# Patient Record
Sex: FEMALE | Race: WHITE | NOT HISPANIC OR LATINO | Employment: UNEMPLOYED | URBAN - METROPOLITAN AREA
[De-identification: names, ages, dates, MRNs, and addresses within clinical notes are randomized per-mention and may not be internally consistent; named-entity substitution may affect disease eponyms.]

---

## 2017-01-16 ENCOUNTER — OFFICE VISIT (OUTPATIENT)
Dept: INTERNAL MEDICINE | Facility: CLINIC | Age: 52
End: 2017-01-16
Payer: MEDICARE

## 2017-01-16 VITALS
HEART RATE: 87 BPM | TEMPERATURE: 99 F | SYSTOLIC BLOOD PRESSURE: 117 MMHG | WEIGHT: 169.31 LBS | HEIGHT: 64 IN | OXYGEN SATURATION: 96 % | DIASTOLIC BLOOD PRESSURE: 70 MMHG | RESPIRATION RATE: 17 BRPM | BODY MASS INDEX: 28.91 KG/M2

## 2017-01-16 DIAGNOSIS — Z01.419 WELL WOMAN EXAM WITH ROUTINE GYNECOLOGICAL EXAM: ICD-10-CM

## 2017-01-16 DIAGNOSIS — F98.8 ADD (ATTENTION DEFICIT DISORDER): ICD-10-CM

## 2017-01-16 DIAGNOSIS — J01.90 ACUTE SINUSITIS, UNSPECIFIED: ICD-10-CM

## 2017-01-16 DIAGNOSIS — Z12.39 BREAST CANCER SCREENING: ICD-10-CM

## 2017-01-16 DIAGNOSIS — F43.0 ACUTE STRESS REACTION: Primary | ICD-10-CM

## 2017-01-16 PROCEDURE — 99999 PR PBB SHADOW E&M-EST. PATIENT-LVL III: CPT | Mod: PBBFAC,,, | Performed by: FAMILY MEDICINE

## 2017-01-16 PROCEDURE — 1159F MED LIST DOCD IN RCRD: CPT | Mod: S$GLB,,, | Performed by: FAMILY MEDICINE

## 2017-01-16 PROCEDURE — 99499 UNLISTED E&M SERVICE: CPT | Mod: S$GLB,,, | Performed by: FAMILY MEDICINE

## 2017-01-16 PROCEDURE — 99214 OFFICE O/P EST MOD 30 MIN: CPT | Mod: S$GLB,,, | Performed by: FAMILY MEDICINE

## 2017-01-16 RX ORDER — LISDEXAMFETAMINE DIMESYLATE 40 MG/1
40 CAPSULE ORAL EVERY MORNING
Qty: 30 CAPSULE | Refills: 0 | Status: SHIPPED | OUTPATIENT
Start: 2017-02-16 | End: 2017-04-21 | Stop reason: SDUPTHER

## 2017-01-16 RX ORDER — LISDEXAMFETAMINE DIMESYLATE 40 MG/1
40 CAPSULE ORAL EVERY MORNING
Qty: 30 CAPSULE | Refills: 0 | Status: SHIPPED | OUTPATIENT
Start: 2017-01-16 | End: 2017-04-21 | Stop reason: SDUPTHER

## 2017-01-16 RX ORDER — AMOXICILLIN 875 MG/1
875 TABLET, FILM COATED ORAL EVERY 12 HOURS
Qty: 14 TABLET | Refills: 0 | Status: SHIPPED | OUTPATIENT
Start: 2017-01-16 | End: 2017-01-23

## 2017-01-16 RX ORDER — ONDANSETRON 4 MG/1
4 TABLET, ORALLY DISINTEGRATING ORAL EVERY 8 HOURS PRN
Qty: 14 TABLET | Refills: 1 | Status: SHIPPED | OUTPATIENT
Start: 2017-01-16 | End: 2017-12-20 | Stop reason: SDUPTHER

## 2017-01-16 RX ORDER — BENZONATATE 100 MG/1
100 CAPSULE ORAL 3 TIMES DAILY PRN
Qty: 30 CAPSULE | Refills: 1 | Status: SHIPPED | OUTPATIENT
Start: 2017-01-16 | End: 2017-08-01 | Stop reason: ALTCHOICE

## 2017-01-16 RX ORDER — LISDEXAMFETAMINE DIMESYLATE 40 MG/1
40 CAPSULE ORAL EVERY MORNING
Qty: 30 CAPSULE | Refills: 0 | Status: SHIPPED | OUTPATIENT
Start: 2017-03-16 | End: 2017-04-21 | Stop reason: SDUPTHER

## 2017-01-16 RX ORDER — CITALOPRAM 20 MG/1
20 TABLET, FILM COATED ORAL DAILY
Qty: 30 TABLET | Refills: 11 | Status: SHIPPED | OUTPATIENT
Start: 2017-01-16 | End: 2018-03-08 | Stop reason: SDUPTHER

## 2017-01-16 RX ORDER — ALBUTEROL SULFATE 1.25 MG/3ML
SOLUTION RESPIRATORY (INHALATION)
Qty: 75 ML | Refills: 6 | Status: SHIPPED | OUTPATIENT
Start: 2017-01-16 | End: 2018-07-16 | Stop reason: SDUPTHER

## 2017-01-16 RX ORDER — BUTALBITAL, ACETAMINOPHEN AND CAFFEINE 50; 325; 40 MG/1; MG/1; MG/1
1 TABLET ORAL EVERY 6 HOURS PRN
Qty: 30 TABLET | Refills: 2 | Status: SHIPPED | OUTPATIENT
Start: 2017-01-16 | End: 2017-02-15

## 2017-01-16 NOTE — PROGRESS NOTES
Chief Complaint:    Chief Complaint   Patient presents with    Medication Refill       History of Present Illness:    She presents today for ADD refill.  The medicines are working well.  She does a positive stress she has several people living in her house which is stressing out.    Her son was in drug rehabilitation.  She gets stressed out cries she is not suicidal or homicidal.  She does want something to help relieve the stress.    She's also had some sinus congestion cough postnasal drip for the past few days.            ROS:  Review of Systems   Constitutional: Negative for activity change, chills, fatigue, fever and unexpected weight change.   HENT: Positive for congestion, postnasal drip, rhinorrhea and sinus pressure. Negative for ear discharge, ear pain and hearing loss.    Eyes: Negative for pain and visual disturbance.   Respiratory: Positive for cough. Negative for chest tightness and shortness of breath.    Cardiovascular: Negative for chest pain and palpitations.   Gastrointestinal: Negative for abdominal pain, diarrhea and vomiting.   Endocrine: Negative for heat intolerance.   Genitourinary: Negative for dysuria, flank pain, frequency and hematuria.   Musculoskeletal: Negative for back pain, gait problem and neck pain.   Skin: Negative for color change and rash.   Neurological: Negative for dizziness, tremors, seizures, numbness and headaches.   Psychiatric/Behavioral: Negative for agitation, hallucinations, self-injury, sleep disturbance and suicidal ideas. The patient is not nervous/anxious.        Past Medical History   Diagnosis Date    ADD (attention deficit disorder with hyperactivity)     Anxiety     Asthma      mild intermittent    Depression        Social History:  Social History     Social History    Marital status:      Spouse name: N/A    Number of children: N/A    Years of education: N/A     Social History Main Topics    Smoking status: Current Every Day Smoker      "Packs/day: 0.50     Years: 10.00     Types: Cigarettes    Smokeless tobacco: Never Used    Alcohol use No    Drug use: No    Sexual activity: Yes     Partners: Male     Birth control/ protection: None     Other Topics Concern    None     Social History Narrative       Family History:   family history includes Coronary artery disease (age of onset: 38) in her father; Coronary artery disease (age of onset: 50) in her mother; Lung cancer in her mother. There is no history of Breast cancer, Colon cancer, or Ovarian cancer.    Health Maintenance   Topic Date Due    Lipid Panel  1965    TETANUS VACCINE  11/01/1982    Colonoscopy  11/01/2014    Mammogram  03/24/2016    Influenza Vaccine  08/01/2016    Pap Smear  03/11/2018    Pneumococcal PPSV23 (Medium Risk) (2) 11/01/2029    Hepatitis C Screening  Completed       Physical Exam:    Vital Signs  Temp: 98.5 °F (36.9 °C)  Temp src: Tympanic  Pulse: 87  Resp: 17  SpO2: 96 %  BP: 117/70  BP Location: Left arm  BP Method: Manual  Patient Position: Sitting  Pain Score: 0-No pain  Height and Weight  Height: 5' 4" (162.6 cm)  Weight: 76.8 kg (169 lb 5 oz)  BSA (Calculated - sq m): 1.86 sq meters  BMI (Calculated): 29.1  Weight in (lb) to have BMI = 25: 145.3]    Body mass index is 29.06 kg/(m^2).    Physical Exam   Constitutional: She is oriented to person, place, and time. She appears well-developed.   HENT:   Right Ear: External ear normal.   Left Ear: External ear normal.   Mouth/Throat: Oropharynx is clear and moist.   Eyes: Conjunctivae are normal. Pupils are equal, round, and reactive to light.   Neck: Normal range of motion. Neck supple.   Cardiovascular: Normal rate, regular rhythm and normal heart sounds.    No murmur heard.  Pulmonary/Chest: Effort normal and breath sounds normal. No respiratory distress. She has no wheezes. She has no rales. She exhibits no tenderness.   Abdominal: Soft. She exhibits no distension and no mass. There is no tenderness. " There is no guarding.   Musculoskeletal: She exhibits no edema or tenderness.   Lymphadenopathy:     She has no cervical adenopathy.   Neurological: She is alert and oriented to person, place, and time. She has normal reflexes.   Skin: Skin is warm and dry.   Psychiatric: She has a normal mood and affect. Her behavior is normal. Judgment and thought content normal.       No results found for: CHOL, TRIG, HDL, TOTALCHOLEST, NONHDLCHOL    No results found for: HGBA1C    Assessment:      ICD-10-CM ICD-9-CM   1. Acute stress reaction F43.0 308.9   2. ADD (attention deficit disorder) F98.8 314.00   3. Acute sinusitis, unspecified J01.90 461.9   4. Well woman exam with routine gynecological exam Z01.419 V72.31   5. Breast cancer screening Z12.39 V76.10         Plan:  1.  Acute stress reaction start Celexa  2.  ADD  looked up 3 prescriptions filled for Vyvanse.  2.  Sinusitis acute, treat with amoxicillin.  4.  Schedule a screening mammogram  She will be referred to GYN for Pap smear.  Orders Placed This Encounter   Procedures    Mammo Digital Screening Bilat with CAD    Ambulatory Referral to Obstetrics / Gynecology       Current Outpatient Prescriptions   Medication Sig Dispense Refill    albuterol (ACCUNEB) 1.25 mg/3 mL Nebu USE ONE VIAL IN NEBULIZER EVERY 4 TO 6 HOURS AS NEEDED FOR WHEEZING & SHORTNESS OF BREATH **THANK YOU** 75 mL 6    albuterol (PROAIR HFA) 90 mcg/actuation inhaler Inhale 2 puffs into the lungs every 6 (six) hours as needed for Wheezing. 25.5 g 3    fluticasone (FLONASE) 50 mcg/actuation nasal spray 2 sprays by Each Nare route once daily. 16 g 11    levocetirizine (XYZAL) 5 MG tablet TAKE ONE TABLET BY MOUTH EVERY EVENING **THANK YOU** 30 tablet 6    lisdexamfetamine (VYVANSE) 40 MG Cap Take 1 capsule (40 mg total) by mouth every morning. 30 capsule 0    [START ON 2/16/2017] lisdexamfetamine (VYVANSE) 40 MG Cap Take 1 capsule (40 mg total) by mouth every morning. 30 capsule 0    [START ON  3/16/2017] lisdexamfetamine (VYVANSE) 40 MG Cap Take 1 capsule (40 mg total) by mouth every morning. 30 capsule 0    montelukast (SINGULAIR) 10 mg tablet 1 Tablet Oral Every day 90 tablet 3    omeprazole (PRILOSEC) 40 MG capsule Take 1 capsule (40 mg total) by mouth once daily. 90 capsule 3    ondansetron (ZOFRAN-ODT) 4 MG TbDL Take 1 tablet (4 mg total) by mouth every 8 (eight) hours as needed. 14 tablet 1    tizanidine (ZANAFLEX) 4 mg Cap Take 1 capsule by mouth 2 (two) times daily as needed. 60 capsule 0    amoxicillin (AMOXIL) 875 MG tablet Take 1 tablet (875 mg total) by mouth every 12 (twelve) hours. 14 tablet 0    benzonatate (TESSALON) 100 MG capsule Take 1 capsule (100 mg total) by mouth 3 (three) times daily as needed for Cough. 30 capsule 1    butalbital-acetaminophen-caffeine -40 mg (FIORICET, ESGIC) -40 mg per tablet Take 1 tablet by mouth every 6 (six) hours as needed for Pain. 30 tablet 2    citalopram (CELEXA) 20 MG tablet Take 1 tablet (20 mg total) by mouth once daily. 30 tablet 11    epinephrine (EPIPEN 2-JHONY) 0.3 mg/0.3 mL AtIn Inject 0.3 mLs (0.3 mg total) into the muscle once. 0.3 mL 12     No current facility-administered medications for this visit.        Medications Discontinued During This Encounter   Medication Reason    lisdexamfetamine (VYVANSE) 40 MG Cap Reorder    lisdexamfetamine (VYVANSE) 40 MG Cap Reorder    lisdexamfetamine (VYVANSE) 40 MG Cap Reorder    albuterol (ACCUNEB) 1.25 mg/3 mL Nebu Reorder    ondansetron (ZOFRAN-ODT) 4 MG TbDL Reorder       No Follow-up on file.      Dr Dima Her MD    Disclaimer: This note is prepared using voice recognition system and as such is likely to have errors and is not proof read.

## 2017-01-16 NOTE — MR AVS SNAPSHOT
[facility]  [address] [place] [id number]  Phone: [phone]                  Chasity Blake   2017 10:40 AM   Office Visit    Description:  Female : 1965   Provider:  Dima Her MD   Department:  [facility]           Reason for Visit     Medication Refill           Diagnoses this Visit        Comments    Acute stress reaction    -  Primary     ADD (attention deficit disorder)         Acute sinusitis, unspecified         Well woman exam with routine gynecological exam         Breast cancer screening                To Do List           Future Appointments        Provider Department Dept Phone    2017 10:00 AM ONLH MAMMO1 [facility] [phone]    2017 11:00 AM MD EDIL[facility] - OB/ [phone]    2017 10:40 AM Dima Her MD [facility] [phone]      Goals (5 Years of Data)     None       These Medications        Disp Refills Start End    lisdexamfetamine (VYVANSE) 40 MG Cap 30 capsule 0 2017     Take 1 capsule (40 mg total) by mouth every morning. - Oral    Pharmacy: [facility] [place] [address] Ph #: [phone]       lisdexamfetamine (VYVANSE) 40 MG Cap 30 capsule 0 2017     Take 1 capsule (40 mg total) by mouth every morning. - Oral    Pharmacy: [facility] - [place] [address] Ph #: [phone]       lisdexamfetamine (VYVANSE) 40 MG Cap 30 capsule 0 3/16/2017     Take 1 capsule (40 mg total) by mouth every morning. - Oral    Pharmacy: [facility] [place] [address] Ph #: [phone]       albuterol (ACCUNEB) 1.25 mg/3 mL Nebu 75 mL 6 2017     USE ONE VIAL IN NEBULIZER EVERY 4 TO 6 HOURS AS NEEDED FOR WHEEZING & SHORTNESS OF BREATH **THANK YOU**    Pharmacy: [facility] [address]  #: [phone]       butalbital-acetaminophen-caffeine -40 mg (FIORICET, ESGIC) -40 mg per tablet 30 tablet 2 1/16/2017 2/15/2017    Take 1 tablet by mouth every 6 (six) hours as needed for Pain. - Oral    Pharmacy: [address] #: [phone]       ondansetron (ZOFRAN-ODT) 4 MG TbDL 14 tablet 1 1/16/2017     Take 1 tablet (4 mg total) by mouth every 8 (eight) hours as needed. - Oral    Pharmacy: [address] #: [phone]       citalopram (CELEXA) 20 MG tablet 30 tablet 11 1/16/2017 1/16/2018    Take 1 tablet (20 mg total) by mouth once daily. - Oral    Pharmacy: [address] #: [phone]       amoxicillin (AMOXIL) 875 MG tablet 14 tablet 0 1/16/2017 1/23/2017    Take 1 tablet (875 mg total) by mouth every 12 (twelve) hours. - Oral    Pharmacy: [address] #: [phone]       Notes to Pharmacy: Pharmacist to go over the side effects.    benzonatate (TESSALON) 100 MG capsule 30 capsule 1 1/16/2017     Take 1 capsule (100 mg total) by mouth 3 (three) times daily as needed for Cough. - Oral    Pharmacy: [address] #: [phone]         [facility] - 24/7 Assistance  Registered nurses in the [facility] provide clinical advisement, health education, appointment booking, and other advisory services.  Call for this free service at [phone].             Medications           Message regarding Medications     Verify the changes and/or additions to your medication regime listed below are the same as discussed with your clinician today.  If any of these changes or additions are incorrect, please notify your healthcare provider.        START  taking these NEW medications        Refills    butalbital-acetaminophen-caffeine -40 mg (FIORICET, ESGIC) -40 mg per tablet 2    Sig: Take 1 tablet by mouth every 6 (six) hours as needed for Pain.    Class: Normal    Route: Oral    citalopram (CELEXA) 20 MG tablet 11    Sig: Take 1 tablet (20 mg total) by mouth once daily.    Class: Normal    Route: Oral    amoxicillin (AMOXIL) 875 MG tablet 0    Sig: Take 1 tablet (875 mg total) by mouth every 12 (twelve) hours.    Class: Normal    Route: Oral    benzonatate (TESSALON) 100 MG capsule 1    Sig: Take 1 capsule (100 mg total) by mouth 3 (three) times daily as needed for Cough.    Class: Normal    Route: Oral           Verify that the below list of medications is an accurate representation of the medications you are currently taking.  If none reported, the list may be blank. If incorrect, please contact your healthcare provider. Carry this list with you in case of emergency.           Current Medications     albuterol (ACCUNEB) 1.25 mg/3 mL Nebu USE ONE VIAL IN NEBULIZER EVERY 4 TO 6 HOURS AS NEEDED FOR WHEEZING & SHORTNESS OF BREATH **THANK YOU**    albuterol (PROAIR HFA) 90 mcg/actuation inhaler Inhale 2 puffs into the lungs every 6 (six) hours as needed for Wheezing.    fluticasone (FLONASE) 50 mcg/actuation nasal spray 2 sprays by Each Nare route once daily.    levocetirizine (XYZAL) 5 MG tablet TAKE ONE TABLET BY MOUTH EVERY EVENING **THANK YOU**    lisdexamfetamine (VYVANSE) 40 MG Cap Take 1 capsule (40 mg total) by mouth every morning.    lisdexamfetamine (VYVANSE) 40 MG Cap Starting on Feb 16, 2017. Take 1 capsule (40 mg total) by mouth every morning.    lisdexamfetamine (VYVANSE) 40 MG Cap Starting on Mar 16, 2017. Take 1 capsule (40 mg total) by mouth every morning.    montelukast (SINGULAIR) 10 mg tablet 1 Tablet Oral Every day    omeprazole (PRILOSEC) 40 MG capsule Take 1 capsule (40 mg total) by mouth once daily.    ondansetron (ZOFRAN-ODT) 4  "MG TbDL Take 1 tablet (4 mg total) by mouth every 8 (eight) hours as needed.    tizanidine (ZANAFLEX) 4 mg Cap Take 1 capsule by mouth 2 (two) times daily as needed.    amoxicillin (AMOXIL) 875 MG tablet Take 1 tablet (875 mg total) by mouth every 12 (twelve) hours.    benzonatate (TESSALON) 100 MG capsule Take 1 capsule (100 mg total) by mouth 3 (three) times daily as needed for Cough.    butalbital-acetaminophen-caffeine -40 mg (FIORICET, ESGIC) -40 mg per tablet Take 1 tablet by mouth every 6 (six) hours as needed for Pain.    citalopram (CELEXA) 20 MG tablet Take 1 tablet (20 mg total) by mouth once daily.    epinephrine (EPIPEN 2-JHONY) 0.3 mg/0.3 mL AtIn Inject 0.3 mLs (0.3 mg total) into the muscle once.           Clinical Reference Information           Vital Signs - Last Recorded  Most recent update: 1/16/2017 11:10 AM by Jeanne Desai MA    BP Pulse Temp Resp Ht Wt    117/70 (BP Location: Left arm, Patient Position: Sitting, BP Method: Manual) 87 98.5 °F (36.9 °C) (Tympanic) 17 5' 4" (1.626 m) 76.8 kg (169 lb 5 oz)    SpO2 BMI             96% 29.06 kg/m2         Blood Pressure          Most Recent Value    BP  117/70      Allergies as of 1/16/2017     Percocet [Oxycodone-acetaminophen]      Immunizations Administered on Date of Encounter - 1/16/2017     None      Orders Placed During Today's Visit      Normal Orders This Visit    Ambulatory Referral to Obstetrics / Gynecology     Future Labs/Procedures Expected by Expires    Mammo Digital Screening Bilat with CAD  1/16/2017 (Approximate) 3/18/2018      Smoking Cessation     If you would like to quit smoking:   You may be eligible for free services if you are a [place] resident and started smoking cigarettes before September 1, 1988.  Call the [employer]oking [employer]ess[employer] ([employer]) toll free at (655) 701-7217 or (676) 379-5926.   Call 1-800-QUIT-NOW if you do not meet the above criteria.            "

## 2017-04-21 ENCOUNTER — TELEPHONE (OUTPATIENT)
Dept: INTERNAL MEDICINE | Facility: CLINIC | Age: 52
End: 2017-04-21

## 2017-04-21 ENCOUNTER — OFFICE VISIT (OUTPATIENT)
Dept: INTERNAL MEDICINE | Facility: CLINIC | Age: 52
End: 2017-04-21
Payer: MEDICARE

## 2017-04-21 VITALS
TEMPERATURE: 99 F | WEIGHT: 174.81 LBS | HEART RATE: 83 BPM | BODY MASS INDEX: 29.84 KG/M2 | OXYGEN SATURATION: 98 % | SYSTOLIC BLOOD PRESSURE: 130 MMHG | DIASTOLIC BLOOD PRESSURE: 80 MMHG | HEIGHT: 64 IN

## 2017-04-21 DIAGNOSIS — M71.30 SYNOVIAL CYST: ICD-10-CM

## 2017-04-21 DIAGNOSIS — Z12.39 BREAST CANCER SCREENING: ICD-10-CM

## 2017-04-21 DIAGNOSIS — Z12.11 COLON CANCER SCREENING: ICD-10-CM

## 2017-04-21 DIAGNOSIS — F43.0 ACUTE STRESS REACTION: ICD-10-CM

## 2017-04-21 DIAGNOSIS — F98.8 ADD (ATTENTION DEFICIT DISORDER): Primary | ICD-10-CM

## 2017-04-21 PROCEDURE — 99499 UNLISTED E&M SERVICE: CPT | Mod: S$GLB,,, | Performed by: FAMILY MEDICINE

## 2017-04-21 PROCEDURE — 99999 PR PBB SHADOW E&M-EST. PATIENT-LVL III: CPT | Mod: PBBFAC,,, | Performed by: FAMILY MEDICINE

## 2017-04-21 PROCEDURE — 1160F RVW MEDS BY RX/DR IN RCRD: CPT | Mod: S$GLB,,, | Performed by: FAMILY MEDICINE

## 2017-04-21 PROCEDURE — 99214 OFFICE O/P EST MOD 30 MIN: CPT | Mod: S$GLB,,, | Performed by: FAMILY MEDICINE

## 2017-04-21 RX ORDER — LISDEXAMFETAMINE DIMESYLATE 40 MG/1
40 CAPSULE ORAL EVERY MORNING
Qty: 30 CAPSULE | Refills: 0 | Status: SHIPPED | OUTPATIENT
Start: 2017-04-25 | End: 2017-08-01 | Stop reason: SDUPTHER

## 2017-04-21 RX ORDER — LISDEXAMFETAMINE DIMESYLATE 40 MG/1
40 CAPSULE ORAL EVERY MORNING
Qty: 30 CAPSULE | Refills: 0 | Status: SHIPPED | OUTPATIENT
Start: 2017-05-25 | End: 2017-08-01 | Stop reason: SDUPTHER

## 2017-04-21 RX ORDER — LISDEXAMFETAMINE DIMESYLATE 40 MG/1
40 CAPSULE ORAL EVERY MORNING
Qty: 30 CAPSULE | Refills: 0 | Status: SHIPPED | OUTPATIENT
Start: 2017-06-25 | End: 2017-08-01 | Stop reason: CLARIF

## 2017-04-21 NOTE — TELEPHONE ENCOUNTER
----- Message from Lizeth Chaidez sent at 4/21/2017 11:42 AM CDT -----  Contact: [facility]  States need to change fill date on Rx for Vyvanse. Please adv/call     Pt use..    [facility] [address] [place] [id number] [address] [address]  Phone: [phone] Fax: [phone]

## 2017-04-21 NOTE — PROGRESS NOTES
Chief Complaint:    Chief Complaint   Patient presents with    Follow-up     3 month       History of Present Illness:    Patient is here for follow-up of chronic liver problems,  She is in a lot of stress she has ADD medicine is working good.  She has a synovial cyst on the wrist that is getting bigger.  She's taking Celexa.  Asthma stable.      ROS:  Review of Systems   Constitutional: Negative for activity change, chills, fatigue, fever and unexpected weight change.   HENT: Negative for congestion, ear discharge, ear pain, hearing loss, postnasal drip and rhinorrhea.    Eyes: Negative for pain and visual disturbance.   Respiratory: Negative for cough, chest tightness and shortness of breath.    Cardiovascular: Negative for chest pain and palpitations.   Gastrointestinal: Negative for abdominal pain, diarrhea and vomiting.   Endocrine: Negative for heat intolerance.   Genitourinary: Negative for dysuria, flank pain, frequency and hematuria.   Musculoskeletal: Negative for back pain, gait problem and neck pain.   Skin: Negative for color change and rash.   Neurological: Negative for dizziness, tremors, seizures, numbness and headaches.   Psychiatric/Behavioral: Negative for agitation, hallucinations, self-injury, sleep disturbance and suicidal ideas. The patient is not nervous/anxious.        Past Medical History:   Diagnosis Date    ADD (attention deficit disorder with hyperactivity)     Anxiety     Asthma     mild intermittent    Depression        Social History:  Social History     Social History    Marital status:      Spouse name: N/A    Number of children: N/A    Years of education: N/A     Social History Main Topics    Smoking status: Current Every Day Smoker     Packs/day: 0.50     Years: 10.00     Types: Cigarettes    Smokeless tobacco: Never Used    Alcohol use No    Drug use: No    Sexual activity: Yes     Partners: Male     Birth control/ protection: None     Other Topics Concern  "   None     Social History Narrative       Family History:   family history includes Coronary artery disease (age of onset: 38) in her father; Coronary artery disease (age of onset: 50) in her mother; Lung cancer in her mother. There is no history of Breast cancer, Colon cancer, or Ovarian cancer.    Health Maintenance   Topic Date Due    Lipid Panel  1965    TETANUS VACCINE  11/01/1982    Colonoscopy  11/01/2014    Mammogram  03/24/2016    Influenza Vaccine  08/01/2016    Pap Smear  03/11/2018    Pneumococcal PPSV23 (Medium Risk) (2) 11/01/2029    Hepatitis C Screening  Completed       Physical Exam:    Vital Signs  Temp: 99 °F (37.2 °C)  Temp src: Tympanic  Pulse: 83  SpO2: 98 %  BP: 130/80  Pain Score: 0-No pain  Height and Weight  Height: 5' 4" (162.6 cm)  Weight: 79.3 kg (174 lb 13.2 oz)  BSA (Calculated - sq m): 1.89 sq meters  BMI (Calculated): 30.1  Weight in (lb) to have BMI = 25: 145.3]    Body mass index is 30.01 kg/(m^2).    Physical Exam   Constitutional: She is oriented to person, place, and time. She appears well-developed.   HENT:   Mouth/Throat: Oropharynx is clear and moist.   Eyes: Conjunctivae are normal. Pupils are equal, round, and reactive to light.   Neck: Normal range of motion. Neck supple.   Cardiovascular: Normal rate, regular rhythm and normal heart sounds.    No murmur heard.  Pulmonary/Chest: Effort normal and breath sounds normal. No respiratory distress. She has no wheezes. She has no rales. She exhibits no tenderness.   Abdominal: Soft. She exhibits no distension and no mass. There is no tenderness. There is no guarding.   Musculoskeletal: She exhibits no edema or tenderness.   Lymphadenopathy:     She has no cervical adenopathy.   Neurological: She is alert and oriented to person, place, and time. She has normal reflexes.   Skin: Skin is warm and dry.   Psychiatric: She has a normal mood and affect. Her behavior is normal. Judgment and thought content normal. "       No results found for: CHOL, TRIG, HDL, TOTALCHOLEST, NONHDLCHOL    No results found for: HGBA1C    Assessment:      ICD-10-CM ICD-9-CM   1. ADD (attention deficit disorder) F98.8 314.00   2. Synovial cyst M71.30 727.40   3. Acute stress reaction F43.0 308.9   4. Colon cancer screening Z12.11 V76.51   5. Breast cancer screening Z12.39 V76.10         Plan:  Patient's Vyvanse is been filled.  She'll be referred to orthopedic for synovial cyst.  Stress reaction is controlled.  Schedule a screening mammogram and colonoscopy.  We'll do a Pap smear next visit.        Orders Placed This Encounter   Procedures    Mammo Digital Screening Bilat with CAD    Ambulatory referral to Orthopedics       Current Outpatient Prescriptions   Medication Sig Dispense Refill    albuterol (ACCUNEB) 1.25 mg/3 mL Nebu USE ONE VIAL IN NEBULIZER EVERY 4 TO 6 HOURS AS NEEDED FOR WHEEZING & SHORTNESS OF BREATH **THANK YOU** 75 mL 6    albuterol (PROAIR HFA) 90 mcg/actuation inhaler Inhale 2 puffs into the lungs every 6 (six) hours as needed for Wheezing. 25.5 g 3    benzonatate (TESSALON) 100 MG capsule Take 1 capsule (100 mg total) by mouth 3 (three) times daily as needed for Cough. 30 capsule 1    citalopram (CELEXA) 20 MG tablet Take 1 tablet (20 mg total) by mouth once daily. 30 tablet 11    fluticasone (FLONASE) 50 mcg/actuation nasal spray 2 sprays by Each Nare route once daily. 16 g 11    levocetirizine (XYZAL) 5 MG tablet TAKE ONE TABLET BY MOUTH EVERY EVENING **THANK YOU** 30 tablet 6    [START ON 4/25/2017] lisdexamfetamine (VYVANSE) 40 MG Cap Take 1 capsule (40 mg total) by mouth every morning. 30 capsule 0    montelukast (SINGULAIR) 10 mg tablet 1 Tablet Oral Every day 90 tablet 3    omeprazole (PRILOSEC) 40 MG capsule Take 1 capsule (40 mg total) by mouth once daily. 90 capsule 3    ondansetron (ZOFRAN-ODT) 4 MG TbDL Take 1 tablet (4 mg total) by mouth every 8 (eight) hours as needed. 14 tablet 1    tizanidine  (ZANAFLEX) 4 mg Cap Take 1 capsule by mouth 2 (two) times daily as needed. 60 capsule 0    epinephrine (EPIPEN 2-JHONY) 0.3 mg/0.3 mL AtIn Inject 0.3 mLs (0.3 mg total) into the muscle once. 0.3 mL 12    [START ON 6/25/2017] lisdexamfetamine (VYVANSE) 40 MG Cap Take 1 capsule (40 mg total) by mouth every morning. 30 capsule 0    [START ON 5/25/2017] lisdexamfetamine (VYVANSE) 40 MG Cap Take 1 capsule (40 mg total) by mouth every morning. 30 capsule 0     No current facility-administered medications for this visit.        Medications Discontinued During This Encounter   Medication Reason    lisdexamfetamine (VYVANSE) 40 MG Cap Reorder    lisdexamfetamine (VYVANSE) 40 MG Cap Reorder    lisdexamfetamine (VYVANSE) 40 MG Cap Reorder       No Follow-up on file.      Dr Dima Her MD    Disclaimer: This note is prepared using voice recognition system and as such is likely to have errors and is not proof read.

## 2017-04-21 NOTE — MR AVS SNAPSHOT
[facility]  [address] [place] [address]  Phone: [phone]                  Chasity Blake   2017 10:40 AM   Office Visit    Description:  Female : 1965   Provider:  Dima Her MD   Department:  [facility]           Reason for Visit     Follow-up           Diagnoses this Visit        Comments    ADD (attention deficit disorder)    -  Primary     Synovial cyst         Acute stress reaction         Colon cancer screening         Breast cancer screening                To Do List           Future Appointments        Provider Department Dept Phone    5/3/2017 11:00 AM Agustin Garcia MD [facility] [phone]    5/10/2017 12:30 PM [place] [id number] [facility] [phone]      Goals (5 Years of Data)     None       These Medications        Disp Refills Start End    lisdexamfetamine (VYVANSE) 40 MG Cap 30 capsule 0 2017     Take 1 capsule (40 mg total) by mouth every morning. - Oral    Pharmacy: [place] [id number] Ph #: [phone]       lisdexamfetamine (VYVANSE) 40 MG Cap 30 capsule 0 2017     Take 1 capsule (40 mg total) by mouth every morning. - Oral    Pharmacy: [place] [id number] Ph #: [phone]       lisdexamfetamine (VYVANSE) 40 MG Cap 30 capsule 0 2017     Take 1 capsule (40 mg total) by mouth every morning. - Oral    Pharmacy: [place] [id number] Ph #: [phone]         [facility] On Call     [facility] -  Assistance  Unless otherwise directed by your provider, please contact [facility], our nurse care line that is available for  assistance.     Registered nurses in the [facility] provide: appointment scheduling, clinical advisement, health education, and other advisory services.  Call:  [phone] (toll free)               Medications           Message regarding Medications     Verify the changes and/or additions to your medication regime listed below are the same as discussed with your clinician today.  If any of these changes or additions are incorrect, please notify your healthcare provider.             Verify that the below list of medications is an accurate representation of the medications you are currently taking.  If none reported, the list may be blank. If incorrect, please contact your healthcare provider. Carry this list with you in case of emergency.           Current Medications     albuterol (ACCUNEB) 1.25 mg/3 mL Nebu USE ONE VIAL IN NEBULIZER EVERY 4 TO 6 HOURS AS NEEDED FOR WHEEZING & SHORTNESS OF BREATH **THANK YOU**    albuterol (PROAIR HFA) 90 mcg/actuation inhaler Inhale 2 puffs into the lungs every 6 (six) hours as needed for Wheezing.    benzonatate (TESSALON) 100 MG capsule Take 1 capsule (100 mg total) by mouth 3 (three) times daily as needed for Cough.    citalopram (CELEXA) 20 MG tablet Take 1 tablet (20 mg total) by mouth once daily.    epinephrine (EPIPEN 2-JHONY) 0.3 mg/0.3 mL AtIn Inject 0.3 mLs (0.3 mg total) into the muscle once.    fluticasone (FLONASE) 50 mcg/actuation nasal spray 2 sprays by Each Nare route once daily.    levocetirizine (XYZAL) 5 MG tablet TAKE ONE TABLET BY MOUTH EVERY EVENING **THANK YOU**    lisdexamfetamine (VYVANSE) 40 MG Cap Starting on Apr 25, 2017. Take 1 capsule (40 mg total) by mouth every morning.    lisdexamfetamine (VYVANSE) 40 MG Cap Starting on Jun 25, 2017. Take 1 capsule (40 mg total) by mouth every morning.    lisdexamfetamine (VYVANSE) 40 MG Cap Starting on May 25, 2017. Take 1 capsule (40 mg total) by mouth every morning.    montelukast (SINGULAIR) 10 mg tablet 1 Tablet Oral Every day    omeprazole (PRILOSEC) 40 MG capsule Take 1 capsule (40 mg total) by mouth once daily.    ondansetron (ZOFRAN-ODT) 4 MG TbDL Take 1 tablet  "(4 mg total) by mouth every 8 (eight) hours as needed.    tizanidine (ZANAFLEX) 4 mg Cap Take 1 capsule by mouth 2 (two) times daily as needed.           Clinical Reference Information           Your Vitals Were     BP Pulse Temp Height Weight SpO2    130/80 83 99 °F (37.2 °C) (Tympanic) 5' 4" (1.626 m) 79.3 kg (174 lb 13.2 oz) 98%    BMI                30.01 kg/m2          Blood Pressure          Most Recent Value    BP  130/80      Allergies as of 4/21/2017     Percocet [Oxycodone-acetaminophen]      Immunizations Administered on Date of Encounter - 4/21/2017     None      Orders Placed During Today's Visit      Normal Orders This Visit    Ambulatory referral to Orthopedics     Case request GI: COLONOSCOPY     Future Labs/Procedures Expected by Expires    Mammo Digital Screening Bilat with CAD  4/21/2017 (Approximate) 6/21/2018      Smoking Cessation     If you would like to quit smoking:   You may be eligible for free services if you are a [place] resident and started smoking cigarettes before September 1, 1988.  Call the Smoking Cessation Trust ([facility]) toll free at [phone] or [phone].   Call [phone]-NOW if you do not meet the above criteria.   Contact us via email: [email or web address]   View our website for more information: [email or web address]        Language Assistance Services     ATTENTION: Language assistance services are available, free of charge. Please call [phone].      ATENCIÓN: Si habla español, tiene a gusman disposición servicios gratuitos de asistencia lingüística. Llame al [phone].     [facility] Ý: N?u b?n nói Ti?ng Vi?t, có các d?ch v? h? tr? ngôn ng? mi?n phí dành cho b?n. G?i s? [phone].         [facility] complies with applicable Federal civil rights laws and does not discriminate on the basis of race, color, national origin, age, disability, or sex.        "

## 2017-05-02 DIAGNOSIS — M25.531 BILATERAL WRIST PAIN: Primary | ICD-10-CM

## 2017-05-02 DIAGNOSIS — M25.532 BILATERAL WRIST PAIN: Primary | ICD-10-CM

## 2017-05-09 RX ORDER — OMEPRAZOLE 40 MG/1
CAPSULE, DELAYED RELEASE ORAL
Qty: 90 CAPSULE | Refills: 3 | Status: SHIPPED | OUTPATIENT
Start: 2017-05-09 | End: 2018-08-06 | Stop reason: SDUPTHER

## 2017-05-10 ENCOUNTER — PATIENT MESSAGE (OUTPATIENT)
Dept: INTERNAL MEDICINE | Facility: CLINIC | Age: 52
End: 2017-05-10

## 2017-07-20 ENCOUNTER — PATIENT OUTREACH (OUTPATIENT)
Dept: ADMINISTRATIVE | Facility: HOSPITAL | Age: 52
End: 2017-07-20

## 2017-08-01 ENCOUNTER — LAB VISIT (OUTPATIENT)
Dept: LAB | Facility: HOSPITAL | Age: 52
End: 2017-08-01
Attending: FAMILY MEDICINE
Payer: MEDICARE

## 2017-08-01 ENCOUNTER — OFFICE VISIT (OUTPATIENT)
Dept: FAMILY MEDICINE | Facility: CLINIC | Age: 52
End: 2017-08-01
Payer: MEDICARE

## 2017-08-01 VITALS
SYSTOLIC BLOOD PRESSURE: 113 MMHG | BODY MASS INDEX: 29.24 KG/M2 | TEMPERATURE: 99 F | OXYGEN SATURATION: 97 % | HEART RATE: 83 BPM | DIASTOLIC BLOOD PRESSURE: 62 MMHG | WEIGHT: 171.31 LBS | HEIGHT: 64 IN

## 2017-08-01 DIAGNOSIS — R23.2 HOT FLASHES: ICD-10-CM

## 2017-08-01 DIAGNOSIS — F98.8 ATTENTION DEFICIT DISORDER, UNSPECIFIED HYPERACTIVITY PRESENCE: ICD-10-CM

## 2017-08-01 DIAGNOSIS — K21.9 GASTROESOPHAGEAL REFLUX DISEASE, ESOPHAGITIS PRESENCE NOT SPECIFIED: ICD-10-CM

## 2017-08-01 DIAGNOSIS — M79.672 FOOT PAIN, LEFT: ICD-10-CM

## 2017-08-01 DIAGNOSIS — F98.8 ATTENTION DEFICIT DISORDER, UNSPECIFIED HYPERACTIVITY PRESENCE: Primary | ICD-10-CM

## 2017-08-01 LAB
CHOLEST/HDLC SERPL: 3.7 {RATIO}
FSH SERPL-ACNC: 73.1 MIU/ML
HDL/CHOLESTEROL RATIO: 27.3 %
HDLC SERPL-MCNC: 176 MG/DL
HDLC SERPL-MCNC: 48 MG/DL
LDLC SERPL CALC-MCNC: 96.2 MG/DL
LH SERPL-ACNC: 27 MIU/ML
NONHDLC SERPL-MCNC: 128 MG/DL
TRIGL SERPL-MCNC: 159 MG/DL

## 2017-08-01 PROCEDURE — 83002 ASSAY OF GONADOTROPIN (LH): CPT

## 2017-08-01 PROCEDURE — 99499 UNLISTED E&M SERVICE: CPT | Mod: S$GLB,,, | Performed by: FAMILY MEDICINE

## 2017-08-01 PROCEDURE — 99999 PR PBB SHADOW E&M-EST. PATIENT-LVL III: CPT | Mod: PBBFAC,,, | Performed by: FAMILY MEDICINE

## 2017-08-01 PROCEDURE — 83001 ASSAY OF GONADOTROPIN (FSH): CPT

## 2017-08-01 PROCEDURE — 36415 COLL VENOUS BLD VENIPUNCTURE: CPT | Mod: PO

## 2017-08-01 PROCEDURE — 80061 LIPID PANEL: CPT

## 2017-08-01 PROCEDURE — 99214 OFFICE O/P EST MOD 30 MIN: CPT | Mod: S$GLB,,, | Performed by: FAMILY MEDICINE

## 2017-08-01 RX ORDER — VARENICLINE TARTRATE 0.5 (11)-1
KIT ORAL
Qty: 1 PACKAGE | Status: CANCELLED | OUTPATIENT
Start: 2017-08-01

## 2017-08-01 RX ORDER — VARENICLINE TARTRATE 0.5 (11)-1
1 KIT ORAL
Qty: 1 PACKAGE | Refills: 0 | Status: SHIPPED | OUTPATIENT
Start: 2017-08-01 | End: 2017-11-09

## 2017-08-01 RX ORDER — LISDEXAMFETAMINE DIMESYLATE 40 MG/1
40 CAPSULE ORAL EVERY MORNING
Qty: 30 CAPSULE | Refills: 0 | Status: SHIPPED | OUTPATIENT
Start: 2017-10-01 | End: 2017-12-20 | Stop reason: SDUPTHER

## 2017-08-01 RX ORDER — TIZANIDINE HYDROCHLORIDE 4 MG/1
1 CAPSULE, GELATIN COATED ORAL 2 TIMES DAILY PRN
Qty: 60 CAPSULE | Refills: 0 | Status: SHIPPED | OUTPATIENT
Start: 2017-08-01 | End: 2018-05-24 | Stop reason: SDUPTHER

## 2017-08-01 RX ORDER — LISDEXAMFETAMINE DIMESYLATE 40 MG/1
40 CAPSULE ORAL EVERY MORNING
Qty: 30 CAPSULE | Refills: 0 | Status: SHIPPED | OUTPATIENT
Start: 2017-08-01 | End: 2017-10-12 | Stop reason: SDUPTHER

## 2017-08-01 RX ORDER — VARENICLINE TARTRATE 0.5 (11)-1
KIT ORAL
COMMUNITY
End: 2017-08-01 | Stop reason: SDUPTHER

## 2017-08-01 RX ORDER — PANTOPRAZOLE SODIUM 40 MG/1
40 TABLET, DELAYED RELEASE ORAL DAILY
Qty: 30 TABLET | Refills: 1 | Status: SHIPPED | OUTPATIENT
Start: 2017-08-01 | End: 2017-11-09

## 2017-08-01 RX ORDER — LISDEXAMFETAMINE DIMESYLATE 40 MG/1
40 CAPSULE ORAL EVERY MORNING
Qty: 30 CAPSULE | Refills: 0 | Status: SHIPPED | OUTPATIENT
Start: 2017-09-01 | End: 2017-12-20 | Stop reason: SDUPTHER

## 2017-08-01 NOTE — PROGRESS NOTES
Chief Complaint:    Chief Complaint   Patient presents with    Arthritis    Medication Refill       History of Present Illness:    She revealed the following commands,  She has ADHD wants refill on vitamins,  She is also having hot flashes going on for the past few weeks  She has some chronic foot pain mostly when she tries to walk.  She takes Prevacid for GERD also try different medicine because insurance not paying  She is has started smoking again once her prescription for Chantix.  Also due for mammogram and colonoscopies which has been scheduled numerous times and she has not kept that appointment.    ROS:  Review of Systems   Constitutional: Negative for activity change, chills, fatigue, fever and unexpected weight change.   HENT: Negative for congestion, ear discharge, ear pain, hearing loss, postnasal drip and rhinorrhea.    Eyes: Negative for pain and visual disturbance.   Respiratory: Negative for cough, chest tightness and shortness of breath.    Cardiovascular: Negative for chest pain and palpitations.   Gastrointestinal: Negative for abdominal pain, diarrhea and vomiting.   Endocrine: Negative for heat intolerance.   Genitourinary: Negative for dysuria, flank pain, frequency and hematuria.   Musculoskeletal: Negative for back pain, gait problem and neck pain.   Skin: Negative for color change and rash.   Neurological: Negative for dizziness, tremors, seizures, numbness and headaches.   Psychiatric/Behavioral: Negative for agitation, hallucinations, self-injury, sleep disturbance and suicidal ideas. The patient is not nervous/anxious.        Past Medical History:   Diagnosis Date    ADD (attention deficit disorder with hyperactivity)     Anxiety     Asthma     mild intermittent    Depression        Social History:  Social History     Social History    Marital status:      Spouse name: N/A    Number of children: N/A    Years of education: N/A     Social History Main Topics    Smoking  "status: Current Every Day Smoker     Packs/day: 0.50     Years: 10.00     Types: Cigarettes    Smokeless tobacco: Never Used    Alcohol use No    Drug use: No    Sexual activity: Yes     Partners: Male     Birth control/ protection: None     Other Topics Concern    None     Social History Narrative    None       Family History:   family history includes Coronary artery disease (age of onset: 38) in her father; Coronary artery disease (age of onset: 50) in her mother; Lung cancer in her mother.    Health Maintenance   Topic Date Due    Lipid Panel  1965    TETANUS VACCINE  11/01/1982    Colonoscopy  11/01/2014    Mammogram  03/24/2016    Influenza Vaccine  08/01/2017    Pap Smear  03/11/2018    Pneumococcal PPSV23 (Medium Risk) (2) 11/01/2029    Hepatitis C Screening  Completed       Physical Exam:    Vital Signs  Temp: 99.1 °F (37.3 °C)  Temp src: Oral  Pulse: 83  SpO2: 97 %  BP: 113/62  BP Location: Left arm  BP Method: Manual  Patient Position: Sitting  Pain Score: 0-No pain  Height and Weight  Height: 5' 4" (162.6 cm)  Weight: 77.7 kg (171 lb 4.8 oz)  BSA (Calculated - sq m): 1.87 sq meters  BMI (Calculated): 29.5  Weight in (lb) to have BMI = 25: 145.3]    Body mass index is 29.4 kg/m².    Physical Exam   Constitutional: She is oriented to person, place, and time. She appears well-developed.   HENT:   Mouth/Throat: Oropharynx is clear and moist.   Eyes: Conjunctivae are normal. Pupils are equal, round, and reactive to light.   Neck: Normal range of motion. Neck supple.   Cardiovascular: Normal rate, regular rhythm and normal heart sounds.    No murmur heard.  Pulmonary/Chest: Effort normal and breath sounds normal. No respiratory distress. She has no wheezes. She has no rales. She exhibits no tenderness.   Abdominal: Soft. She exhibits no distension and no mass. There is no tenderness. There is no guarding.   Musculoskeletal: She exhibits no edema or tenderness.        " Feet:    Lymphadenopathy:     She has no cervical adenopathy.   Neurological: She is alert and oriented to person, place, and time. She has normal reflexes.   Skin: Skin is warm and dry.   Psychiatric: She has a normal mood and affect. Her behavior is normal. Judgment and thought content normal.       No results found for: CHOL, TRIG, HDL, TOTALCHOLEST, NONHDLCHOL    No results found for: HGBA1C    Assessment:      ICD-10-CM ICD-9-CM   1. Attention deficit disorder, unspecified hyperactivity presence F98.8 314.00   2. Foot pain, left M79.672 729.5   3. Gastroesophageal reflux disease, esophagitis presence not specified K21.9 530.81   4. Hot flashes R23.2 782.62         Plan:  We'll check lipids and labs as below  She'll be referred to podiatry for foot pain   looked up today, 3 prescriptions for Vyvanse sent to the pharmacy.  Prescription was Chantix to be sent to the pharmacy.  Please let us know when you want to do mammogram and colonoscopy and we'll be happy to schedule.  Orders Placed This Encounter   Procedures    Lipid panel    Follicle stimulating hormone    Luteinizing hormone    Ambulatory referral to Podiatry       Current Outpatient Prescriptions   Medication Sig Dispense Refill    albuterol (ACCUNEB) 1.25 mg/3 mL Nebu USE ONE VIAL IN NEBULIZER EVERY 4 TO 6 HOURS AS NEEDED FOR WHEEZING & SHORTNESS OF BREATH **THANK YOU** 75 mL 6    albuterol (PROAIR HFA) 90 mcg/actuation inhaler Inhale 2 puffs into the lungs every 6 (six) hours as needed for Wheezing. 25.5 g 3    citalopram (CELEXA) 20 MG tablet Take 1 tablet (20 mg total) by mouth once daily. 30 tablet 11    fluticasone (FLONASE) 50 mcg/actuation nasal spray 2 sprays by Each Nare route once daily. 16 g 11    levocetirizine (XYZAL) 5 MG tablet TAKE ONE TABLET BY MOUTH EVERY EVENING **THANK YOU** 30 tablet 6    lisdexamfetamine (VYVANSE) 40 MG Cap Take 1 capsule (40 mg total) by mouth every morning. 30 capsule 0    [START ON 9/1/2017]  lisdexamfetamine (VYVANSE) 40 MG Cap Take 1 capsule (40 mg total) by mouth every morning. 30 capsule 0    [START ON 10/1/2017] lisdexamfetamine (VYVANSE) 40 MG Cap Take 1 capsule (40 mg total) by mouth every morning. 30 capsule 0    montelukast (SINGULAIR) 10 mg tablet 1 Tablet Oral Every day 90 tablet 3    omeprazole (PRILOSEC) 40 MG capsule TAKE ONE CAPSULE BY MOUTH ONCE DAILY **THANK YOU** 90 capsule 3    ondansetron (ZOFRAN-ODT) 4 MG TbDL Take 1 tablet (4 mg total) by mouth every 8 (eight) hours as needed. 14 tablet 1    tizanidine (ZANAFLEX) 4 mg Cap Take 1 capsule by mouth 2 (two) times daily as needed. 60 capsule 0    varenicline (CHANTIX JHONY) 0.5 mg (11)- 1 mg (42) tablet Take by mouth. Take one 0.5mg tab by mouth once daily X3 days,then increase to one 0.5mg tab twice daily X4 days,then increase to one 1mg tab twice daily      epinephrine (EPIPEN 2-JHONY) 0.3 mg/0.3 mL AtIn Inject 0.3 mLs (0.3 mg total) into the muscle once. 0.3 mL 12    pantoprazole (PROTONIX) 40 MG tablet Take 1 tablet (40 mg total) by mouth once daily. 30 tablet 1     No current facility-administered medications for this visit.        Medications Discontinued During This Encounter   Medication Reason    benzonatate (TESSALON) 100 MG capsule Therapy completed    lisdexamfetamine (VYVANSE) 40 MG Cap Error    lisdexamfetamine (VYVANSE) 40 MG Cap Duplicate Order    lisdexamfetamine (VYVANSE) 40 MG Cap Reorder    tizanidine (ZANAFLEX) 4 mg Cap Reorder       No Follow-up on file.      Dr Dima Her MD    Disclaimer: This note is prepared using voice recognition system and as such is likely to have errors and is not proof read.

## 2017-08-07 RX ORDER — ALBUTEROL SULFATE 90 UG/1
AEROSOL, METERED RESPIRATORY (INHALATION)
Qty: 25.5 G | Refills: 5 | Status: SHIPPED | OUTPATIENT
Start: 2017-08-07 | End: 2017-12-20 | Stop reason: SDUPTHER

## 2017-08-08 RX ORDER — MONTELUKAST SODIUM 10 MG/1
TABLET ORAL
Qty: 90 TABLET | Refills: 4 | Status: SHIPPED | OUTPATIENT
Start: 2017-08-08 | End: 2018-04-09 | Stop reason: SDUPTHER

## 2017-08-22 ENCOUNTER — OFFICE VISIT (OUTPATIENT)
Dept: PODIATRY | Facility: CLINIC | Age: 52
End: 2017-08-22
Payer: COMMERCIAL

## 2017-08-22 ENCOUNTER — HOSPITAL ENCOUNTER (OUTPATIENT)
Dept: RADIOLOGY | Facility: HOSPITAL | Age: 52
Discharge: HOME | End: 2017-08-22
Attending: PODIATRIST
Payer: COMMERCIAL

## 2017-08-22 ENCOUNTER — HOSPITAL ENCOUNTER (OUTPATIENT)
Dept: RADIOLOGY | Facility: HOSPITAL | Age: 52
Discharge: HOME | End: 2017-08-22
Attending: FAMILY MEDICINE
Payer: COMMERCIAL

## 2017-08-22 VITALS
SYSTOLIC BLOOD PRESSURE: 112 MMHG | BODY MASS INDEX: 28 KG/M2 | HEIGHT: 64 IN | HEART RATE: 68 BPM | WEIGHT: 164 LBS | DIASTOLIC BLOOD PRESSURE: 69 MMHG

## 2017-08-22 DIAGNOSIS — M79.671 RIGHT FOOT PAIN: Primary | ICD-10-CM

## 2017-08-22 DIAGNOSIS — D36.10 NEUROMA: Primary | ICD-10-CM

## 2017-08-22 DIAGNOSIS — Z12.39 BREAST CANCER SCREENING: ICD-10-CM

## 2017-08-22 DIAGNOSIS — M79.671 RIGHT FOOT PAIN: ICD-10-CM

## 2017-08-22 PROCEDURE — 73630 X-RAY EXAM OF FOOT: CPT | Mod: TC,RT

## 2017-08-22 PROCEDURE — 77063 BREAST TOMOSYNTHESIS BI: CPT | Mod: 26,,, | Performed by: RADIOLOGY

## 2017-08-22 PROCEDURE — 77067 SCR MAMMO BI INCL CAD: CPT | Mod: TC

## 2017-08-22 PROCEDURE — 77067 SCR MAMMO BI INCL CAD: CPT | Mod: 26,,, | Performed by: RADIOLOGY

## 2017-08-22 PROCEDURE — 99204 OFFICE O/P NEW MOD 45 MIN: CPT | Mod: S$GLB,,, | Performed by: PODIATRIST

## 2017-08-22 PROCEDURE — 3008F BODY MASS INDEX DOCD: CPT | Mod: S$GLB,,, | Performed by: PODIATRIST

## 2017-08-22 PROCEDURE — 99999 PR PBB SHADOW E&M-EST. PATIENT-LVL III: CPT | Mod: PBBFAC,,, | Performed by: PODIATRIST

## 2017-08-22 PROCEDURE — 73630 X-RAY EXAM OF FOOT: CPT | Mod: 26,RT,, | Performed by: RADIOLOGY

## 2017-08-22 RX ORDER — MELOXICAM 15 MG/1
15 TABLET ORAL DAILY
Qty: 30 TABLET | Refills: 0 | Status: SHIPPED | OUTPATIENT
Start: 2017-08-22 | End: 2018-02-07

## 2017-08-22 NOTE — LETTER
August 28, 2017      Dima Her MD  [id number] [address] [address]           [facility]  [id number] [place] [address]  Phone: [phone]  Fax: [phone]          Patient: Chasity Blake   MR Number: [id number]   YOB: 1965   Date of Visit: 8/22/2017       Dear Dr. Dima Her:    Thank you for referring Chasity Blake to me for evaluation. Attached you will find relevant portions of my assessment and plan of care.    If you have questions, please do not hesitate to call me. I look forward to following Chasity Blake along with you.    Sincerely,    Jose Nina  CC:  No Recipients    If you would like to receive this communication electronically, please contact [email or web address] or [phone] to request more information on [facility] [employer] access.    For providers and/or their staff who would like to refer a patient to [facility], please contact us through our one-stop-shop provider referral line, Yary Larson, at [phone].    If you feel you have received this communication in error or would no longer like to receive these types of communications, please e-mail [email or web address]

## 2017-08-22 NOTE — PROGRESS NOTES
Subjective:     Patient ID: Chasity Blake is a 51 y.o. female.    Chief Complaint: Foot Pain (Right foot pain(ball of foot starting from 2nd toe). Patient states no injury and rates the current pain 2/10.)    Chasity is a 51 y.o. female who presents to the podiatry clinic with complaint of  right foot pain. Onset of the symptoms was several weeks ago. Precipitating event: none known. Current symptoms include: ability to bear weight, but with some pain. Aggravating factors: any weight bearing and standing. Symptoms have waxed and waned. Patient has had no prior foot problems. Evaluation to date: none. Treatment to date: OTC analgesics which are somewhat effective. Patients rates pain 2/10 on pain scale.        Patient Active Problem List   Diagnosis    Insomnia, psychophysiological    ADD (attention deficit disorder)    Backache    Asthma, mild intermittent    Abnormal uterine bleeding    BECK (stress urinary incontinence, female)    Urticaria, idiopathic       Medication List with Changes/Refills   New Medications    MELOXICAM (MOBIC) 15 MG TABLET    Take 1 tablet (15 mg total) by mouth once daily.   Current Medications    ALBUTEROL (ACCUNEB) 1.25 MG/3 ML NEBU    USE ONE VIAL IN NEBULIZER EVERY 4 TO 6 HOURS AS NEEDED FOR WHEEZING & SHORTNESS OF BREATH **THANK YOU**    CITALOPRAM (CELEXA) 20 MG TABLET    Take 1 tablet (20 mg total) by mouth once daily.    EPINEPHRINE (EPIPEN 2-JHONY) 0.3 MG/0.3 ML ATIN    Inject 0.3 mLs (0.3 mg total) into the muscle once.    FLUTICASONE (FLONASE) 50 MCG/ACTUATION NASAL SPRAY    2 sprays by Each Nare route once daily.    LEVOCETIRIZINE (XYZAL) 5 MG TABLET    TAKE ONE TABLET BY MOUTH EVERY EVENING **THANK YOU**    LISDEXAMFETAMINE (VYVANSE) 40 MG CAP    Take 1 capsule (40 mg total) by mouth every morning.    LISDEXAMFETAMINE (VYVANSE) 40 MG CAP    Take 1 capsule (40 mg total) by mouth every morning.    LISDEXAMFETAMINE (VYVANSE) 40 MG CAP    Take 1 capsule (40 mg total) by mouth  every morning.    MONTELUKAST (SINGULAIR) 10 MG TABLET    TAKE ONE TABLET BY MOUTH EVERY DAY **THANK YOU**    OMEPRAZOLE (PRILOSEC) 40 MG CAPSULE    TAKE ONE CAPSULE BY MOUTH ONCE DAILY **THANK YOU**    ONDANSETRON (ZOFRAN-ODT) 4 MG TBDL    Take 1 tablet (4 mg total) by mouth every 8 (eight) hours as needed.    PANTOPRAZOLE (PROTONIX) 40 MG TABLET    Take 1 tablet (40 mg total) by mouth once daily.    PROAIR HFA 90 MCG/ACTUATION INHALER    INHALE TWO PUFFS INTO THE LUNGS EVERY 6 HOURS AS NEEDED FOR WHEEZING **THANK YOU**    TIZANIDINE (ZANAFLEX) 4 MG CAP    Take 1 capsule by mouth 2 (two) times daily as needed.    VARENICLINE (CHANTIX JHONY) 0.5 MG (11)- 1 MG (42) TABLET    Take 1 tablet by mouth 2 times daily 2 hours after meal. Take one 0.5mg tab by mouth once daily X3 days,then increase to one 0.5mg tab twice daily X4 days,then increase to one 1mg tab twice daily       Review of patient's allergies indicates:   Allergen Reactions    Percocet [oxycodone-acetaminophen] Itching       Past Surgical History:   Procedure Laterality Date    BELT ABDOMINOPLASTY      Breast Augmentation      BREAST BIOPSY Left     2015. benign    BREAST SURGERY      COSMETIC SURGERY      TONSILLECTOMY      TUBAL LIGATION         Family History   Problem Relation Age of Onset    Lung cancer Mother     Coronary artery disease Mother 50    Coronary artery disease Father 38    Breast cancer Neg Hx     Colon cancer Neg Hx     Ovarian cancer Neg Hx        Social History     Social History    Marital status:      Spouse name: N/A    Number of children: N/A    Years of education: N/A     Occupational History    Not on file.     Social History Main Topics    Smoking status: Current Every Day Smoker     Packs/day: 0.50     Years: 10.00     Types: Cigarettes    Smokeless tobacco: Never Used    Alcohol use No    Drug use: No    Sexual activity: Yes     Partners: Male     Birth control/ protection: None     Other Topics  "Concern    Not on file     Social History Narrative    No narrative on file       Vitals:    08/22/17 1320   BP: 112/69   Pulse: 68   Weight: 74.4 kg (164 lb)   Height: 5' 4" (1.626 m)   PainSc:   2   PainLoc: Foot     Review of Systems   Constitutional: Negative for chills and fever.   Respiratory: Negative for shortness of breath.    Cardiovascular: Negative for chest pain, palpitations, orthopnea, claudication and leg swelling.   Gastrointestinal: Negative for diarrhea, nausea and vomiting.   Musculoskeletal: Negative for joint pain.   Skin: Negative for rash.   Neurological: Negative for dizziness, tingling, sensory change, focal weakness and weakness.   Psychiatric/Behavioral: Negative.            Objective:       PHYSICAL EXAM: Apperance: Alert and orient in no distress,well developed, and with good attention to grooming and body habits  Patient presents ambulating in tennis shoes.   Lower Extremity Physical Exam:  VASCULAR: Dorsalis pedis pulses 2/4 bilateral and Posterior Tibial pulses 2/4 bilateral. Capillary fill time <4 seconds bilateral. No edema observed bilateral. Varicosities absent bilateral. Skin temperature of the lower extremities is warm to warm, proximal to distal. Hair growth WNL bilateral.  DERMATOLOGICAL: No skin rashes, subcutaneous nodules, lesions, or ulcers observed bilateral.  NEUROLOGICAL: Light touch, sharp-dull, proprioception all present and equal bilaterally.    MUSCULOSKELETAL: Muscle strength is 5/5 for foot inverters, everters, plantarflexors, and dorsiflexors. Muscle tone is normal. There is pain on palpation of the intermetatarsal space with a (+) Presley's click. Minimal tenderness to palpation of the adjacent metatarsal heads..        Assessment:       Encounter Diagnosis   Name Primary?    Neuroma Yes         Plan:   Neuroma  -     meloxicam (MOBIC) 15 MG tablet; Take 1 tablet (15 mg total) by mouth once daily.  Dispense: 30 tablet; Refill: 0      I counseled the patient " on her conditions, regarding findings of my examination, my impressions, and usual treatment plan.   Ordered right foot x-ray.  I explained to the patient that etiology and treatment options for neuroma pain including rest, ice messages,  new shoegear and inserts, NSAID's,  Injections, physical therapy, and/or surgical intervention  Patient agreed to oral and  Offloading padding dispensed/applied to shoe inserts.   Prescribed Meloxicam 15mg to be taken once daily with food for the next 7 days and then as needed for inflammation. Patient advised on the possible elevation of blood pressure or heart effects and caution to take pills as needed and to discontinue use if symptoms arise, patient agreed.  The patient and I reviewed the types of shoes she should be wearing, my recommendation includes generally the best time of the day for a shoe fitting is the afternoon, shoes with a wide toe box, very good cushion, and tennis shoes with removable inner soles.The patient and I reviewed my recommendations for over-the-counter orthotic inserts.   Patient to return in 6 weeks.                 Ap Robertson DPM  [facility]

## 2017-10-12 ENCOUNTER — OFFICE VISIT (OUTPATIENT)
Dept: FAMILY MEDICINE | Facility: CLINIC | Age: 52
End: 2017-10-12
Payer: COMMERCIAL

## 2017-10-12 VITALS
DIASTOLIC BLOOD PRESSURE: 59 MMHG | SYSTOLIC BLOOD PRESSURE: 109 MMHG | HEART RATE: 83 BPM | HEIGHT: 64 IN | WEIGHT: 174.06 LBS | OXYGEN SATURATION: 95 % | BODY MASS INDEX: 29.72 KG/M2 | TEMPERATURE: 98 F

## 2017-10-12 DIAGNOSIS — B96.89 ACUTE BACTERIAL SINUSITIS: ICD-10-CM

## 2017-10-12 DIAGNOSIS — J45.20 MILD INTERMITTENT ASTHMA WITHOUT COMPLICATION: Primary | ICD-10-CM

## 2017-10-12 DIAGNOSIS — J01.90 ACUTE BACTERIAL SINUSITIS: ICD-10-CM

## 2017-10-12 PROCEDURE — 99213 OFFICE O/P EST LOW 20 MIN: CPT | Mod: S$GLB,,, | Performed by: FAMILY MEDICINE

## 2017-10-12 PROCEDURE — 99499 UNLISTED E&M SERVICE: CPT | Mod: S$GLB,,, | Performed by: FAMILY MEDICINE

## 2017-10-12 PROCEDURE — 99999 PR PBB SHADOW E&M-EST. PATIENT-LVL III: CPT | Mod: PBBFAC,,, | Performed by: FAMILY MEDICINE

## 2017-10-12 RX ORDER — IPRATROPIUM BROMIDE AND ALBUTEROL SULFATE 2.5; .5 MG/3ML; MG/3ML
3 SOLUTION RESPIRATORY (INHALATION) EVERY 6 HOURS PRN
Qty: 1 BOX | Refills: 0 | Status: SHIPPED | OUTPATIENT
Start: 2017-10-12 | End: 2018-07-16 | Stop reason: SDUPTHER

## 2017-10-12 RX ORDER — PREDNISONE 50 MG/1
50 TABLET ORAL DAILY
Qty: 7 TABLET | Refills: 0 | Status: SHIPPED | OUTPATIENT
Start: 2017-10-12 | End: 2017-10-19

## 2017-10-12 RX ORDER — AMOXICILLIN AND CLAVULANATE POTASSIUM 875; 125 MG/1; MG/1
1 TABLET, FILM COATED ORAL EVERY 12 HOURS
Qty: 14 TABLET | Refills: 0 | Status: SHIPPED | OUTPATIENT
Start: 2017-10-12 | End: 2017-10-19

## 2017-10-12 RX ORDER — LISDEXAMFETAMINE DIMESYLATE 40 MG/1
40 CAPSULE ORAL EVERY MORNING
Qty: 30 CAPSULE | Refills: 0 | Status: SHIPPED | OUTPATIENT
Start: 2017-11-05 | End: 2017-12-20 | Stop reason: SDUPTHER

## 2017-10-12 NOTE — PROGRESS NOTES
Chief Complaint:    Chief Complaint   Patient presents with    Nasal Congestion    Cough       History of Present Illness:  Presents today with several day history of cough congestion sinus pressure wheezing.  Does suffer with asthma.      ROS:  Review of Systems   Constitutional: Negative for appetite change, chills and fever.   HENT: Positive for congestion. Negative for ear discharge, ear pain, facial swelling, mouth sores, postnasal drip, rhinorrhea, sinus pressure, sneezing, sore throat, trouble swallowing and voice change.    Eyes: Negative for discharge, redness and itching.   Respiratory: Positive for cough and wheezing. Negative for chest tightness and shortness of breath.    Cardiovascular: Negative for chest pain.       Past Medical History:   Diagnosis Date    ADD (attention deficit disorder with hyperactivity)     Anxiety     Asthma     mild intermittent    Depression        Social History:  Social History     Social History    Marital status:      Spouse name: N/A    Number of children: N/A    Years of education: N/A     Social History Main Topics    Smoking status: Current Every Day Smoker     Packs/day: 0.50     Years: 10.00     Types: Cigarettes    Smokeless tobacco: Never Used    Alcohol use No    Drug use: No    Sexual activity: Yes     Partners: Male     Birth control/ protection: None     Other Topics Concern    None     Social History Narrative    None       Family History:   family history includes Coronary artery disease (age of onset: 38) in her father; Coronary artery disease (age of onset: 50) in her mother; Lung cancer in her mother.    Health Maintenance   Topic Date Due    TETANUS VACCINE  11/01/1982    Colonoscopy  11/01/2014    Influenza Vaccine  08/01/2017    Pap Smear  03/11/2018    Mammogram  08/22/2018    Lipid Panel  08/01/2022    Pneumococcal PPSV23 (Medium Risk) (2) 11/01/2029    Hepatitis C Screening  Completed       Physical Exam:    Vital  "Signs  Temp: 98.1 °F (36.7 °C)  Temp src: Tympanic  Pulse: 83  SpO2: 95 %  BP: (!) 109/59  BP Location: Left arm  Patient Position: Sitting  Pain Score: 0-No pain  Height and Weight  Height: 5' 4" (162.6 cm)  Weight: 78.9 kg (174 lb 0.9 oz)  BSA (Calculated - sq m): 1.89 sq meters  BMI (Calculated): 29.9  Weight in (lb) to have BMI = 25: 145.3]    Body mass index is 29.88 kg/m².    Physical Exam   Constitutional: She appears well-developed and well-nourished.   HENT:   Head: Normocephalic and atraumatic.   Right Ear: Tympanic membrane is not bulging.   Left Ear: Tympanic membrane is not bulging.   Nose: No rhinorrhea. Right sinus exhibits no maxillary sinus tenderness and no frontal sinus tenderness. Left sinus exhibits no maxillary sinus tenderness and no frontal sinus tenderness.   Mouth/Throat: Mucous membranes are normal. No posterior oropharyngeal erythema or tonsillar abscesses.   Positive for postnasal drip with some oropharyngeal erythema   Eyes: Conjunctivae are normal. Pupils are equal, round, and reactive to light.   Neck: Normal range of motion.   Cardiovascular: Normal rate and normal heart sounds.    Pulmonary/Chest: Effort normal. No stridor. No respiratory distress. She has wheezes. She has no rales. She exhibits no tenderness.   Abdominal: Soft. There is no tenderness.   Lymphadenopathy:     She has no cervical adenopathy.       Lab Results   Component Value Date    CHOL 176 08/01/2017    TRIG 159 (H) 08/01/2017    HDL 48 08/01/2017    TOTALCHOLEST 3.7 08/01/2017    NONHDLCHOL 128 08/01/2017       No results found for: HGBA1C    Assessment:      ICD-10-CM ICD-9-CM   1. Mild intermittent asthma without complication J45.20 493.90   2. Acute bacterial sinusitis J01.90 461.9    B96.89          Plan:  DuoNeb prescribed to use with the nebulizer  *And prednisone 50 mg 7 days  Add Augmentin  Orders Placed This Encounter   Procedures    POCT Influenza A/B       Current Outpatient Prescriptions   Medication " Sig Dispense Refill    albuterol (ACCUNEB) 1.25 mg/3 mL Nebu USE ONE VIAL IN NEBULIZER EVERY 4 TO 6 HOURS AS NEEDED FOR WHEEZING & SHORTNESS OF BREATH **THANK YOU** 75 mL 6    citalopram (CELEXA) 20 MG tablet Take 1 tablet (20 mg total) by mouth once daily. 30 tablet 11    epinephrine (EPIPEN 2-JHONY) 0.3 mg/0.3 mL AtIn Inject 0.3 mLs (0.3 mg total) into the muscle once. 0.3 mL 12    fluticasone (FLONASE) 50 mcg/actuation nasal spray 2 sprays by Each Nare route once daily. 16 g 11    levocetirizine (XYZAL) 5 MG tablet TAKE ONE TABLET BY MOUTH EVERY EVENING **THANK YOU** 30 tablet 6    lisdexamfetamine (VYVANSE) 40 MG Cap Take 1 capsule (40 mg total) by mouth every morning. 30 capsule 0    lisdexamfetamine (VYVANSE) 40 MG Cap Take 1 capsule (40 mg total) by mouth every morning. 30 capsule 0    [START ON 11/5/2017] lisdexamfetamine (VYVANSE) 40 MG Cap Take 1 capsule (40 mg total) by mouth every morning. 30 capsule 0    meloxicam (MOBIC) 15 MG tablet Take 1 tablet (15 mg total) by mouth once daily. 30 tablet 0    montelukast (SINGULAIR) 10 mg tablet TAKE ONE TABLET BY MOUTH EVERY DAY **THANK YOU** 90 tablet 4    omeprazole (PRILOSEC) 40 MG capsule TAKE ONE CAPSULE BY MOUTH ONCE DAILY **THANK YOU** 90 capsule 3    ondansetron (ZOFRAN-ODT) 4 MG TbDL Take 1 tablet (4 mg total) by mouth every 8 (eight) hours as needed. 14 tablet 1    pantoprazole (PROTONIX) 40 MG tablet Take 1 tablet (40 mg total) by mouth once daily. 30 tablet 1    PROAIR HFA 90 mcg/actuation inhaler INHALE TWO PUFFS INTO THE LUNGS EVERY 6 HOURS AS NEEDED FOR WHEEZING **THANK YOU** 25.5 g 5    tizanidine (ZANAFLEX) 4 mg Cap Take 1 capsule by mouth 2 (two) times daily as needed. 60 capsule 0    varenicline (CHANTIX JHONY) 0.5 mg (11)- 1 mg (42) tablet Take 1 tablet by mouth 2 times daily 2 hours after meal. Take one 0.5mg tab by mouth once daily X3 days,then increase to one 0.5mg tab twice daily X4 days,then increase to one 1mg tab twice daily  1 Package 0    albuterol-ipratropium 2.5mg-0.5mg/3mL (DUO-NEB) 0.5 mg-3 mg(2.5 mg base)/3 mL nebulizer solution Take 3 mLs by nebulization every 6 (six) hours as needed for Wheezing. Rescue 1 Box 0    amoxicillin-clavulanate 875-125mg (AUGMENTIN) 875-125 mg per tablet Take 1 tablet by mouth every 12 (twelve) hours. 14 tablet 0    predniSONE (DELTASONE) 50 MG Tab Take 1 tablet (50 mg total) by mouth once daily. 7 tablet 0     No current facility-administered medications for this visit.        Medications Discontinued During This Encounter   Medication Reason    lisdexamfetamine (VYVANSE) 40 MG Cap Reorder       No Follow-up on file.      Dima Her MD          Disclaimer: This note is prepared using voice recognition system and as such is likely to have errors and is not proof read.

## 2017-10-18 RX ORDER — LEVOCETIRIZINE DIHYDROCHLORIDE 5 MG/1
TABLET, FILM COATED ORAL
Qty: 30 TABLET | Refills: 5 | Status: SHIPPED | OUTPATIENT
Start: 2017-10-18

## 2017-11-01 ENCOUNTER — OFFICE VISIT (OUTPATIENT)
Dept: FAMILY MEDICINE | Facility: CLINIC | Age: 52
End: 2017-11-01
Payer: COMMERCIAL

## 2017-11-01 ENCOUNTER — TELEPHONE (OUTPATIENT)
Dept: FAMILY MEDICINE | Facility: CLINIC | Age: 52
End: 2017-11-01

## 2017-11-01 VITALS
DIASTOLIC BLOOD PRESSURE: 77 MMHG | BODY MASS INDEX: 30.19 KG/M2 | HEIGHT: 64 IN | OXYGEN SATURATION: 99 % | HEART RATE: 78 BPM | WEIGHT: 176.81 LBS | TEMPERATURE: 98 F | SYSTOLIC BLOOD PRESSURE: 130 MMHG
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
## 2017-11-01 PROCEDURE — 99999 PR PBB SHADOW E&M-EST. PATIENT-LVL III: CPT | Mod: PBBFAC,,, | Performed by: FAMILY MEDICINE

## 2017-11-01 PROCEDURE — 99213 OFFICE O/P EST LOW 20 MIN: CPT | Mod: S$GLB,,, | Performed by: FAMILY MEDICINE

## 2017-11-01 RX ORDER — FLUCONAZOLE 150 MG/1
150 TABLET ORAL ONCE
Qty: 1 TABLET | Refills: 2 | Status: SHIPPED | OUTPATIENT
Start: 2017-11-01 | End: 2017-11-01

## 2017-11-01 RX ORDER — SULFAMETHOXAZOLE AND TRIMETHOPRIM 800; 160 MG/1; MG/1
1 TABLET ORAL 2 TIMES DAILY
Qty: 10 TABLET | Refills: 0 | Status: SHIPPED | OUTPATIENT
Start: 2017-11-01 | End: 2017-11-09

## 2017-11-01 NOTE — TELEPHONE ENCOUNTER
----- Message from Melvina Luis sent at 11/1/2017  8:46 AM CDT -----  Contact: Pt   Pt request same day access appointment due a spider bite on right upper leg. Hard, red, and  painful. .[phone] (home)

## 2017-11-01 NOTE — PROGRESS NOTES
"  Chief Complaint:    Chief Complaint   Patient presents with    Insect Bite     right upper thigh       History of Present Illness:    Says she was bit by an insect 2 days back did not see the insect but seems to think it was a spider.  She went to the ED she was started on clindamycin the area was marked on the leg and it looks like the redness is receding and got smaller but it's a lot more later.    ROS:  Review of Systems    Past Medical History:   Diagnosis Date    ADD (attention deficit disorder with hyperactivity)     Anxiety     Asthma     mild intermittent    Depression        Social History:  Social History     Social History    Marital status:      Spouse name: N/A    Number of children: N/A    Years of education: N/A     Social History Main Topics    Smoking status: Current Every Day Smoker     Packs/day: 0.50     Years: 10.00     Types: Cigarettes    Smokeless tobacco: Never Used    Alcohol use No    Drug use: No    Sexual activity: Yes     Partners: Male     Birth control/ protection: None     Other Topics Concern    None     Social History Narrative    None       Family History:   family history includes Coronary artery disease (age of onset: 38) in her father; Coronary artery disease (age of onset: 50) in her mother; Lung cancer in her mother.    Health Maintenance   Topic Date Due    TETANUS VACCINE  11/01/1982    Colonoscopy  11/01/2014    Influenza Vaccine  08/01/2017    Pap Smear  03/11/2018    Mammogram  08/22/2018    Lipid Panel  08/01/2022    Pneumococcal PPSV23 (Medium Risk) (2) 11/01/2029    Hepatitis C Screening  Completed       Physical Exam:    Vital Signs  Temp: 98.4 °F (36.9 °C)  Temp src: Tympanic  Pulse: 78  SpO2: 99 %  BP: 130/77  BP Location: Left arm  Patient Position: Sitting  Pain Score:   7 (right upper thigh)  Pain Loc: Leg  Height and Weight  Height: 5' 4" (162.6 cm)  Weight: 80.2 kg (176 lb 12.9 oz)  BSA (Calculated - sq m): 1.9 sq meters  BMI " (Calculated): 30.4  Weight in (lb) to have BMI = 25: 145.3]    Body mass index is 30.35 kg/m².    Physical Exam    Lab Results   Component Value Date    CHOL 176 08/01/2017    TRIG 159 (H) 08/01/2017    HDL 48 08/01/2017    TOTALCHOLEST 3.7 08/01/2017    NONHDLCHOL 128 08/01/2017       No results found for: HGBA1C        There is no fluctuation, has not ready for an I&D.  Assessment:      ICD-10-CM ICD-9-CM   1. Insect bite, initial encounter W57.XXXA 919.4     E906.4         Plan:  The infection C seems to be responding well without Bactrim twice a day.  Please use warm rags but comes to a head come back for an I&D.  It is not sure what type of insect this was  No orders of the defined types were placed in this encounter.      Current Outpatient Prescriptions   Medication Sig Dispense Refill    albuterol (ACCUNEB) 1.25 mg/3 mL Nebu USE ONE VIAL IN NEBULIZER EVERY 4 TO 6 HOURS AS NEEDED FOR WHEEZING & SHORTNESS OF BREATH **THANK YOU** 75 mL 6    albuterol-ipratropium 2.5mg-0.5mg/3mL (DUO-NEB) 0.5 mg-3 mg(2.5 mg base)/3 mL nebulizer solution Take 3 mLs by nebulization every 6 (six) hours as needed for Wheezing. Rescue 1 Box 0    citalopram (CELEXA) 20 MG tablet Take 1 tablet (20 mg total) by mouth once daily. 30 tablet 11    epinephrine (EPIPEN 2-JHONY) 0.3 mg/0.3 mL AtIn Inject 0.3 mLs (0.3 mg total) into the muscle once. 0.3 mL 12    fluticasone (FLONASE) 50 mcg/actuation nasal spray 2 sprays by Each Nare route once daily. 16 g 11    levocetirizine (XYZAL) 5 MG tablet TAKE ONE TABLET BY MOUTH EVERY EVENING **THANK YOU** 30 tablet 5    lisdexamfetamine (VYVANSE) 40 MG Cap Take 1 capsule (40 mg total) by mouth every morning. 30 capsule 0    lisdexamfetamine (VYVANSE) 40 MG Cap Take 1 capsule (40 mg total) by mouth every morning. 30 capsule 0    [START ON 11/5/2017] lisdexamfetamine (VYVANSE) 40 MG Cap Take 1 capsule (40 mg total) by mouth every morning. 30 capsule 0    meloxicam (MOBIC) 15 MG tablet Take 1  tablet (15 mg total) by mouth once daily. 30 tablet 0    montelukast (SINGULAIR) 10 mg tablet TAKE ONE TABLET BY MOUTH EVERY DAY **THANK YOU** 90 tablet 4    omeprazole (PRILOSEC) 40 MG capsule TAKE ONE CAPSULE BY MOUTH ONCE DAILY **THANK YOU** 90 capsule 3    ondansetron (ZOFRAN-ODT) 4 MG TbDL Take 1 tablet (4 mg total) by mouth every 8 (eight) hours as needed. 14 tablet 1    pantoprazole (PROTONIX) 40 MG tablet Take 1 tablet (40 mg total) by mouth once daily. 30 tablet 1    PROAIR HFA 90 mcg/actuation inhaler INHALE TWO PUFFS INTO THE LUNGS EVERY 6 HOURS AS NEEDED FOR WHEEZING **THANK YOU** 25.5 g 5    tizanidine (ZANAFLEX) 4 mg Cap Take 1 capsule by mouth 2 (two) times daily as needed. 60 capsule 0    varenicline (CHANTIX JHONY) 0.5 mg (11)- 1 mg (42) tablet Take 1 tablet by mouth 2 times daily 2 hours after meal. Take one 0.5mg tab by mouth once daily X3 days,then increase to one 0.5mg tab twice daily X4 days,then increase to one 1mg tab twice daily 1 Package 0    fluconazole (DIFLUCAN) 150 MG Tab Take 1 tablet (150 mg total) by mouth once. 1 tablet 2    sulfamethoxazole-trimethoprim 800-160mg (BACTRIM DS) 800-160 mg Tab Take 1 tablet by mouth 2 (two) times daily. 10 tablet 0     No current facility-administered medications for this visit.        There are no discontinued medications.    No Follow-up on file.      Dima Her MD

## 2017-11-04 ENCOUNTER — HOSPITAL ENCOUNTER (EMERGENCY)
Facility: HOSPITAL | Age: 52
Discharge: HOME | End: 2017-11-04
Payer: COMMERCIAL

## 2017-11-04 VITALS
HEIGHT: 64 IN | BODY MASS INDEX: 30.05 KG/M2 | TEMPERATURE: 99 F | RESPIRATION RATE: 16 BRPM | HEART RATE: 92 BPM | WEIGHT: 176 LBS | DIASTOLIC BLOOD PRESSURE: 72 MMHG | OXYGEN SATURATION: 98 % | SYSTOLIC BLOOD PRESSURE: 128 MMHG

## 2017-11-04 DIAGNOSIS — M79.604 PAIN OF RIGHT LOWER EXTREMITY: ICD-10-CM

## 2017-11-04 DIAGNOSIS — L02.415 ABSCESS OF RIGHT THIGH: Primary | ICD-10-CM

## 2017-11-04 PROCEDURE — 10060 I&D ABSCESS SIMPLE/SINGLE: CPT

## 2017-11-04 PROCEDURE — 99283 EMERGENCY DEPT VISIT LOW MDM: CPT | Mod: 25

## 2017-11-04 PROCEDURE — 25000003 PHARM REV CODE 250: Performed by: REGISTERED NURSE

## 2017-11-04 RX ORDER — HYDROCODONE BITARTRATE AND ACETAMINOPHEN 5; 325 MG/1; MG/1
1 TABLET ORAL EVERY 6 HOURS PRN
Qty: 10 TABLET | Refills: 0 | Status: SHIPPED | OUTPATIENT
Start: 2017-11-04 | End: 2017-11-09

## 2017-11-04 RX ORDER — LIDOCAINE HYDROCHLORIDE 10 MG/ML
10 INJECTION INFILTRATION; PERINEURAL
Status: COMPLETED | OUTPATIENT
Start: 2017-11-04 | End: 2017-11-04

## 2017-11-04 RX ORDER — CLINDAMYCIN HYDROCHLORIDE 150 MG/1
450 CAPSULE ORAL 4 TIMES DAILY
COMMUNITY
End: 2018-11-07 | Stop reason: ALTCHOICE

## 2017-11-04 RX ORDER — HYDROCODONE BITARTRATE AND ACETAMINOPHEN 10; 325 MG/1; MG/1
1 TABLET ORAL
Status: COMPLETED | OUTPATIENT
Start: 2017-11-04 | End: 2017-11-04

## 2017-11-04 RX ADMIN — LIDOCAINE HYDROCHLORIDE 10 ML: 10 INJECTION, SOLUTION INFILTRATION; PERINEURAL at 01:11

## 2017-11-04 RX ADMIN — HYDROCODONE BITARTRATE AND ACETAMINOPHEN 1 TABLET: 10; 325 TABLET ORAL at 02:11

## 2017-11-04 NOTE — ED PROVIDER NOTES
Encounter Date: 11/4/2017       History     Chief Complaint   Patient presents with    Abscess     pt states she has a spider bite, seen at [facility] Sunday     Pt presents to ED complaining of abscess to R thigh which onset gradually 6 days ago. Symptoms are constant and moderate in severity. No mitigating or exacerbating factors reported. Associated sxs include pain to area and drainage which began this morning. Patient denies any fever, and all other sxs at this time. Prior Tx includes clindamycin and bactrim prescribed by her PCP. She states that she was seen at [facility] last Sunday and was prescribed antibiotics but it was never drained. No further complaints or concerns at this time.             Review of patient's allergies indicates:   Allergen Reactions    Percocet [oxycodone-acetaminophen] Itching     Past Medical History:   Diagnosis Date    ADD (attention deficit disorder with hyperactivity)     Anxiety     Asthma     mild intermittent    Depression      Past Surgical History:   Procedure Laterality Date    BELT ABDOMINOPLASTY      Breast Augmentation      BREAST BIOPSY Left     2015. benign    BREAST SURGERY      COSMETIC SURGERY      TONSILLECTOMY      TUBAL LIGATION       Family History   Problem Relation Age of Onset    Lung cancer Mother     Coronary artery disease Mother 50    Coronary artery disease Father 38    Breast cancer Neg Hx     Colon cancer Neg Hx     Ovarian cancer Neg Hx      Social History   Substance Use Topics    Smoking status: Current Every Day Smoker     Packs/day: 0.50     Years: 10.00     Types: Cigarettes    Smokeless tobacco: Never Used    Alcohol use No     Review of Systems   Skin: Positive for wound.        Positive for abscess R thigh       Physical Exam     Initial Vitals [11/04/17 1316]   BP Pulse Resp Temp SpO2   131/71 101 16 98.5 °F (36.9 °C) 100 %      MAP       91         Physical Exam  Vital signs and nursing notes reviewed.  Constitutional: Patient is  in no acute distress. Awake and alert. Well-developed and well-nourished.  Head: Atraumatic. Normocephalic.  Eyes: PERRL. EOM intact. Conjunctivae nl. No scleral icterus.  ENT: Mucous membranes are moist. Oropharynx is clear.  Neck: Supple. Full ROM. No lymphadenopathy.  Cardiovascular: Regular rate and rhythm. No murmurs, rubs, or gallops. Distal pulses are 2+ and symmetric .  Pulmonary/Chest: No respiratory distress. Clear to auscultation bilaterally. No wheezing, rales, or rhonchi.  Abdominal: Soft. Non-distended. No TTP. No rebound, guarding, or rigidity. Good bowel sounds.  Genitourinary: No CVA tenderness  Musculoskeletal: Moves all extremities. No edema. No calf tenderness.  Skin: Warm and dry. 6 cm area of erythema and induration with fluctuance to R thigh  Neurological: Awake and alert. No acute focal neurological deficits are appreciated.  Psychiatric: Normal affect. Good eye contact. Appropriate in content.    ED Course   I & D - Incision and Drainage  Date/Time: 11/4/2017 2:10 PM  Performed by: REI WONG JR  Authorized by: REI WONG JR   Type: abscess  Body area: lower extremity  Location details: right leg  Anesthesia: local infiltration    Anesthesia:  Local Anesthetic: lidocaine 1% without epinephrine  Anesthetic total: 10 mL  Description of findings: Abscess R thigh   Scalpel size: 11  Incision type: single straight  Complexity: simple  Drainage: pus and  bloody  Drainage amount: moderate  Wound treatment: incision  Packing material: 1/4 in gauze  Complications: No  Specimens: No  Patient tolerance: Patient tolerated the procedure well with no immediate complications        Medications   hydrocodone-acetaminophen 10-325mg per tablet 1 tablet (not administered)   lidocaine HCL 10 mg/ml (1%) injection 10 mL (10 mLs Infiltration Given 11/4/17 1349)                                 ED Course    2:24 PM: Reassessed pt at this time.  Pt states her condition has improved at this time.  Discussed with pt all pertinent ED information and results. Discussed pt dx of abscess and plan of tx. Pt will follow up with PCP or return to ED in 24-48 hours for packing removal. Gave pt all f/u and return to the ED instructions. All questions and concerns were addressed at this time. Pt expresses understanding of information and instructions, and is comfortable with plan to discharge. Pt is stable for discharge.    New Prescriptions    HYDROCODONE-ACETAMINOPHEN 5-325MG (NORCO) 5-325 MG PER TABLET    Take 1 tablet by mouth every 6 (six) hours as needed for Pain.       Clinical Impression:   The primary encounter diagnosis was Abscess of right thigh. A diagnosis of Pain of right lower extremity was also pertinent to this visit.                           Prince Darby Jr., FNP  11/04/17 0306

## 2017-11-06 ENCOUNTER — HOSPITAL ENCOUNTER (EMERGENCY)
Facility: HOSPITAL | Age: 52
Discharge: HOME | End: 2017-11-06
Payer: COMMERCIAL

## 2017-11-06 VITALS
SYSTOLIC BLOOD PRESSURE: 121 MMHG | WEIGHT: 155 LBS | DIASTOLIC BLOOD PRESSURE: 72 MMHG | HEIGHT: 64 IN | HEART RATE: 80 BPM | TEMPERATURE: 98 F | OXYGEN SATURATION: 100 % | BODY MASS INDEX: 26.46 KG/M2 | RESPIRATION RATE: 20 BRPM

## 2017-11-06 DIAGNOSIS — Z51.89 WOUND CHECK, ABSCESS: Primary | ICD-10-CM

## 2017-11-06 DIAGNOSIS — Z48.00 ENCOUNTER FOR ABSCESS PACKING REMOVAL: ICD-10-CM

## 2017-11-06 PROCEDURE — 99283 EMERGENCY DEPT VISIT LOW MDM: CPT

## 2017-11-06 NOTE — DISCHARGE INSTRUCTIONS
Keep wound clean and dry.  Apply warm compresses four times daily to the wound to help draw out infection.  Take antibiotic as prescribed for full course of treatment.  Bactroban ointment three times day.  Keep wound covered when going outside or working.  Remove packing in 2 days.  Recommend pulling while in shower or follow-up with PCP for removal.     If symptoms worsen or fail to improve with treatment, fever occurs, or if wound increases in size or fails to respond to antibiotic, see your Primary Care Provider or go to the nearest Emergency Room.

## 2017-11-07 NOTE — ED PROVIDER NOTES
"  History      Chief Complaint   Patient presents with    Wound Check     Pt states, "I am here to get my wound checked on my leg."       Review of patient's allergies indicates:   Allergen Reactions    Percocet [oxycodone-acetaminophen] Itching        HPI   HPI    11/6/2017, 9:40 PM   History obtained from the patient      History of Present Illness: Chasity Blake is a 52 y.o. female patient who presents to the Emergency Department for wound check of abscess.   Patient had abscess I&D two days ago.  She denies fever.  Patient continues to take antibiotic.       Arrival mode: Personal vehicle    PCP: Dima Her MD       Past Medical History:  Past Medical History:   Diagnosis Date    ADD (attention deficit disorder with hyperactivity)     Anxiety     Asthma     mild intermittent    Depression        Past Surgical History:  Past Surgical History:   Procedure Laterality Date    BELT ABDOMINOPLASTY      Breast Augmentation      BREAST BIOPSY Left     2015. benign    BREAST SURGERY      COSMETIC SURGERY      TONSILLECTOMY      TUBAL LIGATION           Family History:  Family History   Problem Relation Age of Onset    Lung cancer Mother     Coronary artery disease Mother 50    Coronary artery disease Father 38    Breast cancer Neg Hx     Colon cancer Neg Hx     Ovarian cancer Neg Hx        Social History:  Social History     Social History Main Topics    Smoking status: Current Every Day Smoker     Packs/day: 0.50     Years: 10.00     Types: Cigarettes    Smokeless tobacco: Never Used    Alcohol use No    Drug use: No    Sexual activity: Yes     Partners: Male     Birth control/ protection: None       ROS   Review of Systems   Constitutional: Negative for chills and fever.   HENT: Negative for congestion and sore throat.    Respiratory: Negative for cough and wheezing.    Gastrointestinal: Negative for diarrhea and vomiting.   Skin: Positive for wound.       Physical Exam      Initial Vitals " "[11/06/17 1124]   BP Pulse Resp Temp SpO2   121/72 80 20 98 °F (36.7 °C) 100 %      MAP       88.33          Physical Exam  Nursing Notes and Vital Signs Reviewed.  Constitutional: Patient is in no apparent distress. Awake and alert. Well-developed and well-nourished.  Head: Atraumatic. Normocephalic.  Eyes: PERRL. EOM intact. Conjunctivae are not pale. No scleral icterus.  ENT: Mucous membranes are moist. Oropharynx is clear and symmetric.    Neck: Supple. Full ROM. No lymphadenopathy.  Cardiovascular: Regular rate. Regular rhythm. No murmurs, rubs, or gallops. Distal pulses are 2+ and symmetric.  Pulmonary/Chest: No respiratory distress. Clear to auscultation bilaterally. No wheezing, rales, or rhonchi.  Abdominal: Soft and non-distended.  There is no tenderness.  No rebound, guarding, or rigidity. Good bowel sounds.  Genitourinary: No CVA tenderness  Musculoskeletal: Moves all extremities. No obvious deformities. No edema. No calf tenderness.  Skin: Warm and dry.  Packing in place with drainage on bandage, minimal pus on sides of wound.    Neurological:  Alert, awake, and appropriate.  Normal speech.  No acute focal neurological deficits are appreciated.  Psychiatric: Normal affect. Good eye contact. Appropriate in content.    ED Course    Procedures  ED Vital Signs:  Vitals:    11/06/17 1124   BP: 121/72   Pulse: 80   Resp: 20   Temp: 98 °F (36.7 °C)   TempSrc: Oral   SpO2: 100%   Weight: 70.3 kg (155 lb)   Height: 5' 4" (1.626 m)       Abnormal Lab Results:  Labs Reviewed - No data to display       Imaging Results:  Imaging Results    None                 The Emergency Provider reviewed the vital signs and test results, which are outlined above.    ED Discussion     12:50:  Packing removed.  Wound then packed with 1/4 inch gauze and dressed with bandage.  Recommend patient remove packing in two days.        12:58 PM:  Discussed with pt all pertinent ED information and results. Discussed pt dx and plan of tx. " Gave pt all f/u and return to the ED instructions. All questions and concerns were addressed at this time. Pt expresses understanding of information and instructions, and is comfortable with plan to discharge. Pt is stable for discharge.        ED Medication(s):  Medications - No data to display    Discharge Medication List as of 11/6/2017 12:58 PM          Follow-up Information     Dima Her MD In 2 days.    Specialty:  Family Medicine  Contact information:  [address] [address] [address] [phone]                     Medical Decision Making                  Clinical Impression       ICD-10-CM ICD-9-CM   1. Wound check, abscess Z51.89 V58.89   2. Encounter for abscess packing removal Z48.00 V58.30       Disposition:   Disposition: Discharged  Condition: Stable         Carola Hassan PA-C  11/06/17 2144

## 2017-11-09 ENCOUNTER — OFFICE VISIT (OUTPATIENT)
Dept: FAMILY MEDICINE | Facility: CLINIC | Age: 52
End: 2017-11-09
Payer: COMMERCIAL

## 2017-11-09 VITALS
BODY MASS INDEX: 26.78 KG/M2 | OXYGEN SATURATION: 99 % | TEMPERATURE: 99 F | SYSTOLIC BLOOD PRESSURE: 120 MMHG | HEART RATE: 80 BPM | DIASTOLIC BLOOD PRESSURE: 82 MMHG | HEIGHT: 64 IN | WEIGHT: 156.88 LBS

## 2017-11-09 DIAGNOSIS — L02.91 ABSCESS: Primary | ICD-10-CM

## 2017-11-09 PROCEDURE — 99999 PR PBB SHADOW E&M-EST. PATIENT-LVL III: CPT | Mod: PBBFAC,,, | Performed by: FAMILY MEDICINE

## 2017-11-09 PROCEDURE — 99213 OFFICE O/P EST LOW 20 MIN: CPT | Mod: S$GLB,,, | Performed by: FAMILY MEDICINE

## 2017-11-09 RX ORDER — MUPIROCIN CALCIUM 20 MG/G
CREAM TOPICAL 3 TIMES DAILY
Qty: 1 TUBE | Refills: 0 | Status: SHIPPED | OUTPATIENT
Start: 2017-11-09 | End: 2018-11-07 | Stop reason: SDUPTHER

## 2017-11-09 NOTE — PROGRESS NOTES
She is here to have the abscess looked at.  She had it I&D in the ED and repacked once.  Examination of the abscesses that is healing well there is still about a centimeter deep hole but there is no surrounding cellulitis or any purulent discharge.      A new packing was placed in today, patient will replace the packing in a couple days.  Use antibiotic cream and a Band-Aid.

## 2017-12-20 ENCOUNTER — OFFICE VISIT (OUTPATIENT)
Dept: FAMILY MEDICINE | Facility: CLINIC | Age: 52
End: 2017-12-20
Payer: COMMERCIAL

## 2017-12-20 VITALS
BODY MASS INDEX: 29.75 KG/M2 | HEART RATE: 81 BPM | OXYGEN SATURATION: 99 % | SYSTOLIC BLOOD PRESSURE: 138 MMHG | DIASTOLIC BLOOD PRESSURE: 72 MMHG | HEIGHT: 64 IN | TEMPERATURE: 98 F | WEIGHT: 174.25 LBS

## 2017-12-20 DIAGNOSIS — F98.8 ATTENTION DEFICIT DISORDER, UNSPECIFIED HYPERACTIVITY PRESENCE: Primary | ICD-10-CM

## 2017-12-20 PROCEDURE — 99213 OFFICE O/P EST LOW 20 MIN: CPT | Mod: S$GLB,,, | Performed by: FAMILY MEDICINE

## 2017-12-20 PROCEDURE — 99999 PR PBB SHADOW E&M-EST. PATIENT-LVL III: CPT | Mod: PBBFAC,,, | Performed by: FAMILY MEDICINE

## 2017-12-20 PROCEDURE — 99499 UNLISTED E&M SERVICE: CPT | Mod: S$GLB,,, | Performed by: FAMILY MEDICINE

## 2017-12-20 RX ORDER — LISDEXAMFETAMINE DIMESYLATE 40 MG/1
40 CAPSULE ORAL EVERY MORNING
Qty: 30 CAPSULE | Refills: 0 | Status: SHIPPED | OUTPATIENT
Start: 2018-02-24 | End: 2018-02-07 | Stop reason: SDUPTHER

## 2017-12-20 RX ORDER — LISDEXAMFETAMINE DIMESYLATE 40 MG/1
40 CAPSULE ORAL EVERY MORNING
Qty: 30 CAPSULE | Refills: 0 | Status: SHIPPED | OUTPATIENT
Start: 2017-12-24 | End: 2018-04-09 | Stop reason: SDUPTHER

## 2017-12-20 RX ORDER — LISDEXAMFETAMINE DIMESYLATE 40 MG/1
40 CAPSULE ORAL EVERY MORNING
Qty: 30 CAPSULE | Refills: 0 | Status: SHIPPED | OUTPATIENT
Start: 2018-01-24 | End: 2018-02-07 | Stop reason: SDUPTHER

## 2017-12-20 RX ORDER — ALBUTEROL SULFATE 90 UG/1
AEROSOL, METERED RESPIRATORY (INHALATION)
Qty: 25.5 G | Refills: 5 | Status: SHIPPED | OUTPATIENT
Start: 2017-12-20 | End: 2018-07-16 | Stop reason: SDUPTHER

## 2017-12-20 RX ORDER — ONDANSETRON 4 MG/1
4 TABLET, ORALLY DISINTEGRATING ORAL EVERY 8 HOURS PRN
Qty: 14 TABLET | Refills: 1 | Status: SHIPPED | OUTPATIENT
Start: 2017-12-20 | End: 2018-11-07 | Stop reason: SDUPTHER

## 2017-12-20 NOTE — PROGRESS NOTES
Chief Complaint:    Chief Complaint   Patient presents with    Medication Refill       History of Present Illness:    Patient is here for follow-up of ADD medication medicine working good no side effects.    ROS:  Review of Systems   Constitutional: Negative for activity change, appetite change and unexpected weight change.   Cardiovascular: Negative for palpitations.   Gastrointestinal: Negative for abdominal pain.   Musculoskeletal: Negative for myalgias.   Neurological: Negative for dizziness, tremors, light-headedness and headaches.   Psychiatric/Behavioral: Negative for agitation, behavioral problems, confusion, decreased concentration, dysphoric mood, hallucinations, sleep disturbance and suicidal ideas. The patient is not nervous/anxious and is not hyperactive.        Past Medical History:   Diagnosis Date    ADD (attention deficit disorder with hyperactivity)     Anxiety     Asthma     mild intermittent    Depression        Social History:  Social History     Social History    Marital status:      Spouse name: N/A    Number of children: N/A    Years of education: N/A     Social History Main Topics    Smoking status: Current Every Day Smoker     Packs/day: 0.50     Years: 10.00     Types: Cigarettes    Smokeless tobacco: Never Used    Alcohol use No    Drug use: No    Sexual activity: Yes     Partners: Male     Birth control/ protection: None     Other Topics Concern    None     Social History Narrative    None       Family History:   family history includes Coronary artery disease (age of onset: 38) in her father; Coronary artery disease (age of onset: 50) in her mother; Lung cancer in her mother.    Health Maintenance   Topic Date Due    TETANUS VACCINE  11/01/1982    Colonoscopy  11/01/2014    Influenza Vaccine  08/01/2017    Pap Smear  03/11/2018    Mammogram  08/22/2018    Lipid Panel  08/01/2022    Pneumococcal PPSV23 (Medium Risk) (2) 11/01/2029    Hepatitis C Screening   "Completed       Physical Exam:    Vital Signs  Temp: 98.1 °F (36.7 °C)  Temp src: Tympanic  Pulse: 81  SpO2: 99 %  BP: 138/72  BP Location: Left arm  Patient Position: Sitting  Pain Score: 0-No pain  Height and Weight  Height: 5' 4" (162.6 cm)  Weight: 79.1 kg (174 lb 4.4 oz)  BSA (Calculated - sq m): 1.89 sq meters  BMI (Calculated): 30  Weight in (lb) to have BMI = 25: 145.3]    Body mass index is 29.91 kg/m².    Physical Exam   Constitutional: She appears well-developed.   HENT:   Mouth/Throat: Oropharynx is clear and moist.   Eyes: Conjunctivae are normal. Pupils are equal, round, and reactive to light.   Neck: Normal range of motion.   Cardiovascular: Normal rate, regular rhythm and normal heart sounds.    Pulmonary/Chest: Breath sounds normal.   Musculoskeletal: Normal range of motion.   Neurological: She is alert. Coordination normal.   Psychiatric: She has a normal mood and affect. Her behavior is normal. Judgment and thought content normal.       Lab Results   Component Value Date    CHOL 176 08/01/2017    TRIG 159 (H) 08/01/2017    HDL 48 08/01/2017    TOTALCHOLEST 3.7 08/01/2017    NONHDLCHOL 128 08/01/2017       No results found for: HGBA1C    Assessment:      ICD-10-CM ICD-9-CM   1. Attention deficit disorder, unspecified hyperactivity presence F98.8 314.00         Plan:   looked up 3 prescriptions sent to the pharmacy.  No orders of the defined types were placed in this encounter.      Current Outpatient Prescriptions   Medication Sig Dispense Refill    albuterol (ACCUNEB) 1.25 mg/3 mL Nebu USE ONE VIAL IN NEBULIZER EVERY 4 TO 6 HOURS AS NEEDED FOR WHEEZING & SHORTNESS OF BREATH **THANK YOU** 75 mL 6    albuterol (PROAIR HFA) 90 mcg/actuation inhaler INHALE TWO PUFFS INTO THE LUNGS EVERY 6 HOURS AS NEEDED FOR WHEEZING **THANK YOU** 25.5 g 5    albuterol-ipratropium 2.5mg-0.5mg/3mL (DUO-NEB) 0.5 mg-3 mg(2.5 mg base)/3 mL nebulizer solution Take 3 mLs by nebulization every 6 (six) hours as needed " for Wheezing. Rescue 1 Box 0    citalopram (CELEXA) 20 MG tablet Take 1 tablet (20 mg total) by mouth once daily. 30 tablet 11    clindamycin (CLEOCIN) 150 MG capsule Take 450 mg by mouth 4 (four) times daily.      fluticasone (FLONASE) 50 mcg/actuation nasal spray 2 sprays by Each Nare route once daily. 16 g 11    levocetirizine (XYZAL) 5 MG tablet TAKE ONE TABLET BY MOUTH EVERY EVENING **THANK YOU** 30 tablet 5    [START ON 12/24/2017] lisdexamfetamine (VYVANSE) 40 MG Cap Take 1 capsule (40 mg total) by mouth every morning. 30 capsule 0    [START ON 1/24/2018] lisdexamfetamine (VYVANSE) 40 MG Cap Take 1 capsule (40 mg total) by mouth every morning. 30 capsule 0    [START ON 2/24/2018] lisdexamfetamine (VYVANSE) 40 MG Cap Take 1 capsule (40 mg total) by mouth every morning. 30 capsule 0    meloxicam (MOBIC) 15 MG tablet Take 1 tablet (15 mg total) by mouth once daily. 30 tablet 0    montelukast (SINGULAIR) 10 mg tablet TAKE ONE TABLET BY MOUTH EVERY DAY **THANK YOU** 90 tablet 4    mupirocin calcium 2% (BACTROBAN) 2 % cream Apply topically 3 (three) times daily. 1 Tube 0    omeprazole (PRILOSEC) 40 MG capsule TAKE ONE CAPSULE BY MOUTH ONCE DAILY **THANK YOU** 90 capsule 3    ondansetron (ZOFRAN-ODT) 4 MG TbDL Take 1 tablet (4 mg total) by mouth every 8 (eight) hours as needed. 14 tablet 1    tizanidine (ZANAFLEX) 4 mg Cap Take 1 capsule by mouth 2 (two) times daily as needed. 60 capsule 0    epinephrine (EPIPEN 2-JHONY) 0.3 mg/0.3 mL AtIn Inject 0.3 mLs (0.3 mg total) into the muscle once. 0.3 mL 12     No current facility-administered medications for this visit.        Medications Discontinued During This Encounter   Medication Reason    lisdexamfetamine (VYVANSE) 40 MG Cap Reorder    lisdexamfetamine (VYVANSE) 40 MG Cap Reorder    lisdexamfetamine (VYVANSE) 40 MG Cap Reorder    PROAIR HFA 90 mcg/actuation inhaler Reorder    ondansetron (ZOFRAN-ODT) 4 MG TbDL Reorder       No Follow-up on  file.      Dima Her MD

## 2018-01-15 ENCOUNTER — OFFICE VISIT (OUTPATIENT)
Dept: FAMILY MEDICINE | Facility: CLINIC | Age: 53
End: 2018-01-15
Payer: MEDICARE

## 2018-01-15 VITALS
OXYGEN SATURATION: 100 % | TEMPERATURE: 99 F | SYSTOLIC BLOOD PRESSURE: 128 MMHG | BODY MASS INDEX: 30.66 KG/M2 | HEIGHT: 64 IN | HEART RATE: 76 BPM | WEIGHT: 179.56 LBS | DIASTOLIC BLOOD PRESSURE: 76 MMHG

## 2018-01-15 DIAGNOSIS — M70.62 TROCHANTERIC BURSITIS OF BOTH HIPS: Primary | ICD-10-CM

## 2018-01-15 DIAGNOSIS — R07.89 OTHER CHEST PAIN: ICD-10-CM

## 2018-01-15 DIAGNOSIS — F17.200 SMOKER: ICD-10-CM

## 2018-01-15 DIAGNOSIS — M77.8 RIGHT SHOULDER TENDINITIS: ICD-10-CM

## 2018-01-15 DIAGNOSIS — M70.61 TROCHANTERIC BURSITIS OF BOTH HIPS: Primary | ICD-10-CM

## 2018-01-15 PROCEDURE — 99214 OFFICE O/P EST MOD 30 MIN: CPT | Mod: S$GLB,,, | Performed by: FAMILY MEDICINE

## 2018-01-15 PROCEDURE — 99999 PR PBB SHADOW E&M-EST. PATIENT-LVL IV: CPT | Mod: PBBFAC,,, | Performed by: FAMILY MEDICINE

## 2018-01-15 RX ORDER — PREDNISONE 50 MG/1
50 TABLET ORAL DAILY
Qty: 5 TABLET | Refills: 0 | Status: SHIPPED | OUTPATIENT
Start: 2018-01-15 | End: 2018-01-20

## 2018-01-15 RX ORDER — TIZANIDINE 4 MG/1
4 TABLET ORAL EVERY 6 HOURS PRN
Qty: 30 TABLET | Refills: 0 | Status: SHIPPED | OUTPATIENT
Start: 2018-01-15 | End: 2018-01-25

## 2018-01-15 NOTE — PROGRESS NOTES
Chief Complaint:    Chief Complaint   Patient presents with    Shoulder Pain    Hip Pain       History of Present Illness:    She presents today she said that over the last few days she's been having pain involving bilateral hips, right shoulder and right knee.  She does housecleaning for a living.  She denies any tingling numbness no injury.    She also acknowledges chest pain sometimes not necessarily related to exertion.  Denies any diaphoresis.  She is a smoker she denies any shortness of breath or cough.    ROS:  Review of Systems   Constitutional: Negative for activity change, chills, fatigue, fever and unexpected weight change.   HENT: Negative for congestion, ear discharge, ear pain, hearing loss, postnasal drip and rhinorrhea.    Eyes: Negative for pain and visual disturbance.   Respiratory: Positive for chest tightness. Negative for cough and shortness of breath.    Cardiovascular: Negative for chest pain and palpitations.   Gastrointestinal: Negative for abdominal pain, diarrhea and vomiting.   Endocrine: Negative for heat intolerance.   Genitourinary: Negative for dysuria, flank pain, frequency and hematuria.   Musculoskeletal: Positive for back pain. Negative for gait problem and neck pain.   Skin: Negative for color change and rash.   Neurological: Negative for dizziness, tremors, seizures, numbness and headaches.   Psychiatric/Behavioral: Negative for agitation, hallucinations, self-injury, sleep disturbance and suicidal ideas. The patient is not nervous/anxious.        Past Medical History:   Diagnosis Date    ADD (attention deficit disorder with hyperactivity)     Anxiety     Asthma     mild intermittent    Depression        Social History:  Social History     Social History    Marital status:      Spouse name: N/A    Number of children: N/A    Years of education: N/A     Social History Main Topics    Smoking status: Current Every Day Smoker     Packs/day: 0.50     Years: 10.00     " Types: Cigarettes    Smokeless tobacco: Never Used    Alcohol use No    Drug use: No    Sexual activity: Yes     Partners: Male     Birth control/ protection: None     Other Topics Concern    None     Social History Narrative    None       Family History:   family history includes Coronary artery disease (age of onset: 38) in her father; Coronary artery disease (age of onset: 50) in her mother; Lung cancer in her mother.    Health Maintenance   Topic Date Due    Colonoscopy  11/01/2014    Influenza Vaccine  08/01/2017    Pap Smear  03/11/2018    Mammogram  08/22/2018    Lipid Panel  08/01/2022    TETANUS VACCINE  10/29/2027    Pneumococcal PPSV23 (Medium Risk) (2) 11/01/2029    Hepatitis C Screening  Completed       Physical Exam:    Vital Signs  Temp: 98.5 °F (36.9 °C)  Temp src: Tympanic  Pulse: 76  SpO2: 100 %  BP: 128/76  BP Location: Left arm  Patient Position: Sitting  Pain Score: 0-No pain  Height and Weight  Height: 5' 4" (162.6 cm)  Weight: 81.4 kg (179 lb 9 oz)  BSA (Calculated - sq m): 1.92 sq meters  BMI (Calculated): 30.9  Weight in (lb) to have BMI = 25: 145.3]    Body mass index is 30.82 kg/m².    Physical Exam   Constitutional: She is oriented to person, place, and time. She appears well-developed.   HENT:   Mouth/Throat: Oropharynx is clear and moist.   Eyes: Conjunctivae are normal. Pupils are equal, round, and reactive to light.   Neck: Normal range of motion. Neck supple.   Cardiovascular: Normal rate, regular rhythm and normal heart sounds.    No murmur heard.  Pulmonary/Chest: Effort normal and breath sounds normal. No respiratory distress. She has no wheezes. She has no rales. She exhibits no tenderness.   Abdominal: Soft. She exhibits no distension and no mass. There is no tenderness. There is no guarding.   Musculoskeletal: She exhibits no edema.        Right shoulder: She exhibits tenderness. She exhibits normal range of motion, no bony tenderness, no swelling and no " effusion.        Legs:  Bilateral hips with full range of motion.         Lymphadenopathy:     She has no cervical adenopathy.   Neurological: She is alert and oriented to person, place, and time. She has normal reflexes.   Skin: Skin is warm and dry.   Psychiatric: She has a normal mood and affect. Her behavior is normal. Judgment and thought content normal.       Lab Results   Component Value Date    CHOL 176 08/01/2017    TRIG 159 (H) 08/01/2017    HDL 48 08/01/2017    TOTALCHOLEST 3.7 08/01/2017    NONHDLCHOL 128 08/01/2017       No results found for: HGBA1C    Assessment:      ICD-10-CM ICD-9-CM   1. Trochanteric bursitis of both hips M70.61 726.5    M70.62    2. Right shoulder tendinitis M75.81 726.10   3. Other chest pain R07.89 786.59   4. Smoker F17.200 305.1         Plan:  Appears to have tendinitis involving hips and shoulder possibly over exertion could be the cause we'll treat symptomatically with prednisone and tizanidine.  Please stay off of any heavy exertion for the next few days.  We'll get a stress test to evaluate the chest pain.  Smoking cessation is strongly advised please go to smoking cessation class offered for free.    Orders Placed This Encounter   Procedures    Ambulatory referral to Smoking Cessation Program    Cardiac treadmill stress test       Current Outpatient Prescriptions   Medication Sig Dispense Refill    albuterol (ACCUNEB) 1.25 mg/3 mL Nebu USE ONE VIAL IN NEBULIZER EVERY 4 TO 6 HOURS AS NEEDED FOR WHEEZING & SHORTNESS OF BREATH **THANK YOU** 75 mL 6    albuterol (PROAIR HFA) 90 mcg/actuation inhaler INHALE TWO PUFFS INTO THE LUNGS EVERY 6 HOURS AS NEEDED FOR WHEEZING **THANK YOU** 25.5 g 5    albuterol-ipratropium 2.5mg-0.5mg/3mL (DUO-NEB) 0.5 mg-3 mg(2.5 mg base)/3 mL nebulizer solution Take 3 mLs by nebulization every 6 (six) hours as needed for Wheezing. Rescue 1 Box 0    citalopram (CELEXA) 20 MG tablet Take 1 tablet (20 mg total) by mouth once daily. 30 tablet 11     clindamycin (CLEOCIN) 150 MG capsule Take 450 mg by mouth 4 (four) times daily.      fluticasone (FLONASE) 50 mcg/actuation nasal spray 2 sprays by Each Nare route once daily. 16 g 11    levocetirizine (XYZAL) 5 MG tablet TAKE ONE TABLET BY MOUTH EVERY EVENING **THANK YOU** 30 tablet 5    lisdexamfetamine (VYVANSE) 40 MG Cap Take 1 capsule (40 mg total) by mouth every morning. 30 capsule 0    [START ON 1/24/2018] lisdexamfetamine (VYVANSE) 40 MG Cap Take 1 capsule (40 mg total) by mouth every morning. 30 capsule 0    [START ON 2/24/2018] lisdexamfetamine (VYVANSE) 40 MG Cap Take 1 capsule (40 mg total) by mouth every morning. 30 capsule 0    meloxicam (MOBIC) 15 MG tablet Take 1 tablet (15 mg total) by mouth once daily. 30 tablet 0    montelukast (SINGULAIR) 10 mg tablet TAKE ONE TABLET BY MOUTH EVERY DAY **THANK YOU** 90 tablet 4    mupirocin calcium 2% (BACTROBAN) 2 % cream Apply topically 3 (three) times daily. 1 Tube 0    omeprazole (PRILOSEC) 40 MG capsule TAKE ONE CAPSULE BY MOUTH ONCE DAILY **THANK YOU** 90 capsule 3    ondansetron (ZOFRAN-ODT) 4 MG TbDL Take 1 tablet (4 mg total) by mouth every 8 (eight) hours as needed. 14 tablet 1    tizanidine (ZANAFLEX) 4 mg Cap Take 1 capsule by mouth 2 (two) times daily as needed. 60 capsule 0    epinephrine (EPIPEN 2-JHONY) 0.3 mg/0.3 mL AtIn Inject 0.3 mLs (0.3 mg total) into the muscle once. 0.3 mL 12    predniSONE (DELTASONE) 50 MG Tab Take 1 tablet (50 mg total) by mouth once daily. 5 tablet 0    tiZANidine (ZANAFLEX) 4 MG tablet Take 1 tablet (4 mg total) by mouth every 6 (six) hours as needed. 30 tablet 0     No current facility-administered medications for this visit.        There are no discontinued medications.    No Follow-up on file.      Dima Her MD

## 2018-01-21 ENCOUNTER — HOSPITAL ENCOUNTER (EMERGENCY)
Facility: HOSPITAL | Age: 53
Discharge: HOME | End: 2018-01-21
Attending: INTERNAL MEDICINE
Payer: COMMERCIAL

## 2018-01-21 VITALS
DIASTOLIC BLOOD PRESSURE: 82 MMHG | WEIGHT: 186.5 LBS | RESPIRATION RATE: 18 BRPM | BODY MASS INDEX: 31.84 KG/M2 | OXYGEN SATURATION: 100 % | SYSTOLIC BLOOD PRESSURE: 138 MMHG | HEART RATE: 70 BPM | HEIGHT: 64 IN | TEMPERATURE: 98 F

## 2018-01-21 DIAGNOSIS — F17.200 SMOKING ADDICTION: ICD-10-CM

## 2018-01-21 DIAGNOSIS — R07.9 CHEST PAIN: ICD-10-CM

## 2018-01-21 DIAGNOSIS — J43.2 CENTRILOBULAR EMPHYSEMA: Primary | ICD-10-CM

## 2018-01-21 LAB
ALBUMIN SERPL BCP-MCNC: 3.3 G/DL
ALP SERPL-CCNC: 82 U/L
ALT SERPL W/O P-5'-P-CCNC: 25 U/L
ANION GAP SERPL CALC-SCNC: 10 MMOL/L
AST SERPL-CCNC: 8 U/L
BASOPHILS # BLD AUTO: 0.02 K/UL
BASOPHILS NFR BLD: 0.2 %
BILIRUB SERPL-MCNC: 0.2 MG/DL
BNP SERPL-MCNC: 121 PG/ML
BUN SERPL-MCNC: 9 MG/DL
CALCIUM SERPL-MCNC: 8.1 MG/DL
CHLORIDE SERPL-SCNC: 108 MMOL/L
CO2 SERPL-SCNC: 24 MMOL/L
CREAT SERPL-MCNC: 0.7 MG/DL
D DIMER PPP IA.FEU-MCNC: 0.48 MG/L FEU
DIFFERENTIAL METHOD: ABNORMAL
EOSINOPHIL # BLD AUTO: 0.4 K/UL
EOSINOPHIL NFR BLD: 3.2 %
ERYTHROCYTE [DISTWIDTH] IN BLOOD BY AUTOMATED COUNT: 14.3 %
EST. GFR  (AFRICAN AMERICAN): >60 ML/MIN/1.73 M^2
EST. GFR  (NON AFRICAN AMERICAN): >60 ML/MIN/1.73 M^2
GLUCOSE SERPL-MCNC: 95 MG/DL
HCT VFR BLD AUTO: 35.9 %
HGB BLD-MCNC: 12.2 G/DL
LYMPHOCYTES # BLD AUTO: 3.4 K/UL
LYMPHOCYTES NFR BLD: 28.8 %
MCH RBC QN AUTO: 30.4 PG
MCHC RBC AUTO-ENTMCNC: 34 G/DL
MCV RBC AUTO: 90 FL
MONOCYTES # BLD AUTO: 0.7 K/UL
MONOCYTES NFR BLD: 6.1 %
NEUTROPHILS # BLD AUTO: 7.2 K/UL
NEUTROPHILS NFR BLD: 61.7 %
PLATELET # BLD AUTO: 302 K/UL
PMV BLD AUTO: 11 FL
POTASSIUM SERPL-SCNC: 3.2 MMOL/L
PROT SERPL-MCNC: 5.7 G/DL
RBC # BLD AUTO: 4.01 M/UL
SODIUM SERPL-SCNC: 142 MMOL/L
TROPONIN I SERPL DL<=0.01 NG/ML-MCNC: <0.006 NG/ML
WBC # BLD AUTO: 11.63 K/UL

## 2018-01-21 PROCEDURE — 85025 COMPLETE CBC W/AUTO DIFF WBC: CPT

## 2018-01-21 PROCEDURE — 83880 ASSAY OF NATRIURETIC PEPTIDE: CPT

## 2018-01-21 PROCEDURE — 93010 ELECTROCARDIOGRAM REPORT: CPT | Mod: ,,, | Performed by: INTERNAL MEDICINE

## 2018-01-21 PROCEDURE — 99284 EMERGENCY DEPT VISIT MOD MDM: CPT | Mod: 25

## 2018-01-21 PROCEDURE — 80053 COMPREHEN METABOLIC PANEL: CPT

## 2018-01-21 PROCEDURE — 84484 ASSAY OF TROPONIN QUANT: CPT

## 2018-01-21 PROCEDURE — 25000003 PHARM REV CODE 250: Performed by: INTERNAL MEDICINE

## 2018-01-21 PROCEDURE — 93005 ELECTROCARDIOGRAM TRACING: CPT

## 2018-01-21 PROCEDURE — 85379 FIBRIN DEGRADATION QUANT: CPT

## 2018-01-21 RX ORDER — ASPIRIN 325 MG
325 TABLET ORAL
Status: COMPLETED | OUTPATIENT
Start: 2018-01-21 | End: 2018-01-21

## 2018-01-21 RX ORDER — NAPROXEN SODIUM 220 MG/1
81 TABLET, FILM COATED ORAL DAILY
Qty: 30 TABLET | Refills: 0 | COMMUNITY
Start: 2018-01-21 | End: 2024-03-15

## 2018-01-21 RX ADMIN — ASPIRIN 325 MG ORAL TABLET 325 MG: 325 PILL ORAL at 04:01

## 2018-01-21 NOTE — ED PROVIDER NOTES
"SCRIBE #1 NOTE: I, Corinne Mack, am scribing for, and in the presence of, Mukesh Rodriguez MD. I have scribed the entire note.      History      Chief Complaint   Patient presents with    Chest Pain     midsternal CP that radiates to both sides of jaw started about 1 hour ago       Review of patient's allergies indicates:   Allergen Reactions    Percocet [oxycodone-acetaminophen] Itching        HPI   HPI    1/21/2018, 4:07 PM   History obtained from the patient      History of Present Illness: Chasity Blake is a 52 y.o. female patient who presents to the Emergency Department for CP which onset gradually several days ago. Symptoms are intermittent and moderate in severity. Pt describes her CP as a "tightness" in her chest. No mitigating or exacerbating factors reported. Associated sxs include SOB, palpitation, neck pain, light-headedness, HA, and cough. Patient denies any fever, chills, diaphoresis, leg swelling, N/V, back pain, dizziness, numbness, and all other sxs at this time. No prior Tx reported. Pt has a stress test scheduled for tomorrow. Pt has Hx of anxiety and tobacco abuse. No further complaints or concerns at this time.         Arrival mode: Personal vehicle    PCP: Dima Her MD       Past Medical History:  Past Medical History:   Diagnosis Date    ADD (attention deficit disorder with hyperactivity)     Anxiety     Asthma     mild intermittent    Depression        Past Surgical History:  Past Surgical History:   Procedure Laterality Date    BELT ABDOMINOPLASTY      Breast Augmentation      BREAST BIOPSY Left     2015. benign    BREAST SURGERY      COSMETIC SURGERY      TONSILLECTOMY      TUBAL LIGATION           Family History:  Family History   Problem Relation Age of Onset    Lung cancer Mother     Coronary artery disease Mother 50    Coronary artery disease Father 38    Breast cancer Neg Hx     Colon cancer Neg Hx     Ovarian cancer Neg Hx        Social History:  Social " History     Social History Main Topics    Smoking status: Current Every Day Smoker     Packs/day: 0.50     Years: 10.00     Types: Cigarettes    Smokeless tobacco: Never Used    Alcohol use No    Drug use: No    Sexual activity: Yes     Partners: Male     Birth control/ protection: None       ROS   Review of Systems   Constitutional: Negative for chills, diaphoresis and fever.   Respiratory: Positive for cough and shortness of breath.    Cardiovascular: Positive for chest pain and palpitations. Negative for leg swelling.   Gastrointestinal: Negative for abdominal pain, diarrhea, nausea and vomiting.   Musculoskeletal: Positive for neck pain. Negative for back pain and neck stiffness.   Skin: Negative for rash and wound.   Neurological: Positive for light-headedness and headaches. Negative for dizziness and numbness.   All other systems reviewed and are negative.    Physical Exam      Initial Vitals [01/21/18 1555]   BP Pulse Resp Temp SpO2   138/82 70 18 98 °F (36.7 °C) 100 %      MAP       100.67          Physical Exam  Nursing Notes and Vital Signs Reviewed.  Constitutional: Patient is in no apparent distress. Well-developed and well-nourished.  Head: Atraumatic. Normocephalic.  Eyes: PERRL. EOM intact. Conjunctivae are not pale. No scleral icterus.  ENT: Mucous membranes are moist. Oropharynx is clear and symmetric.    Neck: Supple. Full ROM.   Cardiovascular: Regular rate. Regular rhythm. No murmurs, rubs, or gallops. Distal pulses are 2+ and symmetric.  Pulmonary/Chest: No respiratory distress. Prolonged expirations consistent with emphysema. No wheezing or rales.  Abdominal: Soft and non-distended.  There is no tenderness.  No rebound, guarding, or rigidity  Musculoskeletal: Moves all extremities. No obvious deformities.   Skin: Warm and dry.  Neurological:  Alert, awake, and appropriate.  Normal speech.  No acute focal neurological deficits are appreciated.  Psychiatric: Normal affect. Good eye contact.  "Appropriate in content.    ED Course    Procedures  ED Vital Signs:  Vitals:    01/21/18 1555   BP: 138/82   Pulse: 70   Resp: 18   Temp: 98 °F (36.7 °C)   TempSrc: Oral   SpO2: 100%   Weight: 84.6 kg (186 lb 8.2 oz)   Height: 5' 4" (1.626 m)       Abnormal Lab Results:  Labs Reviewed   CBC W/ AUTO DIFFERENTIAL - Abnormal; Notable for the following:        Result Value    Hematocrit 35.9 (*)     All other components within normal limits   COMPREHENSIVE METABOLIC PANEL - Abnormal; Notable for the following:     Potassium 3.2 (*)     Calcium 8.1 (*)     Total Protein 5.7 (*)     Albumin 3.3 (*)     AST 8 (*)     All other components within normal limits   B-TYPE NATRIURETIC PEPTIDE - Abnormal; Notable for the following:      (*)     All other components within normal limits   TROPONIN I   D DIMER, QUANTITATIVE   TROPONIN I        All Lab Results:  Results for orders placed or performed during the hospital encounter of 01/21/18   CBC auto differential   Result Value Ref Range    WBC 11.63 3.90 - 12.70 K/uL    RBC 4.01 4.00 - 5.40 M/uL    Hemoglobin 12.2 12.0 - 16.0 g/dL    Hematocrit 35.9 (L) 37.0 - 48.5 %    MCV 90 82 - 98 fL    MCH 30.4 27.0 - 31.0 pg    MCHC 34.0 32.0 - 36.0 g/dL    RDW 14.3 11.5 - 14.5 %    Platelets 302 150 - 350 K/uL    MPV 11.0 9.2 - 12.9 fL    Gran # 7.2 1.8 - 7.7 K/uL    Lymph # 3.4 1.0 - 4.8 K/uL    Mono # 0.7 0.3 - 1.0 K/uL    Eos # 0.4 0.0 - 0.5 K/uL    Baso # 0.02 0.00 - 0.20 K/uL    Gran% 61.7 38.0 - 73.0 %    Lymph% 28.8 18.0 - 48.0 %    Mono% 6.1 4.0 - 15.0 %    Eosinophil% 3.2 0.0 - 8.0 %    Basophil% 0.2 0.0 - 1.9 %    Differential Method Automated    Comprehensive metabolic panel   Result Value Ref Range    Sodium 142 136 - 145 mmol/L    Potassium 3.2 (L) 3.5 - 5.1 mmol/L    Chloride 108 95 - 110 mmol/L    CO2 24 23 - 29 mmol/L    Glucose 95 70 - 110 mg/dL    BUN, Bld 9 6 - 20 mg/dL    Creatinine 0.7 0.5 - 1.4 mg/dL    Calcium 8.1 (L) 8.7 - 10.5 mg/dL    Total Protein 5.7 (L) " 6.0 - 8.4 g/dL    Albumin 3.3 (L) 3.5 - 5.2 g/dL    Total Bilirubin 0.2 0.1 - 1.0 mg/dL    Alkaline Phosphatase 82 55 - 135 U/L    AST 8 (L) 10 - 40 U/L    ALT 25 10 - 44 U/L    Anion Gap 10 8 - 16 mmol/L    eGFR if African American >60 >60 mL/min/1.73 m^2    eGFR if non African American >60 >60 mL/min/1.73 m^2   Troponin I #1   Result Value Ref Range    Troponin I <0.006 0.000 - 0.026 ng/mL   B-Type natriuretic peptide (BNP)   Result Value Ref Range     (H) 0 - 99 pg/mL   D dimer, quantitative   Result Value Ref Range    D-Dimer 0.48 <0.50 mg/L FEU       Imaging Results:  Imaging Results          X-Ray Chest PA And Lateral (Final result)  Result time 01/21/18 16:22:01    Final result by Albaro Urban III, MD (01/21/18 16:22:01)                 Impression:         Negative two-view chest x-ray.      Electronically signed by: ALBARO URBAN MD  Date:     01/21/18  Time:    16:22              Narrative:    Two-view chest x-ray.    Clinical indication: Chest Pain     Heart size is normal. The lung fields are clear. No acute pulmonary infiltrate.                               The EKG was ordered, reviewed, and independently interpreted by the ED provider.  Interpretation time: 1609  Rate: 69 BPM  Rhythm: normal sinus rhythm and PACs  Interpretation: No STEMI.           The Emergency Provider reviewed the vital signs and test results, which are outlined above.    ED Discussion     5:41 PM: Reassessed pt at this time. Pt is awake, alert, and in no distress. Discussed with pt all pertinent ED information and results. Discussed pt dx and plan of tx. Gave pt all f/u and return to the ED instructions. All questions and concerns were addressed at this time. Pt expresses understanding of information and instructions, and is comfortable with plan to discharge. Pt is stable for discharge.    I discussed with patient and/or family/caretaker that evaluation in the ED does not suggest any emergent or life  threatening medical conditions requiring immediate intervention beyond what was provided in the ED, and I believe patient is safe for discharge.  Regardless, an unremarkable evaluation in the ED does not preclude the development or presence of a serious of life threatening condition. As such, patient was instructed to return immediately for any worsening or change in current symptoms.    I have discussed with patient and/or family/caretaker chest pain precautions, specifically to return for worsening chest pain, shortness of breath, fever, or any concern.  I have low suspicion for cardiopulmonary, vascular, infectious, respiratory, or other emergent medical condition based on my evaluation in the ED.    Patient's headache is either consistent with previous headache and/or lacks features concerning for emergent or life threatening condition.  I do not suspect SAH, meningitis, increased IC pressure, infectious, toxic, vascular, CNS, or other EMC.  I have discussed this at length with patient and/or family/caretaker.      ED Medication(s):  Medications   aspirin tablet 325 mg (325 mg Oral Given 1/21/18 1619)       New Prescriptions    ASPIRIN 81 MG CHEW    Take 1 tablet (81 mg total) by mouth once daily.             Medical Decision Making    Medical Decision Making:   Clinical Tests:   Lab Tests: Ordered and Reviewed  Radiological Study: Ordered and Reviewed  Medical Tests: Ordered and Reviewed           Scribe Attestation:   Scribe #1: I performed the above scribed service and the documentation accurately describes the services I performed. I attest to the accuracy of the note.    Attending:   Physician Attestation Statement for Scribe #1: I, Mukesh Rodriguez MD, personally performed the services described in this documentation, as scribed by Corinne Mack, in my presence, and it is both accurate and complete.          Clinical Impression       ICD-10-CM ICD-9-CM   1. Centrilobular emphysema J43.2 492.8   2. Chest  pain R07.9 786.50   3. Smoking addiction F17.200 305.1       Disposition:   Disposition: Discharged  Condition: Stable         Mukesh Rodriguez MD  01/21/18 [id number]

## 2018-01-28 RX ORDER — CLINDAMYCIN PHOSPHATE 11.9 MG/ML
SOLUTION TOPICAL
Qty: 30 ML | Refills: 1 | Status: SHIPPED | OUTPATIENT
Start: 2018-01-28 | End: 2019-05-08 | Stop reason: SDUPTHER

## 2018-02-05 ENCOUNTER — HOSPITAL ENCOUNTER (EMERGENCY)
Facility: HOSPITAL | Age: 53
Discharge: HOME | End: 2018-02-05
Attending: EMERGENCY MEDICINE
Payer: COMMERCIAL

## 2018-02-05 VITALS
BODY MASS INDEX: 29.88 KG/M2 | HEIGHT: 64 IN | WEIGHT: 175 LBS | SYSTOLIC BLOOD PRESSURE: 152 MMHG | DIASTOLIC BLOOD PRESSURE: 65 MMHG | OXYGEN SATURATION: 97 % | HEART RATE: 78 BPM | RESPIRATION RATE: 18 BRPM | TEMPERATURE: 99 F

## 2018-02-05 DIAGNOSIS — L02.214 ABSCESS OF GROIN, RIGHT: Primary | ICD-10-CM

## 2018-02-05 PROCEDURE — 63600175 PHARM REV CODE 636 W HCPCS: Performed by: NURSE PRACTITIONER

## 2018-02-05 PROCEDURE — 99283 EMERGENCY DEPT VISIT LOW MDM: CPT | Mod: 25

## 2018-02-05 PROCEDURE — 96372 THER/PROPH/DIAG INJ SC/IM: CPT

## 2018-02-05 PROCEDURE — 25000003 PHARM REV CODE 250: Performed by: NURSE PRACTITIONER

## 2018-02-05 PROCEDURE — 10061 I&D ABSCESS COMP/MULTIPLE: CPT | Mod: 25

## 2018-02-05 RX ORDER — PROMETHAZINE HYDROCHLORIDE 25 MG/ML
12.5 INJECTION, SOLUTION INTRAMUSCULAR; INTRAVENOUS
Status: COMPLETED | OUTPATIENT
Start: 2018-02-05 | End: 2018-02-05

## 2018-02-05 RX ORDER — LIDOCAINE HYDROCHLORIDE 10 MG/ML
10 INJECTION INFILTRATION; PERINEURAL
Status: COMPLETED | OUTPATIENT
Start: 2018-02-05 | End: 2018-02-05

## 2018-02-05 RX ORDER — HYDROMORPHONE HYDROCHLORIDE 1 MG/ML
1 INJECTION, SOLUTION INTRAMUSCULAR; INTRAVENOUS; SUBCUTANEOUS
Status: COMPLETED | OUTPATIENT
Start: 2018-02-05 | End: 2018-02-05

## 2018-02-05 RX ADMIN — LIDOCAINE HYDROCHLORIDE 10 ML: 10 INJECTION, SOLUTION INFILTRATION; PERINEURAL at 04:02

## 2018-02-05 RX ADMIN — PROMETHAZINE HYDROCHLORIDE 12.5 MG: 25 INJECTION INTRAMUSCULAR; INTRAVENOUS at 04:02

## 2018-02-05 RX ADMIN — Medication 1 MG: at 04:02

## 2018-02-05 NOTE — ED PROVIDER NOTES
SCRIBE #1 NOTE: IKlaudia, am scribing for, and in the presence of, Hayden Cortes NP. I have scribed the entire note.      History      Chief Complaint   Patient presents with    Wound Infection     patient states she had an abscess drained at [facility] after hours yesterday, c/o pain and swelling        Review of patient's allergies indicates:   Allergen Reactions    Percocet [oxycodone-acetaminophen] Itching        HPI   HPI    2/5/2018, 3:55 PM   History obtained from the patient      History of Present Illness: Chasity Blake is a 52 y.o. female patient 1 day s/p I&D with packing of R groin abscess at [facility] who presents to the Emergency Department for evaluation because she states the abscess has not been actively draining. Pt reports that when she pushes on the area above the abscess, pus drains from the needle puncture site and not the incision site, which she thinks is abnormal. Pt received an abx injection and was prescribed Clindamycin 150 mg QID and has taken one tablet because she filled the prescription this morning. Symptoms are constant and mild in severity. Pain is exacerbated with palpation. No mitigating factors reported. She has taken Tylenol for fever.  Patient denies chills, nausea, vomiting, and all other sxs at this time. No further complaints or concerns at this time.     Arrival mode: Personal vehicle    PCP: Dima Her MD       Past Medical History:  Past Medical History:   Diagnosis Date    ADD (attention deficit disorder with hyperactivity)     Anxiety     Asthma     mild intermittent    Depression        Past Surgical History:  Past Surgical History:   Procedure Laterality Date    BELT ABDOMINOPLASTY      Breast Augmentation      BREAST BIOPSY Left     2015. benign    BREAST SURGERY      COSMETIC SURGERY      TONSILLECTOMY      TUBAL LIGATION           Family History:  Family History   Problem Relation Age of Onset    Lung cancer Mother     Coronary artery disease Mother 50     Coronary artery disease Father 38    Breast cancer Neg Hx     Colon cancer Neg Hx     Ovarian cancer Neg Hx        Social History:  Social History     Social History Main Topics    Smoking status: Current Every Day Smoker     Packs/day: 0.50     Years: 10.00     Types: Cigarettes    Smokeless tobacco: Never Used    Alcohol use No    Drug use: No    Sexual activity: Yes     Partners: Male     Birth control/ protection: None       ROS   Review of Systems   Constitutional: Positive for fever. Negative for chills.   HENT: Negative for sore throat.    Respiratory: Negative for shortness of breath.    Cardiovascular: Negative for chest pain.   Gastrointestinal: Negative for nausea and vomiting.   Genitourinary: Negative for dysuria.   Musculoskeletal: Negative for back pain.   Skin: Positive for color change (erythema) and wound (abscess). Negative for rash.   Neurological: Negative for weakness.   Hematological: Does not bruise/bleed easily.   All other systems reviewed and are negative.      Physical Exam      Initial Vitals [02/05/18 1512]   BP Pulse Resp Temp SpO2   (!) 158/71 85 18 98.7 °F (37.1 °C) 97 %      MAP       100          Physical Exam  Nursing Notes and Vital Signs Reviewed.  Constitutional: Patient is in no acute distress. Awake and alert. Well-developed and well-nourished.  Head: Atraumatic. Normocephalic.  Eyes: Conjunctivae are not pale. No scleral icterus.  ENT: Mucous membranes are moist. Oropharynx is clear and symmetric.    Neck: Supple. Full ROM.   Cardiovascular: Regular rate. Regular rhythm. No murmurs, rubs, or gallops. Distal pulses are 2+ and symmetric.  Pulmonary/Chest: No respiratory distress. Clear to auscultation bilaterally. No wheezing, rales, or rhonchi.  Abdominal: Soft. Non-distended.   Musculoskeletal: Moves all extremities. No obvious deformities.   Skin: Warm and dry. 5 cm x 3 cm area of induration and erythema to the R groin with a puncture wound draining copious pus,  "no extension into labia or buttock region to suggest necrotizing fasciitis  Neurological:  Alert, awake, and appropriate.  Normal speech. No acute focal neurological deficits are appreciated.  Psychiatric: Normal affect. Good eye contact. Appropriate in content.    ED Course    I & D - Incision and Drainage  Date/Time: 2/5/2018 4:48 PM  Performed by: ALTHEA PATE  Authorized by: REI CARTER JR   Consent Done: Yes  Consent: Verbal consent obtained.  Type: abscess  Body area: lower extremity  Location details: right leg  Anesthesia: local infiltration    Anesthesia:  Local Anesthetic: lidocaine 1% without epinephrine  Scalpel size: 11  Incision type: single straight  Complexity: complex  Drainage: pus and  bloody  Drainage amount: copious  Wound treatment: incision,  drainage,  expression of material,  wound packed and  deloculation  Packing material: 1/4 in gauze  Complications: No  Specimens: No  Implants: No  Patient tolerance: Patient tolerated the procedure well with no immediate complications        ED Vital Signs:  Vitals:    02/05/18 1512   BP: (!) 158/71   Pulse: 85   Resp: 18   Temp: 98.7 °F (37.1 °C)   SpO2: 97%   Weight: 79.4 kg (175 lb)   Height: 5' 4" (1.626 m)     The Emergency Provider reviewed the vital signs and test results, which are outlined above.    ED Discussion     4:59 PM: Informed patient to continue taking Clindamycin as prescribed. Informed pt to f/u with PCP in 2 days for wound recheck and packing removal. Gave patient wound care precautions. All questions and concerns were addressed at this time. Pt expresses understanding of information and instructions, and is comfortable with plan to discharge. Pt is stable for discharge.    I discussed wound care precautions with patient and/or family/caretaker; specifically that all wounds have risk of infection despite efforts to cleanse and debride the wound; and there is a risk of an occult foreign body (and thus increased risk of " infection) despite a negative examination.  I discussed with patient need to return for any signs of infection, specifically redness, increased pain, fever, drainage of pus, or any concern, immediately.    ED Medication(s):  Medications   HYDROmorphone injection 1 mg (1 mg Intramuscular Given 2/5/18 1626)   promethazine injection 12.5 mg (12.5 mg Intramuscular Given 2/5/18 1625)   lidocaine HCL 10 mg/ml (1%) injection 10 mL (10 mLs Infiltration Given 2/5/18 1624)       New Prescriptions    No medications on file       Follow-up Information     Dima Her MD. Schedule an appointment as soon as possible for a visit in 2 days.    Specialty:  Family Medicine  Contact information:  [address] [address] [address] [phone]                    Medical Decision Making              Scribe Attestation:   Scribe #1: I performed the above scribed service and the documentation accurately describes the services I performed. I attest to the accuracy of the note.    Attending:   Physician Attestation Statement for Scribe #1: I, Hayden Cortes NP, personally performed the services described in this documentation, as scribed by Klaudia Mijares, in my presence, and it is both accurate and complete.          Clinical Impression       ICD-10-CM ICD-9-CM   1. Abscess of groin, right L02.214 682.2       Disposition:   Disposition: Discharged  Condition: Stable           Hayden Cortes NP  02/05/18 5629

## 2018-02-07 ENCOUNTER — OFFICE VISIT (OUTPATIENT)
Dept: FAMILY MEDICINE | Facility: CLINIC | Age: 53
End: 2018-02-07
Payer: MEDICARE

## 2018-02-07 VITALS
SYSTOLIC BLOOD PRESSURE: 130 MMHG | RESPIRATION RATE: 14 BRPM | BODY MASS INDEX: 30.86 KG/M2 | TEMPERATURE: 99 F | OXYGEN SATURATION: 99 % | DIASTOLIC BLOOD PRESSURE: 80 MMHG | WEIGHT: 180.75 LBS | HEART RATE: 85 BPM | HEIGHT: 64 IN

## 2018-02-07 DIAGNOSIS — L02.91 ABSCESS: Primary | ICD-10-CM

## 2018-02-07 PROCEDURE — 99213 OFFICE O/P EST LOW 20 MIN: CPT | Mod: S$GLB,,, | Performed by: FAMILY MEDICINE

## 2018-02-07 PROCEDURE — 99999 PR PBB SHADOW E&M-EST. PATIENT-LVL III: CPT | Mod: PBBFAC,,, | Performed by: FAMILY MEDICINE

## 2018-02-07 PROCEDURE — 3008F BODY MASS INDEX DOCD: CPT | Mod: S$GLB,,, | Performed by: FAMILY MEDICINE

## 2018-02-07 RX ORDER — SULFAMETHOXAZOLE AND TRIMETHOPRIM 800; 160 MG/1; MG/1
1 TABLET ORAL 2 TIMES DAILY
Qty: 20 TABLET | Refills: 0 | Status: SHIPPED | OUTPATIENT
Start: 2018-02-07 | End: 2018-11-07 | Stop reason: ALTCHOICE

## 2018-02-07 NOTE — PROGRESS NOTES
"  Chief Complaint:    Chief Complaint   Patient presents with    Abscess       History of Present Illness:  Here for follow-up of abscess which I indeed in the ED currently on clindamycin.  This is the second time she has had it.      ROS:  Review of Systems    Past Medical History:   Diagnosis Date    ADD (attention deficit disorder with hyperactivity)     Anxiety     Asthma     mild intermittent    Depression        Social History:  Social History     Social History    Marital status:      Spouse name: N/A    Number of children: N/A    Years of education: N/A     Social History Main Topics    Smoking status: Current Every Day Smoker     Packs/day: 0.50     Years: 10.00     Types: Cigarettes    Smokeless tobacco: Never Used    Alcohol use No    Drug use: No    Sexual activity: Yes     Partners: Male     Birth control/ protection: None     Other Topics Concern    None     Social History Narrative    None       Family History:   family history includes Coronary artery disease (age of onset: 38) in her father; Coronary artery disease (age of onset: 50) in her mother; Lung cancer in her mother.    Health Maintenance   Topic Date Due    Colonoscopy  11/01/2014    Influenza Vaccine  08/01/2017    Pap Smear  03/11/2018    Mammogram  08/22/2018    Lipid Panel  08/01/2022    TETANUS VACCINE  10/29/2027    Pneumococcal PPSV23 (Medium Risk) (2) 11/01/2029    Hepatitis C Screening  Completed       Physical Exam:    Vital Signs  Temp: 98.8 °F (37.1 °C)  Temp src: Tympanic  Pulse: 85  Resp: 14  SpO2: 99 %  BP: 130/80  BP Location: Right arm  Patient Position: Sitting  Pain Score:   3  Pain Loc: Leg  Height and Weight  Height: 5' 4" (162.6 cm)  Weight: 82 kg (180 lb 12.4 oz)  BSA (Calculated - sq m): 1.92 sq meters  BMI (Calculated): 31.1  Weight in (lb) to have BMI = 25: 145.3]    Body mass index is 31.03 kg/m².    Physical Exam   Musculoskeletal:        Legs:      Lab Results   Component Value Date "    CHOL 176 08/01/2017    TRIG 159 (H) 08/01/2017    HDL 48 08/01/2017    TOTALCHOLEST 3.7 08/01/2017    NONHDLCHOL 128 08/01/2017       No results found for: HGBA1C    Assessment:      ICD-10-CM ICD-9-CM   1. Abscess L02.91 682.9         Plan:  Please increase clindamycin to 300 mg 3 times a day also add Bactrim.  Use Bactroban in the nostril  The abscess will be packed she'll come back in a couple days for a nurse visit to be repacked  No orders of the defined types were placed in this encounter.      Current Outpatient Prescriptions   Medication Sig Dispense Refill    montelukast (SINGULAIR) 10 mg tablet TAKE ONE TABLET BY MOUTH EVERY DAY **THANK YOU** 90 tablet 4    omeprazole (PRILOSEC) 40 MG capsule TAKE ONE CAPSULE BY MOUTH ONCE DAILY **THANK YOU** 90 capsule 3    albuterol (ACCUNEB) 1.25 mg/3 mL Nebu USE ONE VIAL IN NEBULIZER EVERY 4 TO 6 HOURS AS NEEDED FOR WHEEZING & SHORTNESS OF BREATH **THANK YOU** 75 mL 6    albuterol (PROAIR HFA) 90 mcg/actuation inhaler INHALE TWO PUFFS INTO THE LUNGS EVERY 6 HOURS AS NEEDED FOR WHEEZING **THANK YOU** 25.5 g 5    albuterol-ipratropium 2.5mg-0.5mg/3mL (DUO-NEB) 0.5 mg-3 mg(2.5 mg base)/3 mL nebulizer solution Take 3 mLs by nebulization every 6 (six) hours as needed for Wheezing. Rescue 1 Box 0    aspirin 81 MG Chew Take 1 tablet (81 mg total) by mouth once daily. 30 tablet 0    citalopram (CELEXA) 20 MG tablet Take 1 tablet (20 mg total) by mouth once daily. 30 tablet 11    clindamycin (CLEOCIN T) 1 % external solution APPLY TO AFFECTED AREA TOPICALLY TWICE DAILY **THANK YOU** 30 mL 1    clindamycin (CLEOCIN) 150 MG capsule Take 450 mg by mouth 4 (four) times daily.      epinephrine (EPIPEN 2-JHONY) 0.3 mg/0.3 mL AtIn Inject 0.3 mLs (0.3 mg total) into the muscle once. 0.3 mL 12    fluticasone (FLONASE) 50 mcg/actuation nasal spray 2 sprays by Each Nare route once daily. 16 g 11    levocetirizine (XYZAL) 5 MG tablet TAKE ONE TABLET BY MOUTH EVERY EVENING  **THANK YOU** 30 tablet 5    lisdexamfetamine (VYVANSE) 40 MG Cap Take 1 capsule (40 mg total) by mouth every morning. 30 capsule 0    mupirocin calcium 2% (BACTROBAN) 2 % cream Apply topically 3 (three) times daily. 1 Tube 0    ondansetron (ZOFRAN-ODT) 4 MG TbDL Take 1 tablet (4 mg total) by mouth every 8 (eight) hours as needed. 14 tablet 1    sulfamethoxazole-trimethoprim 800-160mg (BACTRIM DS) 800-160 mg Tab Take 1 tablet by mouth 2 (two) times daily. 20 tablet 0    tizanidine (ZANAFLEX) 4 mg Cap Take 1 capsule by mouth 2 (two) times daily as needed. 60 capsule 0     No current facility-administered medications for this visit.        Medications Discontinued During This Encounter   Medication Reason    lisdexamfetamine (VYVANSE) 40 MG Cap Duplicate Order    lisdexamfetamine (VYVANSE) 40 MG Cap Duplicate Order    meloxicam (MOBIC) 15 MG tablet        No Follow-up on file.      Dima Her MD

## 2018-02-09 ENCOUNTER — CLINICAL SUPPORT (OUTPATIENT)
Dept: FAMILY MEDICINE | Facility: CLINIC | Age: 53
End: 2018-02-09
Payer: MEDICARE

## 2018-02-09 NOTE — PROGRESS NOTES
Dressing change to right upper thigh/groin area. Removed packing, small amount of drainage noted on bandage. Wound looks good, no redness or swelling. Re-packed wound with 1/4 inch packing. Non-stick dressing and ace wrap. Tape has irritated her skin .

## 2018-02-14 ENCOUNTER — TELEPHONE (OUTPATIENT)
Dept: SMOKING CESSATION | Facility: CLINIC | Age: 53
End: 2018-02-14

## 2018-02-14 NOTE — TELEPHONE ENCOUNTER
Tried to reschedule a no show appointment. I was able to leave a detailed message with my contact information, Katia Conklin, [phone]. I requested a return call.

## 2018-02-16 ENCOUNTER — DOCUMENTATION ONLY (OUTPATIENT)
Dept: ENDOSCOPY | Facility: HOSPITAL | Age: 53
End: 2018-02-16

## 2018-02-23 ENCOUNTER — PATIENT MESSAGE (OUTPATIENT)
Dept: FAMILY MEDICINE | Facility: CLINIC | Age: 53
End: 2018-02-23

## 2018-02-23 RX ORDER — VARENICLINE TARTRATE 0.5 (11)-1
KIT ORAL
Qty: 1 PACKAGE | Refills: 0 | Status: SHIPPED | OUTPATIENT
Start: 2018-02-23 | End: 2019-03-08

## 2018-03-08 RX ORDER — CITALOPRAM 20 MG/1
TABLET, FILM COATED ORAL
Qty: 30 TABLET | Refills: 1 | Status: SHIPPED | OUTPATIENT
Start: 2018-03-08 | End: 2018-07-06 | Stop reason: SDUPTHER

## 2018-04-02 ENCOUNTER — PATIENT MESSAGE (OUTPATIENT)
Dept: FAMILY MEDICINE | Facility: CLINIC | Age: 53
End: 2018-04-02

## 2018-04-06 ENCOUNTER — PATIENT OUTREACH (OUTPATIENT)
Dept: ADMINISTRATIVE | Facility: HOSPITAL | Age: 53
End: 2018-04-06

## 2018-04-09 ENCOUNTER — OFFICE VISIT (OUTPATIENT)
Dept: FAMILY MEDICINE | Facility: CLINIC | Age: 53
End: 2018-04-09
Payer: MEDICARE

## 2018-04-09 VITALS
DIASTOLIC BLOOD PRESSURE: 80 MMHG | HEART RATE: 80 BPM | WEIGHT: 188.94 LBS | OXYGEN SATURATION: 99 % | BODY MASS INDEX: 32.26 KG/M2 | HEIGHT: 64 IN | TEMPERATURE: 98 F | SYSTOLIC BLOOD PRESSURE: 130 MMHG

## 2018-04-09 DIAGNOSIS — F43.0 STRESS REACTION: ICD-10-CM

## 2018-04-09 DIAGNOSIS — M70.62 TROCHANTERIC BURSITIS OF BOTH HIPS: ICD-10-CM

## 2018-04-09 DIAGNOSIS — M70.61 TROCHANTERIC BURSITIS OF BOTH HIPS: ICD-10-CM

## 2018-04-09 DIAGNOSIS — R39.11 HESITANCY OF MICTURITION: Primary | ICD-10-CM

## 2018-04-09 DIAGNOSIS — F98.8 ATTENTION DEFICIT DISORDER, UNSPECIFIED HYPERACTIVITY PRESENCE: ICD-10-CM

## 2018-04-09 DIAGNOSIS — Z12.11 COLON CANCER SCREENING: ICD-10-CM

## 2018-04-09 LAB
BILIRUB UR QL STRIP: NEGATIVE
CLARITY UR REFRACT.AUTO: CLEAR
COLOR UR AUTO: YELLOW
GLUCOSE UR QL STRIP: NEGATIVE
HGB UR QL STRIP: NEGATIVE
KETONES UR QL STRIP: NEGATIVE
LEUKOCYTE ESTERASE UR QL STRIP: NEGATIVE
NITRITE UR QL STRIP: NEGATIVE
PH UR STRIP: 6 [PH] (ref 5–8)
PROT UR QL STRIP: NEGATIVE
SP GR UR STRIP: 1 (ref 1–1.03)
URN SPEC COLLECT METH UR: NORMAL
UROBILINOGEN UR STRIP-ACNC: NEGATIVE EU/DL

## 2018-04-09 PROCEDURE — 99214 OFFICE O/P EST MOD 30 MIN: CPT | Mod: S$GLB,,, | Performed by: FAMILY MEDICINE

## 2018-04-09 PROCEDURE — 99999 PR PBB SHADOW E&M-EST. PATIENT-LVL III: CPT | Mod: PBBFAC,,, | Performed by: FAMILY MEDICINE

## 2018-04-09 PROCEDURE — 81003 URINALYSIS AUTO W/O SCOPE: CPT

## 2018-04-09 PROCEDURE — 87086 URINE CULTURE/COLONY COUNT: CPT

## 2018-04-09 RX ORDER — LISDEXAMFETAMINE DIMESYLATE 40 MG/1
40 CAPSULE ORAL DAILY
COMMUNITY
End: 2018-04-09 | Stop reason: SDUPTHER

## 2018-04-09 RX ORDER — LISDEXAMFETAMINE DIMESYLATE 40 MG/1
40 CAPSULE ORAL EVERY MORNING
Qty: 30 CAPSULE | Refills: 0 | Status: SHIPPED | OUTPATIENT
Start: 2018-04-19 | End: 2018-07-16 | Stop reason: SDUPTHER

## 2018-04-09 RX ORDER — LISDEXAMFETAMINE DIMESYLATE 40 MG/1
40 CAPSULE ORAL EVERY MORNING
Qty: 30 CAPSULE | Refills: 0 | Status: SHIPPED | OUTPATIENT
Start: 2018-06-19 | End: 2018-07-16 | Stop reason: SDUPTHER

## 2018-04-09 RX ORDER — LISDEXAMFETAMINE DIMESYLATE 40 MG/1
40 CAPSULE ORAL EVERY MORNING
Qty: 30 CAPSULE | Refills: 0 | Status: SHIPPED | OUTPATIENT
Start: 2018-05-19 | End: 2018-07-16 | Stop reason: SDUPTHER

## 2018-04-09 RX ORDER — MONTELUKAST SODIUM 10 MG/1
TABLET ORAL
Qty: 90 TABLET | Refills: 4 | Status: SHIPPED | OUTPATIENT
Start: 2018-04-09 | End: 2019-01-29 | Stop reason: SDUPTHER

## 2018-04-09 NOTE — PROGRESS NOTES
Chief Complaint:    Chief Complaint   Patient presents with    Medication Refill       History of Present Illness:    She presents today for a follow-up,  His number of complaints complains of her joints hurting.  Hips and the right elbow.  She says she's under a lot of stress regarding some custody issues.  She does not sleep well.  She is just tired a lot.  She has ADD on medication requesting a refill  She is also on Celexa for anxiety.    Also complains of urinary hesitancy but no dysuria she says she feels like some urinary discomfort before she sits down but upon sitting down this discomfort goes away.    ROS:  Review of Systems   Constitutional: Positive for fatigue. Negative for activity change, chills, fever and unexpected weight change.   HENT: Negative for congestion, ear discharge, ear pain, hearing loss, postnasal drip and rhinorrhea.    Eyes: Negative for pain and visual disturbance.   Respiratory: Negative for cough, chest tightness and shortness of breath.    Cardiovascular: Negative for chest pain and palpitations.   Gastrointestinal: Negative for abdominal pain, diarrhea and vomiting.   Endocrine: Negative for heat intolerance.   Genitourinary: Negative for dysuria, flank pain, frequency and hematuria.   Musculoskeletal: Positive for arthralgias. Negative for back pain, gait problem and neck pain.   Skin: Negative for color change and rash.   Neurological: Negative for dizziness, tremors, seizures, numbness and headaches.   Psychiatric/Behavioral: Negative for agitation, hallucinations, self-injury, sleep disturbance and suicidal ideas. The patient is not nervous/anxious.        Past Medical History:   Diagnosis Date    ADD (attention deficit disorder with hyperactivity)     Anxiety     Asthma     mild intermittent    Depression        Social History:  Social History     Social History    Marital status:      Spouse name: N/A    Number of children: N/A    Years of education: N/A  "    Social History Main Topics    Smoking status: Current Every Day Smoker     Packs/day: 0.50     Years: 10.00     Types: Cigarettes    Smokeless tobacco: Never Used    Alcohol use No    Drug use: No    Sexual activity: Yes     Partners: Male     Birth control/ protection: None     Other Topics Concern    Not on file     Social History Narrative    No narrative on file       Family History:   family history includes Coronary artery disease (age of onset: 38) in her father; Coronary artery disease (age of onset: 50) in her mother; Lung cancer in her mother.    Health Maintenance   Topic Date Due    Colonoscopy  11/01/2014    Influenza Vaccine  08/01/2017    Pap Smear  03/11/2018    Mammogram  08/22/2018    Lipid Panel  08/01/2022    TETANUS VACCINE  10/29/2027    Pneumococcal PPSV23 (Medium Risk) (2) 11/01/2029    Hepatitis C Screening  Completed       Physical Exam:    Vital Signs  Temp: 98 °F (36.7 °C)  Temp src: Tympanic  Pulse: 80  SpO2: 99 %  BP: 130/80  BP Location: Left arm  Patient Position: Sitting  Height and Weight  Height: 5' 4" (162.6 cm)  Weight: 85.7 kg (188 lb 15 oz)  BSA (Calculated - sq m): 1.97 sq meters  BMI (Calculated): 32.5  Weight in (lb) to have BMI = 25: 145.3]    Body mass index is 32.43 kg/m².    Physical Exam   Constitutional: She is oriented to person, place, and time. She appears well-developed.   HENT:   Mouth/Throat: Oropharynx is clear and moist.   Eyes: Conjunctivae are normal. Pupils are equal, round, and reactive to light.   Neck: Normal range of motion. Neck supple.   Cardiovascular: Normal rate, regular rhythm and normal heart sounds.    No murmur heard.  Pulmonary/Chest: Effort normal and breath sounds normal. No respiratory distress. She has no wheezes. She has no rales. She exhibits no tenderness.   Abdominal: Soft. She exhibits no distension and no mass. There is no tenderness. There is no guarding.   Musculoskeletal: She exhibits no edema or tenderness.      "   Legs:  Lymphadenopathy:     She has no cervical adenopathy.   Neurological: She is alert and oriented to person, place, and time. She has normal reflexes.   Skin: Skin is warm and dry.   Psychiatric: She has a normal mood and affect. Her behavior is normal. Judgment and thought content normal.       Lab Results   Component Value Date    CHOL 176 08/01/2017    TRIG 159 (H) 08/01/2017    HDL 48 08/01/2017    TOTALCHOLEST 3.7 08/01/2017    NONHDLCHOL 128 08/01/2017       No results found for: HGBA1C    Assessment:      ICD-10-CM ICD-9-CM   1. Hesitancy of micturition R39.11 788.64   2. Attention deficit disorder, unspecified hyperactivity presence F98.8 314.00   3. Colon cancer screening Z12.11 V76.51   4. Trochanteric bursitis of both hips M70.61 726.5    M70.62    5. Stress reaction F43.0 308.9         Plan:   looked up patient's Vyvanse refilled today.  We'll check a urinalysis and culture today.  Negative consider urology consult  Advised patient that she should seek psychological consultation with our counselor Wilman Huffman, she did not seem too keen on that.  She does have significant trochanteric bursitis bilateral tape, IT band syndrome, tennis elbow right she could benefit from physical therapy/consultation with physical medicine and she will think about it  She does need a screening colonoscopy emphasized the need for and will send a request for colonoscopy.    Orders Placed This Encounter   Procedures    Urine culture    URINALYSIS       Current Outpatient Prescriptions   Medication Sig Dispense Refill    albuterol (ACCUNEB) 1.25 mg/3 mL Nebu USE ONE VIAL IN NEBULIZER EVERY 4 TO 6 HOURS AS NEEDED FOR WHEEZING & SHORTNESS OF BREATH **THANK YOU** 75 mL 6    albuterol (PROAIR HFA) 90 mcg/actuation inhaler INHALE TWO PUFFS INTO THE LUNGS EVERY 6 HOURS AS NEEDED FOR WHEEZING **THANK YOU** 25.5 g 5    albuterol-ipratropium 2.5mg-0.5mg/3mL (DUO-NEB) 0.5 mg-3 mg(2.5 mg base)/3 mL nebulizer solution Take  3 mLs by nebulization every 6 (six) hours as needed for Wheezing. Rescue 1 Box 0    aspirin 81 MG Chew Take 1 tablet (81 mg total) by mouth once daily. 30 tablet 0    citalopram (CELEXA) 20 MG tablet TAKE ONE TABLET BY MOUTH ONCE DAILY **THANK YOU** 30 tablet 1    clindamycin (CLEOCIN T) 1 % external solution APPLY TO AFFECTED AREA TOPICALLY TWICE DAILY **THANK YOU** 30 mL 1    clindamycin (CLEOCIN) 150 MG capsule Take 450 mg by mouth 4 (four) times daily.      fluticasone (FLONASE) 50 mcg/actuation nasal spray 2 sprays by Each Nare route once daily. 16 g 11    levocetirizine (XYZAL) 5 MG tablet TAKE ONE TABLET BY MOUTH EVERY EVENING **THANK YOU** 30 tablet 5    [START ON 4/19/2018] lisdexamfetamine (VYVANSE) 40 MG Cap Take 1 capsule (40 mg total) by mouth every morning. 30 capsule 0    [START ON 5/19/2018] lisdexamfetamine (VYVANSE) 40 MG Cap Take 1 capsule (40 mg total) by mouth every morning. 30 capsule 0    [START ON 6/19/2018] lisdexamfetamine (VYVANSE) 40 MG Cap Take 1 capsule (40 mg total) by mouth every morning. 30 capsule 0    montelukast (SINGULAIR) 10 mg tablet TAKE ONE TABLET BY MOUTH EVERY DAY **THANK YOU** 90 tablet 4    mupirocin calcium 2% (BACTROBAN) 2 % cream Apply topically 3 (three) times daily. 1 Tube 0    omeprazole (PRILOSEC) 40 MG capsule TAKE ONE CAPSULE BY MOUTH ONCE DAILY **THANK YOU** 90 capsule 3    ondansetron (ZOFRAN-ODT) 4 MG TbDL Take 1 tablet (4 mg total) by mouth every 8 (eight) hours as needed. 14 tablet 1    sulfamethoxazole-trimethoprim 800-160mg (BACTRIM DS) 800-160 mg Tab Take 1 tablet by mouth 2 (two) times daily. 20 tablet 0    tizanidine (ZANAFLEX) 4 mg Cap Take 1 capsule by mouth 2 (two) times daily as needed. 60 capsule 0    varenicline (CHANTIX STARTING MONTH BOX) 0.5 mg (11)- 1 mg (42) tablet Take one 0.5mg tab by mouth once daily X3 days,then increase to one 0.5mg tab twice daily X4 days,then increase to one 1mg tab twice daily 1 Package 0     epinephrine (EPIPEN 2-JHONY) 0.3 mg/0.3 mL AtIn Inject 0.3 mLs (0.3 mg total) into the muscle once. 0.3 mL 12     No current facility-administered medications for this visit.        Medications Discontinued During This Encounter   Medication Reason    montelukast (SINGULAIR) 10 mg tablet Reorder    lisdexamfetamine (VYVANSE) 40 MG Cap Reorder    lisdexamfetamine (VYVANSE) 40 MG Cap Reorder    lisdexamfetamine (VYVANSE) 40 MG Cap Reorder       No Follow-up on file.      Dima Her MD

## 2018-04-10 LAB — BACTERIA UR CULT: NORMAL

## 2018-05-24 ENCOUNTER — OFFICE VISIT (OUTPATIENT)
Dept: FAMILY MEDICINE | Facility: CLINIC | Age: 53
End: 2018-05-24
Payer: COMMERCIAL

## 2018-05-24 VITALS
HEIGHT: 64 IN | DIASTOLIC BLOOD PRESSURE: 82 MMHG | OXYGEN SATURATION: 98 % | BODY MASS INDEX: 32.58 KG/M2 | HEART RATE: 82 BPM | SYSTOLIC BLOOD PRESSURE: 128 MMHG | TEMPERATURE: 99 F | WEIGHT: 190.81 LBS

## 2018-05-24 DIAGNOSIS — L03.818 CELLULITIS OF OTHER SPECIFIED SITE: Primary | ICD-10-CM

## 2018-05-24 PROCEDURE — 99999 PR PBB SHADOW E&M-EST. PATIENT-LVL IV: CPT | Mod: PBBFAC,,, | Performed by: FAMILY MEDICINE

## 2018-05-24 PROCEDURE — 3008F BODY MASS INDEX DOCD: CPT | Mod: CPTII,S$GLB,, | Performed by: FAMILY MEDICINE

## 2018-05-24 PROCEDURE — 99213 OFFICE O/P EST LOW 20 MIN: CPT | Mod: S$GLB,,, | Performed by: FAMILY MEDICINE

## 2018-05-24 RX ORDER — TIZANIDINE HYDROCHLORIDE 4 MG/1
1 CAPSULE, GELATIN COATED ORAL 2 TIMES DAILY PRN
Qty: 60 CAPSULE | Refills: 0 | Status: SHIPPED | OUTPATIENT
Start: 2018-05-24 | End: 2019-07-11 | Stop reason: SDUPTHER

## 2018-05-24 RX ORDER — CLINDAMYCIN HYDROCHLORIDE 300 MG/1
300 CAPSULE ORAL EVERY 8 HOURS
Qty: 21 CAPSULE | Refills: 0 | Status: SHIPPED | OUTPATIENT
Start: 2018-05-24 | End: 2018-11-07 | Stop reason: ALTCHOICE

## 2018-05-24 RX ORDER — SULFAMETHOXAZOLE AND TRIMETHOPRIM 800; 160 MG/1; MG/1
1 TABLET ORAL 2 TIMES DAILY
Qty: 20 TABLET | Refills: 0 | Status: SHIPPED | OUTPATIENT
Start: 2018-05-24 | End: 2018-11-07 | Stop reason: ALTCHOICE

## 2018-05-24 NOTE — PROGRESS NOTES
"  Chief Complaint:    Chief Complaint   Patient presents with    Abscess       History of Present Illness:    Complaining of an infection of the labia she shaves that area and gets an infection because of that.  ROS:  Review of Systems    Past Medical History:   Diagnosis Date    ADD (attention deficit disorder with hyperactivity)     Anxiety     Asthma     mild intermittent    Depression        Social History:  Social History     Social History    Marital status:      Spouse name: N/A    Number of children: N/A    Years of education: N/A     Social History Main Topics    Smoking status: Current Every Day Smoker     Packs/day: 0.50     Years: 10.00     Types: Cigarettes    Smokeless tobacco: Never Used    Alcohol use No    Drug use: No    Sexual activity: Yes     Partners: Male     Birth control/ protection: None     Other Topics Concern    None     Social History Narrative    None       Family History:   family history includes Coronary artery disease (age of onset: 38) in her father; Coronary artery disease (age of onset: 50) in her mother; Lung cancer in her mother.    Health Maintenance   Topic Date Due    Colonoscopy  11/01/2014    Pap Smear  03/11/2018    Influenza Vaccine  08/01/2018    Mammogram  08/22/2018    Lipid Panel  08/01/2022    TETANUS VACCINE  10/29/2027    Pneumococcal PPSV23 (Medium Risk) (2) 11/01/2029    Hepatitis C Screening  Completed       Physical Exam:    Vital Signs  Temp: 99.3 °F (37.4 °C)  Temp src: Tympanic  Pulse: 82  SpO2: 98 %  BP: 128/82  BP Location: Left arm  Patient Position: Sitting  Pain Score: 10-Worst pain ever (vagina)  Height and Weight  Height: 5' 4" (162.6 cm)  Weight: 86.5 kg (190 lb 12.9 oz)  BSA (Calculated - sq m): 1.98 sq meters  BMI (Calculated): 32.8  Weight in (lb) to have BMI = 25: 145.3]    Body mass index is 32.75 kg/m².    Physical Exam   Genitourinary:             Results for orders placed or performed in visit on 04/09/18 "   Urine culture   Result Value Ref Range    Urine Culture, Routine No significant growth    URINALYSIS   Result Value Ref Range    Specimen UA Urine, Clean Catch     Color, UA Yellow Yellow, Straw, Tori    Appearance, UA Clear Clear    pH, UA 6.0 5.0 - 8.0    Specific Gravity, UA 1.005 1.005 - 1.030    Protein, UA Negative Negative    Glucose, UA Negative Negative    Ketones, UA Negative Negative    Bilirubin (UA) Negative Negative    Occult Blood UA Negative Negative    Nitrite, UA Negative Negative    Urobilinogen, UA Negative <2.0 EU/dL    Leukocytes, UA Negative Negative         No results found for: HGBA1C    Assessment:      ICD-10-CM ICD-9-CM   1. Cellulitis of other specified site L03.818 682.8         Plan:    The treat with Bactrim and clindamycin, use warm Sitz back and the if the abscess comes to a head comeback I&D.Recommend using nasal Bactroban for family members      No orders of the defined types were placed in this encounter.      Current Outpatient Prescriptions   Medication Sig Dispense Refill    albuterol (ACCUNEB) 1.25 mg/3 mL Nebu USE ONE VIAL IN NEBULIZER EVERY 4 TO 6 HOURS AS NEEDED FOR WHEEZING & SHORTNESS OF BREATH **THANK YOU** 75 mL 6    albuterol (PROAIR HFA) 90 mcg/actuation inhaler INHALE TWO PUFFS INTO THE LUNGS EVERY 6 HOURS AS NEEDED FOR WHEEZING **THANK YOU** 25.5 g 5    albuterol-ipratropium 2.5mg-0.5mg/3mL (DUO-NEB) 0.5 mg-3 mg(2.5 mg base)/3 mL nebulizer solution Take 3 mLs by nebulization every 6 (six) hours as needed for Wheezing. Rescue 1 Box 0    aspirin 81 MG Chew Take 1 tablet (81 mg total) by mouth once daily. 30 tablet 0    citalopram (CELEXA) 20 MG tablet TAKE ONE TABLET BY MOUTH ONCE DAILY **THANK YOU** 30 tablet 1    fluticasone (FLONASE) 50 mcg/actuation nasal spray 2 sprays by Each Nare route once daily. 16 g 11    levocetirizine (XYZAL) 5 MG tablet TAKE ONE TABLET BY MOUTH EVERY EVENING **THANK YOU** 30 tablet 5    lisdexamfetamine (VYVANSE) 40 MG Cap  Take 1 capsule (40 mg total) by mouth every morning. 30 capsule 0    lisdexamfetamine (VYVANSE) 40 MG Cap Take 1 capsule (40 mg total) by mouth every morning. 30 capsule 0    [START ON 6/19/2018] lisdexamfetamine (VYVANSE) 40 MG Cap Take 1 capsule (40 mg total) by mouth every morning. 30 capsule 0    montelukast (SINGULAIR) 10 mg tablet TAKE ONE TABLET BY MOUTH EVERY DAY **THANK YOU** 90 tablet 4    omeprazole (PRILOSEC) 40 MG capsule TAKE ONE CAPSULE BY MOUTH ONCE DAILY **THANK YOU** 90 capsule 3    ondansetron (ZOFRAN-ODT) 4 MG TbDL Take 1 tablet (4 mg total) by mouth every 8 (eight) hours as needed. 14 tablet 1    tiZANidine (ZANAFLEX) 4 mg Cap Take 1 capsule by mouth 2 (two) times daily as needed. 60 capsule 0    varenicline (CHANTIX STARTING MONTH BOX) 0.5 mg (11)- 1 mg (42) tablet Take one 0.5mg tab by mouth once daily X3 days,then increase to one 0.5mg tab twice daily X4 days,then increase to one 1mg tab twice daily 1 Package 0    clindamycin (CLEOCIN T) 1 % external solution APPLY TO AFFECTED AREA TOPICALLY TWICE DAILY **THANK YOU** 30 mL 1    clindamycin (CLEOCIN) 150 MG capsule Take 450 mg by mouth 4 (four) times daily.      clindamycin (CLEOCIN) 300 MG capsule Take 1 capsule (300 mg total) by mouth every 8 (eight) hours. 21 capsule 0    epinephrine (EPIPEN 2-JHONY) 0.3 mg/0.3 mL AtIn Inject 0.3 mLs (0.3 mg total) into the muscle once. 0.3 mL 12    mupirocin calcium 2% (BACTROBAN) 2 % cream Apply topically 3 (three) times daily. 1 Tube 0    sulfamethoxazole-trimethoprim 800-160mg (BACTRIM DS) 800-160 mg Tab Take 1 tablet by mouth 2 (two) times daily. 20 tablet 0    sulfamethoxazole-trimethoprim 800-160mg (BACTRIM DS) 800-160 mg Tab Take 1 tablet by mouth 2 (two) times daily. 20 tablet 0     No current facility-administered medications for this visit.        Medications Discontinued During This Encounter   Medication Reason    tizanidine (ZANAFLEX) 4 mg Cap Reorder       No Follow-up on  file.      Dima Her MD

## 2018-06-28 ENCOUNTER — PATIENT OUTREACH (OUTPATIENT)
Dept: ADMINISTRATIVE | Facility: HOSPITAL | Age: 53
End: 2018-06-28

## 2018-07-06 ENCOUNTER — PATIENT MESSAGE (OUTPATIENT)
Dept: FAMILY MEDICINE | Facility: CLINIC | Age: 53
End: 2018-07-06

## 2018-07-06 RX ORDER — CITALOPRAM 20 MG/1
TABLET, FILM COATED ORAL
Qty: 30 TABLET | Refills: 1 | Status: SHIPPED | OUTPATIENT
Start: 2018-07-06 | End: 2018-11-09 | Stop reason: SDUPTHER

## 2018-07-16 ENCOUNTER — OFFICE VISIT (OUTPATIENT)
Dept: INTERNAL MEDICINE | Facility: CLINIC | Age: 53
End: 2018-07-16
Payer: COMMERCIAL

## 2018-07-16 VITALS
WEIGHT: 189.38 LBS | OXYGEN SATURATION: 97 % | HEIGHT: 65 IN | BODY MASS INDEX: 31.55 KG/M2 | SYSTOLIC BLOOD PRESSURE: 135 MMHG | DIASTOLIC BLOOD PRESSURE: 66 MMHG | TEMPERATURE: 99 F | HEART RATE: 87 BPM

## 2018-07-16 DIAGNOSIS — Z12.39 BREAST CANCER SCREENING: ICD-10-CM

## 2018-07-16 DIAGNOSIS — Z12.11 COLON CANCER SCREENING: ICD-10-CM

## 2018-07-16 DIAGNOSIS — F98.8 ATTENTION DEFICIT DISORDER, UNSPECIFIED HYPERACTIVITY PRESENCE: Primary | ICD-10-CM

## 2018-07-16 PROCEDURE — 99999 PR PBB SHADOW E&M-EST. PATIENT-LVL III: CPT | Mod: PBBFAC,,, | Performed by: FAMILY MEDICINE

## 2018-07-16 PROCEDURE — 99213 OFFICE O/P EST LOW 20 MIN: CPT | Mod: S$GLB,,, | Performed by: FAMILY MEDICINE

## 2018-07-16 PROCEDURE — 3008F BODY MASS INDEX DOCD: CPT | Mod: CPTII,S$GLB,, | Performed by: FAMILY MEDICINE

## 2018-07-16 RX ORDER — LISDEXAMFETAMINE DIMESYLATE 40 MG/1
40 CAPSULE ORAL EVERY MORNING
Qty: 30 CAPSULE | Refills: 0 | Status: SHIPPED | OUTPATIENT
Start: 2018-08-20 | End: 2018-10-20 | Stop reason: SDUPTHER

## 2018-07-16 RX ORDER — LISDEXAMFETAMINE DIMESYLATE 40 MG/1
40 CAPSULE ORAL EVERY MORNING
Qty: 30 CAPSULE | Refills: 0 | Status: SHIPPED | OUTPATIENT
Start: 2018-07-20 | End: 2018-12-20 | Stop reason: SDUPTHER

## 2018-07-16 RX ORDER — IPRATROPIUM BROMIDE AND ALBUTEROL SULFATE 2.5; .5 MG/3ML; MG/3ML
3 SOLUTION RESPIRATORY (INHALATION) EVERY 6 HOURS PRN
Qty: 1 BOX | Refills: 2 | Status: SHIPPED | OUTPATIENT
Start: 2018-07-16 | End: 2018-11-07 | Stop reason: SDUPTHER

## 2018-07-16 RX ORDER — LISDEXAMFETAMINE DIMESYLATE 40 MG/1
40 CAPSULE ORAL EVERY MORNING
Qty: 30 CAPSULE | Refills: 0 | Status: SHIPPED | OUTPATIENT
Start: 2018-09-20 | End: 2018-10-20 | Stop reason: SDUPTHER

## 2018-07-16 RX ORDER — ALBUTEROL SULFATE 90 UG/1
AEROSOL, METERED RESPIRATORY (INHALATION)
Qty: 25.5 G | Refills: 5 | Status: SHIPPED | OUTPATIENT
Start: 2018-07-16 | End: 2019-03-08 | Stop reason: SDUPTHER

## 2018-07-16 RX ORDER — ALBUTEROL SULFATE 1.25 MG/3ML
SOLUTION RESPIRATORY (INHALATION)
Qty: 75 ML | Refills: 6 | Status: SHIPPED | OUTPATIENT
Start: 2018-07-16 | End: 2020-11-12 | Stop reason: SDUPTHER

## 2018-07-16 NOTE — PROGRESS NOTES
Chief Complaint:    Chief Complaint   Patient presents with    Medication Refill       History of Present Illness:    Presents today for refill of her Vyvanse doing okay  Anxiety was well controlled on current medication.      ROS:  Review of Systems   Constitutional: Negative for activity change, chills, fatigue, fever and unexpected weight change.   HENT: Negative for congestion, ear discharge, ear pain, hearing loss, postnasal drip and rhinorrhea.    Eyes: Negative for pain and visual disturbance.   Respiratory: Negative for cough, chest tightness and shortness of breath.    Cardiovascular: Negative for chest pain and palpitations.   Gastrointestinal: Negative for abdominal pain, diarrhea and vomiting.   Endocrine: Negative for heat intolerance.   Genitourinary: Negative for dysuria, flank pain, frequency and hematuria.   Musculoskeletal: Negative for back pain, gait problem and neck pain.   Skin: Negative for color change and rash.   Neurological: Negative for dizziness, tremors, seizures, numbness and headaches.   Psychiatric/Behavioral: Negative for agitation, hallucinations, self-injury, sleep disturbance and suicidal ideas. The patient is not nervous/anxious.        Past Medical History:   Diagnosis Date    ADD (attention deficit disorder with hyperactivity)     Anxiety     Asthma     mild intermittent    Depression        Social History:  Social History     Social History    Marital status:      Spouse name: N/A    Number of children: N/A    Years of education: N/A     Social History Main Topics    Smoking status: Current Every Day Smoker     Packs/day: 0.50     Years: 10.00     Types: Cigarettes    Smokeless tobacco: Never Used    Alcohol use No    Drug use: No    Sexual activity: Yes     Partners: Male     Birth control/ protection: None     Other Topics Concern    None     Social History Narrative    None       Family History:   family history includes Coronary artery disease (age  "of onset: 38) in her father; Coronary artery disease (age of onset: 50) in her mother; Lung cancer in her mother.    Health Maintenance   Topic Date Due    Colonoscopy  11/01/2014    Pap Smear  03/11/2018    Mammogram  08/22/2018    Influenza Vaccine  08/01/2018    Lipid Panel  08/01/2022    TETANUS VACCINE  10/29/2027    Pneumococcal PPSV23 (Medium Risk) (2) 11/01/2029    Hepatitis C Screening  Completed       Physical Exam:    Vital Signs  Temp: 99.2 °F (37.3 °C)  Temp src: Tympanic  Pulse: 87  SpO2: 97 %  BP: 135/66  BP Location: Left arm  Patient Position: Sitting  Pain Score: 0-No pain  Height and Weight  Height: 5' 5" (165.1 cm)  Weight: 85.9 kg (189 lb 6 oz)  BSA (Calculated - sq m): 1.98 sq meters  BMI (Calculated): 31.6  Weight in (lb) to have BMI = 25: 149.9]    Body mass index is 31.51 kg/m².    Physical Exam   Constitutional: She is oriented to person, place, and time. She appears well-developed.   HENT:   Mouth/Throat: Oropharynx is clear and moist.   Eyes: Conjunctivae are normal. Pupils are equal, round, and reactive to light.   Neck: Normal range of motion. Neck supple.   Cardiovascular: Normal rate, regular rhythm and normal heart sounds.    No murmur heard.  Pulmonary/Chest: Effort normal and breath sounds normal. No respiratory distress. She has no wheezes. She has no rales. She exhibits no tenderness.   Abdominal: Soft. She exhibits no distension and no mass. There is no tenderness. There is no guarding.   Musculoskeletal: She exhibits no edema or tenderness.   Lymphadenopathy:     She has no cervical adenopathy.   Neurological: She is alert and oriented to person, place, and time. She has normal reflexes.   Skin: Skin is warm and dry.   Psychiatric: She has a normal mood and affect. Her behavior is normal. Judgment and thought content normal.       Results for orders placed or performed in visit on 04/09/18   Urine culture   Result Value Ref Range    Urine Culture, Routine No significant " growth    URINALYSIS   Result Value Ref Range    Specimen UA Urine, Clean Catch     Color, UA Yellow Yellow, Straw, Tori    Appearance, UA Clear Clear    pH, UA 6.0 5.0 - 8.0    Specific Gravity, UA 1.005 1.005 - 1.030    Protein, UA Negative Negative    Glucose, UA Negative Negative    Ketones, UA Negative Negative    Bilirubin (UA) Negative Negative    Occult Blood UA Negative Negative    Nitrite, UA Negative Negative    Urobilinogen, UA Negative <2.0 EU/dL    Leukocytes, UA Negative Negative         No results found for: HGBA1C    Assessment:      ICD-10-CM ICD-9-CM   1. Attention deficit disorder, unspecified hyperactivity presence F98.8 314.00   2. Breast cancer screening Z12.31 V76.10   3. Colon cancer screening Z12.11 V76.51         Plan:    He  looked up patient's medication refill  Schedule a screening mammogram and a colonoscopy she will come back in a couple of weeks for Pap smear.      Orders Placed This Encounter   Procedures    Mammo Digital Screening Bilat with CAD       Current Outpatient Prescriptions   Medication Sig Dispense Refill    albuterol (ACCUNEB) 1.25 mg/3 mL Nebu USE ONE VIAL IN NEBULIZER EVERY 4 TO 6 HOURS AS NEEDED FOR WHEEZING & SHORTNESS OF BREATH **THANK YOU** 75 mL 6    albuterol (PROAIR HFA) 90 mcg/actuation inhaler INHALE TWO PUFFS INTO THE LUNGS EVERY 6 HOURS AS NEEDED FOR WHEEZING **THANK YOU** 25.5 g 5    albuterol-ipratropium (DUO-NEB) 2.5 mg-0.5 mg/3 mL nebulizer solution Take 3 mLs by nebulization every 6 (six) hours as needed for Wheezing. Rescue 1 Box 2    aspirin 81 MG Chew Take 1 tablet (81 mg total) by mouth once daily. 30 tablet 0    citalopram (CELEXA) 20 MG tablet TAKE ONE TABLET BY MOUTH ONCE DAILY 30 tablet 1    clindamycin (CLEOCIN T) 1 % external solution APPLY TO AFFECTED AREA TOPICALLY TWICE DAILY **THANK YOU** 30 mL 1    fluticasone (FLONASE) 50 mcg/actuation nasal spray 2 sprays by Each Nare route once daily. 16 g 11    levocetirizine (XYZAL) 5  MG tablet TAKE ONE TABLET BY MOUTH EVERY EVENING **THANK YOU** 30 tablet 5    [START ON 9/20/2018] lisdexamfetamine (VYVANSE) 40 MG Cap Take 1 capsule (40 mg total) by mouth every morning. 30 capsule 0    [START ON 8/20/2018] lisdexamfetamine (VYVANSE) 40 MG Cap Take 1 capsule (40 mg total) by mouth every morning. 30 capsule 0    [START ON 7/20/2018] lisdexamfetamine (VYVANSE) 40 MG Cap Take 1 capsule (40 mg total) by mouth every morning. 30 capsule 0    montelukast (SINGULAIR) 10 mg tablet TAKE ONE TABLET BY MOUTH EVERY DAY **THANK YOU** 90 tablet 4    omeprazole (PRILOSEC) 40 MG capsule TAKE ONE CAPSULE BY MOUTH ONCE DAILY **THANK YOU** 90 capsule 3    ondansetron (ZOFRAN-ODT) 4 MG TbDL Take 1 tablet (4 mg total) by mouth every 8 (eight) hours as needed. 14 tablet 1    tiZANidine (ZANAFLEX) 4 mg Cap Take 1 capsule by mouth 2 (two) times daily as needed. 60 capsule 0    clindamycin (CLEOCIN) 150 MG capsule Take 450 mg by mouth 4 (four) times daily.      clindamycin (CLEOCIN) 300 MG capsule Take 1 capsule (300 mg total) by mouth every 8 (eight) hours. 21 capsule 0    epinephrine (EPIPEN 2-JHONY) 0.3 mg/0.3 mL AtIn Inject 0.3 mLs (0.3 mg total) into the muscle once. 0.3 mL 12    mupirocin calcium 2% (BACTROBAN) 2 % cream Apply topically 3 (three) times daily. 1 Tube 0    mupirocin calcium 2% nasl oint (BACTROBAN) 2 % Oint by Nasal route 2 (two) times daily. 1 Tube 0    sulfamethoxazole-trimethoprim 800-160mg (BACTRIM DS) 800-160 mg Tab Take 1 tablet by mouth 2 (two) times daily. 20 tablet 0    sulfamethoxazole-trimethoprim 800-160mg (BACTRIM DS) 800-160 mg Tab Take 1 tablet by mouth 2 (two) times daily. 20 tablet 0    varenicline (CHANTIX STARTING MONTH BOX) 0.5 mg (11)- 1 mg (42) tablet Take one 0.5mg tab by mouth once daily X3 days,then increase to one 0.5mg tab twice daily X4 days,then increase to one 1mg tab twice daily 1 Package 0     No current facility-administered medications for this visit.         Medications Discontinued During This Encounter   Medication Reason    albuterol (ACCUNEB) 1.25 mg/3 mL Nebu Reorder    albuterol-ipratropium 2.5mg-0.5mg/3mL (DUO-NEB) 0.5 mg-3 mg(2.5 mg base)/3 mL nebulizer solution Reorder    albuterol (PROAIR HFA) 90 mcg/actuation inhaler Reorder    lisdexamfetamine (VYVANSE) 40 MG Cap Reorder    lisdexamfetamine (VYVANSE) 40 MG Cap Reorder    lisdexamfetamine (VYVANSE) 40 MG Cap Reorder       Follow-up in about 2 weeks (around 7/30/2018).      Dima Her MD

## 2018-07-17 ENCOUNTER — TELEPHONE (OUTPATIENT)
Dept: FAMILY MEDICINE | Facility: CLINIC | Age: 53
End: 2018-07-17

## 2018-07-17 NOTE — TELEPHONE ENCOUNTER
I spoke with pt that stated she recently seen Dr Her and he put in an order for a colonoscopy and was waiting for someone to call her to schedule the order. I do not see an order. Dr Her Please advise.

## 2018-07-19 ENCOUNTER — DOCUMENTATION ONLY (OUTPATIENT)
Dept: ENDOSCOPY | Facility: HOSPITAL | Age: 53
End: 2018-07-19

## 2018-08-06 RX ORDER — OMEPRAZOLE 40 MG/1
CAPSULE, DELAYED RELEASE ORAL
Qty: 90 CAPSULE | Refills: 3 | Status: SHIPPED | OUTPATIENT
Start: 2018-08-06 | End: 2019-08-13 | Stop reason: SDUPTHER

## 2018-09-11 ENCOUNTER — OFFICE VISIT (OUTPATIENT)
Dept: FAMILY MEDICINE | Facility: CLINIC | Age: 53
End: 2018-09-11
Payer: COMMERCIAL

## 2018-09-11 VITALS
HEART RATE: 88 BPM | SYSTOLIC BLOOD PRESSURE: 112 MMHG | OXYGEN SATURATION: 97 % | WEIGHT: 183.88 LBS | BODY MASS INDEX: 30.64 KG/M2 | DIASTOLIC BLOOD PRESSURE: 78 MMHG | HEIGHT: 65 IN | TEMPERATURE: 99 F

## 2018-09-11 DIAGNOSIS — L02.92 BOIL: ICD-10-CM

## 2018-09-11 DIAGNOSIS — F98.8 ATTENTION DEFICIT DISORDER, UNSPECIFIED HYPERACTIVITY PRESENCE: ICD-10-CM

## 2018-09-11 DIAGNOSIS — J01.90 ACUTE BACTERIAL SINUSITIS: Primary | ICD-10-CM

## 2018-09-11 DIAGNOSIS — B96.89 ACUTE BACTERIAL SINUSITIS: Primary | ICD-10-CM

## 2018-09-11 PROCEDURE — 99214 OFFICE O/P EST MOD 30 MIN: CPT | Mod: S$GLB,,, | Performed by: FAMILY MEDICINE

## 2018-09-11 PROCEDURE — 99999 PR PBB SHADOW E&M-EST. PATIENT-LVL IV: CPT | Mod: PBBFAC,,, | Performed by: FAMILY MEDICINE

## 2018-09-11 PROCEDURE — 3008F BODY MASS INDEX DOCD: CPT | Mod: CPTII,S$GLB,, | Performed by: FAMILY MEDICINE

## 2018-09-11 RX ORDER — ALBUTEROL SULFATE 90 UG/1
2 AEROSOL, METERED RESPIRATORY (INHALATION) EVERY 4 HOURS PRN
Qty: 18 G | Refills: 2 | Status: SHIPPED | OUTPATIENT
Start: 2018-09-11 | End: 2019-04-05 | Stop reason: SDUPTHER

## 2018-09-11 RX ORDER — DOXYCYCLINE HYCLATE 100 MG
100 TABLET ORAL EVERY 12 HOURS
Qty: 20 TABLET | Refills: 0 | Status: SHIPPED | OUTPATIENT
Start: 2018-09-11 | End: 2018-09-21

## 2018-09-11 RX ORDER — MUPIROCIN 20 MG/G
OINTMENT TOPICAL 3 TIMES DAILY
Qty: 1 TUBE | Refills: 1 | Status: SHIPPED | OUTPATIENT
Start: 2018-09-11 | End: 2019-03-26 | Stop reason: SDUPTHER

## 2018-09-11 NOTE — PROGRESS NOTES
Chief Complaint:    Chief Complaint   Patient presents with    Nasal Congestion    Cough    Sinus Problem       History of Present Illness:    Presents today for follow-up and has for following complaints  She has been having some sinus congestion sinus pain yellow-green discharge postnasal drip.  Denies any fever  She also has a boil in the chin she wants it to be treated referred gets worse.      ROS:  Review of Systems   Constitutional: Negative for activity change, chills, fatigue, fever and unexpected weight change.   HENT: Positive for congestion, sinus pain and sore throat. Negative for ear discharge, ear pain, hearing loss, postnasal drip and rhinorrhea.    Eyes: Negative for pain and visual disturbance.   Respiratory: Positive for cough. Negative for chest tightness and shortness of breath.    Cardiovascular: Negative for chest pain and palpitations.   Gastrointestinal: Negative for abdominal pain, diarrhea and vomiting.   Endocrine: Negative for heat intolerance.   Genitourinary: Negative for dysuria, flank pain, frequency and hematuria.   Musculoskeletal: Negative for back pain, gait problem and neck pain.   Skin: Negative for color change and rash.   Neurological: Negative for dizziness, tremors, seizures, numbness and headaches.   Psychiatric/Behavioral: Negative for agitation, hallucinations, self-injury, sleep disturbance and suicidal ideas. The patient is not nervous/anxious.        Past Medical History:   Diagnosis Date    ADD (attention deficit disorder with hyperactivity)     Anxiety     Asthma     mild intermittent    Depression        Social History:  Social History     Socioeconomic History    Marital status:      Spouse name: None    Number of children: None    Years of education: None    Highest education level: None   Social Needs    Financial resource strain: None    Food insecurity - worry: None    Food insecurity - inability: None    Transportation needs - medical:  "None    Transportation needs - non-medical: None   Occupational History    None   Tobacco Use    Smoking status: Current Every Day Smoker     Packs/day: 0.50     Years: 10.00     Pack years: 5.00     Types: Cigarettes    Smokeless tobacco: Never Used   Substance and Sexual Activity    Alcohol use: No     Alcohol/week: 0.0 oz    Drug use: No    Sexual activity: Yes     Partners: Male     Birth control/protection: None   Other Topics Concern    None   Social History Narrative    None       Family History:   family history includes Coronary artery disease (age of onset: 38) in her father; Coronary artery disease (age of onset: 50) in her mother; Lung cancer in her mother.    Health Maintenance   Topic Date Due    Colonoscopy  11/01/2014    Pap Smear  03/11/2018    Influenza Vaccine  08/01/2018    Mammogram  08/22/2018    Lipid Panel  08/01/2022    TETANUS VACCINE  10/29/2027    Pneumococcal PPSV23 (Medium Risk) (2) 11/01/2029    Hepatitis C Screening  Completed       Physical Exam:    Vital Signs  Temp: 99.2 °F (37.3 °C)  Temp src: Tympanic  Pulse: 88  SpO2: 97 %  BP: 112/78  BP Location: Left arm  Patient Position: Sitting  Height and Weight  Height: 5' 4.5" (163.8 cm)  Weight: 83.4 kg (183 lb 13.8 oz)  BSA (Calculated - sq m): 1.95 sq meters  BMI (Calculated): 31.1  Weight in (lb) to have BMI = 25: 147.6]    Body mass index is 31.07 kg/m².    Physical Exam   Constitutional: She is oriented to person, place, and time. She appears well-developed.   HENT:   Head:       Right Ear: External ear normal.   Left Ear: External ear normal.   Nose: Right sinus exhibits maxillary sinus tenderness. Left sinus exhibits maxillary sinus tenderness.   Mouth/Throat: Oropharynx is clear and moist.   Eyes: Conjunctivae are normal. Pupils are equal, round, and reactive to light.   Neck: Normal range of motion. Neck supple.   Cardiovascular: Normal rate, regular rhythm and normal heart sounds.   No murmur " heard.  Pulmonary/Chest: Effort normal and breath sounds normal. No respiratory distress. She has no wheezes. She has no rales. She exhibits no tenderness.   Abdominal: Soft. She exhibits no distension and no mass. There is no tenderness. There is no guarding.   Musculoskeletal: She exhibits no edema or tenderness.   Lymphadenopathy:     She has no cervical adenopathy.   Neurological: She is alert and oriented to person, place, and time. She has normal reflexes.   Skin: Skin is warm and dry.   Psychiatric: She has a normal mood and affect. Her behavior is normal. Judgment and thought content normal.         Assessment:      ICD-10-CM ICD-9-CM   1. Acute bacterial sinusitis J01.90 461.9    B96.89    2. Boil L02.92 680.9   3. Attention deficit disorder, unspecified hyperactivity presence F98.8 314.00         Plan:    Treat with doxycycline hopefully that should take care of the boil also  Vyvanse working well will fill when due.      No orders of the defined types were placed in this encounter.      Current Outpatient Medications   Medication Sig Dispense Refill    albuterol (ACCUNEB) 1.25 mg/3 mL Nebu USE ONE VIAL IN NEBULIZER EVERY 4 TO 6 HOURS AS NEEDED FOR WHEEZING & SHORTNESS OF BREATH **THANK YOU** 75 mL 6    albuterol (PROAIR HFA) 90 mcg/actuation inhaler INHALE TWO PUFFS INTO THE LUNGS EVERY 6 HOURS AS NEEDED FOR WHEEZING **THANK YOU** 25.5 g 5    albuterol 90 mcg/actuation inhaler Inhale 2 puffs into the lungs every 4 (four) hours as needed for Wheezing. 18 g 2    albuterol-ipratropium (DUO-NEB) 2.5 mg-0.5 mg/3 mL nebulizer solution Take 3 mLs by nebulization every 6 (six) hours as needed for Wheezing. Rescue 1 Box 2    aspirin 81 MG Chew Take 1 tablet (81 mg total) by mouth once daily. 30 tablet 0    citalopram (CELEXA) 20 MG tablet TAKE ONE TABLET BY MOUTH ONCE DAILY 30 tablet 1    clindamycin (CLEOCIN T) 1 % external solution APPLY TO AFFECTED AREA TOPICALLY TWICE DAILY **THANK YOU** 30 mL 1     clindamycin (CLEOCIN) 150 MG capsule Take 450 mg by mouth 4 (four) times daily.      clindamycin (CLEOCIN) 300 MG capsule Take 1 capsule (300 mg total) by mouth every 8 (eight) hours. 21 capsule 0    doxycycline (VIBRA-TABS) 100 MG tablet Take 1 tablet (100 mg total) by mouth every 12 (twelve) hours. for 10 days 20 tablet 0    epinephrine (EPIPEN 2-JHONY) 0.3 mg/0.3 mL AtIn Inject 0.3 mLs (0.3 mg total) into the muscle once. 0.3 mL 12    fluticasone (FLONASE) 50 mcg/actuation nasal spray 2 sprays by Each Nare route once daily. 16 g 11    levocetirizine (XYZAL) 5 MG tablet TAKE ONE TABLET BY MOUTH EVERY EVENING **THANK YOU** 30 tablet 5    [START ON 9/20/2018] lisdexamfetamine (VYVANSE) 40 MG Cap Take 1 capsule (40 mg total) by mouth every morning. 30 capsule 0    lisdexamfetamine (VYVANSE) 40 MG Cap Take 1 capsule (40 mg total) by mouth every morning. 30 capsule 0    lisdexamfetamine (VYVANSE) 40 MG Cap Take 1 capsule (40 mg total) by mouth every morning. 30 capsule 0    montelukast (SINGULAIR) 10 mg tablet TAKE ONE TABLET BY MOUTH EVERY DAY **THANK YOU** 90 tablet 4    mupirocin (BACTROBAN) 2 % ointment Apply topically 3 (three) times daily. 1 Tube 1    mupirocin calcium 2% (BACTROBAN) 2 % cream Apply topically 3 (three) times daily. 1 Tube 0    mupirocin calcium 2% nasl oint (BACTROBAN) 2 % Oint by Nasal route 2 (two) times daily. 1 Tube 0    omeprazole (PRILOSEC) 40 MG capsule TAKE ONE CAPSULE BY MOUTH ONCE DAILY **THANK YOU** 90 capsule 3    ondansetron (ZOFRAN-ODT) 4 MG TbDL Take 1 tablet (4 mg total) by mouth every 8 (eight) hours as needed. 14 tablet 1    sulfamethoxazole-trimethoprim 800-160mg (BACTRIM DS) 800-160 mg Tab Take 1 tablet by mouth 2 (two) times daily. 20 tablet 0    sulfamethoxazole-trimethoprim 800-160mg (BACTRIM DS) 800-160 mg Tab Take 1 tablet by mouth 2 (two) times daily. 20 tablet 0    tiZANidine (ZANAFLEX) 4 mg Cap Take 1 capsule by mouth 2 (two) times daily as needed. 60  capsule 0    varenicline (CHANTIX STARTING MONTH BOX) 0.5 mg (11)- 1 mg (42) tablet Take one 0.5mg tab by mouth once daily X3 days,then increase to one 0.5mg tab twice daily X4 days,then increase to one 1mg tab twice daily 1 Package 0     No current facility-administered medications for this visit.        There are no discontinued medications.    No Follow-up on file.      Dima Her MD

## 2018-09-19 ENCOUNTER — TELEPHONE (OUTPATIENT)
Dept: FAMILY MEDICINE | Facility: CLINIC | Age: 53
End: 2018-09-19

## 2018-09-19 NOTE — TELEPHONE ENCOUNTER
----- Message from Pratibha Lin sent at 9/19/2018  9:50 AM CDT -----  Contact: [facility]  vyvanse 40mg is dated for tmrw but is due for today, needs autho...[phone]

## 2018-09-19 NOTE — TELEPHONE ENCOUNTER
Spoke with Caden at [facility] , pt is 30 of 30 today on her vyvanse , asking for okay to refill today .  Gave okay for refill today so she can have in the am

## 2018-09-24 ENCOUNTER — PATIENT OUTREACH (OUTPATIENT)
Dept: ADMINISTRATIVE | Facility: HOSPITAL | Age: 53
End: 2018-09-24

## 2018-10-16 ENCOUNTER — PATIENT MESSAGE (OUTPATIENT)
Dept: FAMILY MEDICINE | Facility: CLINIC | Age: 53
End: 2018-10-16

## 2018-10-19 ENCOUNTER — TELEPHONE (OUTPATIENT)
Dept: FAMILY MEDICINE | Facility: CLINIC | Age: 53
End: 2018-10-19

## 2018-10-19 ENCOUNTER — PATIENT MESSAGE (OUTPATIENT)
Dept: FAMILY MEDICINE | Facility: CLINIC | Age: 53
End: 2018-10-19

## 2018-10-19 NOTE — TELEPHONE ENCOUNTER
----- Message from Katia Shon sent at 10/19/2018  1:42 PM CDT -----  Contact: pt  1. What is the name of the medication you are requesting? Vyvanse  2. What is the dose? 40mg  3. How do you take the medication? Orally, topically, etc? orally  4. How often do you take this medication? 1xday  5. Do you need a 30 day or 90 day supply? 30  6. How many refills are you requesting? 1  7. What is your preferred pharmacy and location of the pharmacy? [facility]  8. Who can we contact with further questions? Pt  @ [phone]

## 2018-10-20 RX ORDER — LISDEXAMFETAMINE DIMESYLATE 40 MG/1
40 CAPSULE ORAL EVERY MORNING
Qty: 30 CAPSULE | Refills: 0 | Status: SHIPPED | OUTPATIENT
Start: 2018-10-20 | End: 2018-12-20 | Stop reason: SDUPTHER

## 2018-10-20 RX ORDER — LISDEXAMFETAMINE DIMESYLATE 40 MG/1
40 CAPSULE ORAL EVERY MORNING
Qty: 30 CAPSULE | Refills: 0 | Status: SHIPPED | OUTPATIENT
Start: 2018-11-20 | End: 2018-12-20 | Stop reason: SDUPTHER

## 2018-10-22 ENCOUNTER — PATIENT MESSAGE (OUTPATIENT)
Dept: FAMILY MEDICINE | Facility: CLINIC | Age: 53
End: 2018-10-22

## 2018-11-06 ENCOUNTER — PATIENT MESSAGE (OUTPATIENT)
Dept: FAMILY MEDICINE | Facility: CLINIC | Age: 53
End: 2018-11-06

## 2018-11-07 ENCOUNTER — OFFICE VISIT (OUTPATIENT)
Dept: FAMILY MEDICINE | Facility: CLINIC | Age: 53
End: 2018-11-07
Payer: COMMERCIAL

## 2018-11-07 VITALS
HEIGHT: 64 IN | OXYGEN SATURATION: 99 % | SYSTOLIC BLOOD PRESSURE: 132 MMHG | DIASTOLIC BLOOD PRESSURE: 76 MMHG | WEIGHT: 183.56 LBS | TEMPERATURE: 100 F | BODY MASS INDEX: 31.34 KG/M2 | HEART RATE: 77 BPM

## 2018-11-07 DIAGNOSIS — J01.90 ACUTE BACTERIAL SINUSITIS: Primary | ICD-10-CM

## 2018-11-07 DIAGNOSIS — J45.20 MILD INTERMITTENT ASTHMA WITHOUT COMPLICATION: ICD-10-CM

## 2018-11-07 DIAGNOSIS — F98.8 ATTENTION DEFICIT DISORDER, UNSPECIFIED HYPERACTIVITY PRESENCE: ICD-10-CM

## 2018-11-07 DIAGNOSIS — B96.89 ACUTE BACTERIAL SINUSITIS: Primary | ICD-10-CM

## 2018-11-07 PROCEDURE — 99999 PR PBB SHADOW E&M-EST. PATIENT-LVL III: CPT | Mod: PBBFAC,,, | Performed by: FAMILY MEDICINE

## 2018-11-07 PROCEDURE — 99214 OFFICE O/P EST MOD 30 MIN: CPT | Mod: S$GLB,,, | Performed by: FAMILY MEDICINE

## 2018-11-07 PROCEDURE — 3008F BODY MASS INDEX DOCD: CPT | Mod: CPTII,S$GLB,, | Performed by: FAMILY MEDICINE

## 2018-11-07 RX ORDER — DOXYCYCLINE HYCLATE 100 MG
100 TABLET ORAL EVERY 12 HOURS
Qty: 20 TABLET | Refills: 0 | Status: SHIPPED | OUTPATIENT
Start: 2018-11-07 | End: 2018-11-17

## 2018-11-07 RX ORDER — FLUCONAZOLE 150 MG/1
150 TABLET ORAL ONCE
Qty: 1 TABLET | Refills: 2 | Status: SHIPPED | OUTPATIENT
Start: 2018-11-07 | End: 2018-11-07

## 2018-11-07 RX ORDER — PREDNISONE 50 MG/1
50 TABLET ORAL DAILY
Qty: 5 TABLET | Refills: 0 | Status: SHIPPED | OUTPATIENT
Start: 2018-11-07 | End: 2018-11-12

## 2018-11-07 RX ORDER — ONDANSETRON 4 MG/1
4 TABLET, ORALLY DISINTEGRATING ORAL EVERY 8 HOURS PRN
Qty: 14 TABLET | Refills: 1 | Status: SHIPPED | OUTPATIENT
Start: 2018-11-07 | End: 2019-03-28 | Stop reason: SDUPTHER

## 2018-11-07 RX ORDER — IPRATROPIUM BROMIDE AND ALBUTEROL SULFATE 2.5; .5 MG/3ML; MG/3ML
3 SOLUTION RESPIRATORY (INHALATION) EVERY 6 HOURS PRN
Qty: 1 BOX | Refills: 2 | Status: SHIPPED | OUTPATIENT
Start: 2018-11-07 | End: 2019-08-13 | Stop reason: SDUPTHER

## 2018-11-07 NOTE — PROGRESS NOTES
Chief Complaint:    Chief Complaint   Patient presents with    Cough    Chest Congestion    Wheezing       History of Present Illness:    She presents today she says she has been coughing congested yellow-green sputum wheezing also.  She has also got a small bowel boil/follicle to the left forehead that she said she got some pus out.  At this time it does not look angry.  ADD is controlled on current medication    ROS:  Review of Systems   Constitutional: Negative for activity change, chills, fatigue, fever and unexpected weight change.   HENT: Positive for congestion. Negative for ear discharge, ear pain, hearing loss, postnasal drip and rhinorrhea.    Eyes: Negative for pain and visual disturbance.   Respiratory: Positive for cough and wheezing. Negative for chest tightness and shortness of breath.    Cardiovascular: Negative for chest pain and palpitations.   Gastrointestinal: Negative for abdominal pain, diarrhea and vomiting.   Endocrine: Negative for heat intolerance.   Genitourinary: Negative for dysuria, flank pain, frequency and hematuria.   Musculoskeletal: Negative for back pain, gait problem and neck pain.   Skin: Negative for color change and rash.   Neurological: Negative for dizziness, tremors, seizures, numbness and headaches.   Psychiatric/Behavioral: Negative for agitation, hallucinations, self-injury, sleep disturbance and suicidal ideas. The patient is not nervous/anxious.        Past Medical History:   Diagnosis Date    ADD (attention deficit disorder with hyperactivity)     Anxiety     Asthma     mild intermittent    Depression        Social History:  Social History     Socioeconomic History    Marital status:      Spouse name: None    Number of children: None    Years of education: None    Highest education level: None   Social Needs    Financial resource strain: None    Food insecurity - worry: None    Food insecurity - inability: None    Transportation needs -  "medical: None    Transportation needs - non-medical: None   Occupational History    None   Tobacco Use    Smoking status: Current Every Day Smoker     Packs/day: 0.50     Years: 10.00     Pack years: 5.00     Types: Cigarettes    Smokeless tobacco: Never Used   Substance and Sexual Activity    Alcohol use: No     Alcohol/week: 0.0 oz    Drug use: No    Sexual activity: Yes     Partners: Male     Birth control/protection: None   Other Topics Concern    None   Social History Narrative    None       Family History:   family history includes Coronary artery disease (age of onset: 38) in her father; Coronary artery disease (age of onset: 50) in her mother; Lung cancer in her mother.    Health Maintenance   Topic Date Due    Colonoscopy  11/01/2014    Pap Smear  03/11/2018    Influenza Vaccine  08/01/2018    Mammogram  08/22/2018    Lipid Panel  08/01/2022    TETANUS VACCINE  10/29/2027    Pneumococcal PPSV23 (Medium Risk) (2) 11/01/2029    Hepatitis C Screening  Completed       Physical Exam:    Vital Signs  Temp: 99.8 °F (37.7 °C)  Temp src: Tympanic  Pulse: 77  SpO2: 99 %  BP: 132/76  BP Location: Left arm  Patient Position: Sitting  Pain Score: 0-No pain  Height and Weight  Height: 5' 4" (162.6 cm)  Weight: 83.3 kg (183 lb 8.5 oz)  BSA (Calculated - sq m): 1.94 sq meters  BMI (Calculated): 31.6  Weight in (lb) to have BMI = 25: 145.3]    Body mass index is 31.5 kg/m².    Physical Exam   Constitutional: She is oriented to person, place, and time. She appears well-developed.   HENT:   Mouth/Throat: Oropharynx is clear and moist.   Eyes: Conjunctivae are normal. Pupils are equal, round, and reactive to light.   Neck: Normal range of motion. Neck supple.   Cardiovascular: Normal rate, regular rhythm and normal heart sounds.   No murmur heard.  Pulmonary/Chest: Effort normal and breath sounds normal. No respiratory distress. She has no wheezes. She has no rales. She exhibits no tenderness.   Abdominal: " Soft. She exhibits no distension and no mass. There is no tenderness. There is no guarding.   Musculoskeletal: She exhibits no edema or tenderness.   Lymphadenopathy:     She has no cervical adenopathy.   Neurological: She is alert and oriented to person, place, and time. She has normal reflexes.   Skin: Skin is warm and dry.   Psychiatric: She has a normal mood and affect. Her behavior is normal. Judgment and thought content normal.         Assessment:      ICD-10-CM ICD-9-CM   1. Acute bacterial sinusitis J01.90 461.9    B96.89    2. Mild intermittent asthma without complication J45.20 493.90   3. Attention deficit disorder, unspecified hyperactivity presence F98.8 314.00         Plan:    Will treat with doxycycline.  Please continue local antibiotic care on a facial wound  Patient given prednisone for asthma        No orders of the defined types were placed in this encounter.      Current Outpatient Medications   Medication Sig Dispense Refill    albuterol (ACCUNEB) 1.25 mg/3 mL Nebu USE ONE VIAL IN NEBULIZER EVERY 4 TO 6 HOURS AS NEEDED FOR WHEEZING & SHORTNESS OF BREATH **THANK YOU** 75 mL 6    albuterol (PROAIR HFA) 90 mcg/actuation inhaler INHALE TWO PUFFS INTO THE LUNGS EVERY 6 HOURS AS NEEDED FOR WHEEZING **THANK YOU** 25.5 g 5    albuterol 90 mcg/actuation inhaler Inhale 2 puffs into the lungs every 4 (four) hours as needed for Wheezing. 18 g 2    albuterol-ipratropium (DUO-NEB) 2.5 mg-0.5 mg/3 mL nebulizer solution Take 3 mLs by nebulization every 6 (six) hours as needed for Wheezing. Rescue 1 Box 2    aspirin 81 MG Chew Take 1 tablet (81 mg total) by mouth once daily. 30 tablet 0    citalopram (CELEXA) 20 MG tablet TAKE ONE TABLET BY MOUTH ONCE DAILY 30 tablet 1    clindamycin (CLEOCIN T) 1 % external solution APPLY TO AFFECTED AREA TOPICALLY TWICE DAILY **THANK YOU** 30 mL 1    fluticasone (FLONASE) 50 mcg/actuation nasal spray 2 sprays by Each Nare route once daily. 16 g 11    levocetirizine  (XYZAL) 5 MG tablet TAKE ONE TABLET BY MOUTH EVERY EVENING **THANK YOU** 30 tablet 5    lisdexamfetamine (VYVANSE) 40 MG Cap Take 1 capsule (40 mg total) by mouth every morning. 30 capsule 0    [START ON 11/20/2018] lisdexamfetamine (VYVANSE) 40 MG Cap Take 1 capsule (40 mg total) by mouth every morning. 30 capsule 0    lisdexamfetamine (VYVANSE) 40 MG Cap Take 1 capsule (40 mg total) by mouth every morning. 30 capsule 0    montelukast (SINGULAIR) 10 mg tablet TAKE ONE TABLET BY MOUTH EVERY DAY **THANK YOU** 90 tablet 4    mupirocin (BACTROBAN) 2 % ointment Apply topically 3 (three) times daily. 1 Tube 1    omeprazole (PRILOSEC) 40 MG capsule TAKE ONE CAPSULE BY MOUTH ONCE DAILY **THANK YOU** 90 capsule 3    ondansetron (ZOFRAN-ODT) 4 MG TbDL Take 1 tablet (4 mg total) by mouth every 8 (eight) hours as needed. 14 tablet 1    tiZANidine (ZANAFLEX) 4 mg Cap Take 1 capsule by mouth 2 (two) times daily as needed. 60 capsule 0    varenicline (CHANTIX STARTING MONTH BOX) 0.5 mg (11)- 1 mg (42) tablet Take one 0.5mg tab by mouth once daily X3 days,then increase to one 0.5mg tab twice daily X4 days,then increase to one 1mg tab twice daily 1 Package 0    doxycycline (VIBRA-TABS) 100 MG tablet Take 1 tablet (100 mg total) by mouth every 12 (twelve) hours. for 10 days 20 tablet 0    epinephrine (EPIPEN 2-JHONY) 0.3 mg/0.3 mL AtIn Inject 0.3 mLs (0.3 mg total) into the muscle once. 0.3 mL 12    fluconazole (DIFLUCAN) 150 MG Tab Take 1 tablet (150 mg total) by mouth once. for 1 dose 1 tablet 2    predniSONE (DELTASONE) 50 MG Tab Take 1 tablet (50 mg total) by mouth once daily. for 5 days 5 tablet 0     No current facility-administered medications for this visit.        Medications Discontinued During This Encounter   Medication Reason    sulfamethoxazole-trimethoprim 800-160mg (BACTRIM DS) 800-160 mg Tab Therapy completed    sulfamethoxazole-trimethoprim 800-160mg (BACTRIM DS) 800-160 mg Tab Therapy completed     mupirocin calcium 2% nasl oint (BACTROBAN) 2 % Oint Duplicate Order    mupirocin calcium 2% (BACTROBAN) 2 % cream Duplicate Order    clindamycin (CLEOCIN) 300 MG capsule Therapy completed    clindamycin (CLEOCIN) 150 MG capsule Therapy completed    albuterol-ipratropium (DUO-NEB) 2.5 mg-0.5 mg/3 mL nebulizer solution Reorder    ondansetron (ZOFRAN-ODT) 4 MG TbDL Reorder       No Follow-up on file.      Dima Her MD

## 2018-11-09 ENCOUNTER — PATIENT MESSAGE (OUTPATIENT)
Dept: FAMILY MEDICINE | Facility: CLINIC | Age: 53
End: 2018-11-09

## 2018-11-09 RX ORDER — CITALOPRAM 20 MG/1
TABLET, FILM COATED ORAL
Qty: 30 TABLET | Refills: 1 | Status: CANCELLED | OUTPATIENT
Start: 2018-11-09

## 2018-11-10 RX ORDER — CITALOPRAM 20 MG/1
TABLET, FILM COATED ORAL
Qty: 30 TABLET | Refills: 1 | Status: SHIPPED | OUTPATIENT
Start: 2018-11-10 | End: 2019-01-22 | Stop reason: SDUPTHER

## 2018-11-19 ENCOUNTER — TELEPHONE (OUTPATIENT)
Dept: SMOKING CESSATION | Facility: CLINIC | Age: 53
End: 2018-11-19

## 2018-11-19 NOTE — TELEPHONE ENCOUNTER
Attempted to contact patient with offer to reschedule her intake appointment for smoking cessation. Had to leave a message.

## 2018-12-20 ENCOUNTER — OFFICE VISIT (OUTPATIENT)
Dept: FAMILY MEDICINE | Facility: CLINIC | Age: 53
End: 2018-12-20
Payer: COMMERCIAL

## 2018-12-20 VITALS
BODY MASS INDEX: 31.59 KG/M2 | DIASTOLIC BLOOD PRESSURE: 90 MMHG | OXYGEN SATURATION: 98 % | HEART RATE: 97 BPM | SYSTOLIC BLOOD PRESSURE: 134 MMHG | TEMPERATURE: 99 F | HEIGHT: 64 IN | WEIGHT: 185.06 LBS

## 2018-12-20 DIAGNOSIS — N95.1 HOT FLASHES DUE TO MENOPAUSE: Primary | ICD-10-CM

## 2018-12-20 DIAGNOSIS — F98.8 ATTENTION DEFICIT DISORDER, UNSPECIFIED HYPERACTIVITY PRESENCE: ICD-10-CM

## 2018-12-20 DIAGNOSIS — Z12.39 BREAST CANCER SCREENING: ICD-10-CM

## 2018-12-20 DIAGNOSIS — R03.0 ELEVATED BLOOD-PRESSURE READING WITHOUT DIAGNOSIS OF HYPERTENSION: ICD-10-CM

## 2018-12-20 DIAGNOSIS — F43.0 ACUTE STRESS REACTION: ICD-10-CM

## 2018-12-20 DIAGNOSIS — Z12.11 COLON CANCER SCREENING: ICD-10-CM

## 2018-12-20 PROCEDURE — 3008F BODY MASS INDEX DOCD: CPT | Mod: CPTII,S$GLB,, | Performed by: FAMILY MEDICINE

## 2018-12-20 PROCEDURE — 99214 OFFICE O/P EST MOD 30 MIN: CPT | Mod: S$GLB,,, | Performed by: FAMILY MEDICINE

## 2018-12-20 PROCEDURE — 99999 PR PBB SHADOW E&M-EST. PATIENT-LVL IV: CPT | Mod: PBBFAC,,, | Performed by: FAMILY MEDICINE

## 2018-12-20 RX ORDER — LISDEXAMFETAMINE DIMESYLATE 40 MG/1
40 CAPSULE ORAL EVERY MORNING
Qty: 30 CAPSULE | Refills: 0 | Status: SHIPPED | OUTPATIENT
Start: 2019-01-20 | End: 2019-03-26 | Stop reason: SDUPTHER

## 2018-12-20 RX ORDER — LORAZEPAM 0.5 MG/1
0.5 TABLET ORAL EVERY 12 HOURS PRN
Qty: 14 TABLET | Refills: 0 | Status: SHIPPED | OUTPATIENT
Start: 2018-12-20 | End: 2019-01-29 | Stop reason: SDUPTHER

## 2018-12-20 RX ORDER — LISDEXAMFETAMINE DIMESYLATE 40 MG/1
40 CAPSULE ORAL EVERY MORNING
Qty: 30 CAPSULE | Refills: 0 | Status: SHIPPED | OUTPATIENT
Start: 2018-12-20 | End: 2019-03-26 | Stop reason: SDUPTHER

## 2018-12-20 RX ORDER — LISDEXAMFETAMINE DIMESYLATE 40 MG/1
40 CAPSULE ORAL EVERY MORNING
Qty: 30 CAPSULE | Refills: 0 | Status: SHIPPED | OUTPATIENT
Start: 2019-02-20 | End: 2019-03-25 | Stop reason: SDUPTHER

## 2018-12-20 NOTE — PROGRESS NOTES
Chief Complaint:    Chief Complaint   Patient presents with    Medication Refill       History of Present Illness:  She presents today she has been having hot flashes for the last few years.  She stopped having.  After she had surgical ablation few years back.  She still has her ovaries.  She wants something for hot flashes she smokes occasionally.  She says hot flashes a making her life really miserable she cannot rest she has hot and cold.  Also needs a refill of her ADD meds  She does acknowledge significant stress she wants to be be able to take some medication stress as needed.        ROS:  Review of Systems   Constitutional: Negative for activity change, chills, fatigue, fever and unexpected weight change.   HENT: Negative for congestion, ear discharge, ear pain, hearing loss, postnasal drip and rhinorrhea.    Eyes: Negative for pain and visual disturbance.   Respiratory: Negative for cough, chest tightness and shortness of breath.    Cardiovascular: Negative for chest pain and palpitations.   Gastrointestinal: Negative for abdominal pain, diarrhea and vomiting.   Endocrine: Negative for heat intolerance.   Genitourinary: Negative for dysuria, flank pain, frequency and hematuria.   Musculoskeletal: Negative for back pain, gait problem and neck pain.   Skin: Negative for color change and rash.   Neurological: Negative for dizziness, tremors, seizures, numbness and headaches.   Psychiatric/Behavioral: Negative for agitation, hallucinations, self-injury, sleep disturbance and suicidal ideas. The patient is not nervous/anxious.        Past Medical History:   Diagnosis Date    ADD (attention deficit disorder with hyperactivity)     Anxiety     Asthma     mild intermittent    Depression        Social History:  Social History     Socioeconomic History    Marital status:      Spouse name: None    Number of children: None    Years of education: None    Highest education level: None   Social Needs     "Financial resource strain: None    Food insecurity - worry: None    Food insecurity - inability: None    Transportation needs - medical: None    Transportation needs - non-medical: None   Occupational History    None   Tobacco Use    Smoking status: Current Every Day Smoker     Packs/day: 0.50     Years: 10.00     Pack years: 5.00     Types: Cigarettes    Smokeless tobacco: Never Used   Substance and Sexual Activity    Alcohol use: No     Alcohol/week: 0.0 oz    Drug use: No    Sexual activity: Yes     Partners: Male     Birth control/protection: None   Other Topics Concern    None   Social History Narrative    None       Family History:   family history includes Coronary artery disease (age of onset: 38) in her father; Coronary artery disease (age of onset: 50) in her mother; Lung cancer in her mother.    Health Maintenance   Topic Date Due    Colonoscopy  11/01/2014    Pap Smear  03/11/2018    Influenza Vaccine  08/01/2018    Mammogram  07/16/2019    Lipid Panel  08/01/2022    TETANUS VACCINE  10/29/2027    Hepatitis C Screening  Completed    Pneumococcal Vaccine (Medium Risk)  Completed       Physical Exam:    Vital Signs  Temp: 98.9 °F (37.2 °C)  Temp src: Tympanic  Pulse: 97  SpO2: 98 %  BP: (!) 134/90  BP Location: Left arm  Patient Position: Sitting  Height and Weight  Height: 5' 4" (162.6 cm)  Weight: 84 kg (185 lb 1.2 oz)  BSA (Calculated - sq m): 1.95 sq meters  BMI (Calculated): 31.8  Weight in (lb) to have BMI = 25: 145.3]    Body mass index is 31.77 kg/m².    Physical Exam   Constitutional: She is oriented to person, place, and time. She appears well-developed.   HENT:   Mouth/Throat: Oropharynx is clear and moist.   Fluid bilateral TM   Eyes: Conjunctivae are normal. Pupils are equal, round, and reactive to light.   Neck: Normal range of motion. Neck supple.   Cardiovascular: Normal rate, regular rhythm and normal heart sounds.   No murmur heard.  Pulmonary/Chest: Effort normal and " breath sounds normal. No respiratory distress. She has no wheezes. She has no rales. She exhibits no tenderness.   Abdominal: Soft. She exhibits no distension and no mass. There is no tenderness. There is no guarding.   Musculoskeletal: She exhibits no edema or tenderness.   Lymphadenopathy:     She has no cervical adenopathy.   Neurological: She is alert and oriented to person, place, and time. She has normal reflexes.   Skin: Skin is warm and dry.   Psychiatric: She has a normal mood and affect. Her behavior is normal. Judgment and thought content normal.         Assessment:      ICD-10-CM ICD-9-CM   1. Hot flashes due to menopause N95.1 627.2   2. Attention deficit disorder, unspecified hyperactivity presence F98.8 314.00   3. Acute stress reaction F43.0 308.9   4. Colon cancer screening Z12.11 V76.51   5. Breast cancer screening Z12.31 V76.10   6. Elevated blood-pressure reading without diagnosis of hypertension R03.0 796.2         Plan:    Will start her on climarapro once weekly he explained the side effects her hazards of hormone replacement therapy including blood clots risk for heart attack in cancers and explained to her that this is a short-term option.  Complete smoking cessation is absolutely advised.  Patient's Vyvanse refilled  checked  Start her on Ativan to use only as needed patient explained that is habit-forming should not be used on a regular basis.      Orders Placed This Encounter   Procedures    Mammo Digital Screening Bilat       Current Outpatient Medications   Medication Sig Dispense Refill    albuterol (ACCUNEB) 1.25 mg/3 mL Nebu USE ONE VIAL IN NEBULIZER EVERY 4 TO 6 HOURS AS NEEDED FOR WHEEZING & SHORTNESS OF BREATH **THANK YOU** 75 mL 6    albuterol (PROAIR HFA) 90 mcg/actuation inhaler INHALE TWO PUFFS INTO THE LUNGS EVERY 6 HOURS AS NEEDED FOR WHEEZING **THANK YOU** 25.5 g 5    albuterol 90 mcg/actuation inhaler Inhale 2 puffs into the lungs every 4 (four) hours as needed for  Wheezing. 18 g 2    albuterol-ipratropium (DUO-NEB) 2.5 mg-0.5 mg/3 mL nebulizer solution Take 3 mLs by nebulization every 6 (six) hours as needed for Wheezing. Rescue 1 Box 2    aspirin 81 MG Chew Take 1 tablet (81 mg total) by mouth once daily. 30 tablet 0    citalopram (CELEXA) 20 MG tablet TAKE ONE TABLET BY MOUTH ONCE DAILY 30 tablet 1    clindamycin (CLEOCIN T) 1 % external solution APPLY TO AFFECTED AREA TOPICALLY TWICE DAILY **THANK YOU** 30 mL 1    fluticasone (FLONASE) 50 mcg/actuation nasal spray 2 sprays by Each Nare route once daily. 16 g 11    levocetirizine (XYZAL) 5 MG tablet TAKE ONE TABLET BY MOUTH EVERY EVENING **THANK YOU** 30 tablet 5    [START ON 2/20/2019] lisdexamfetamine (VYVANSE) 40 MG Cap Take 1 capsule (40 mg total) by mouth every morning. 30 capsule 0    [START ON 1/20/2019] lisdexamfetamine (VYVANSE) 40 MG Cap Take 1 capsule (40 mg total) by mouth every morning. 30 capsule 0    lisdexamfetamine (VYVANSE) 40 MG Cap Take 1 capsule (40 mg total) by mouth every morning. 30 capsule 0    montelukast (SINGULAIR) 10 mg tablet TAKE ONE TABLET BY MOUTH EVERY DAY **THANK YOU** 90 tablet 4    mupirocin (BACTROBAN) 2 % ointment Apply topically 3 (three) times daily. 1 Tube 1    omeprazole (PRILOSEC) 40 MG capsule TAKE ONE CAPSULE BY MOUTH ONCE DAILY **THANK YOU** 90 capsule 3    ondansetron (ZOFRAN-ODT) 4 MG TbDL Take 1 tablet (4 mg total) by mouth every 8 (eight) hours as needed. 14 tablet 1    tiZANidine (ZANAFLEX) 4 mg Cap Take 1 capsule by mouth 2 (two) times daily as needed. 60 capsule 0    varenicline (CHANTIX STARTING MONTH BOX) 0.5 mg (11)- 1 mg (42) tablet Take one 0.5mg tab by mouth once daily X3 days,then increase to one 0.5mg tab twice daily X4 days,then increase to one 1mg tab twice daily 1 Package 0    epinephrine (EPIPEN 2-JHONY) 0.3 mg/0.3 mL AtIn Inject 0.3 mLs (0.3 mg total) into the muscle once. 0.3 mL 12    estradiol-levonorgestrel (CLIMARAPRO) 0.045-0.015 mg/24 hr  Place 1 patch onto the skin once a week. 4 patch 11    LORazepam (ATIVAN) 0.5 MG tablet Take 1 tablet (0.5 mg total) by mouth every 12 (twelve) hours as needed for Anxiety. 14 tablet 0     No current facility-administered medications for this visit.        Medications Discontinued During This Encounter   Medication Reason    lisdexamfetamine (VYVANSE) 40 MG Cap Reorder    lisdexamfetamine (VYVANSE) 40 MG Cap Reorder    lisdexamfetamine (VYVANSE) 40 MG Cap Reorder       Follow-up in about 1 week (around 12/27/2018).      Dima Her MD

## 2019-01-02 ENCOUNTER — TELEPHONE (OUTPATIENT)
Dept: ENDOSCOPY | Facility: HOSPITAL | Age: 54
End: 2019-01-02

## 2019-01-22 RX ORDER — CITALOPRAM 20 MG/1
TABLET, FILM COATED ORAL
Qty: 30 TABLET | Refills: 1 | Status: SHIPPED | OUTPATIENT
Start: 2019-01-22 | End: 2019-03-26 | Stop reason: SDUPTHER

## 2019-01-29 ENCOUNTER — OFFICE VISIT (OUTPATIENT)
Dept: FAMILY MEDICINE | Facility: CLINIC | Age: 54
End: 2019-01-29
Payer: COMMERCIAL

## 2019-01-29 VITALS
TEMPERATURE: 99 F | SYSTOLIC BLOOD PRESSURE: 130 MMHG | HEART RATE: 93 BPM | WEIGHT: 189.81 LBS | BODY MASS INDEX: 32.41 KG/M2 | OXYGEN SATURATION: 99 % | DIASTOLIC BLOOD PRESSURE: 88 MMHG | HEIGHT: 64 IN

## 2019-01-29 DIAGNOSIS — R07.89 CHEST PRESSURE: Primary | ICD-10-CM

## 2019-01-29 DIAGNOSIS — F98.8 ATTENTION DEFICIT DISORDER, UNSPECIFIED HYPERACTIVITY PRESENCE: ICD-10-CM

## 2019-01-29 DIAGNOSIS — R06.00 DYSPNEA, UNSPECIFIED TYPE: ICD-10-CM

## 2019-01-29 DIAGNOSIS — R00.2 PALPITATIONS: ICD-10-CM

## 2019-01-29 DIAGNOSIS — Z12.39 BREAST CANCER SCREENING: Primary | ICD-10-CM

## 2019-01-29 DIAGNOSIS — Z12.11 COLON CANCER SCREENING: ICD-10-CM

## 2019-01-29 PROCEDURE — 99999 PR PBB SHADOW E&M-EST. PATIENT-LVL III: CPT | Mod: PBBFAC,,, | Performed by: FAMILY MEDICINE

## 2019-01-29 PROCEDURE — 99214 OFFICE O/P EST MOD 30 MIN: CPT | Mod: S$GLB,,, | Performed by: FAMILY MEDICINE

## 2019-01-29 PROCEDURE — 99214 PR OFFICE/OUTPT VISIT, EST, LEVL IV, 30-39 MIN: ICD-10-PCS | Mod: S$GLB,,, | Performed by: FAMILY MEDICINE

## 2019-01-29 PROCEDURE — 3008F BODY MASS INDEX DOCD: CPT | Mod: CPTII,S$GLB,, | Performed by: FAMILY MEDICINE

## 2019-01-29 PROCEDURE — 99999 PR PBB SHADOW E&M-EST. PATIENT-LVL III: ICD-10-PCS | Mod: PBBFAC,,, | Performed by: FAMILY MEDICINE

## 2019-01-29 PROCEDURE — 3008F PR BODY MASS INDEX (BMI) DOCUMENTED: ICD-10-PCS | Mod: CPTII,S$GLB,, | Performed by: FAMILY MEDICINE

## 2019-01-29 RX ORDER — LORAZEPAM 0.5 MG/1
0.5 TABLET ORAL EVERY 12 HOURS PRN
Qty: 14 TABLET | Refills: 0 | Status: SHIPPED | OUTPATIENT
Start: 2019-01-29 | End: 2021-02-04 | Stop reason: SDUPTHER

## 2019-01-29 RX ORDER — MONTELUKAST SODIUM 10 MG/1
TABLET ORAL
Qty: 90 TABLET | Refills: 4 | Status: SHIPPED | OUTPATIENT
Start: 2019-01-29 | End: 2019-08-13 | Stop reason: SDUPTHER

## 2019-01-29 RX ORDER — PROPRANOLOL HYDROCHLORIDE 80 MG/1
80 CAPSULE, EXTENDED RELEASE ORAL DAILY
Qty: 30 CAPSULE | Refills: 11 | Status: SHIPPED | OUTPATIENT
Start: 2019-01-29 | End: 2019-03-26 | Stop reason: SDUPTHER

## 2019-01-29 NOTE — TELEPHONE ENCOUNTER
----- Message from Kate Barahona sent at 1/29/2019  2:42 PM CST -----  Contact: [facility] request  Message     ----- Message from [facility], System Message sent at 1/29/2019  6:33 AM CST -----    Appointment Request From: Chasity Blake    With Provider: Mammo screening    Preferred Date Range: 1/29/2019 - 2/8/2019    Preferred Times: Monday Morning, Monday Afternoon, Tuesday Morning, Thursday Morning, Thursday Afternoon    Reason for visit: Mammo    Comments:  Reschedule Mammo screening

## 2019-01-29 NOTE — TELEPHONE ENCOUNTER
Received fax from pharmacy requesting Propanolol ER caps instead of Innopran XL 80 cap due to not being covered by insurance. Please advise.

## 2019-01-29 NOTE — PROGRESS NOTES
Chief Complaint:    Chief Complaint   Patient presents with    Follow-up       History of Present Illness:    Patient's presents today multiple complaints,  She says she feels tightness in the chest sometimes sometimes she feels short of breath gets anxious usually it happens when she is worried about something and almost he gets into a panic more.  She is a smoker has no cardiac history in the past but she does have a long psych history  She says her blood pressure home is running high.    ROS:  Review of Systems   Constitutional: Negative for activity change, chills, fatigue, fever and unexpected weight change.   HENT: Negative for congestion, ear discharge, ear pain, hearing loss, postnasal drip and rhinorrhea.    Eyes: Negative for pain and visual disturbance.   Respiratory: Positive for chest tightness and shortness of breath. Negative for cough.    Cardiovascular: Negative for chest pain and palpitations.   Gastrointestinal: Negative for abdominal pain, diarrhea and vomiting.   Endocrine: Negative for heat intolerance.   Genitourinary: Negative for dysuria, flank pain, frequency and hematuria.   Musculoskeletal: Negative for back pain, gait problem and neck pain.   Skin: Negative for color change and rash.   Neurological: Negative for dizziness, tremors, seizures, numbness and headaches.   Psychiatric/Behavioral: Negative for agitation, hallucinations, self-injury, sleep disturbance and suicidal ideas. The patient is not nervous/anxious.        Past Medical History:   Diagnosis Date    ADD (attention deficit disorder with hyperactivity)     Anxiety     Asthma     mild intermittent    Depression        Social History:  Social History     Socioeconomic History    Marital status:      Spouse name: None    Number of children: None    Years of education: None    Highest education level: None   Social Needs    Financial resource strain: None    Food insecurity - worry: None    Food insecurity -  "inability: None    Transportation needs - medical: None    Transportation needs - non-medical: None   Occupational History    None   Tobacco Use    Smoking status: Current Every Day Smoker     Packs/day: 0.50     Years: 10.00     Pack years: 5.00     Types: Cigarettes    Smokeless tobacco: Never Used   Substance and Sexual Activity    Alcohol use: No     Alcohol/week: 0.0 oz    Drug use: No    Sexual activity: Yes     Partners: Male     Birth control/protection: None   Other Topics Concern    None   Social History Narrative    None       Family History:   family history includes Coronary artery disease (age of onset: 38) in her father; Coronary artery disease (age of onset: 50) in her mother; Lung cancer in her mother.    Health Maintenance   Topic Date Due    Colonoscopy  11/01/2014    Pap Smear  03/11/2018    Influenza Vaccine  08/01/2018    Mammogram  07/16/2019    Lipid Panel  08/01/2022    TETANUS VACCINE  10/29/2027    Hepatitis C Screening  Completed    Pneumococcal Vaccine (Medium Risk)  Completed       Physical Exam:    Vital Signs  Temp: 98.7 °F (37.1 °C)  Temp src: Tympanic  Pulse: 93  SpO2: 99 %  BP: 130/88  BP Location: Left arm  Patient Position: Sitting  Pain Score: 0-No pain  Height and Weight  Height: 5' 4" (162.6 cm)  Weight: 86.1 kg (189 lb 13.1 oz)  BSA (Calculated - sq m): 1.97 sq meters  BMI (Calculated): 32.7  Weight in (lb) to have BMI = 25: 145.3]    Body mass index is 32.58 kg/m².    Physical Exam   Constitutional: She is oriented to person, place, and time. She appears well-developed.   HENT:   Mouth/Throat: Oropharynx is clear and moist.   Eyes: Conjunctivae are normal. Pupils are equal, round, and reactive to light.   Neck: Normal range of motion. Neck supple.   Cardiovascular: Normal rate, regular rhythm and normal heart sounds.   No murmur heard.  Pulmonary/Chest: Effort normal and breath sounds normal. No respiratory distress. She has no wheezes. She has no rales. " She exhibits no tenderness.   Abdominal: Soft. She exhibits no distension and no mass. There is no tenderness. There is no guarding.   Musculoskeletal: She exhibits no edema or tenderness.   Lymphadenopathy:     She has no cervical adenopathy.   Neurological: She is alert and oriented to person, place, and time. She has normal reflexes.   Skin: Skin is warm and dry.   Psychiatric: She has a normal mood and affect. Her behavior is normal. Judgment and thought content normal.         Assessment:      ICD-10-CM ICD-9-CM   1. Chest pressure R07.89 786.59   2. Dyspnea, unspecified type R06.00 786.09   3. Palpitations R00.2 785.1   4. Colon cancer screening Z12.11 V76.51         Plan:    Will get a nuclear stress test and Holter monitor  Will start on Inderal to help lower blood pressure  Recommend screening colonoscopy  Patient's Vyvanse refill today      Orders Placed This Encounter   Procedures    NM Myocardial Perfusion Spect Multi Pharmacologic    NM Multi Pharm Stress Cardiac Component    Holter monitor - 48 hour       Current Outpatient Medications   Medication Sig Dispense Refill    albuterol (ACCUNEB) 1.25 mg/3 mL Nebu USE ONE VIAL IN NEBULIZER EVERY 4 TO 6 HOURS AS NEEDED FOR WHEEZING & SHORTNESS OF BREATH **THANK YOU** 75 mL 6    albuterol (PROAIR HFA) 90 mcg/actuation inhaler INHALE TWO PUFFS INTO THE LUNGS EVERY 6 HOURS AS NEEDED FOR WHEEZING **THANK YOU** 25.5 g 5    albuterol 90 mcg/actuation inhaler Inhale 2 puffs into the lungs every 4 (four) hours as needed for Wheezing. 18 g 2    albuterol-ipratropium (DUO-NEB) 2.5 mg-0.5 mg/3 mL nebulizer solution Take 3 mLs by nebulization every 6 (six) hours as needed for Wheezing. Rescue 1 Box 2    citalopram (CELEXA) 20 MG tablet TAKE ONE TABLET BY MOUTH ONCE DAILY **THANK YOU** 30 tablet 1    clindamycin (CLEOCIN T) 1 % external solution APPLY TO AFFECTED AREA TOPICALLY TWICE DAILY **THANK YOU** 30 mL 1    estradiol-levonorgestrel (CLIMARAPRO)  0.045-0.015 mg/24 hr Place 1 patch onto the skin once a week. 4 patch 11    fluticasone (FLONASE) 50 mcg/actuation nasal spray 2 sprays by Each Nare route once daily. 16 g 11    levocetirizine (XYZAL) 5 MG tablet TAKE ONE TABLET BY MOUTH EVERY EVENING **THANK YOU** 30 tablet 5    [START ON 2/20/2019] lisdexamfetamine (VYVANSE) 40 MG Cap Take 1 capsule (40 mg total) by mouth every morning. 30 capsule 0    lisdexamfetamine (VYVANSE) 40 MG Cap Take 1 capsule (40 mg total) by mouth every morning. 30 capsule 0    lisdexamfetamine (VYVANSE) 40 MG Cap Take 1 capsule (40 mg total) by mouth every morning. 30 capsule 0    montelukast (SINGULAIR) 10 mg tablet TAKE ONE TABLET BY MOUTH EVERY DAY **THANK YOU** 90 tablet 4    mupirocin (BACTROBAN) 2 % ointment Apply topically 3 (three) times daily. 1 Tube 1    omeprazole (PRILOSEC) 40 MG capsule TAKE ONE CAPSULE BY MOUTH ONCE DAILY **THANK YOU** 90 capsule 3    ondansetron (ZOFRAN-ODT) 4 MG TbDL Take 1 tablet (4 mg total) by mouth every 8 (eight) hours as needed. 14 tablet 1    tiZANidine (ZANAFLEX) 4 mg Cap Take 1 capsule by mouth 2 (two) times daily as needed. 60 capsule 0    varenicline (CHANTIX STARTING MONTH BOX) 0.5 mg (11)- 1 mg (42) tablet Take one 0.5mg tab by mouth once daily X3 days,then increase to one 0.5mg tab twice daily X4 days,then increase to one 1mg tab twice daily 1 Package 0    aspirin 81 MG Chew Take 1 tablet (81 mg total) by mouth once daily. 30 tablet 0    epinephrine (EPIPEN 2-JHONY) 0.3 mg/0.3 mL AtIn Inject 0.3 mLs (0.3 mg total) into the muscle once. 0.3 mL 12    LORazepam (ATIVAN) 0.5 MG tablet Take 1 tablet (0.5 mg total) by mouth every 12 (twelve) hours as needed for Anxiety. 14 tablet 0    propranolol (INDERAL LA) 80 MG 24 hr capsule Take 1 capsule (80 mg total) by mouth once daily. 30 capsule 11     No current facility-administered medications for this visit.        Medications Discontinued During This Encounter   Medication Reason     LORazepam (ATIVAN) 0.5 MG tablet Reorder    montelukast (SINGULAIR) 10 mg tablet Reorder       Follow-up in about 1 month (around 2/28/2019).      Dima Hre MD

## 2019-01-31 ENCOUNTER — HOSPITAL ENCOUNTER (OUTPATIENT)
Dept: RADIOLOGY | Facility: HOSPITAL | Age: 54
Discharge: HOME | End: 2019-01-31
Attending: FAMILY MEDICINE
Payer: COMMERCIAL

## 2019-01-31 ENCOUNTER — CLINICAL SUPPORT (OUTPATIENT)
Dept: CARDIOLOGY | Facility: CLINIC | Age: 54
End: 2019-01-31
Attending: FAMILY MEDICINE
Payer: COMMERCIAL

## 2019-01-31 ENCOUNTER — PATIENT MESSAGE (OUTPATIENT)
Dept: FAMILY MEDICINE | Facility: CLINIC | Age: 54
End: 2019-01-31

## 2019-01-31 DIAGNOSIS — R06.00 DYSPNEA, UNSPECIFIED TYPE: ICD-10-CM

## 2019-01-31 DIAGNOSIS — R07.89 CHEST PRESSURE: ICD-10-CM

## 2019-01-31 LAB — DIASTOLIC DYSFUNCTION: NO

## 2019-01-31 PROCEDURE — A9502 TC99M TETROFOSMIN: HCPCS

## 2019-01-31 PROCEDURE — 93015 CV STRESS TEST SUPVJ I&R: CPT | Mod: S$GLB,,, | Performed by: INTERNAL MEDICINE

## 2019-01-31 PROCEDURE — 93015 NM MULTI PHARM STRESS CARDIAC COMPONENT: ICD-10-PCS | Mod: S$GLB,,, | Performed by: INTERNAL MEDICINE

## 2019-01-31 PROCEDURE — 78452 NM MULTI PHARM STRESS CARDIAC COMPONENT: ICD-10-PCS | Mod: 26,,, | Performed by: INTERNAL MEDICINE

## 2019-01-31 PROCEDURE — 78452 HT MUSCLE IMAGE SPECT MULT: CPT | Mod: 26,,, | Performed by: INTERNAL MEDICINE

## 2019-02-01 ENCOUNTER — OFFICE VISIT (OUTPATIENT)
Dept: FAMILY MEDICINE | Facility: CLINIC | Age: 54
End: 2019-02-01
Payer: COMMERCIAL

## 2019-02-01 VITALS
HEIGHT: 65 IN | SYSTOLIC BLOOD PRESSURE: 122 MMHG | DIASTOLIC BLOOD PRESSURE: 76 MMHG | HEART RATE: 100 BPM | TEMPERATURE: 99 F | WEIGHT: 188.63 LBS | OXYGEN SATURATION: 98 % | BODY MASS INDEX: 31.43 KG/M2

## 2019-02-01 DIAGNOSIS — L02.91 ABSCESS: Primary | ICD-10-CM

## 2019-02-01 PROCEDURE — 99214 PR OFFICE/OUTPT VISIT, EST, LEVL IV, 30-39 MIN: ICD-10-PCS | Mod: S$GLB,,, | Performed by: FAMILY MEDICINE

## 2019-02-01 PROCEDURE — 99214 OFFICE O/P EST MOD 30 MIN: CPT | Mod: S$GLB,,, | Performed by: FAMILY MEDICINE

## 2019-02-01 PROCEDURE — 99999 PR PBB SHADOW E&M-EST. PATIENT-LVL III: CPT | Mod: PBBFAC,,, | Performed by: FAMILY MEDICINE

## 2019-02-01 PROCEDURE — 3008F PR BODY MASS INDEX (BMI) DOCUMENTED: ICD-10-PCS | Mod: CPTII,S$GLB,, | Performed by: FAMILY MEDICINE

## 2019-02-01 PROCEDURE — 99999 PR PBB SHADOW E&M-EST. PATIENT-LVL III: ICD-10-PCS | Mod: PBBFAC,,, | Performed by: FAMILY MEDICINE

## 2019-02-01 PROCEDURE — 3008F BODY MASS INDEX DOCD: CPT | Mod: CPTII,S$GLB,, | Performed by: FAMILY MEDICINE

## 2019-02-01 RX ORDER — CLINDAMYCIN HYDROCHLORIDE 300 MG/1
300 CAPSULE ORAL EVERY 6 HOURS
Qty: 28 CAPSULE | Refills: 0 | Status: SHIPPED | OUTPATIENT
Start: 2019-02-01 | End: 2019-02-08

## 2019-02-01 RX ORDER — LIDOCAINE HYDROCHLORIDE 10 MG/ML
5 INJECTION, SOLUTION EPIDURAL; INFILTRATION; INTRACAUDAL; PERINEURAL
Status: CANCELLED | OUTPATIENT
Start: 2019-02-01 | End: 2019-02-01

## 2019-02-01 NOTE — PROGRESS NOTES
Subjective:       Patient ID: Chasity Blake is a 53 y.o. female.    Chief Complaint: Abscess    Abscess to right anterior thigh x one day. Started last night as the size of a quarter erythema, tenderness and heat, and now it is spreading, with fluctuance.  She has had  I&D in the past, twice on the same area.    Past Medical History:   Diagnosis Date    ADD (attention deficit disorder with hyperactivity)     Anxiety     Asthma     mild intermittent    Depression      Family History   Problem Relation Age of Onset    Lung cancer Mother     Coronary artery disease Mother 50    Coronary artery disease Father 38    Breast cancer Neg Hx     Colon cancer Neg Hx     Ovarian cancer Neg Hx      Social History     Socioeconomic History    Marital status:      Spouse name: Not on file    Number of children: Not on file    Years of education: Not on file    Highest education level: Not on file   Social Needs    Financial resource strain: Not on file    Food insecurity - worry: Not on file    Food insecurity - inability: Not on file    Transportation needs - medical: Not on file    Transportation needs - non-medical: Not on file   Occupational History    Not on file   Tobacco Use    Smoking status: Current Every Day Smoker     Packs/day: 0.50     Years: 10.00     Pack years: 5.00     Types: Cigarettes    Smokeless tobacco: Never Used   Substance and Sexual Activity    Alcohol use: No     Alcohol/week: 0.0 oz    Drug use: No    Sexual activity: Yes     Partners: Male     Birth control/protection: None   Other Topics Concern    Not on file   Social History Narrative    Not on file     Outpatient Encounter Medications as of 2/1/2019   Medication Sig Dispense Refill    albuterol (ACCUNEB) 1.25 mg/3 mL Nebu USE ONE VIAL IN NEBULIZER EVERY 4 TO 6 HOURS AS NEEDED FOR WHEEZING & SHORTNESS OF BREATH **THANK YOU** 75 mL 6    albuterol (PROAIR HFA) 90 mcg/actuation inhaler INHALE TWO PUFFS INTO THE LUNGS  EVERY 6 HOURS AS NEEDED FOR WHEEZING **THANK YOU** 25.5 g 5    albuterol 90 mcg/actuation inhaler Inhale 2 puffs into the lungs every 4 (four) hours as needed for Wheezing. 18 g 2    albuterol-ipratropium (DUO-NEB) 2.5 mg-0.5 mg/3 mL nebulizer solution Take 3 mLs by nebulization every 6 (six) hours as needed for Wheezing. Rescue 1 Box 2    citalopram (CELEXA) 20 MG tablet TAKE ONE TABLET BY MOUTH ONCE DAILY **THANK YOU** 30 tablet 1    clindamycin (CLEOCIN T) 1 % external solution APPLY TO AFFECTED AREA TOPICALLY TWICE DAILY **THANK YOU** 30 mL 1    estradiol-levonorgestrel (CLIMARAPRO) 0.045-0.015 mg/24 hr Place 1 patch onto the skin once a week. 4 patch 11    fluticasone (FLONASE) 50 mcg/actuation nasal spray 2 sprays by Each Nare route once daily. 16 g 11    levocetirizine (XYZAL) 5 MG tablet TAKE ONE TABLET BY MOUTH EVERY EVENING **THANK YOU** 30 tablet 5    [START ON 2/20/2019] lisdexamfetamine (VYVANSE) 40 MG Cap Take 1 capsule (40 mg total) by mouth every morning. 30 capsule 0    lisdexamfetamine (VYVANSE) 40 MG Cap Take 1 capsule (40 mg total) by mouth every morning. 30 capsule 0    lisdexamfetamine (VYVANSE) 40 MG Cap Take 1 capsule (40 mg total) by mouth every morning. 30 capsule 0    LORazepam (ATIVAN) 0.5 MG tablet Take 1 tablet (0.5 mg total) by mouth every 12 (twelve) hours as needed for Anxiety. 14 tablet 0    montelukast (SINGULAIR) 10 mg tablet TAKE ONE TABLET BY MOUTH EVERY DAY **THANK YOU** 90 tablet 4    mupirocin (BACTROBAN) 2 % ointment Apply topically 3 (three) times daily. 1 Tube 1    omeprazole (PRILOSEC) 40 MG capsule TAKE ONE CAPSULE BY MOUTH ONCE DAILY **THANK YOU** 90 capsule 3    ondansetron (ZOFRAN-ODT) 4 MG TbDL Take 1 tablet (4 mg total) by mouth every 8 (eight) hours as needed. 14 tablet 1    propranolol (INDERAL LA) 80 MG 24 hr capsule Take 1 capsule (80 mg total) by mouth once daily. 30 capsule 11    tiZANidine (ZANAFLEX) 4 mg Cap Take 1 capsule by mouth 2 (two)  "times daily as needed. 60 capsule 0    varenicline (CHANTIX STARTING MONTH BOX) 0.5 mg (11)- 1 mg (42) tablet Take one 0.5mg tab by mouth once daily X3 days,then increase to one 0.5mg tab twice daily X4 days,then increase to one 1mg tab twice daily 1 Package 0    aspirin 81 MG Chew Take 1 tablet (81 mg total) by mouth once daily. 30 tablet 0    clindamycin (CLEOCIN) 300 MG capsule Take 1 capsule (300 mg total) by mouth every 6 (six) hours. for 7 days 28 capsule 0    epinephrine (EPIPEN 2-JHONY) 0.3 mg/0.3 mL AtIn Inject 0.3 mLs (0.3 mg total) into the muscle once. 0.3 mL 12     No facility-administered encounter medications on file as of 2/1/2019.        Review of Systems   Constitutional: Negative for chills and fever.   HENT: Negative for congestion and facial swelling.    Eyes: Negative for discharge and itching.   Respiratory: Negative for cough and wheezing.    Cardiovascular: Negative for chest pain and palpitations.   Gastrointestinal: Negative for abdominal pain, nausea and vomiting.   Endocrine: Negative for cold intolerance and heat intolerance.   Genitourinary: Negative for dysuria and flank pain.   Musculoskeletal: Negative for myalgias and neck stiffness.   Skin: Negative for pallor and wound.   Neurological: Negative for facial asymmetry and weakness.   Psychiatric/Behavioral: Negative for agitation and suicidal ideas.       Objective:      /76 (BP Location: Right arm, Patient Position: Sitting, BP Method: Large (Manual))   Pulse 100   Temp 99.1 °F (37.3 °C) (Oral)   Ht 5' 4.5" (1.638 m)   Wt 85.6 kg (188 lb 9.7 oz)   SpO2 98%   BMI 31.87 kg/m²   Physical Exam   Constitutional: She is oriented to person, place, and time. She appears well-developed. No distress.   HENT:   Head: Normocephalic and atraumatic.   Right Ear: External ear normal.   Left Ear: External ear normal.   Eyes: Conjunctivae and EOM are normal.   Neck: Neck supple. No thyromegaly present.   Cardiovascular: Normal rate " and regular rhythm.   Pulmonary/Chest: Effort normal. No respiratory distress.   Abdominal: Soft. She exhibits no distension.   Genitourinary:   Genitourinary Comments: deferred   Musculoskeletal: She exhibits no edema or deformity.        Legs:  3cm diameter area of erythema, fluctuance and heat.    Lymphadenopathy:        Head (right side): No submandibular adenopathy present.        Head (left side): No submandibular adenopathy present.     She has no cervical adenopathy.   Neurological: She is alert and oriented to person, place, and time.   Skin: Skin is warm and dry.   Psychiatric: She has a normal mood and affect. Her behavior is normal.   Nursing note and vitals reviewed.      Results for orders placed or performed in visit on 01/31/19   NM Multi Pharm Stress Cardiac Component   Result Value Ref Range    Diastolic Dysfunction No      Assessment:       1. Abscess        Plan:   Abscess: abscess examined   Has not come to a head yet.  Continue warm compress.  Eat yogurt while on abx.  RTC when ripe or worsening  -     clindamycin (CLEOCIN) 300 MG capsule; Take 1 capsule (300 mg total) by mouth every 6 (six) hours. for 7 days  Dispense: 28 capsule; Refill: 0      A total of 25 mins was spent in face to face time, of which over 50 % was spent in counseling patient on disease process, complications, treatment, and side effects of medications.

## 2019-02-02 NOTE — PATIENT INSTRUCTIONS
Abscess (Antibiotic Treatment Only)  An abscess (sometimes called a boil) happens when bacteria get trapped under the skin and start to grow. Pus forms inside the abscess as the body responds to the bacteria. An abscess can happen with an insect bite, ingrown hair, blocked oil gland, pimple, cyst, or puncture wound.  In the early stages, your wound may be red and tender. For this stage, you may get antibiotics. If the abscess does not get better with antibiotics, it will need to be drained with a small cut.  Home care  These tips will help you care for your abscess at home:  · Soak the wound in hot water or apply hot packs (small towel soaked in hot water) to the area for 20 minutes at a time. Do this 3 to 4 times a day.  · Do not cut, squeeze, or pop the boil yourself.  · Apply antibiotic cream or ointment to the skin 3 to 4 times a day, unless something else was prescribed. Some ointments include an antibiotic plus a pain reliever.  · If your doctor prescribed antibiotics, do not stop taking them until you have finished the medicine or the doctor tells you to stop.  · You may use an over-the-counter pain medicine to control pain, unless another pain medicine was prescribed. If you have chronic liver or kidney disease or ever had a stomach ulcer or gastrointestinal bleeding, talk with your doctor before using these any of these.  Follow-up care  Follow up with your healthcare provider, or as advised. Check your wound each day for the signs of worsening infection listed below.  When to seek medical advice  Get prompt medical attention if any of these occur:  · An increase in redness or swelling  · Red streaks in the skin leading away from the abscess  · An increase in local pain or swelling  · Fever of 100.4ºF (38ºC) or higher, or as directed by your healthcare provider  · Pus or fluid coming from the abscess  · Boil returns after getting better  Date Last Reviewed: 9/1/2016  © 5095-6274 [employer] [address] [place] [id number]. All rights reserved. This information is not intended as a substitute for professional medical care. Always follow your healthcare professional's instructions.

## 2019-02-04 ENCOUNTER — PATIENT MESSAGE (OUTPATIENT)
Dept: FAMILY MEDICINE | Facility: CLINIC | Age: 54
End: 2019-02-04

## 2019-02-05 ENCOUNTER — TELEPHONE (OUTPATIENT)
Dept: ENDOSCOPY | Facility: HOSPITAL | Age: 54
End: 2019-02-05

## 2019-02-06 ENCOUNTER — PATIENT MESSAGE (OUTPATIENT)
Dept: FAMILY MEDICINE | Facility: CLINIC | Age: 54
End: 2019-02-06

## 2019-02-22 ENCOUNTER — CLINICAL SUPPORT (OUTPATIENT)
Dept: CARDIOLOGY | Facility: CLINIC | Age: 54
End: 2019-02-22
Attending: FAMILY MEDICINE
Payer: COMMERCIAL

## 2019-02-22 DIAGNOSIS — R00.2 PALPITATIONS: ICD-10-CM

## 2019-02-22 PROCEDURE — 93224 HOLTER MONITOR - 48 HOUR: ICD-10-PCS | Mod: S$GLB,,, | Performed by: NUCLEAR MEDICINE

## 2019-02-22 PROCEDURE — 93224 XTRNL ECG REC UP TO 48 HRS: CPT | Mod: S$GLB,,, | Performed by: NUCLEAR MEDICINE

## 2019-02-27 ENCOUNTER — TELEPHONE (OUTPATIENT)
Dept: FAMILY MEDICINE | Facility: CLINIC | Age: 54
End: 2019-02-27

## 2019-02-27 DIAGNOSIS — Z12.39 BREAST CANCER SCREENING: Primary | ICD-10-CM

## 2019-02-27 DIAGNOSIS — I49.9 CARDIAC ARRHYTHMIA, UNSPECIFIED CARDIAC ARRHYTHMIA TYPE: Primary | ICD-10-CM

## 2019-02-27 NOTE — TELEPHONE ENCOUNTER
----- Message from Kate Barahona sent at 2/27/2019  2:57 PM CST -----  Contact: [place] request  Message     ----- Message from [facility], System Message sent at 2/25/2019  8:27 PM CST -----    Appointment Request From: Chasity Blake    With Provider: Mammogram    Preferred Date Range: 3/5/2019 - 3/8/2019    Preferred Times: Any time    Reason for visit: Mammogram    Comments:  Need to reschedule appointment

## 2019-02-27 NOTE — PROGRESS NOTES
Notified pt of abnormal results and notified her of the referral/appt to be scheduled. Pt verbalized understanding.

## 2019-02-28 ENCOUNTER — TELEPHONE (OUTPATIENT)
Dept: ELECTROPHYSIOLOGY | Facility: CLINIC | Age: 54
End: 2019-02-28

## 2019-02-28 DIAGNOSIS — R00.2 PALPITATIONS: Primary | ICD-10-CM

## 2019-03-08 ENCOUNTER — OFFICE VISIT (OUTPATIENT)
Dept: FAMILY MEDICINE | Facility: CLINIC | Age: 54
End: 2019-03-08
Payer: COMMERCIAL

## 2019-03-08 VITALS
TEMPERATURE: 99 F | DIASTOLIC BLOOD PRESSURE: 80 MMHG | OXYGEN SATURATION: 99 % | WEIGHT: 188.06 LBS | HEART RATE: 97 BPM | BODY MASS INDEX: 32.11 KG/M2 | HEIGHT: 64 IN | SYSTOLIC BLOOD PRESSURE: 112 MMHG

## 2019-03-08 DIAGNOSIS — Z28.21 INFLUENZA VACCINE REFUSED: ICD-10-CM

## 2019-03-08 DIAGNOSIS — L02.415 ABSCESS OF RIGHT LEG: Primary | ICD-10-CM

## 2019-03-08 DIAGNOSIS — J45.20 MILD INTERMITTENT ASTHMA WITHOUT COMPLICATION: ICD-10-CM

## 2019-03-08 DIAGNOSIS — J06.9 UPPER RESPIRATORY TRACT INFECTION, UNSPECIFIED TYPE: ICD-10-CM

## 2019-03-08 DIAGNOSIS — F17.210 LIGHT CIGARETTE SMOKER (1-9 CIGARETTES PER DAY): ICD-10-CM

## 2019-03-08 PROCEDURE — 3008F PR BODY MASS INDEX (BMI) DOCUMENTED: ICD-10-PCS | Mod: CPTII,S$GLB,, | Performed by: FAMILY MEDICINE

## 2019-03-08 PROCEDURE — 99214 PR OFFICE/OUTPT VISIT, EST, LEVL IV, 30-39 MIN: ICD-10-PCS | Mod: S$GLB,,, | Performed by: FAMILY MEDICINE

## 2019-03-08 PROCEDURE — 99999 PR PBB SHADOW E&M-EST. PATIENT-LVL III: ICD-10-PCS | Mod: PBBFAC,,, | Performed by: FAMILY MEDICINE

## 2019-03-08 PROCEDURE — 3008F BODY MASS INDEX DOCD: CPT | Mod: CPTII,S$GLB,, | Performed by: FAMILY MEDICINE

## 2019-03-08 PROCEDURE — 99214 OFFICE O/P EST MOD 30 MIN: CPT | Mod: S$GLB,,, | Performed by: FAMILY MEDICINE

## 2019-03-08 PROCEDURE — 99999 PR PBB SHADOW E&M-EST. PATIENT-LVL III: CPT | Mod: PBBFAC,,, | Performed by: FAMILY MEDICINE

## 2019-03-08 RX ORDER — ALBUTEROL SULFATE 90 UG/1
AEROSOL, METERED RESPIRATORY (INHALATION)
Qty: 25.5 G | Refills: 5 | Status: SHIPPED | OUTPATIENT
Start: 2019-03-08 | End: 2019-08-13 | Stop reason: SDUPTHER

## 2019-03-08 RX ORDER — SULFAMETHOXAZOLE AND TRIMETHOPRIM 800; 160 MG/1; MG/1
1 TABLET ORAL 2 TIMES DAILY
Qty: 14 TABLET | Refills: 0 | Status: CANCELLED | OUTPATIENT
Start: 2019-03-08 | End: 2019-03-15

## 2019-03-08 RX ORDER — PROMETHAZINE HYDROCHLORIDE AND DEXTROMETHORPHAN HYDROBROMIDE 6.25; 15 MG/5ML; MG/5ML
5 SYRUP ORAL NIGHTLY PRN
Qty: 180 ML | Refills: 2 | Status: SHIPPED | OUTPATIENT
Start: 2019-03-08 | End: 2019-03-18

## 2019-03-08 RX ORDER — SULFAMETHOXAZOLE AND TRIMETHOPRIM 800; 160 MG/1; MG/1
1 TABLET ORAL 2 TIMES DAILY
Qty: 14 TABLET | Refills: 0 | Status: SHIPPED | OUTPATIENT
Start: 2019-03-08 | End: 2019-03-15

## 2019-03-08 NOTE — PROGRESS NOTES
Subjective:       Patient ID: Chasity Blake is a 53 y.o. female.    Chief Complaint: Abscess and URI  Abscess posterior thigh x 2 days. Spontaneously opened and draining pus. She came in due to worsening local pain and tenderness. She has been doing warm soaks.     URI x 3 days. Associated with sinus congestion and headaches and malaise and cervical LAD. She has not tried any medication. Denies fever, muscle aches, joint aches and sick contacts.    She did not get the flu vaccine and does not want it.    Smoking currently: light smoker 1-7 stick per day.  She has been smoking for 5 years.    Mild intermittent asthma:  Controlled on albuterol/DuoNeb and singular.  Denies shortness of breath, chest tightness or any other symptoms of exacerbation.  Past Medical History:   Diagnosis Date    ADD (attention deficit disorder with hyperactivity)     Anxiety     Asthma     mild intermittent    Depression      Family History   Problem Relation Age of Onset    Lung cancer Mother     Coronary artery disease Mother 50    Coronary artery disease Father 38    Breast cancer Neg Hx     Colon cancer Neg Hx     Ovarian cancer Neg Hx      Social History     Socioeconomic History    Marital status:      Spouse name: Not on file    Number of children: Not on file    Years of education: Not on file    Highest education level: Not on file   Social Needs    Financial resource strain: Not on file    Food insecurity - worry: Not on file    Food insecurity - inability: Not on file    Transportation needs - medical: Not on file    Transportation needs - non-medical: Not on file   Occupational History    Not on file   Tobacco Use    Smoking status: Current Every Day Smoker     Packs/day: 0.50     Years: 10.00     Pack years: 5.00     Types: Cigarettes    Smokeless tobacco: Never Used   Substance and Sexual Activity    Alcohol use: No     Alcohol/week: 0.0 oz    Drug use: No    Sexual activity: Yes     Partners:  Male     Birth control/protection: None   Other Topics Concern    Not on file   Social History Narrative    Not on file     Outpatient Encounter Medications as of 3/8/2019   Medication Sig Dispense Refill    albuterol (ACCUNEB) 1.25 mg/3 mL Nebu USE ONE VIAL IN NEBULIZER EVERY 4 TO 6 HOURS AS NEEDED FOR WHEEZING & SHORTNESS OF BREATH **THANK YOU** 75 mL 6    albuterol (PROAIR HFA) 90 mcg/actuation inhaler INHALE TWO PUFFS INTO THE LUNGS EVERY 6 HOURS AS NEEDED FOR WHEEZING **THANK YOU** 25.5 g 5    albuterol 90 mcg/actuation inhaler Inhale 2 puffs into the lungs every 4 (four) hours as needed for Wheezing. 18 g 2    albuterol-ipratropium (DUO-NEB) 2.5 mg-0.5 mg/3 mL nebulizer solution Take 3 mLs by nebulization every 6 (six) hours as needed for Wheezing. Rescue 1 Box 2    citalopram (CELEXA) 20 MG tablet TAKE ONE TABLET BY MOUTH ONCE DAILY **THANK YOU** 30 tablet 1    clindamycin (CLEOCIN T) 1 % external solution APPLY TO AFFECTED AREA TOPICALLY TWICE DAILY **THANK YOU** 30 mL 1    estradiol-levonorgestrel (CLIMARAPRO) 0.045-0.015 mg/24 hr Place 1 patch onto the skin once a week. 4 patch 11    fluticasone (FLONASE) 50 mcg/actuation nasal spray 2 sprays by Each Nare route once daily. 16 g 11    levocetirizine (XYZAL) 5 MG tablet TAKE ONE TABLET BY MOUTH EVERY EVENING **THANK YOU** 30 tablet 5    lisdexamfetamine (VYVANSE) 40 MG Cap Take 1 capsule (40 mg total) by mouth every morning. 30 capsule 0    lisdexamfetamine (VYVANSE) 40 MG Cap Take 1 capsule (40 mg total) by mouth every morning. 30 capsule 0    lisdexamfetamine (VYVANSE) 40 MG Cap Take 1 capsule (40 mg total) by mouth every morning. 30 capsule 0    montelukast (SINGULAIR) 10 mg tablet TAKE ONE TABLET BY MOUTH EVERY DAY **THANK YOU** 90 tablet 4    mupirocin (BACTROBAN) 2 % ointment Apply topically 3 (three) times daily. 1 Tube 1    omeprazole (PRILOSEC) 40 MG capsule TAKE ONE CAPSULE BY MOUTH ONCE DAILY **THANK YOU** 90 capsule 3     ondansetron (ZOFRAN-ODT) 4 MG TbDL Take 1 tablet (4 mg total) by mouth every 8 (eight) hours as needed. 14 tablet 1    propranolol (INDERAL LA) 80 MG 24 hr capsule Take 1 capsule (80 mg total) by mouth once daily. 30 capsule 11    tiZANidine (ZANAFLEX) 4 mg Cap Take 1 capsule by mouth 2 (two) times daily as needed. 60 capsule 0    [DISCONTINUED] albuterol (PROAIR HFA) 90 mcg/actuation inhaler INHALE TWO PUFFS INTO THE LUNGS EVERY 6 HOURS AS NEEDED FOR WHEEZING **THANK YOU** 25.5 g 5    aspirin 81 MG Chew Take 1 tablet (81 mg total) by mouth once daily. 30 tablet 0    epinephrine (EPIPEN 2-JHONY) 0.3 mg/0.3 mL AtIn Inject 0.3 mLs (0.3 mg total) into the muscle once. 0.3 mL 12    LORazepam (ATIVAN) 0.5 MG tablet Take 1 tablet (0.5 mg total) by mouth every 12 (twelve) hours as needed for Anxiety. 14 tablet 0    promethazine-dextromethorphan (PROMETHAZINE-DM) 6.25-15 mg/5 mL Syrp Take 5 mLs by mouth nightly as needed. 180 mL 2    sulfamethoxazole-trimethoprim 800-160mg (BACTRIM DS) 800-160 mg Tab Take 1 tablet by mouth 2 (two) times daily. for 7 days 14 tablet 0    [DISCONTINUED] varenicline (CHANTIX STARTING MONTH BOX) 0.5 mg (11)- 1 mg (42) tablet Take one 0.5mg tab by mouth once daily X3 days,then increase to one 0.5mg tab twice daily X4 days,then increase to one 1mg tab twice daily 1 Package 0     No facility-administered encounter medications on file as of 3/8/2019.        Review of Systems   Constitutional: Negative for chills and fever.   HENT: Positive for congestion. Negative for facial swelling.    Eyes: Negative for discharge and itching.   Respiratory: Positive for cough. Negative for apnea, chest tightness, shortness of breath and wheezing.    Cardiovascular: Negative for chest pain and palpitations.   Gastrointestinal: Negative for abdominal pain, nausea and vomiting.   Endocrine: Negative for cold intolerance and heat intolerance.   Genitourinary: Negative for dysuria and flank pain.  "  Musculoskeletal: Negative for myalgias and neck stiffness.   Skin: Positive for wound. Negative for pallor.        boil   Neurological: Negative for facial asymmetry and weakness.   Psychiatric/Behavioral: Negative for agitation and suicidal ideas.       Objective:      /80 (BP Location: Right arm, Patient Position: Sitting, BP Method: Medium (Manual))   Pulse 97   Temp 99.4 °F (37.4 °C) (Tympanic)   Ht 5' 4" (1.626 m)   Wt 85.3 kg (188 lb 0.8 oz)   SpO2 99%   BMI 32.28 kg/m²   Physical Exam   Constitutional: She is oriented to person, place, and time. She appears well-developed. No distress.   HENT:   Head: Normocephalic and atraumatic.   Right Ear: Tympanic membrane and external ear normal.   Left Ear: Tympanic membrane and external ear normal.   Nose: Mucosal edema present. Right sinus exhibits no maxillary sinus tenderness and no frontal sinus tenderness. Left sinus exhibits no maxillary sinus tenderness and no frontal sinus tenderness.   Eyes: Conjunctivae and EOM are normal.   Neck: Neck supple. No thyromegaly present.   Cardiovascular: Normal rate and regular rhythm.   Pulmonary/Chest: Effort normal and breath sounds normal. No respiratory distress.   Abdominal: Soft. She exhibits no distension.   Genitourinary:   Genitourinary Comments: deferred   Musculoskeletal: She exhibits no edema or deformity.   Lymphadenopathy:        Head (right side): Submandibular adenopathy present.        Head (left side): Submandibular adenopathy present.     She has no cervical adenopathy.   Neurological: She is alert and oriented to person, place, and time.   Skin: Skin is warm and dry.        Posterior right upper thigh:  2 cm x 2 cm area of erythema and fluctuance, open at the center with minimal serosanguineous drainage noted on the Band-Aid.   Psychiatric: She has a normal mood and affect. Her behavior is normal.   Nursing note and vitals reviewed.      Results for orders placed or performed in visit on " 01/31/19   NM Multi Pharm Stress Cardiac Component   Result Value Ref Range    Diastolic Dysfunction No      Assessment:       1. Abscess of right leg    2. Upper respiratory tract infection, unspecified type    3. Light cigarette smoker (1-9 cigarettes per day)    4. Mild intermittent asthma without complication    5. Influenza vaccine refused        Plan:   Abscess of right leg  -     sulfamethoxazole-trimethoprim 800-160mg (BACTRIM DS) 800-160 mg Tab; Take 1 tablet by mouth 2 (two) times daily. for 7 days  Dispense: 14 tablet; Refill: 0    Upper respiratory tract infection, unspecified type  -      -      -     promethazine-dextromethorphan (PROMETHAZINE-DM) 6.25-15 mg/5 mL Syrp; Take 5 mLs by mouth nightly as needed.  Dispense: 180 mL; Refill: 2    Mild intermittent asthma without complication:  Chronic stable controlled    Like s albuterol (PROAIR HFA) 90 mcg/actuation inhaler; INHALE TWO PUFFS INTO THE LUNGS EVERY 6 HOURS AS NEEDED FOR WHEEZING **THANK YOU**  Dispense: 25.5 g; Refill: 5    moker:  The patient has been counseled to quit smoking assistant worsening upper respiratory infection and could trigger asthma exacerbation.  Total time spent in face to face counseling and coordination of care - 25 minutes over 50% of time was used in discussion of prognosis, risks, benefits of treatment, instructions and compliance with regimen .

## 2019-03-08 NOTE — PATIENT INSTRUCTIONS
Viral Upper Respiratory Illness (Adult)  You have a viral upper respiratory illness (URI), which is another term for the common cold. This illness is contagious during the first few days. It is spread through the air by coughing and sneezing. It may also be spread by direct contact (touching the sick person and then touching your own eyes, nose, or mouth). Frequent handwashing will decrease risk of spread. Most viral illnesses go away within 7 to 10 days with rest and simple home remedies. Sometimes the illness may last for several weeks. Antibiotics will not kill a virus, and they are generally not prescribed for this condition.    Home care  · If symptoms are severe, rest at home for the first 2 to 3 days. When you resume activity, don't let yourself get too tired.  · Avoid being exposed to cigarette smoke (yours or others).  · You may use acetaminophen or ibuprofen to control pain and fever, unless another medicine was prescribed. (Note: If you have chronic liver or kidney disease, have ever had a stomach ulcer or gastrointestinal bleeding, or are taking blood-thinning medicines, talk with your healthcare provider before using these medicines.) Aspirin should never be given to anyone under 18 years of age who is ill with a viral infection or fever. It may cause severe liver or brain damage.  · Your appetite may be poor, so a light diet is fine. Avoid dehydration by drinking 6 to 8 glasses of fluids per day (water, soft drinks, juices, tea, or soup). Extra fluids will help loosen secretions in the nose and lungs.  · Over-the-counter cold medicines will not shorten the length of time youre sick, but they may be helpful for the following symptoms: cough, sore throat, and nasal and sinus congestion. (Note: Do not use decongestants if you have high blood pressure.)  Follow-up care  Follow up with your healthcare provider, or as advised.  When to seek medical advice  Call your healthcare provider right away if any  of these occur:  · Cough with lots of colored sputum (mucus)  · Severe headache; face, neck, or ear pain  · Difficulty swallowing due to throat pain  · Fever of 100.4°F (38°C)  Call 911, or get immediate medical care  Call emergency services right away if any of these occur:  · Chest pain, shortness of breath, wheezing, or difficulty breathing  · Coughing up blood  · Inability to swallow due to throat pain  Date Last Reviewed: 9/13/2015 © 2000-2017 [employer]. [address] [place] [id number]. All rights reserved. This information is not intended as a substitute for professional medical care. Always follow your healthcare professional's instructions.        Abscess (Antibiotic Treatment Only)  An abscess (sometimes called a boil) happens when bacteria get trapped under the skin and start to grow. Pus forms inside the abscess as the body responds to the bacteria. An abscess can happen with an insect bite, ingrown hair, blocked oil gland, pimple, cyst, or puncture wound.  In the early stages, your wound may be red and tender. For this stage, you may get antibiotics. If the abscess does not get better with antibiotics, it will need to be drained with a small cut.  Home care  These tips will help you care for your abscess at home:  · Soak the wound in hot water or apply hot packs (small towel soaked in hot water) to the area for 20 minutes at a time. Do this 3 to 4 times a day.  · Do not cut, squeeze, or pop the boil yourself.  · Apply antibiotic cream or ointment to the skin 3 to 4 times a day, unless something else was prescribed. Some ointments include an antibiotic plus a pain reliever.  · If your doctor prescribed antibiotics, do not stop taking them until you have finished the medicine or the doctor tells you to stop.  · You may use an over-the-counter pain medicine to control pain, unless another pain medicine was prescribed. If you have chronic liver or kidney disease or ever had a  stomach ulcer or gastrointestinal bleeding, talk with your doctor before using these any of these.  Follow-up care  Follow up with your healthcare provider, or as advised. Check your wound each day for the signs of worsening infection listed below.  When to seek medical advice  Get prompt medical attention if any of these occur:  · An increase in redness or swelling  · Red streaks in the skin leading away from the abscess  · An increase in local pain or swelling  · Fever of 100.4ºF (38ºC) or higher, or as directed by your healthcare provider  · Pus or fluid coming from the abscess  · Boil returns after getting better  Date Last Reviewed: 9/1/2016  © 9443-9596 [employer]. [address] [place] [address], [place] [address]. All rights reserved. This information is not intended as a substitute for professional medical care. Always follow your healthcare professional's instructions.

## 2019-03-12 ENCOUNTER — INITIAL CONSULT (OUTPATIENT)
Dept: ELECTROPHYSIOLOGY | Facility: CLINIC | Age: 54
End: 2019-03-12
Payer: MEDICARE

## 2019-03-12 ENCOUNTER — HOSPITAL ENCOUNTER (OUTPATIENT)
Dept: CARDIOLOGY | Facility: CLINIC | Age: 54
Discharge: HOME | End: 2019-03-12
Payer: COMMERCIAL

## 2019-03-12 VITALS
HEIGHT: 65 IN | BODY MASS INDEX: 31.33 KG/M2 | HEART RATE: 68 BPM | DIASTOLIC BLOOD PRESSURE: 79 MMHG | SYSTOLIC BLOOD PRESSURE: 159 MMHG | WEIGHT: 188.06 LBS

## 2019-03-12 DIAGNOSIS — R00.2 PALPITATIONS: ICD-10-CM

## 2019-03-12 DIAGNOSIS — I49.3 PVCS (PREMATURE VENTRICULAR CONTRACTIONS): ICD-10-CM

## 2019-03-12 PROCEDURE — 99999 PR PBB SHADOW E&M-EST. PATIENT-LVL III: ICD-10-PCS | Mod: PBBFAC,,, | Performed by: INTERNAL MEDICINE

## 2019-03-12 PROCEDURE — 93000 ELECTROCARDIOGRAM COMPLETE: CPT | Mod: S$GLB,,, | Performed by: INTERNAL MEDICINE

## 2019-03-12 PROCEDURE — 93000 RHYTHM STRIP: ICD-10-PCS | Mod: S$GLB,,, | Performed by: INTERNAL MEDICINE

## 2019-03-12 PROCEDURE — 99204 OFFICE O/P NEW MOD 45 MIN: CPT | Mod: S$GLB,,, | Performed by: INTERNAL MEDICINE

## 2019-03-12 PROCEDURE — 99499 UNLISTED E&M SERVICE: CPT | Mod: S$GLB,,, | Performed by: INTERNAL MEDICINE

## 2019-03-12 PROCEDURE — 99499 RISK ADDL DX/OHS AUDIT: ICD-10-PCS | Mod: S$GLB,,, | Performed by: INTERNAL MEDICINE

## 2019-03-12 PROCEDURE — 99999 PR PBB SHADOW E&M-EST. PATIENT-LVL III: CPT | Mod: PBBFAC,,, | Performed by: INTERNAL MEDICINE

## 2019-03-12 PROCEDURE — 99204 PR OFFICE/OUTPT VISIT, NEW, LEVL IV, 45-59 MIN: ICD-10-PCS | Mod: S$GLB,,, | Performed by: INTERNAL MEDICINE

## 2019-03-12 NOTE — LETTER
March 12, 2019      Dima Her MD  [id number] [address] [address]           [facility]  [address]  [facility] [address]  Phone: [phone]  Fax: [phone]          Patient: Chasity Blake   MR Number: [id number]   YOB: 1965   Date of Visit: 3/12/2019       Dear Dr. Dima Her:    Thank you for referring Chasity Blake to me for evaluation. Attached you will find relevant portions of my assessment and plan of care.    If you have questions, please do not hesitate to call me. I look forward to following Chasity Blake along with you.    Sincerely,    Miguel Medina MD    Enclosure  CC:  No Recipients    If you would like to receive this communication electronically, please contact [email or web address] or [phone] to request more information on [employer] Link access.    For providers and/or their staff who would like to refer a patient to [facility], please contact us through our one-stop-shop provider referral line, [facility], at [phone].    If you feel you have received this communication in error or would no longer like to receive these types of communications, please e-mail [email or web address]

## 2019-03-25 RX ORDER — LISDEXAMFETAMINE DIMESYLATE 40 MG/1
CAPSULE ORAL
Qty: 30 CAPSULE | Refills: 0 | Status: SHIPPED | OUTPATIENT
Start: 2019-03-25 | End: 2019-07-11 | Stop reason: SDUPTHER

## 2019-03-26 ENCOUNTER — OFFICE VISIT (OUTPATIENT)
Dept: FAMILY MEDICINE | Facility: CLINIC | Age: 54
End: 2019-03-26
Payer: MEDICARE

## 2019-03-26 VITALS
DIASTOLIC BLOOD PRESSURE: 88 MMHG | OXYGEN SATURATION: 99 % | BODY MASS INDEX: 31.82 KG/M2 | TEMPERATURE: 99 F | HEART RATE: 69 BPM | WEIGHT: 190.06 LBS | SYSTOLIC BLOOD PRESSURE: 130 MMHG

## 2019-03-26 DIAGNOSIS — F41.1 GAD (GENERALIZED ANXIETY DISORDER): ICD-10-CM

## 2019-03-26 DIAGNOSIS — J43.2 CENTRILOBULAR EMPHYSEMA: ICD-10-CM

## 2019-03-26 DIAGNOSIS — L02.821 BOIL OF SCALP: ICD-10-CM

## 2019-03-26 DIAGNOSIS — F90.2 ADHD (ATTENTION DEFICIT HYPERACTIVITY DISORDER), COMBINED TYPE: Primary | ICD-10-CM

## 2019-03-26 DIAGNOSIS — F17.200 CURRENT SMOKER: ICD-10-CM

## 2019-03-26 DIAGNOSIS — I10 HTN, GOAL BELOW 130/80: ICD-10-CM

## 2019-03-26 PROCEDURE — 3075F PR MOST RECENT SYSTOLIC BLOOD PRESS GE 130-139MM HG: ICD-10-PCS | Mod: CPTII,S$GLB,, | Performed by: FAMILY MEDICINE

## 2019-03-26 PROCEDURE — 3008F BODY MASS INDEX DOCD: CPT | Mod: CPTII,S$GLB,, | Performed by: FAMILY MEDICINE

## 2019-03-26 PROCEDURE — 99999 PR PBB SHADOW E&M-EST. PATIENT-LVL III: CPT | Mod: PBBFAC,,, | Performed by: FAMILY MEDICINE

## 2019-03-26 PROCEDURE — 99499 UNLISTED E&M SERVICE: CPT | Mod: S$GLB,,, | Performed by: FAMILY MEDICINE

## 2019-03-26 PROCEDURE — 99214 OFFICE O/P EST MOD 30 MIN: CPT | Mod: S$GLB,,, | Performed by: FAMILY MEDICINE

## 2019-03-26 PROCEDURE — 3079F DIAST BP 80-89 MM HG: CPT | Mod: CPTII,S$GLB,, | Performed by: FAMILY MEDICINE

## 2019-03-26 PROCEDURE — 3008F PR BODY MASS INDEX (BMI) DOCUMENTED: ICD-10-PCS | Mod: CPTII,S$GLB,, | Performed by: FAMILY MEDICINE

## 2019-03-26 PROCEDURE — 99499 RISK ADDL DX/OHS AUDIT: ICD-10-PCS | Mod: S$GLB,,, | Performed by: FAMILY MEDICINE

## 2019-03-26 PROCEDURE — 3079F PR MOST RECENT DIASTOLIC BLOOD PRESSURE 80-89 MM HG: ICD-10-PCS | Mod: CPTII,S$GLB,, | Performed by: FAMILY MEDICINE

## 2019-03-26 PROCEDURE — 99999 PR PBB SHADOW E&M-EST. PATIENT-LVL III: ICD-10-PCS | Mod: PBBFAC,,, | Performed by: FAMILY MEDICINE

## 2019-03-26 PROCEDURE — 99214 PR OFFICE/OUTPT VISIT, EST, LEVL IV, 30-39 MIN: ICD-10-PCS | Mod: S$GLB,,, | Performed by: FAMILY MEDICINE

## 2019-03-26 PROCEDURE — 3075F SYST BP GE 130 - 139MM HG: CPT | Mod: CPTII,S$GLB,, | Performed by: FAMILY MEDICINE

## 2019-03-26 RX ORDER — CITALOPRAM 20 MG/1
TABLET, FILM COATED ORAL
Qty: 30 TABLET | Refills: 3 | Status: SHIPPED | OUTPATIENT
Start: 2019-03-26 | End: 2019-08-13 | Stop reason: SDUPTHER

## 2019-03-26 RX ORDER — LISDEXAMFETAMINE DIMESYLATE 40 MG/1
40 CAPSULE ORAL EVERY MORNING
Qty: 30 CAPSULE | Refills: 0 | Status: SHIPPED | OUTPATIENT
Start: 2019-05-26 | End: 2019-06-25

## 2019-03-26 RX ORDER — PROPRANOLOL HYDROCHLORIDE 80 MG/1
80 CAPSULE, EXTENDED RELEASE ORAL DAILY
Qty: 30 CAPSULE | Refills: 6 | Status: SHIPPED | OUTPATIENT
Start: 2019-03-26 | End: 2019-08-13 | Stop reason: SDUPTHER

## 2019-03-26 RX ORDER — LISDEXAMFETAMINE DIMESYLATE 40 MG/1
40 CAPSULE ORAL EVERY MORNING
Qty: 30 CAPSULE | Refills: 0 | Status: SHIPPED | OUTPATIENT
Start: 2019-04-26 | End: 2019-05-26

## 2019-03-26 RX ORDER — LISDEXAMFETAMINE DIMESYLATE 40 MG/1
CAPSULE ORAL
Qty: 30 CAPSULE | Refills: 1 | Status: CANCELLED | OUTPATIENT
Start: 2019-03-26

## 2019-03-26 RX ORDER — MUPIROCIN 20 MG/G
OINTMENT TOPICAL 3 TIMES DAILY
Qty: 1 TUBE | Refills: 1 | Status: SHIPPED | OUTPATIENT
Start: 2019-03-26 | End: 2019-04-23 | Stop reason: SDUPTHER

## 2019-03-26 RX ORDER — CEPHALEXIN 500 MG/1
500 CAPSULE ORAL EVERY 8 HOURS
Qty: 21 CAPSULE | Refills: 0 | Status: SHIPPED | OUTPATIENT
Start: 2019-03-26 | End: 2019-04-02

## 2019-03-26 NOTE — PROGRESS NOTES
Subjective:       Patient ID: Chasity Blake is a 53 y.o. female.    Chief Complaint: FU on ADHD, anxiety, boil on the scalp    ADHD: chronic and controlled on vyvanse 40 mg daily, denies any side effect, needs refill for the next 2 months  HTN: Chronic controlled on inderal 80 mg daily, will need a refill  Anxiety:  Chronic stable on Celexa 20 mg p.o. daily will need a refill.  Boil on the scalp: Associated with tenderness and ache. This is a recurrent problem but occurs on other parts of the body. She worked on it by herself, now open, Denies fever.      Past Medical History:   Diagnosis Date    ADD (attention deficit disorder with hyperactivity)     Anxiety     Asthma     mild intermittent    Depression      Family History   Problem Relation Age of Onset    Lung cancer Mother     Coronary artery disease Mother 50    Coronary artery disease Father 38    Breast cancer Neg Hx     Colon cancer Neg Hx     Ovarian cancer Neg Hx      Social History     Socioeconomic History    Marital status:      Spouse name: Not on file    Number of children: Not on file    Years of education: Not on file    Highest education level: Not on file   Occupational History    Not on file   Social Needs    Financial resource strain: Not very hard    Food insecurity:     Worry: Never true     Inability: Never true    Transportation needs:     Medical: No     Non-medical: No   Tobacco Use    Smoking status: Current Every Day Smoker     Packs/day: 0.50     Years: 10.00     Pack years: 5.00     Types: Cigarettes    Smokeless tobacco: Never Used   Substance and Sexual Activity    Alcohol use: No     Alcohol/week: 0.0 oz     Frequency: Never     Drinks per session: Patient refused     Binge frequency: Never    Drug use: No    Sexual activity: Yes     Partners: Male     Birth control/protection: None   Lifestyle    Physical activity:     Days per week: 4 days     Minutes per session: 30 min    Stress: Rather much    Relationships    Social connections:     Talks on phone: More than three times a week     Gets together: Three times a week     Attends [employer] service: Not on file     Active member of club or organization: Yes     Attends meetings of clubs or organizations: 1 to 4 times per year     Relationship status:     Intimate partner violence:     Fear of current or ex partner: Not on file     Emotionally abused: Not on file     Physically abused: Not on file     Forced sexual activity: Not on file   Other Topics Concern    Not on file   Social History Narrative    Not on file     Outpatient Encounter Medications as of 3/26/2019   Medication Sig Dispense Refill    albuterol (ACCUNEB) 1.25 mg/3 mL Nebu USE ONE VIAL IN NEBULIZER EVERY 4 TO 6 HOURS AS NEEDED FOR WHEEZING & SHORTNESS OF BREATH **THANK YOU** 75 mL 6    albuterol (PROAIR HFA) 90 mcg/actuation inhaler INHALE TWO PUFFS INTO THE LUNGS EVERY 6 HOURS AS NEEDED FOR WHEEZING **THANK YOU** 25.5 g 5    albuterol 90 mcg/actuation inhaler Inhale 2 puffs into the lungs every 4 (four) hours as needed for Wheezing. 18 g 2    albuterol-ipratropium (DUO-NEB) 2.5 mg-0.5 mg/3 mL nebulizer solution Take 3 mLs by nebulization every 6 (six) hours as needed for Wheezing. Rescue 1 Box 2    citalopram (CELEXA) 20 MG tablet TAKE ONE TABLET BY MOUTH ONCE DAILY **THANK YOU** 30 tablet 3    clindamycin (CLEOCIN T) 1 % external solution APPLY TO AFFECTED AREA TOPICALLY TWICE DAILY **THANK YOU** 30 mL 1    estradiol-levonorgestrel (CLIMARAPRO) 0.045-0.015 mg/24 hr Place 1 patch onto the skin once a week. 4 patch 11    fluticasone (FLONASE) 50 mcg/actuation nasal spray 2 sprays by Each Nare route once daily. 16 g 11    levocetirizine (XYZAL) 5 MG tablet TAKE ONE TABLET BY MOUTH EVERY EVENING **THANK YOU** 30 tablet 5    [START ON 5/26/2019] lisdexamfetamine (VYVANSE) 40 MG Cap Take 1 capsule (40 mg total) by mouth every morning. 30 capsule 0    [START ON 4/26/2019]  lisdexamfetamine (VYVANSE) 40 MG Cap Take 1 capsule (40 mg total) by mouth every morning. 30 capsule 0    montelukast (SINGULAIR) 10 mg tablet TAKE ONE TABLET BY MOUTH EVERY DAY **THANK YOU** 90 tablet 4    mupirocin (BACTROBAN) 2 % ointment Apply topically 3 (three) times daily. 1 Tube 1    omeprazole (PRILOSEC) 40 MG capsule TAKE ONE CAPSULE BY MOUTH ONCE DAILY **THANK YOU** 90 capsule 3    ondansetron (ZOFRAN-ODT) 4 MG TbDL Take 1 tablet (4 mg total) by mouth every 8 (eight) hours as needed. 14 tablet 1    propranolol (INDERAL LA) 80 MG 24 hr capsule Take 1 capsule (80 mg total) by mouth once daily. 30 capsule 6    tiZANidine (ZANAFLEX) 4 mg Cap Take 1 capsule by mouth 2 (two) times daily as needed. 60 capsule 0    VYVANSE 40 mg Cap TAKE ONE CAPSULE BY MOUTH EVERY MORNING **THANK YOU** 30 capsule 0    [DISCONTINUED] citalopram (CELEXA) 20 MG tablet TAKE ONE TABLET BY MOUTH ONCE DAILY **THANK YOU** 30 tablet 1    [DISCONTINUED] lisdexamfetamine (VYVANSE) 40 MG Cap Take 1 capsule (40 mg total) by mouth every morning. 30 capsule 0    [DISCONTINUED] lisdexamfetamine (VYVANSE) 40 MG Cap Take 1 capsule (40 mg total) by mouth every morning. 30 capsule 0    [DISCONTINUED] mupirocin (BACTROBAN) 2 % ointment Apply topically 3 (three) times daily. 1 Tube 1    [DISCONTINUED] propranolol (INDERAL LA) 80 MG 24 hr capsule Take 1 capsule (80 mg total) by mouth once daily. 30 capsule 11    aspirin 81 MG Chew Take 1 tablet (81 mg total) by mouth once daily. 30 tablet 0    cephALEXin (KEFLEX) 500 MG capsule Take 1 capsule (500 mg total) by mouth every 8 (eight) hours. for 7 days 21 capsule 0    epinephrine (EPIPEN 2-JHONY) 0.3 mg/0.3 mL AtIn Inject 0.3 mLs (0.3 mg total) into the muscle once. 0.3 mL 12    LORazepam (ATIVAN) 0.5 MG tablet Take 1 tablet (0.5 mg total) by mouth every 12 (twelve) hours as needed for Anxiety. 14 tablet 0     No facility-administered encounter medications on file as of 3/26/2019.         Review of Systems   Constitutional: Negative for chills and fever.   HENT: Negative for congestion and facial swelling.    Eyes: Negative for discharge and itching.   Respiratory: Negative for cough and wheezing.    Cardiovascular: Negative for chest pain and palpitations.   Gastrointestinal: Negative for abdominal pain, nausea and vomiting.   Endocrine: Negative for cold intolerance and heat intolerance.   Genitourinary: Negative for dysuria and flank pain.   Musculoskeletal: Negative for myalgias and neck stiffness.   Skin: Positive for wound. Negative for pallor.        Boil to head   Neurological: Negative for facial asymmetry and weakness.   Psychiatric/Behavioral: Negative for agitation and suicidal ideas.       Objective:      /88 (BP Location: Right arm, Patient Position: Sitting, BP Method: Large (Manual))   Pulse 69   Temp 98.8 °F (37.1 °C) (Oral)   Wt 86.2 kg (190 lb 0.6 oz)   SpO2 99%   BMI 31.82 kg/m²   Physical Exam   Constitutional: She is oriented to person, place, and time. She appears well-developed. No distress.   HENT:   Head: Normocephalic and atraumatic.       Right Ear: External ear normal.   Left Ear: External ear normal.   1.5cm area of fluctuance, open, wound bed with yellow debris.   Eyes: Conjunctivae and EOM are normal.   Neck: Neck supple. No thyromegaly present.   Cardiovascular: Normal rate and regular rhythm.   Pulmonary/Chest: Effort normal. No respiratory distress.   Abdominal: Soft. She exhibits no distension.   Genitourinary:   Genitourinary Comments: deferred   Musculoskeletal: She exhibits no edema or deformity.   Lymphadenopathy:        Head (right side): No submandibular adenopathy present.        Head (left side): No submandibular adenopathy present.     She has cervical adenopathy.        Right cervical: No posterior cervical adenopathy present.       Left cervical: Posterior cervical adenopathy present.   Neurological: She is alert and oriented to person,  place, and time.   Skin: Skin is warm and dry.   Psychiatric: She has a normal mood and affect. Her behavior is normal.   Nursing note and vitals reviewed.      Results for orders placed or performed in visit on 01/31/19   [facility] Pharm Stress Cardiac Component   Result Value Ref Range    Diastolic Dysfunction No      Assessment:       1. ADHD (attention deficit hyperactivity disorder), combined type    2. STAR (generalized anxiety disorder)    3. HTN, goal below 130/80    4. Centrilobular emphysema    5. Boil of scalp        Plan:   ADHD (attention deficit hyperactivity disorder), combined type    STAR (generalized anxiety disorder)    HTN, goal below 130/80    Centrilobular emphysema    Boil of scalp  -     cephALEXin (KEFLEX) 500 MG capsule; Take 1 capsule (500 mg total) by mouth every 8 (eight) hours. for 7 days  Dispense: 21 capsule; Refill: 0    Other orders  -     citalopram (CELEXA) 20 MG tablet; TAKE ONE TABLET BY MOUTH ONCE DAILY **THANK YOU**  Dispense: 30 tablet; Refill: 3  -     propranolol (INDERAL LA) 80 MG 24 hr capsule; Take 1 capsule (80 mg total) by mouth once daily.  Dispense: 30 capsule; Refill: 6  -     lisdexamfetamine (VYVANSE) 40 MG Cap; Take 1 capsule (40 mg total) by mouth every morning.  Dispense: 30 capsule; Refill: 0  -     lisdexamfetamine (VYVANSE) 40 MG Cap; Take 1 capsule (40 mg total) by mouth every morning.  Dispense: 30 capsule; Refill: 0  -     mupirocin (BACTROBAN) 2 % ointment; Apply topically 3 (three) times daily.  Dispense: 1 Tube; Refill: 1

## 2019-03-26 NOTE — PATIENT INSTRUCTIONS
Understanding Carbuncles    A carbuncle is a painful boil under the skin. It happens when a group of hair follicles become infected. Follicles are the tiny holes from which hair grows out of your skin.  How to say it  Amna   What causes carbuncles?  A carbuncle is caused by an infection with the bacteria Staphylococcus aureus. They are common on areas of the body where friction and sweat occur. They usually appear on the back of the neck, back, and thighs. This type of infection can also happen when the skin is injured, such as by a cut or bug bite.  The bacteria that causes carbuncles can spread easily from person to person. People at higher risk for boils are those with diabetes or a weak immune system. Drug users who use needles are also more likely to get them.  Symptoms of carbuncles  A carbuncle starts as a small painful bump. But it can grow quickly. It may become:  · Red  · Swollen  · Tender  Carbuncles may ooze pus. They may also cause fever and a general feeling of illness.  Treatment for carbuncles  A carbuncle may heal on its own in a few weeks. But the pus within it needs to come out first. Treatment options include:  · Warm compress. Putting a warm, wet washcloth on the boil will help the pus drain out. You should never try to pop a boil. That can cause the infection to spread.  · Surgical drainage. If the boil doesnt drain on its own, your healthcare provider may need to cut into it.  · Antibiotics. In some cases, oral antibiotics may be prescribed to fight the infection, especially if the carbuncle returns. You will likely have to take the medicine for 5 to 7 days. You may need to take it longer for a severe case.  · Good hygiene. Proper handwashing can prevent boils from spreading and coming back. Also wash things that have been in contact with the carbuncle in hot water. This includes items such as clothing and towels.  Complications of carbuncles  The main complication of a carbuncle  is the spreading of the infection. The bacteria can infect the heart and bone. It can also lead to septic shock, an infection of the entire body.  When to call your healthcare provider  Call your healthcare provider right away if you have any of these:  · Fever of 100.4°F (38°C) or higher, or as directed  · Redness, swelling, or fluid leaking from a carbuncle that gets worse  · Pain that gets worse  · Symptoms that dont get better, or get worse  · New symptoms   Date Last Reviewed: 5/1/2016 © 2000-2017 [employer]. [address] [place] [address]. All rights reserved. This information is not intended as a substitute for professional medical care. Always follow your healthcare professional's instructions.        Understanding Carbuncles    A carbuncle is a painful boil under the skin. It happens when a group of hair follicles become infected. Follicles are the tiny holes from which hair grows out of your skin.  How to say it  Amna   What causes carbuncles?  A carbuncle is caused by an infection with the bacteria Staphylococcus aureus. They are common on areas of the body where friction and sweat occur. They usually appear on the back of the neck, back, and thighs. This type of infection can also happen when the skin is injured, such as by a cut or bug bite.  The bacteria that causes carbuncles can spread easily from person to person. People at higher risk for boils are those with diabetes or a weak immune system. Drug users who use needles are also more likely to get them.  Symptoms of carbuncles  A carbuncle starts as a small painful bump. But it can grow quickly. It may become:  · Red  · Swollen  · Tender  Carbuncles may ooze pus. They may also cause fever and a general feeling of illness.  Treatment for carbuncles  A carbuncle may heal on its own in a few weeks. But the pus within it needs to come out first. Treatment options include:  · Warm compress. Putting a warm, wet  washcloth on the boil will help the pus drain out. You should never try to pop a boil. That can cause the infection to spread.  · Surgical drainage. If the boil doesnt drain on its own, your healthcare provider may need to cut into it.  · Antibiotics. In some cases, oral antibiotics may be prescribed to fight the infection, especially if the carbuncle returns. You will likely have to take the medicine for 5 to 7 days. You may need to take it longer for a severe case.  · Good hygiene. Proper handwashing can prevent boils from spreading and coming back. Also wash things that have been in contact with the carbuncle in hot water. This includes items such as clothing and towels.  Complications of carbuncles  The main complication of a carbuncle is the spreading of the infection. The bacteria can infect the heart and bone. It can also lead to septic shock, an infection of the entire body.  When to call your healthcare provider  Call your healthcare provider right away if you have any of these:  · Fever of 100.4°F (38°C) or higher, or as directed  · Redness, swelling, or fluid leaking from a carbuncle that gets worse  · Pain that gets worse  · Symptoms that dont get better, or get worse  · New symptoms   Date Last Reviewed: 5/1/2016  © 8877-7798 [employer]. [address] [place] [address], [place] [address]. All rights reserved. This information is not intended as a substitute for professional medical care. Always follow your healthcare professional's instructions.

## 2019-03-27 ENCOUNTER — PROCEDURE VISIT (OUTPATIENT)
Dept: FAMILY MEDICINE | Facility: CLINIC | Age: 54
End: 2019-03-27
Payer: COMMERCIAL

## 2019-03-27 DIAGNOSIS — Z76.89 ENCOUNTER FOR INCISION AND DRAINAGE PROCEDURE: Primary | ICD-10-CM

## 2019-03-27 PROCEDURE — 10060 I&D ABSCESS SIMPLE/SINGLE: CPT | Mod: S$GLB,,, | Performed by: FAMILY MEDICINE

## 2019-03-27 PROCEDURE — 10060 INCISION & DRAINAGE: ICD-10-PCS | Mod: S$GLB,,, | Performed by: FAMILY MEDICINE

## 2019-03-27 RX ORDER — LIDOCAINE HYDROCHLORIDE AND EPINEPHRINE 10; 10 MG/ML; UG/ML
3 INJECTION, SOLUTION INFILTRATION; PERINEURAL
Status: DISCONTINUED | OUTPATIENT
Start: 2019-03-27 | End: 2019-05-22

## 2019-03-27 NOTE — PROCEDURES
"Incision & Drainage  Date/Time: 3/27/2019 11:00 AM  Performed by: Gladys Nicolas MD  Authorized by: Gladys Nicolas MD     Time out: Immediately prior to procedure a "time out" was called to verify the correct patient, procedure, equipment, support staff and site/side marked as required.    Consent Done?:  Yes (Written)    Type:  Abscess  Body area:  Head/neck  Location details:  Scalp  Anesthesia:  Local infiltration  Local anesthetic: lidocaine 1% with epinephrine  Anesthetic total (ml):  3  Scalpel size:  11  Incision type:  Single straight  Complexity:  Simple  Drainage:  Pus  Drainage amount:  Scant  Wound treatment:  Expression of material  Packing material:  1/4 in iodoform gauze  Patient tolerance:  Patient tolerated the procedure well with no immediate complications    RTC in 2 days for wound check      "

## 2019-03-28 ENCOUNTER — OFFICE VISIT (OUTPATIENT)
Dept: FAMILY MEDICINE | Facility: CLINIC | Age: 54
End: 2019-03-28
Payer: COMMERCIAL

## 2019-03-28 VITALS
SYSTOLIC BLOOD PRESSURE: 128 MMHG | TEMPERATURE: 99 F | DIASTOLIC BLOOD PRESSURE: 77 MMHG | BODY MASS INDEX: 31.71 KG/M2 | OXYGEN SATURATION: 98 % | HEART RATE: 83 BPM | WEIGHT: 189.38 LBS

## 2019-03-28 DIAGNOSIS — T75.3XXD MOTION SICKNESS, SUBSEQUENT ENCOUNTER: Primary | ICD-10-CM

## 2019-03-28 DIAGNOSIS — L02.811 ABSCESS OF SCALP: ICD-10-CM

## 2019-03-28 PROCEDURE — 3008F BODY MASS INDEX DOCD: CPT | Mod: CPTII,S$GLB,, | Performed by: FAMILY MEDICINE

## 2019-03-28 PROCEDURE — 3008F PR BODY MASS INDEX (BMI) DOCUMENTED: ICD-10-PCS | Mod: CPTII,S$GLB,, | Performed by: FAMILY MEDICINE

## 2019-03-28 PROCEDURE — 3074F PR MOST RECENT SYSTOLIC BLOOD PRESSURE < 130 MM HG: ICD-10-PCS | Mod: CPTII,S$GLB,, | Performed by: FAMILY MEDICINE

## 2019-03-28 PROCEDURE — 99999 PR PBB SHADOW E&M-EST. PATIENT-LVL III: CPT | Mod: PBBFAC,,, | Performed by: FAMILY MEDICINE

## 2019-03-28 PROCEDURE — 99213 PR OFFICE/OUTPT VISIT, EST, LEVL III, 20-29 MIN: ICD-10-PCS | Mod: 25,S$GLB,, | Performed by: FAMILY MEDICINE

## 2019-03-28 PROCEDURE — 3078F DIAST BP <80 MM HG: CPT | Mod: CPTII,S$GLB,, | Performed by: FAMILY MEDICINE

## 2019-03-28 PROCEDURE — 3074F SYST BP LT 130 MM HG: CPT | Mod: CPTII,S$GLB,, | Performed by: FAMILY MEDICINE

## 2019-03-28 PROCEDURE — 99213 OFFICE O/P EST LOW 20 MIN: CPT | Mod: 25,S$GLB,, | Performed by: FAMILY MEDICINE

## 2019-03-28 PROCEDURE — 99999 PR PBB SHADOW E&M-EST. PATIENT-LVL III: ICD-10-PCS | Mod: PBBFAC,,, | Performed by: FAMILY MEDICINE

## 2019-03-28 PROCEDURE — 3078F PR MOST RECENT DIASTOLIC BLOOD PRESSURE < 80 MM HG: ICD-10-PCS | Mod: CPTII,S$GLB,, | Performed by: FAMILY MEDICINE

## 2019-03-28 RX ORDER — TRAMADOL HYDROCHLORIDE 50 MG/1
50 TABLET ORAL EVERY 8 HOURS PRN
Qty: 12 TABLET | Refills: 0 | Status: SHIPPED | OUTPATIENT
Start: 2019-03-28 | End: 2019-04-01

## 2019-03-28 RX ORDER — ONDANSETRON 4 MG/1
4 TABLET, ORALLY DISINTEGRATING ORAL EVERY 8 HOURS PRN
Qty: 14 TABLET | Refills: 1 | Status: SHIPPED | OUTPATIENT
Start: 2019-03-28 | End: 2019-07-11 | Stop reason: SDUPTHER

## 2019-03-28 NOTE — PATIENT INSTRUCTIONS
Abscess (Incision & Drainage)  An abscess is sometimes called a boil. It happens when bacteria get trapped under the skin and start to grow. Pus forms inside the abscess as the body responds to the bacteria. An abscess can happen with an insect bite, ingrown hair, blocked oil gland, pimple, cyst, or puncture wound.  Your healthcare provider has drained the pus from your abscess. If the abscess pocket was large, your healthcare provider may have put in gauze packing. Your provider will need to remove it on your next visit. He or she may also replace it at that time. You may not need antibiotics to treat a simple abscess, unless the infection is spreading into the skin around the wound (cellulitis).  The wound will take about 1 to 2 weeks to heal, depending on the size of the abscess. Healthy tissue will grow from the bottom and sides of the opening until it seals over.  Home care  These tips can help your wound heal:  · The wound may drain for the first 2 days. Cover the wound with a clean dry dressing. Change the dressing if it becomes soaked with blood or pus.  · If a gauze packing was placed inside the abscess pocket, you may be told to remove it yourself. You may do this in the shower. Once the packing is removed, you should wash the area in the shower, or clean the area as directed by your provider. Continue to do this until the skin opening has closed. Make sure you wash your hands after changing the packing or cleaning the wound.  · If you were prescribed antibiotics, take them as directed until they are all gone.  · You may use acetaminophen or ibuprofen to control pain, unless another pain medicine was prescribed. If you have liver disease or ever had a stomach ulcer, talk with your doctor before using these medicines.  Follow-up care  Follow up with your healthcare provider, or as advised. If a gauze packing was put in your wound, it should be removed in 1 to 2 days. Check your wound every day for any  signs that the infection is getting worse. The signs are listed below.  When to seek medical advice  Call your healthcare provider right away if any of these occur:  · Increasing redness or swelling  · Red streaks in the skin leading away from the wound  · Increasing local pain or swelling  · Continued pus draining from the wound 2 days after treatment  · Fever of 100.4ºF (38ºC) or higher, or as directed by your healthcare provider  · Boil returns when you are at home  Date Last Reviewed: 9/1/2016  © 1066-6159 [employer]. [address] [place] [address]. All rights reserved. This information is not intended as a substitute for professional medical care. Always follow your healthcare professional's instructions.

## 2019-03-28 NOTE — PROGRESS NOTES
Subjective:       Patient ID: Chasity Blake is a 53 y.o. female.    Chief Complaint:  Wound check and complaints of motion sickness  Abscess to posterior scabbed-left side.  I&D was performed yesterday and wound packed with iodoform.  Patient is on Keflex.  Could not sleep last night due to pain and this morning she is back in the clinic for evaluation of the wound.  She took ibuprofen last night and that helped a little bit.  Also with complaints of motion sickness.  She is driving to [place] for Friday and asking for ondansetron which helps her with motion sickness otherwise she would be vomiting.    Past Medical History:   Diagnosis Date    ADD (attention deficit disorder with hyperactivity)     Anxiety     Asthma     mild intermittent    Depression      Family History   Problem Relation Age of Onset    Lung cancer Mother     Coronary artery disease Mother 50    Coronary artery disease Father 38    Breast cancer Neg Hx     Colon cancer Neg Hx     Ovarian cancer Neg Hx      Social History     Socioeconomic History    Marital status:      Spouse name: Not on file    Number of children: Not on file    Years of education: Not on file    Highest education level: Not on file   Occupational History    Not on file   Social Needs    Financial resource strain: Not very hard    Food insecurity:     Worry: Never true     Inability: Never true    Transportation needs:     Medical: No     Non-medical: No   Tobacco Use    Smoking status: Current Every Day Smoker     Packs/day: 0.50     Years: 10.00     Pack years: 5.00     Types: Cigarettes    Smokeless tobacco: Never Used   Substance and Sexual Activity    Alcohol use: No     Alcohol/week: 0.0 oz     Frequency: Never     Drinks per session: Patient refused     Binge frequency: Never    Drug use: No    Sexual activity: Yes     Partners: Male     Birth control/protection: None   Lifestyle    Physical activity:     Days per week: 4 days     Minutes  per session: 30 min    Stress: Rather much   Relationships    Social connections:     Talks on phone: More than three times a week     Gets together: Three times a week     Attends Sikh service: Not on file     Active member of club or organization: Yes     Attends meetings of clubs or organizations: 1 to 4 times per year     Relationship status:     Intimate partner violence:     Fear of current or ex partner: Not on file     Emotionally abused: Not on file     Physically abused: Not on file     Forced sexual activity: Not on file   Other Topics Concern    Not on file   Social History Narrative    Not on file     Outpatient Encounter Medications as of 3/28/2019   Medication Sig Dispense Refill    albuterol (ACCUNEB) 1.25 mg/3 mL Nebu USE ONE VIAL IN NEBULIZER EVERY 4 TO 6 HOURS AS NEEDED FOR WHEEZING & SHORTNESS OF BREATH **THANK YOU** 75 mL 6    albuterol (PROAIR HFA) 90 mcg/actuation inhaler INHALE TWO PUFFS INTO THE LUNGS EVERY 6 HOURS AS NEEDED FOR WHEEZING **THANK YOU** 25.5 g 5    albuterol 90 mcg/actuation inhaler Inhale 2 puffs into the lungs every 4 (four) hours as needed for Wheezing. 18 g 2    albuterol-ipratropium (DUO-NEB) 2.5 mg-0.5 mg/3 mL nebulizer solution Take 3 mLs by nebulization every 6 (six) hours as needed for Wheezing. Rescue 1 Box 2    aspirin 81 MG Chew Take 1 tablet (81 mg total) by mouth once daily. 30 tablet 0    cephALEXin (KEFLEX) 500 MG capsule Take 1 capsule (500 mg total) by mouth every 8 (eight) hours. for 7 days 21 capsule 0    citalopram (CELEXA) 20 MG tablet TAKE ONE TABLET BY MOUTH ONCE DAILY **THANK YOU** 30 tablet 3    clindamycin (CLEOCIN T) 1 % external solution APPLY TO AFFECTED AREA TOPICALLY TWICE DAILY **THANK YOU** 30 mL 1    epinephrine (EPIPEN 2-JHONY) 0.3 mg/0.3 mL AtIn Inject 0.3 mLs (0.3 mg total) into the muscle once. 0.3 mL 12    estradiol-levonorgestrel (CLIMARAPRO) 0.045-0.015 mg/24 hr Place 1 patch onto the skin once a week. 4 patch  11    fluticasone (FLONASE) 50 mcg/actuation nasal spray 2 sprays by Each Nare route once daily. 16 g 11    levocetirizine (XYZAL) 5 MG tablet TAKE ONE TABLET BY MOUTH EVERY EVENING **THANK YOU** 30 tablet 5    [START ON 5/26/2019] lisdexamfetamine (VYVANSE) 40 MG Cap Take 1 capsule (40 mg total) by mouth every morning. 30 capsule 0    [START ON 4/26/2019] lisdexamfetamine (VYVANSE) 40 MG Cap Take 1 capsule (40 mg total) by mouth every morning. 30 capsule 0    LORazepam (ATIVAN) 0.5 MG tablet Take 1 tablet (0.5 mg total) by mouth every 12 (twelve) hours as needed for Anxiety. 14 tablet 0    montelukast (SINGULAIR) 10 mg tablet TAKE ONE TABLET BY MOUTH EVERY DAY **THANK YOU** 90 tablet 4    mupirocin (BACTROBAN) 2 % ointment Apply topically 3 (three) times daily. 1 Tube 1    omeprazole (PRILOSEC) 40 MG capsule TAKE ONE CAPSULE BY MOUTH ONCE DAILY **THANK YOU** 90 capsule 3    ondansetron (ZOFRAN-ODT) 4 MG TbDL Take 1 tablet (4 mg total) by mouth every 8 (eight) hours as needed. 14 tablet 1    propranolol (INDERAL LA) 80 MG 24 hr capsule Take 1 capsule (80 mg total) by mouth once daily. 30 capsule 6    tiZANidine (ZANAFLEX) 4 mg Cap Take 1 capsule by mouth 2 (two) times daily as needed. 60 capsule 0    traMADol (ULTRAM) 50 mg tablet Take 1 tablet (50 mg total) by mouth every 8 (eight) hours as needed for Pain. 12 tablet 0    VYVANSE 40 mg Cap TAKE ONE CAPSULE BY MOUTH EVERY MORNING **THANK YOU** 30 capsule 0    [DISCONTINUED] ondansetron (ZOFRAN-ODT) 4 MG TbDL Take 1 tablet (4 mg total) by mouth every 8 (eight) hours as needed. 14 tablet 1     Facility-Administered Encounter Medications as of 3/28/2019   Medication Dose Route Frequency Provider Last Rate Last Dose    lidocaine-EPINEPHrine 1%-1:100,000 injection 3 mL  3 mL Other 1 time in Clinic/HOD Gladys Nicolas MD           Review of Systems   Constitutional: Negative for chills and fever.   HENT: Negative for congestion and facial swelling.     Eyes: Negative for discharge and itching.   Respiratory: Negative for cough and wheezing.    Cardiovascular: Negative for chest pain and palpitations.   Gastrointestinal: Negative for abdominal pain, nausea and vomiting.   Endocrine: Negative for cold intolerance and heat intolerance.   Genitourinary: Negative for dysuria and flank pain.   Musculoskeletal: Negative for myalgias and neck stiffness.   Skin: Negative for pallor and wound.   Neurological: Negative for facial asymmetry and weakness.   Psychiatric/Behavioral: Negative for agitation and suicidal ideas.       Objective:      /77 (BP Location: Left arm, Patient Position: Sitting, BP Method: Medium (Automatic))   Pulse 83   Temp 99.3 °F (37.4 °C) (Oral)   Wt 85.9 kg (189 lb 6 oz)   SpO2 98%   BMI 31.71 kg/m²   Physical Exam   Constitutional: She is oriented to person, place, and time. She appears well-developed. No distress.   HENT:   Head: Normocephalic and atraumatic.   Right Ear: External ear normal.   Left Ear: External ear normal.   Eyes: Conjunctivae and EOM are normal.   Neck: Neck supple. No thyromegaly present.   Cardiovascular: Normal rate and regular rhythm.   Pulmonary/Chest: Effort normal. No respiratory distress.   Abdominal: Soft. She exhibits no distension.   Genitourinary:   Genitourinary Comments: deferred   Musculoskeletal: She exhibits no edema or deformity.   Lymphadenopathy:        Head (right side): No submandibular adenopathy present.        Head (left side): No submandibular adenopathy present.     She has no cervical adenopathy.   Neurological: She is alert and oriented to person, place, and time.   Skin: Skin is warm and dry.   Scalp wound examined-packing is intact with the tip dry, surrounding area of erythema and fluctuance is decreased.   Psychiatric: She has a normal mood and affect. Her behavior is normal.   Nursing note and vitals reviewed.      Results for orders placed or performed in visit on 01/31/19   NM  [facility] Pharm Stress Cardiac Component   Result Value Ref Range    Diastolic Dysfunction No      Assessment:       1. Motion sickness, subsequent encounter    2. Abscess of scalp        Plan:   Motion sickness, subsequent encounter  -     ondansetron (ZOFRAN-ODT) 4 MG TbDL; Take 1 tablet (4 mg total) by mouth every 8 (eight) hours as needed.  Dispense: 14 tablet; Refill: 1    Abscess of scalp ;  Will give some few tablets of Ultram for pain and have patient come tomorrow as scheduled for wound care before she goes out of town.  Continue antibiotic.    Other orders  -     traMADol (ULTRAM) 50 mg tablet; Take 1 tablet (50 mg total) by mouth every 8 (eight) hours as needed for Pain.  Dispense: 12 tablet; Refill: 0

## 2019-04-01 ENCOUNTER — TELEPHONE (OUTPATIENT)
Dept: OBSTETRICS AND GYNECOLOGY | Facility: CLINIC | Age: 54
End: 2019-04-01

## 2019-04-01 NOTE — TELEPHONE ENCOUNTER
----- Message from Nina Douglas sent at 4/1/2019 11:19 AM CDT -----  Contact: pt  Pt needs to reschedule appointment with CARO Stevens. [phone] (home)

## 2019-04-01 NOTE — TELEPHONE ENCOUNTER
----- Message from Nina Douglas sent at 4/1/2019 11:19 AM CDT -----  Contact: pt  Pt needs to reschedule appointment with CARO Stevens. [phone] (home)

## 2019-04-01 NOTE — TELEPHONE ENCOUNTER
Returned call to patient.  Well woman and mammogram appointments rescheduled to 05/15/19, patient confirmed appointment dates, times and locations

## 2019-04-05 ENCOUNTER — PATIENT MESSAGE (OUTPATIENT)
Dept: FAMILY MEDICINE | Facility: CLINIC | Age: 54
End: 2019-04-05

## 2019-04-05 ENCOUNTER — OFFICE VISIT (OUTPATIENT)
Dept: FAMILY MEDICINE | Facility: CLINIC | Age: 54
End: 2019-04-05
Payer: COMMERCIAL

## 2019-04-05 VITALS
DIASTOLIC BLOOD PRESSURE: 84 MMHG | SYSTOLIC BLOOD PRESSURE: 138 MMHG | RESPIRATION RATE: 20 BRPM | BODY MASS INDEX: 31.22 KG/M2 | HEART RATE: 68 BPM | OXYGEN SATURATION: 96 % | TEMPERATURE: 100 F | WEIGHT: 187.38 LBS | HEIGHT: 65 IN

## 2019-04-05 DIAGNOSIS — J44.1 COPD WITH EXACERBATION: Primary | ICD-10-CM

## 2019-04-05 DIAGNOSIS — F17.210 LIGHT SMOKER: ICD-10-CM

## 2019-04-05 PROCEDURE — 3075F SYST BP GE 130 - 139MM HG: CPT | Mod: CPTII,S$GLB,, | Performed by: FAMILY MEDICINE

## 2019-04-05 PROCEDURE — 99999 PR PBB SHADOW E&M-EST. PATIENT-LVL III: ICD-10-PCS | Mod: PBBFAC,,, | Performed by: FAMILY MEDICINE

## 2019-04-05 PROCEDURE — 99214 OFFICE O/P EST MOD 30 MIN: CPT | Mod: 24,S$GLB,, | Performed by: FAMILY MEDICINE

## 2019-04-05 PROCEDURE — 99214 PR OFFICE/OUTPT VISIT, EST, LEVL IV, 30-39 MIN: ICD-10-PCS | Mod: 24,S$GLB,, | Performed by: FAMILY MEDICINE

## 2019-04-05 PROCEDURE — 3079F PR MOST RECENT DIASTOLIC BLOOD PRESSURE 80-89 MM HG: ICD-10-PCS | Mod: CPTII,S$GLB,, | Performed by: FAMILY MEDICINE

## 2019-04-05 PROCEDURE — 3075F PR MOST RECENT SYSTOLIC BLOOD PRESS GE 130-139MM HG: ICD-10-PCS | Mod: CPTII,S$GLB,, | Performed by: FAMILY MEDICINE

## 2019-04-05 PROCEDURE — 3008F PR BODY MASS INDEX (BMI) DOCUMENTED: ICD-10-PCS | Mod: CPTII,S$GLB,, | Performed by: FAMILY MEDICINE

## 2019-04-05 PROCEDURE — 99999 PR PBB SHADOW E&M-EST. PATIENT-LVL III: CPT | Mod: PBBFAC,,, | Performed by: FAMILY MEDICINE

## 2019-04-05 PROCEDURE — 3079F DIAST BP 80-89 MM HG: CPT | Mod: CPTII,S$GLB,, | Performed by: FAMILY MEDICINE

## 2019-04-05 PROCEDURE — 3008F BODY MASS INDEX DOCD: CPT | Mod: CPTII,S$GLB,, | Performed by: FAMILY MEDICINE

## 2019-04-05 RX ORDER — PREDNISONE 20 MG/1
20 TABLET ORAL DAILY
Qty: 10 TABLET | Refills: 0 | Status: SHIPPED | OUTPATIENT
Start: 2019-04-05 | End: 2019-04-15

## 2019-04-05 RX ORDER — FLUCONAZOLE 150 MG/1
150 TABLET ORAL ONCE
Qty: 2 TABLET | Refills: 0 | Status: SHIPPED | OUTPATIENT
Start: 2019-04-05 | End: 2019-04-05

## 2019-04-05 RX ORDER — LEVOFLOXACIN 500 MG/1
500 TABLET, FILM COATED ORAL DAILY
Qty: 7 TABLET | Refills: 0 | Status: SHIPPED | OUTPATIENT
Start: 2019-04-05 | End: 2019-04-12

## 2019-04-05 RX ORDER — BENZONATATE 100 MG/1
100 CAPSULE ORAL 3 TIMES DAILY PRN
Qty: 30 CAPSULE | Refills: 0 | Status: SHIPPED | OUTPATIENT
Start: 2019-04-05 | End: 2019-04-15

## 2019-04-05 NOTE — PATIENT INSTRUCTIONS
Discharge Instructions: COPD  You have been diagnosed with chronic obstructive pulmonary disease (COPD). This is a name given to a group of diseases that limit the flow of air in and out of your lungs. This makes it harder to breathe. With COPD, you are also more likely to get lung infections. COPD includes chronic bronchitis and emphysema. COPD is most often caused by heavy, long-term cigarette smoking.  Home care  Quit smoking  · If you smoke, quit. It is the best thing you can do for your COPD and your overall health.  · Join a stop-smoking program. There are even telephone, text message, and Internet programs to help you quit.  · Ask your healthcare provider about medicines or other methods to help you quit.  · Ask family members to quit smoking as well.  · Don't allow people to smoke in your home, in your car, or when they are around you.  Protect yourself from infection  · Wash your hands often. Do your best to keep your hands away from your face. Most germs are spread from your hands to your mouth.  · Get a flu shot every year. Also ask your provider about pneumonia vaccines.  · Avoid crowds. It's especially important to do this in the winter when more people have colds and flu.  · To stay healthy, get enough sleep, exercise regularly, and eat a balanced diet. You should:  ¨ Get about 8 hours of sleep every night.  ¨ Try to exercise for at least 30 minutes on most days.  ¨ Have healthy foods including fruits and vegetables, 100% whole grains, lean meats and fish, and low-fat dairy products. Try to stay away from foods high in fats and sugar.  Take your medicines  Take your medicines exactly as directed. Don't skip doses.  Manage your stress  Stress can make COPD worse. Use this stress management technique:  · Find a quiet place and sit or lie in a comfortable position.  · Close your eyes and perform breathing exercises for several minutes. Ask your provider about the best way to breathe.  Pulmonary  rehabilitation  · Pulmonary rehab can help you feel better. These programs include exercise, breathing techniques, information about COPD, counseling, and help for smokers.  · Ask your provider or your local hospital about programs in your area.  When to call your healthcare provider  Call your provider immediately if you have any of the following:  · Shortness of breath, wheezing, or coughing  · Increased mucus  · Yellow, green, bloody, or smelly mucus  · Fever or chills  · Tightness in your chest that does not go away with rest or medicine  · An irregular heartbeat or a feeling that your heart is beating very fast  · Swollen ankles   Date Last Reviewed: 5/1/2016  © 4731-5777 [employer]. [address] [place] [address], [place] [address]. All rights reserved. This information is not intended as a substitute for professional medical care. Always follow your healthcare professional's instructions.

## 2019-04-05 NOTE — PROGRESS NOTES
Subjective:       Patient ID: Chasity Blake is a 53 y.o. female.    Chief Complaint: cough and fever x 5 days  Worsening.  Associated with sore throat, hoarseness  Chest congestion, chest tightness, increased sputum and wheezing.  Fever is subjective and low grade.  And the cough is now blood tinged.  Doing breathing treatment every 4-6 hours and mucinex.    She is a light smoker and have family members that smoke inside the house too.       Past Medical History:   Diagnosis Date    ADD (attention deficit disorder with hyperactivity)     Anxiety     Asthma     mild intermittent    Depression      Family History   Problem Relation Age of Onset    Lung cancer Mother     Coronary artery disease Mother 50    Coronary artery disease Father 38    Breast cancer Neg Hx     Colon cancer Neg Hx     Ovarian cancer Neg Hx      Social History     Socioeconomic History    Marital status:      Spouse name: Not on file    Number of children: Not on file    Years of education: Not on file    Highest education level: Not on file   Occupational History    Not on file   Social Needs    Financial resource strain: Not very hard    Food insecurity:     Worry: Never true     Inability: Never true    Transportation needs:     Medical: No     Non-medical: No   Tobacco Use    Smoking status: Current Every Day Smoker     Packs/day: 0.50     Years: 10.00     Pack years: 5.00     Types: Cigarettes    Smokeless tobacco: Never Used   Substance and Sexual Activity    Alcohol use: No     Alcohol/week: 0.0 oz     Frequency: Never     Drinks per session: Patient refused     Binge frequency: Never    Drug use: No    Sexual activity: Yes     Partners: Male     Birth control/protection: None   Lifestyle    Physical activity:     Days per week: 4 days     Minutes per session: 30 min    Stress: Rather much   Relationships    Social connections:     Talks on phone: More than three times a week     Gets together: Three times a  week     Attends Gnosticism service: Not on file     Active member of club or organization: Yes     Attends meetings of clubs or organizations: 1 to 4 times per year     Relationship status:    Other Topics Concern    Not on file   Social History Narrative    Not on file     Outpatient Encounter Medications as of 4/5/2019   Medication Sig Dispense Refill    albuterol (ACCUNEB) 1.25 mg/3 mL Nebu USE ONE VIAL IN NEBULIZER EVERY 4 TO 6 HOURS AS NEEDED FOR WHEEZING & SHORTNESS OF BREATH **THANK YOU** 75 mL 6    albuterol (PROAIR HFA) 90 mcg/actuation inhaler INHALE TWO PUFFS INTO THE LUNGS EVERY 6 HOURS AS NEEDED FOR WHEEZING **THANK YOU** 25.5 g 5    albuterol-ipratropium (DUO-NEB) 2.5 mg-0.5 mg/3 mL nebulizer solution Take 3 mLs by nebulization every 6 (six) hours as needed for Wheezing. Rescue 1 Box 2    aspirin 81 MG Chew Take 1 tablet (81 mg total) by mouth once daily. 30 tablet 0    citalopram (CELEXA) 20 MG tablet TAKE ONE TABLET BY MOUTH ONCE DAILY **THANK YOU** 30 tablet 3    clindamycin (CLEOCIN T) 1 % external solution APPLY TO AFFECTED AREA TOPICALLY TWICE DAILY **THANK YOU** 30 mL 1    epinephrine (EPIPEN 2-JHONY) 0.3 mg/0.3 mL AtIn Inject 0.3 mLs (0.3 mg total) into the muscle once. 0.3 mL 12    estradiol-levonorgestrel (CLIMARAPRO) 0.045-0.015 mg/24 hr Place 1 patch onto the skin once a week. 4 patch 11    fluticasone (FLONASE) 50 mcg/actuation nasal spray 2 sprays by Each Nare route once daily. 16 g 11    levocetirizine (XYZAL) 5 MG tablet TAKE ONE TABLET BY MOUTH EVERY EVENING **THANK YOU** 30 tablet 5    [START ON 5/26/2019] lisdexamfetamine (VYVANSE) 40 MG Cap Take 1 capsule (40 mg total) by mouth every morning. 30 capsule 0    [START ON 4/26/2019] lisdexamfetamine (VYVANSE) 40 MG Cap Take 1 capsule (40 mg total) by mouth every morning. 30 capsule 0    LORazepam (ATIVAN) 0.5 MG tablet Take 1 tablet (0.5 mg total) by mouth every 12 (twelve) hours as needed for Anxiety. 14 tablet 0     montelukast (SINGULAIR) 10 mg tablet TAKE ONE TABLET BY MOUTH EVERY DAY **THANK YOU** 90 tablet 4    mupirocin (BACTROBAN) 2 % ointment Apply topically 3 (three) times daily. 1 Tube 1    omeprazole (PRILOSEC) 40 MG capsule TAKE ONE CAPSULE BY MOUTH ONCE DAILY **THANK YOU** 90 capsule 3    propranolol (INDERAL LA) 80 MG 24 hr capsule Take 1 capsule (80 mg total) by mouth once daily. 30 capsule 6    tiZANidine (ZANAFLEX) 4 mg Cap Take 1 capsule by mouth 2 (two) times daily as needed. 60 capsule 0    VYVANSE 40 mg Cap TAKE ONE CAPSULE BY MOUTH EVERY MORNING **THANK YOU** 30 capsule 0    [DISCONTINUED] albuterol 90 mcg/actuation inhaler Inhale 2 puffs into the lungs every 4 (four) hours as needed for Wheezing. 18 g 2    benzonatate (TESSALON) 100 MG capsule Take 1 capsule (100 mg total) by mouth 3 (three) times daily as needed for Cough. 30 capsule 0    fluconazole (DIFLUCAN) 150 MG Tab Take 1 tablet (150 mg total) by mouth once. May repeat dose after one week if symptom is still present for 1 dose 2 tablet 0    levoFLOXacin (LEVAQUIN) 500 MG tablet Take 1 tablet (500 mg total) by mouth once daily. for 7 days 7 tablet 0    ondansetron (ZOFRAN-ODT) 4 MG TbDL Take 1 tablet (4 mg total) by mouth every 8 (eight) hours as needed. 14 tablet 1    predniSONE (DELTASONE) 20 MG tablet Take 1 tablet (20 mg total) by mouth once daily. for 10 days 10 tablet 0     Facility-Administered Encounter Medications as of 4/5/2019   Medication Dose Route Frequency Provider Last Rate Last Dose    lidocaine-EPINEPHrine 1%-1:100,000 injection 3 mL  3 mL Other 1 time in Clinic/HOD Gladys Nicolas MD           Review of Systems   Constitutional: Positive for activity change and fever. Negative for chills.   HENT: Positive for congestion. Negative for facial swelling.    Eyes: Negative for discharge and itching.   Respiratory: Positive for cough, chest tightness and wheezing. Negative for shortness of breath.    Cardiovascular:  "Negative for chest pain and palpitations.   Gastrointestinal: Negative for abdominal pain, nausea and vomiting.   Endocrine: Negative for cold intolerance and heat intolerance.   Genitourinary: Negative for dysuria and flank pain.   Musculoskeletal: Negative for myalgias and neck stiffness.   Skin: Negative for pallor and wound.   Neurological: Negative for facial asymmetry and weakness.   Psychiatric/Behavioral: Negative for agitation and suicidal ideas.       Objective:      /84 (BP Location: Left arm, Patient Position: Sitting, BP Method: X-Large (Manual))   Pulse 68   Temp 99.7 °F (37.6 °C) (Tympanic)   Resp 20   Ht 5' 4.8" (1.646 m)   Wt 85 kg (187 lb 6.3 oz)   SpO2 96%   BMI 31.38 kg/m²   Physical Exam   Constitutional: She is oriented to person, place, and time. She appears well-developed. No distress.   HENT:   Head: Normocephalic and atraumatic.   Right Ear: External ear normal.   Left Ear: External ear normal.   Eyes: Conjunctivae and EOM are normal.   Neck: Neck supple. No thyromegaly present.   Cardiovascular: Normal rate and regular rhythm.   Pulmonary/Chest: Effort normal. No respiratory distress. She has decreased breath sounds in the right lower field and the left lower field. She has no wheezes.   Abdominal: Soft. She exhibits no distension.   Genitourinary:   Genitourinary Comments: deferred   Musculoskeletal: She exhibits no edema or deformity.   Lymphadenopathy:        Head (right side): No submandibular adenopathy present.        Head (left side): No submandibular adenopathy present.     She has no cervical adenopathy.   Neurological: She is alert and oriented to person, place, and time.   Skin: Skin is warm and dry.   Psychiatric: She has a normal mood and affect. Her behavior is normal.   Nursing note and vitals reviewed.        Assessment:       1. COPD with exacerbation    2. Light smoker        Plan:   COPD with exacerbation; chronic condition with flare up  She is on keflex " with 2 days left-discontinue and start levaquin, continue neb tx  -     levoFLOXacin (LEVAQUIN) 500 MG tablet; Take 1 tablet (500 mg total) by mouth once daily. for 7 days  Dispense: 7 tablet; Refill: 0  -     predniSONE (DELTASONE) 20 MG tablet; Take 1 tablet (20 mg total) by mouth once daily. for 10 days  Dispense: 10 tablet; Refill: 0  -     fluconazole (DIFLUCAN) 150 MG Tab; Take 1 tablet (150 mg total) by mouth once. May repeat dose after one week if symptom is still present for 1 dose  Dispense: 2 tablet; Refill: 0  -     benzonatate (TESSALON) 100 MG capsule; Take 1 capsule (100 mg total) by mouth 3 (three) times daily as needed for Cough.  Dispense: 30 capsule; Refill: 0    Do not take celexa while you are taking the Levaquin    Light smoker: Counseled to quit smoking as it is making your resp sxs worse

## 2019-04-08 ENCOUNTER — OFFICE VISIT (OUTPATIENT)
Dept: FAMILY MEDICINE | Facility: CLINIC | Age: 54
End: 2019-04-08
Payer: COMMERCIAL

## 2019-04-08 ENCOUNTER — PATIENT MESSAGE (OUTPATIENT)
Dept: FAMILY MEDICINE | Facility: CLINIC | Age: 54
End: 2019-04-08

## 2019-04-08 ENCOUNTER — HOSPITAL ENCOUNTER (OUTPATIENT)
Dept: RADIOLOGY | Facility: HOSPITAL | Age: 54
Discharge: HOME | End: 2019-04-08
Attending: FAMILY MEDICINE
Payer: COMMERCIAL

## 2019-04-08 VITALS
SYSTOLIC BLOOD PRESSURE: 122 MMHG | OXYGEN SATURATION: 98 % | DIASTOLIC BLOOD PRESSURE: 72 MMHG | TEMPERATURE: 99 F | WEIGHT: 189.81 LBS | BODY MASS INDEX: 31.78 KG/M2 | HEART RATE: 68 BPM

## 2019-04-08 DIAGNOSIS — J45.901 ASTHMA WITH COPD WITH EXACERBATION: Primary | ICD-10-CM

## 2019-04-08 DIAGNOSIS — J44.1 ASTHMA WITH COPD WITH EXACERBATION: Primary | ICD-10-CM

## 2019-04-08 DIAGNOSIS — J45.31 MILD PERSISTENT ASTHMA WITH ACUTE EXACERBATION: ICD-10-CM

## 2019-04-08 PROCEDURE — 99999 PR PBB SHADOW E&M-EST. PATIENT-LVL III: ICD-10-PCS | Mod: PBBFAC,,, | Performed by: FAMILY MEDICINE

## 2019-04-08 PROCEDURE — 3008F PR BODY MASS INDEX (BMI) DOCUMENTED: ICD-10-PCS | Mod: CPTII,S$GLB,, | Performed by: FAMILY MEDICINE

## 2019-04-08 PROCEDURE — 99214 OFFICE O/P EST MOD 30 MIN: CPT | Mod: S$GLB,,, | Performed by: FAMILY MEDICINE

## 2019-04-08 PROCEDURE — 71046 X-RAY EXAM CHEST 2 VIEWS: CPT | Mod: 26,,, | Performed by: RADIOLOGY

## 2019-04-08 PROCEDURE — 3078F DIAST BP <80 MM HG: CPT | Mod: CPTII,S$GLB,, | Performed by: FAMILY MEDICINE

## 2019-04-08 PROCEDURE — 3078F PR MOST RECENT DIASTOLIC BLOOD PRESSURE < 80 MM HG: ICD-10-PCS | Mod: CPTII,S$GLB,, | Performed by: FAMILY MEDICINE

## 2019-04-08 PROCEDURE — 99999 PR PBB SHADOW E&M-EST. PATIENT-LVL III: CPT | Mod: PBBFAC,,, | Performed by: FAMILY MEDICINE

## 2019-04-08 PROCEDURE — 3074F SYST BP LT 130 MM HG: CPT | Mod: CPTII,S$GLB,, | Performed by: FAMILY MEDICINE

## 2019-04-08 PROCEDURE — 99214 PR OFFICE/OUTPT VISIT, EST, LEVL IV, 30-39 MIN: ICD-10-PCS | Mod: S$GLB,,, | Performed by: FAMILY MEDICINE

## 2019-04-08 PROCEDURE — 71046 XR CHEST PA AND LATERAL: ICD-10-PCS | Mod: 26,,, | Performed by: RADIOLOGY

## 2019-04-08 PROCEDURE — 3008F BODY MASS INDEX DOCD: CPT | Mod: CPTII,S$GLB,, | Performed by: FAMILY MEDICINE

## 2019-04-08 PROCEDURE — 3074F PR MOST RECENT SYSTOLIC BLOOD PRESSURE < 130 MM HG: ICD-10-PCS | Mod: CPTII,S$GLB,, | Performed by: FAMILY MEDICINE

## 2019-04-08 PROCEDURE — 71046 X-RAY EXAM CHEST 2 VIEWS: CPT | Mod: TC,FY,PO

## 2019-04-08 RX ORDER — BUDESONIDE AND FORMOTEROL FUMARATE DIHYDRATE 160; 4.5 UG/1; UG/1
2 AEROSOL RESPIRATORY (INHALATION) 2 TIMES DAILY
Qty: 1 INHALER | Refills: 12 | Status: SHIPPED | OUTPATIENT
Start: 2019-04-08 | End: 2019-04-08

## 2019-04-08 RX ORDER — PROMETHAZINE HYDROCHLORIDE AND DEXTROMETHORPHAN HYDROBROMIDE 6.25; 15 MG/5ML; MG/5ML
5 SYRUP ORAL NIGHTLY PRN
Qty: 118 ML | Refills: 0 | Status: SHIPPED | OUTPATIENT
Start: 2019-04-08 | End: 2019-04-15

## 2019-04-08 RX ORDER — FLUCONAZOLE 150 MG/1
TABLET ORAL
Refills: 0 | COMMUNITY
Start: 2019-04-05 | End: 2020-06-12 | Stop reason: ALTCHOICE

## 2019-04-08 RX ORDER — FLUTICASONE PROPIONATE AND SALMETEROL 250; 50 UG/1; UG/1
1 POWDER RESPIRATORY (INHALATION) EVERY 12 HOURS
Qty: 60 EACH | Refills: 2 | Status: SHIPPED | OUTPATIENT
Start: 2019-04-08 | End: 2022-09-19 | Stop reason: SDUPTHER

## 2019-04-08 NOTE — PATIENT INSTRUCTIONS
Discharge Instructions for Asthma  You have been diagnosed with an asthma attack. With the help of your healthcare provider, you can keep your asthma under control and have less emergency department visits and stays in the hospital.    Managing asthma  · Take your asthma medicines exactly as your provider tells you. Do this even if you feel that your athma is under control.  · Learn how to monitor your asthma. Some people watch for early changes of symptoms getting worse. Some use a peak flow meter. Your healthcare provider may decide to give you an asthma action plan.  · Be sure to always have a quick-relief inhaler with you. If you were given a prescription, make sure you go to a pharmacy to get it filled as soon as possible.  Controlling asthma triggers  Triggers are those things that make your asthma symptoms worse or cause asthma attacks. Many people with asthma have allergies that can be triggers. Your healthcare provider may have you get allergy testing to find out what you are allergic to. This can help you stay away from triggers.  Dust or dust mites are a common asthma trigger. To avoid a dust mites, do the following:  · Use dust-proof covers on your mattress and pillows. Wash the sheets and blankets on your bed once a week in very hot water.  · Dont sleep or lie on cloth-covered cushions or furniture.  · Ask someone else to vacuum and dust your house.  · If you do vacuum and dust yourself, wear a dust mask. You can buy them from the [employer] store.  · Use a vacuum with a double-layered bag or HEPA (high-efficiency particulate air) filter.  Pets with fur or feathers are triggers for some people. If you must have pets, take these precautions:  · Keep pets out of your bedroom and off your bed. Keep the bedroom door closed.  · Cover the air vents in your bedroom with heavy material to filter the air.  · Don't use carpets or cloth-covered furniture in your home. If this is not possible, keep pets out of  rooms with these items.  · Have someone bathe your pets every week. And brush them often.  If you smoke, do your best to quit.  · Enroll in a stop-smoking program to increase your chance of success.  · Ask your healthcare provider about medicines or other methods to help you quit.  · Ask family members to quit smoking as well.  · Dont allow anyone to smoke in your home, in your car, or around you.  Other steps to take  · Make sure you know what to do if exercise is a trigger for you. Many people use quick-relief inhalers before exercise or physical activity.  · Get a flu shot every year and get pneumonia shots as advised by your healthcare provider.  · Try to keep your windows closed during pollen seasons and when mold counts are high.  · On cold or windy days, cover your nose and mouth with a scarf.  · Try to stay away from people who are sick with colds or the flu. Wash your hands often or use a hand . If respiratory infections like colds or flu trigger your asthma, use your quick-relief medicines as soon as you begin to notice respiratory symptoms. They may include a runny or stuffy nose, sore throat, or a cough.  Follow-up care  Make a follow-up appointment as directed by our staff. Follow your asthma action plan if you were given one.  When to seek medical attention  Call 911 right away if you have:  · Severe wheezing  · Shortness of breath that is not relieved by your quick-relief medication  · Trouble walking or talking because of shortness of breath  · Blue lips or fingernails  · If you monitor symptoms with a peak flow meter, readings less than 50% of your personal best   Date Last Reviewed: 2/3/2017  © 3448-4270 [employer]. [address] [place] [address], [place] [address]. All rights reserved. This information is not intended as a substitute for professional medical care. Always follow your healthcare professional's instructions.

## 2019-04-08 NOTE — PROGRESS NOTES
Subjective:       Patient ID: Chasity Blake is a 53 y.o. female.    Chief Complaint: Cough    COPD exacerbation: seen 3 days ago and started on Levaquin and steroid.  She reports that the cough is not better. Frequent cough with minimal production. She spent the whole weekend in bed and her voice is still gone.   Associated with wheezing and Dyspnea on exertion. She has been doing her neb treatment every 4-6 hours and taking xyzal.   Her sat in the office today is 98.9.  She was first diagnosed with asthma and then COPD. She has used advair in the past.  Denies fever.  No recent travel and she has no history of thromboembolism.    Past Medical History:   Diagnosis Date    ADD (attention deficit disorder with hyperactivity)     Anxiety     Asthma     mild intermittent    Depression      Family History   Problem Relation Age of Onset    Lung cancer Mother     Coronary artery disease Mother 50    Coronary artery disease Father 38    Breast cancer Neg Hx     Colon cancer Neg Hx     Ovarian cancer Neg Hx      Social History     Socioeconomic History    Marital status:      Spouse name: Not on file    Number of children: Not on file    Years of education: Not on file    Highest education level: Not on file   Occupational History    Not on file   Social Needs    Financial resource strain: Not very hard    Food insecurity:     Worry: Never true     Inability: Never true    Transportation needs:     Medical: No     Non-medical: No   Tobacco Use    Smoking status: Current Every Day Smoker     Packs/day: 0.50     Years: 10.00     Pack years: 5.00     Types: Cigarettes    Smokeless tobacco: Never Used   Substance and Sexual Activity    Alcohol use: No     Alcohol/week: 0.0 oz     Frequency: Never     Drinks per session: Patient refused     Binge frequency: Never    Drug use: No    Sexual activity: Yes     Partners: Male     Birth control/protection: None   Lifestyle    Physical activity:     Days  per week: 4 days     Minutes per session: 30 min    Stress: Rather much   Relationships    Social connections:     Talks on phone: More than three times a week     Gets together: Three times a week     Attends [employer] service: Not on file     Active member of club or organization: Yes     Attends meetings of clubs or organizations: 1 to 4 times per year     Relationship status:    Other Topics Concern    Not on file   Social History Narrative    Not on file     Outpatient Encounter Medications as of 4/8/2019   Medication Sig Dispense Refill    albuterol (ACCUNEB) 1.25 mg/3 mL Nebu USE ONE VIAL IN NEBULIZER EVERY 4 TO 6 HOURS AS NEEDED FOR WHEEZING & SHORTNESS OF BREATH **THANK YOU** 75 mL 6    albuterol (PROAIR HFA) 90 mcg/actuation inhaler INHALE TWO PUFFS INTO THE LUNGS EVERY 6 HOURS AS NEEDED FOR WHEEZING **THANK YOU** 25.5 g 5    albuterol-ipratropium (DUO-NEB) 2.5 mg-0.5 mg/3 mL nebulizer solution Take 3 mLs by nebulization every 6 (six) hours as needed for Wheezing. Rescue 1 Box 2    benzonatate (TESSALON) 100 MG capsule Take 1 capsule (100 mg total) by mouth 3 (three) times daily as needed for Cough. 30 capsule 0    citalopram (CELEXA) 20 MG tablet TAKE ONE TABLET BY MOUTH ONCE DAILY **THANK YOU** 30 tablet 3    clindamycin (CLEOCIN T) 1 % external solution APPLY TO AFFECTED AREA TOPICALLY TWICE DAILY **THANK YOU** 30 mL 1    estradiol-levonorgestrel (CLIMARAPRO) 0.045-0.015 mg/24 hr Place 1 patch onto the skin once a week. 4 patch 11    fluticasone (FLONASE) 50 mcg/actuation nasal spray 2 sprays by Each Nare route once daily. 16 g 11    levocetirizine (XYZAL) 5 MG tablet TAKE ONE TABLET BY MOUTH EVERY EVENING **THANK YOU** 30 tablet 5    levoFLOXacin (LEVAQUIN) 500 MG tablet Take 1 tablet (500 mg total) by mouth once daily. for 7 days 7 tablet 0    [START ON 5/26/2019] lisdexamfetamine (VYVANSE) 40 MG Cap Take 1 capsule (40 mg total) by mouth every morning. 30 capsule 0    [START ON  4/26/2019] lisdexamfetamine (VYVANSE) 40 MG Cap Take 1 capsule (40 mg total) by mouth every morning. 30 capsule 0    montelukast (SINGULAIR) 10 mg tablet TAKE ONE TABLET BY MOUTH EVERY DAY **THANK YOU** 90 tablet 4    mupirocin (BACTROBAN) 2 % ointment Apply topically 3 (three) times daily. 1 Tube 1    omeprazole (PRILOSEC) 40 MG capsule TAKE ONE CAPSULE BY MOUTH ONCE DAILY **THANK YOU** 90 capsule 3    ondansetron (ZOFRAN-ODT) 4 MG TbDL Take 1 tablet (4 mg total) by mouth every 8 (eight) hours as needed. 14 tablet 1    predniSONE (DELTASONE) 20 MG tablet Take 1 tablet (20 mg total) by mouth once daily. for 10 days 10 tablet 0    propranolol (INDERAL LA) 80 MG 24 hr capsule Take 1 capsule (80 mg total) by mouth once daily. 30 capsule 6    tiZANidine (ZANAFLEX) 4 mg Cap Take 1 capsule by mouth 2 (two) times daily as needed. 60 capsule 0    VYVANSE 40 mg Cap TAKE ONE CAPSULE BY MOUTH EVERY MORNING **THANK YOU** 30 capsule 0    aspirin 81 MG Chew Take 1 tablet (81 mg total) by mouth once daily. 30 tablet 0    epinephrine (EPIPEN 2-JHONY) 0.3 mg/0.3 mL AtIn Inject 0.3 mLs (0.3 mg total) into the muscle once. 0.3 mL 12    fluconazole (DIFLUCAN) 150 MG Tab TAKE ONE TABLET BY MOUTH ONCE. MAY REPEAT DOSE AFTER ONE WEEK IF SYMPTOM IS STILL PRESENT THANK YOU  0    fluticasone-salmeterol diskus inhaler 250-50 mcg Inhale 1 puff into the lungs every 12 (twelve) hours. 60 each 2    LORazepam (ATIVAN) 0.5 MG tablet Take 1 tablet (0.5 mg total) by mouth every 12 (twelve) hours as needed for Anxiety. 14 tablet 0    promethazine-dextromethorphan (PROMETHAZINE-DM) 6.25-15 mg/5 mL Syrp Take 5 mLs by mouth nightly as needed. 118 mL 0    [DISCONTINUED] budesonide-formoterol 160-4.5 mcg (SYMBICORT) 160-4.5 mcg/actuation HFAA Inhale 2 puffs into the lungs 2 (two) times daily. 1 Inhaler 12     Facility-Administered Encounter Medications as of 4/8/2019   Medication Dose Route Frequency Provider Last Rate Last Dose     lidocaine-EPINEPHrine 1%-1:100,000 injection 3 mL  3 mL Other 1 time in Clinic/HOD Gladys Nicolas MD           Review of Systems   Constitutional: Negative for chills and fever.   HENT: Negative for congestion and facial swelling.    Eyes: Negative for discharge and itching.   Respiratory: Positive for cough, shortness of breath and wheezing.    Cardiovascular: Negative for chest pain and palpitations.   Gastrointestinal: Negative for abdominal pain, nausea and vomiting.   Endocrine: Negative for cold intolerance and heat intolerance.   Genitourinary: Negative for dysuria and flank pain.   Musculoskeletal: Negative for myalgias and neck stiffness.   Skin: Negative for pallor and wound.   Neurological: Negative for facial asymmetry and weakness.   Psychiatric/Behavioral: Negative for agitation and suicidal ideas.       Objective:      /72 (BP Location: Right arm, Patient Position: Sitting, BP Method: Medium (Manual))   Pulse 68   Temp 98.9 °F (37.2 °C) (Oral)   Wt 86.1 kg (189 lb 13.1 oz)   SpO2 98%   BMI 31.78 kg/m²   Physical Exam   Constitutional: She is oriented to person, place, and time. She appears well-developed. No distress.   HENT:   Head: Normocephalic and atraumatic.   Right Ear: External ear normal.   Left Ear: External ear normal.   Eyes: Conjunctivae and EOM are normal.   Neck: Neck supple. No thyromegaly present.   Cardiovascular: Normal rate and regular rhythm.   Pulmonary/Chest: Effort normal. No respiratory distress. She has wheezes.   Abdominal: Soft. She exhibits no distension.   Genitourinary:   Genitourinary Comments: deferred   Musculoskeletal: She exhibits no edema or deformity.   Lymphadenopathy:        Head (right side): No submandibular adenopathy present.        Head (left side): No submandibular adenopathy present.     She has no cervical adenopathy.   Neurological: She is alert and oriented to person, place, and time.   Skin: Skin is warm and dry.   Psychiatric: She has a  normal mood and affect. Her behavior is normal.   Nursing note and vitals reviewed.      Armin Javed MD 4/8/2019       Narrative     EXAMINATION:  XR CHEST PA AND LATERAL    CLINICAL HISTORY:  Mild persistent asthma with (acute) exacerbation    TECHNIQUE:  PA and lateral views of the chest were performed.    COMPARISON:  01/21/2018    FINDINGS:  The cardiac and mediastinal silhouettes appear within normal limits.   The lungs are clear bilaterally.  No acute osseous findings demonstrated.      Impression       No acute findings.      Electronically signed by: Armin Javed MD       Assessment:       1. Asthma with COPD with exacerbation        Plan:   Asthma with COPD with exacerbation-chronic illness with exacerbation.  Low probability for PE. However, will screen with xray and if negative and sxs persists, consider CT.  -restart advair and change cough med from tessalon melquiades to promethazine-dm due to non effective of tessalon melquiades.  Counseled to avoid second hand smoking as they are worsening resp sxs.  -     X-Ray Chest PA And Lateral; Future; Expected date: 04/08/2019  -     promethazine-dextromethorphan (PROMETHAZINE-DM) 6.25-15 mg/5 mL Syrp; Take 5 mLs by mouth nightly as needed.  Dispense: 118 mL; Refill: 0  -     fluticasone-salmeterol diskus inhaler 250-50 mcg; Inhale 1 puff into the lungs every 12 (twelve) hours.  Dispense: 60 each; Refill: 2  Type of Reading: myself.  Radiology Procedure Done: AP & Lat CXR.  Interpretation:  I personally reviewed the images from the chest x-rays which shows a normal cardiomediastinal silhouette and no evidence of infiltrate or effusion.

## 2019-04-22 ENCOUNTER — PATIENT MESSAGE (OUTPATIENT)
Dept: FAMILY MEDICINE | Facility: CLINIC | Age: 54
End: 2019-04-22

## 2019-04-23 ENCOUNTER — PATIENT OUTREACH (OUTPATIENT)
Dept: ADMINISTRATIVE | Facility: HOSPITAL | Age: 54
End: 2019-04-23

## 2019-04-23 ENCOUNTER — OFFICE VISIT (OUTPATIENT)
Dept: FAMILY MEDICINE | Facility: CLINIC | Age: 54
End: 2019-04-23
Payer: COMMERCIAL

## 2019-04-23 VITALS
DIASTOLIC BLOOD PRESSURE: 86 MMHG | BODY MASS INDEX: 32.02 KG/M2 | OXYGEN SATURATION: 98 % | TEMPERATURE: 98 F | WEIGHT: 191.25 LBS | SYSTOLIC BLOOD PRESSURE: 130 MMHG | HEART RATE: 74 BPM

## 2019-04-23 DIAGNOSIS — L73.8 BACTERIAL FOLLICULITIS: ICD-10-CM

## 2019-04-23 DIAGNOSIS — F17.210 CIGARETTE NICOTINE DEPENDENCE WITHOUT COMPLICATION: ICD-10-CM

## 2019-04-23 DIAGNOSIS — J44.1 COPD WITH EXACERBATION: ICD-10-CM

## 2019-04-23 DIAGNOSIS — Z76.89 ENCOUNTER FOR INCISION AND DRAINAGE PROCEDURE: ICD-10-CM

## 2019-04-23 DIAGNOSIS — L02.512 ABSCESS OF FINGER OF LEFT HAND: Primary | ICD-10-CM

## 2019-04-23 PROCEDURE — 3079F DIAST BP 80-89 MM HG: CPT | Mod: CPTII,S$GLB,, | Performed by: FAMILY MEDICINE

## 2019-04-23 PROCEDURE — 3075F PR MOST RECENT SYSTOLIC BLOOD PRESS GE 130-139MM HG: ICD-10-PCS | Mod: CPTII,S$GLB,, | Performed by: FAMILY MEDICINE

## 2019-04-23 PROCEDURE — 26010 INCISION & DRAINAGE: ICD-10-PCS | Mod: S$GLB,,, | Performed by: FAMILY MEDICINE

## 2019-04-23 PROCEDURE — 99999 PR PBB SHADOW E&M-EST. PATIENT-LVL V: CPT | Mod: PBBFAC,,, | Performed by: FAMILY MEDICINE

## 2019-04-23 PROCEDURE — 3008F BODY MASS INDEX DOCD: CPT | Mod: CPTII,S$GLB,, | Performed by: FAMILY MEDICINE

## 2019-04-23 PROCEDURE — 3075F SYST BP GE 130 - 139MM HG: CPT | Mod: CPTII,S$GLB,, | Performed by: FAMILY MEDICINE

## 2019-04-23 PROCEDURE — 3079F PR MOST RECENT DIASTOLIC BLOOD PRESSURE 80-89 MM HG: ICD-10-PCS | Mod: CPTII,S$GLB,, | Performed by: FAMILY MEDICINE

## 2019-04-23 PROCEDURE — 99214 OFFICE O/P EST MOD 30 MIN: CPT | Mod: 25,S$GLB,, | Performed by: FAMILY MEDICINE

## 2019-04-23 PROCEDURE — 26010 DRAINAGE OF FINGER ABSCESS: CPT | Mod: S$GLB,,, | Performed by: FAMILY MEDICINE

## 2019-04-23 PROCEDURE — 99999 PR PBB SHADOW E&M-EST. PATIENT-LVL V: ICD-10-PCS | Mod: PBBFAC,,, | Performed by: FAMILY MEDICINE

## 2019-04-23 PROCEDURE — 3008F PR BODY MASS INDEX (BMI) DOCUMENTED: ICD-10-PCS | Mod: CPTII,S$GLB,, | Performed by: FAMILY MEDICINE

## 2019-04-23 PROCEDURE — 99214 PR OFFICE/OUTPT VISIT, EST, LEVL IV, 30-39 MIN: ICD-10-PCS | Mod: 25,S$GLB,, | Performed by: FAMILY MEDICINE

## 2019-04-23 RX ORDER — TRAMADOL HYDROCHLORIDE 50 MG/1
50 TABLET ORAL EVERY 12 HOURS PRN
Qty: 6 TABLET | Refills: 0 | Status: SHIPPED | OUTPATIENT
Start: 2019-04-23 | End: 2019-04-26

## 2019-04-23 RX ORDER — LIDOCAINE HYDROCHLORIDE 10 MG/ML
3 INJECTION, SOLUTION EPIDURAL; INFILTRATION; INTRACAUDAL; PERINEURAL ONCE
Status: DISCONTINUED | OUTPATIENT
Start: 2019-04-23 | End: 2019-05-22

## 2019-04-23 RX ORDER — CLINDAMYCIN HYDROCHLORIDE 300 MG/1
300 CAPSULE ORAL EVERY 6 HOURS
Qty: 30 CAPSULE | Refills: 0 | Status: SHIPPED | OUTPATIENT
Start: 2019-04-23 | End: 2019-05-03

## 2019-04-23 RX ORDER — MUPIROCIN 20 MG/G
OINTMENT TOPICAL 2 TIMES DAILY
Qty: 1 TUBE | Status: SHIPPED | OUTPATIENT
Start: 2019-04-23 | End: 2019-05-23

## 2019-04-23 NOTE — PATIENT INSTRUCTIONS
Abscess (Incision & Drainage)  An abscess is sometimes called a boil. It happens when bacteria get trapped under the skin and start to grow. Pus forms inside the abscess as the body responds to the bacteria. An abscess can happen with an insect bite, ingrown hair, blocked oil gland, pimple, cyst, or puncture wound.  Your healthcare provider has drained the pus from your abscess. If the abscess pocket was large, your healthcare provider may have put in gauze packing. Your provider will need to remove it on your next visit. He or she may also replace it at that time. You may not need antibiotics to treat a simple abscess, unless the infection is spreading into the skin around the wound (cellulitis).  The wound will take about 1 to 2 weeks to heal, depending on the size of the abscess. Healthy tissue will grow from the bottom and sides of the opening until it seals over.  Home care  These tips can help your wound heal:  · The wound may drain for the first 2 days. Cover the wound with a clean dry dressing. Change the dressing if it becomes soaked with blood or pus.  · If a gauze packing was placed inside the abscess pocket, you may be told to remove it yourself. You may do this in the shower. Once the packing is removed, you should wash the area in the shower, or clean the area as directed by your provider. Continue to do this until the skin opening has closed. Make sure you wash your hands after changing the packing or cleaning the wound.  · If you were prescribed antibiotics, take them as directed until they are all gone.  · You may use acetaminophen or ibuprofen to control pain, unless another pain medicine was prescribed. If you have liver disease or ever had a stomach ulcer, talk with your doctor before using these medicines.  Follow-up care  Follow up with your healthcare provider, or as advised. If a gauze packing was put in your wound, it should be removed in 1 to 2 days. Check your wound every day for any  signs that the infection is getting worse. The signs are listed below.  When to seek medical advice  Call your healthcare provider right away if any of these occur:  · Increasing redness or swelling  · Red streaks in the skin leading away from the wound  · Increasing local pain or swelling  · Continued pus draining from the wound 2 days after treatment  · Fever of 100.4ºF (38ºC) or higher, or as directed by your healthcare provider  · Boil returns when you are at home  Date Last Reviewed: 9/1/2016  © 1233-1923 [employer]. [address] [place] [id number]. All rights reserved. This information is not intended as a substitute for professional medical care. Always follow your healthcare professional's instructions.

## 2019-04-23 NOTE — PROGRESS NOTES
Subjective:       Patient ID: Chasity Blake is a 53 y.o. female.    Chief Complaint:  Abscess and folliculitis   Abscess left thumb x 3 days.  Associated with tenderness, and erythema. She has been picking on it. She was able to express some pus but the surrounding erythema is worse    Diffuse pustules to the Pubis area with 3 abscess to the labia majora. Per patient, she has been busting the pustules by herself and expressing pus. Now she has 3 abscessed ones. Associated with pain, erythema and tenderness. Worsening. Started after she shaved.    COPD exacerbation: reports that she is better but not at baseline yet.  She is using fluticasone/salmeterol twice a day and DuoNeb every 6 hr as needed for wheezing.  Cutting down on smoking, one pack lacks a week.         Past Medical History:   Diagnosis Date    ADD (attention deficit disorder with hyperactivity)     Anxiety     Asthma     mild intermittent    Depression      Family History   Problem Relation Age of Onset    Lung cancer Mother     Coronary artery disease Mother 50    Coronary artery disease Father 38    Breast cancer Neg Hx     Colon cancer Neg Hx     Ovarian cancer Neg Hx      Social History     Socioeconomic History    Marital status:      Spouse name: Not on file    Number of children: Not on file    Years of education: Not on file    Highest education level: Not on file   Occupational History    Not on file   Social Needs    Financial resource strain: Not very hard    Food insecurity:     Worry: Never true     Inability: Never true    Transportation needs:     Medical: No     Non-medical: No   Tobacco Use    Smoking status: Current Every Day Smoker     Packs/day: 0.50     Years: 10.00     Pack years: 5.00     Types: Cigarettes    Smokeless tobacco: Never Used   Substance and Sexual Activity    Alcohol use: No     Alcohol/week: 0.0 oz     Frequency: Never     Drinks per session: Patient refused     Binge frequency: Never     Drug use: No    Sexual activity: Yes     Partners: Male     Birth control/protection: None   Lifestyle    Physical activity:     Days per week: 4 days     Minutes per session: 30 min    Stress: Rather much   Relationships    Social connections:     Talks on phone: More than three times a week     Gets together: Three times a week     Attends [employer]od[employer] service: Not on file     Active member of club or organization: Yes     Attends meetings of clubs or organizations: 1 to 4 times per year     Relationship status:    Other Topics Concern    Not on file   Social History Narrative    Not on file     Outpatient Encounter Medications as of 4/23/2019   Medication Sig Dispense Refill    albuterol (ACCUNEB) 1.25 mg/3 mL Nebu USE ONE VIAL IN NEBULIZER EVERY 4 TO 6 HOURS AS NEEDED FOR WHEEZING & SHORTNESS OF BREATH **THANK YOU** 75 mL 6    albuterol (PROAIR HFA) 90 mcg/actuation inhaler INHALE TWO PUFFS INTO THE LUNGS EVERY 6 HOURS AS NEEDED FOR WHEEZING **THANK YOU** 25.5 g 5    albuterol-ipratropium (DUO-NEB) 2.5 mg-0.5 mg/3 mL nebulizer solution Take 3 mLs by nebulization every 6 (six) hours as needed for Wheezing. Rescue 1 Box 2    aspirin 81 MG Chew Take 1 tablet (81 mg total) by mouth once daily. 30 tablet 0    citalopram (CELEXA) 20 MG tablet TAKE ONE TABLET BY MOUTH ONCE DAILY **THANK YOU** 30 tablet 3    clindamycin (CLEOCIN T) 1 % external solution APPLY TO AFFECTED AREA TOPICALLY TWICE DAILY **THANK YOU** 30 mL 1    clindamycin (CLEOCIN) 300 MG capsule Take 1 capsule (300 mg total) by mouth every 6 (six) hours. for 10 days 30 capsule 0    epinephrine (EPIPEN 2-JHONY) 0.3 mg/0.3 mL AtIn Inject 0.3 mLs (0.3 mg total) into the muscle once. 0.3 mL 12    estradiol-levonorgestrel (CLIMARAPRO) 0.045-0.015 mg/24 hr Place 1 patch onto the skin once a week. 4 patch 11    fluconazole (DIFLUCAN) 150 MG Tab TAKE ONE TABLET BY MOUTH ONCE. MAY REPEAT DOSE AFTER ONE WEEK IF SYMPTOM IS STILL PRESENT THANK YOU  0     fluticasone (FLONASE) 50 mcg/actuation nasal spray 2 sprays by Each Nare route once daily. 16 g 11    fluticasone-salmeterol diskus inhaler 250-50 mcg Inhale 1 puff into the lungs every 12 (twelve) hours. 60 each 2    levocetirizine (XYZAL) 5 MG tablet TAKE ONE TABLET BY MOUTH EVERY EVENING **THANK YOU** 30 tablet 5    [START ON 5/26/2019] lisdexamfetamine (VYVANSE) 40 MG Cap Take 1 capsule (40 mg total) by mouth every morning. 30 capsule 0    [START ON 4/26/2019] lisdexamfetamine (VYVANSE) 40 MG Cap Take 1 capsule (40 mg total) by mouth every morning. 30 capsule 0    LORazepam (ATIVAN) 0.5 MG tablet Take 1 tablet (0.5 mg total) by mouth every 12 (twelve) hours as needed for Anxiety. 14 tablet 0    montelukast (SINGULAIR) 10 mg tablet TAKE ONE TABLET BY MOUTH EVERY DAY **THANK YOU** 90 tablet 4    mupirocin (BACTROBAN) 2 % ointment Apply topically 2 (two) times daily. 1 Tube o    omeprazole (PRILOSEC) 40 MG capsule TAKE ONE CAPSULE BY MOUTH ONCE DAILY **THANK YOU** 90 capsule 3    ondansetron (ZOFRAN-ODT) 4 MG TbDL Take 1 tablet (4 mg total) by mouth every 8 (eight) hours as needed. 14 tablet 1    propranolol (INDERAL LA) 80 MG 24 hr capsule Take 1 capsule (80 mg total) by mouth once daily. 30 capsule 6    tiZANidine (ZANAFLEX) 4 mg Cap Take 1 capsule by mouth 2 (two) times daily as needed. 60 capsule 0    traMADol (ULTRAM) 50 mg tablet Take 1 tablet (50 mg total) by mouth every 12 (twelve) hours as needed for Pain. 6 tablet 0    VYVANSE 40 mg Cap TAKE ONE CAPSULE BY MOUTH EVERY MORNING **THANK YOU** 30 capsule 0    [DISCONTINUED] mupirocin (BACTROBAN) 2 % ointment Apply topically 3 (three) times daily. 1 Tube 1     Facility-Administered Encounter Medications as of 4/23/2019   Medication Dose Route Frequency Provider Last Rate Last Dose    lidocaine (PF) 10 mg/ml (1%) injection 30 mg  3 mL Other Once Gladys Nicolas MD        lidocaine-EPINEPHrine 1%-1:100,000 injection 3 mL  3 mL Other 1 time  in Clinic/HOD Gladys Nicolas MD           Review of Systems   Constitutional: Negative for chills and fever.   HENT: Negative for congestion and facial swelling.    Eyes: Negative for discharge and itching.   Respiratory: Negative for cough and wheezing.    Cardiovascular: Negative for chest pain and palpitations.   Gastrointestinal: Negative for abdominal pain, nausea and vomiting.   Endocrine: Negative for cold intolerance and heat intolerance.   Genitourinary: Negative for dysuria and flank pain.   Musculoskeletal: Negative for myalgias and neck stiffness.   Skin: Positive for rash and wound. Negative for pallor.   Neurological: Negative for facial asymmetry and weakness.   Psychiatric/Behavioral: Negative for agitation and suicidal ideas.       Objective:      /86 (BP Location: Right arm, Patient Position: Sitting, BP Method: Medium (Manual))   Pulse 74   Temp 98.4 °F (36.9 °C) (Oral)   Wt 86.7 kg (191 lb 4 oz)   SpO2 98%   BMI 32.02 kg/m²   Physical Exam   Constitutional: She is oriented to person, place, and time. She appears well-developed. No distress.   HENT:   Head: Normocephalic and atraumatic.   Right Ear: External ear normal.   Left Ear: External ear normal.   Eyes: Conjunctivae and EOM are normal.   Neck: Neck supple. No thyromegaly present.   Cardiovascular: Normal rate and regular rhythm.   Pulmonary/Chest: Effort normal. No respiratory distress.   Abdominal: Soft. She exhibits no distension.   Genitourinary:   Genitourinary Comments: deferred   Musculoskeletal: She exhibits no edema or deformity.   Lymphadenopathy:        Head (right side): No submandibular adenopathy present.        Head (left side): No submandibular adenopathy present.     She has no cervical adenopathy.   Neurological: She is alert and oriented to person, place, and time.   Skin: Skin is warm and dry.   Right thumb: 1.4 cm area of erythema, fluctuance and pus center.    Pubic area with diffuse pustules in different  stages of healing.  3-9mm  nodules on the right labia majora- with fluctuance, warmth and tender.    Psychiatric: She has a normal mood and affect. Her behavior is normal.   Nursing note and vitals reviewed.      Results for orders placed or performed in visit on 01/31/19   NM [facility] Stress Cardiac Component   Result Value Ref Range    Diastolic Dysfunction No      Assessment:       1. Abscess of finger of left hand    2. Encounter for incision and drainage procedure    3. Bacterial folliculitis    4. Cigarette nicotine dependence without complication    5. COPD with exacerbation        Plan:   Abscess of finger of left hand  -     mupirocin (BACTROBAN) 2 % ointment; Apply topically 2 (two) times daily.  Dispense: 1 Tube; Refill: o  -     clindamycin (CLEOCIN) 300 MG capsule; Take 1 capsule (300 mg total) by mouth every 6 (six) hours. for 10 days  Dispense: 30 capsule; Refill: 0  -     traMADol (ULTRAM) 50 mg tablet; Take 1 tablet (50 mg total) by mouth every 12 (twelve) hours as needed for Pain.  Dispense: 6 tablet; Refill: 0    Encounter for incision and drainage procedure  -     lidocaine (PF) 10 mg/ml (1%) injection 30 mg  -     Incision & Drainage    Bacterial folliculitis  -     clindamycin (CLEOCIN) 300 MG capsule; Take 1 capsule (300 mg total) by mouth every 6 (six) hours. for 10 days  Dispense: 30 capsule; Refill: 0    Cigarette nicotine dependence without complication  Advised to quit  COPD with exacerbation  Continue current regimen

## 2019-04-23 NOTE — PROCEDURES
"Incision & Drainage  Date/Time: 4/23/2019 2:28 PM  Performed by: Gladys Nicolas MD  Authorized by: Gladys Nicolas MD     Time out: Immediately prior to procedure a "time out" was called to verify the correct patient, procedure, equipment, support staff and site/side marked as required.    Consent Done?:  Yes (Written)    Type:  Abscess  Body area:  Upper extremity  Location details:  Right thumb  Anesthesia:  Local infiltration  Local anesthetic: lidocaine 1% without epinephrine  Anesthetic total (ml):  2  Scalpel size:  11  Incision type:  Single straight  Complexity:  Simple  Drainage:  Serous  Drainage amount:  Moderate  Wound treatment:  Incision, expression of material, wound left open and deloculation  Packing material:  1/4 in gauze  Patient tolerance:  Patient tolerated the procedure well with no immediate complications    Home care explained to patient and RTC in 2 days      "

## 2019-05-06 ENCOUNTER — HOSPITAL ENCOUNTER (EMERGENCY)
Facility: HOSPITAL | Age: 54
Discharge: HOME | End: 2019-05-06
Attending: EMERGENCY MEDICINE
Payer: COMMERCIAL

## 2019-05-06 VITALS
OXYGEN SATURATION: 98 % | HEIGHT: 64 IN | HEART RATE: 82 BPM | RESPIRATION RATE: 18 BRPM | DIASTOLIC BLOOD PRESSURE: 81 MMHG | SYSTOLIC BLOOD PRESSURE: 128 MMHG | BODY MASS INDEX: 32.29 KG/M2 | WEIGHT: 189.13 LBS | TEMPERATURE: 98 F

## 2019-05-06 DIAGNOSIS — I10 ELEVATED BLOOD PRESSURE READING WITH DIAGNOSIS OF HYPERTENSION: ICD-10-CM

## 2019-05-06 DIAGNOSIS — L02.419 ABSCESS OF LEG: Primary | ICD-10-CM

## 2019-05-06 DIAGNOSIS — F17.200 CURRENT SMOKER: ICD-10-CM

## 2019-05-06 DIAGNOSIS — Z76.89 ENCOUNTER FOR INCISION AND DRAINAGE PROCEDURE: ICD-10-CM

## 2019-05-06 LAB
BASOPHILS # BLD AUTO: 0.02 K/UL (ref 0–0.2)
BASOPHILS NFR BLD: 0.1 % (ref 0–1.9)
DIFFERENTIAL METHOD: ABNORMAL
EOSINOPHIL # BLD AUTO: 0.6 K/UL (ref 0–0.5)
EOSINOPHIL NFR BLD: 4.2 % (ref 0–8)
ERYTHROCYTE [DISTWIDTH] IN BLOOD BY AUTOMATED COUNT: 14 % (ref 11.5–14.5)
HCT VFR BLD AUTO: 41.8 % (ref 37–48.5)
HGB BLD-MCNC: 14 G/DL (ref 12–16)
LYMPHOCYTES # BLD AUTO: 2.6 K/UL (ref 1–4.8)
LYMPHOCYTES NFR BLD: 19.7 % (ref 18–48)
MCH RBC QN AUTO: 30.4 PG (ref 27–31)
MCHC RBC AUTO-ENTMCNC: 33.5 G/DL (ref 32–36)
MCV RBC AUTO: 91 FL (ref 82–98)
MONOCYTES # BLD AUTO: 0.9 K/UL (ref 0.3–1)
MONOCYTES NFR BLD: 7 % (ref 4–15)
NEUTROPHILS # BLD AUTO: 9.2 K/UL (ref 1.8–7.7)
NEUTROPHILS NFR BLD: 69 % (ref 38–73)
PLATELET # BLD AUTO: 375 K/UL (ref 150–350)
PMV BLD AUTO: 11 FL (ref 9.2–12.9)
PROCALCITONIN SERPL IA-MCNC: 0.02 NG/ML
RBC # BLD AUTO: 4.61 M/UL (ref 4–5.4)
WBC # BLD AUTO: 13.38 K/UL (ref 3.9–12.7)

## 2019-05-06 PROCEDURE — 25000003 PHARM REV CODE 250: Performed by: PHYSICIAN ASSISTANT

## 2019-05-06 PROCEDURE — 86803 HEPATITIS C AB TEST: CPT

## 2019-05-06 PROCEDURE — 84145 PROCALCITONIN (PCT): CPT

## 2019-05-06 PROCEDURE — S0077 INJECTION, CLINDAMYCIN PHOSP: HCPCS | Performed by: PHYSICIAN ASSISTANT

## 2019-05-06 PROCEDURE — 85025 COMPLETE CBC W/AUTO DIFF WBC: CPT

## 2019-05-06 PROCEDURE — 99284 EMERGENCY DEPT VISIT MOD MDM: CPT | Mod: 25

## 2019-05-06 PROCEDURE — 86703 HIV-1/HIV-2 1 RESULT ANTBDY: CPT

## 2019-05-06 PROCEDURE — 96372 THER/PROPH/DIAG INJ SC/IM: CPT

## 2019-05-06 PROCEDURE — 10060 I&D ABSCESS SIMPLE/SINGLE: CPT

## 2019-05-06 RX ORDER — LIDOCAINE HYDROCHLORIDE 10 MG/ML
10 INJECTION, SOLUTION EPIDURAL; INFILTRATION; INTRACAUDAL; PERINEURAL
Status: COMPLETED | OUTPATIENT
Start: 2019-05-06 | End: 2019-05-06

## 2019-05-06 RX ORDER — CLINDAMYCIN PHOSPHATE 150 MG/ML
900 INJECTION, SOLUTION INTRAVENOUS
Status: COMPLETED | OUTPATIENT
Start: 2019-05-06 | End: 2019-05-06

## 2019-05-06 RX ADMIN — CLINDAMYCIN PHOSPHATE 900 MG: 150 INJECTION, SOLUTION INTRAMUSCULAR; INTRAVENOUS at 02:05

## 2019-05-06 RX ADMIN — LIDOCAINE HYDROCHLORIDE 100 MG: 10 INJECTION, SOLUTION EPIDURAL; INFILTRATION; INTRACAUDAL at 01:05

## 2019-05-06 NOTE — DISCHARGE INSTRUCTIONS
Keep wound clean and dry.  Apply warm compresses four times daily to the wound to help draw out infection.  Remove packing in two days.  Recommend pulling packing out while in shower.  Complete full course of Clindamycin   Bactroban ointment or Clindamycin ointment as prescribed.  Keep wound covered when going outside or working.    If symptoms worsen or fail to improve with treatment, fever occurs, or if wound increases in size or fails to respond to antibiotic, see your Primary Care Provider or go to the nearest Emergency Room.

## 2019-05-06 NOTE — ED PROVIDER NOTES
"   History      Chief Complaint   Patient presents with    Abscess     Pt states, "I have an abscess on the back of my left leg."       Review of patient's allergies indicates:   Allergen Reactions    Percocet [oxycodone-acetaminophen] Itching        HPI   HPI    5/6/2019, 11:58 AM   History obtained from the patient      History of Present Illness: Chasity Blake is a 53 y.o. female patient who presents to the Emergency Department for abscess of left leg x 5 days.  Patient reports that abscess has been draining.  Patient reports history of several abscesses.  Patient states that PCP has lanced several abscesses in the past.  Abscesses have resolved with antibiotics.  Patient states that she is currently on Clindamycin 300 mg; patient states that she has a few left.  Patient states that she started taking Clindamycin about 1.5 weeks ago.  Patient admits that she continues to shave legs and arm pits despite getting several abscess; she states that she no longer shaves her private area. Patient reports that that PCP obtained cultures from a previous abscess and it was positive for staph.  Denies fever, vomiting, diarrhea, chest pain, SOB, headache, dizziness.        Arrival mode: Personal vehicle      PCP: Dima Her MD       Past Medical History:  Past Medical History:   Diagnosis Date    ADD (attention deficit disorder with hyperactivity)     Anxiety     Asthma     mild intermittent    Depression     Hypertension        Past Surgical History:  Past Surgical History:   Procedure Laterality Date    ABLATION-ENDOMETRIAL N/A 5/11/2015    Performed [facility]    BELT ABDOMINOPLASTY      Breast Augmentation      BREAST BIOPSY Left     2015. benign    BREAST SURGERY      COSMETIC SURGERY      [id number]  5/11/2015    Performed by Gem Woods MD at [facility] OR    TONSILLECTOMY      TUBAL LIGATION           Family History:  Family History   Problem Relation Age of " Onset    Lung cancer Mother     Coronary artery disease Mother 50    Coronary artery disease Father 38    Breast cancer Neg Hx     Colon cancer Neg Hx     Ovarian cancer Neg Hx        Social History:  Social History     Tobacco Use    Smoking status: Current Every Day Smoker     Packs/day: 0.50     Years: 10.00     Pack years: 5.00     Types: Cigarettes    Smokeless tobacco: Never Used   Substance and Sexual Activity    Alcohol use: No     Alcohol/week: 0.0 oz     Frequency: Never     Drinks per session: Patient refused     Binge frequency: Never    Drug use: No    Sexual activity: Yes     Partners: Male     Birth control/protection: None       ROS   Review of Systems   Constitutional: Negative for chills and fever.   HENT: Negative for congestion and rhinorrhea.    Eyes: Negative for discharge and redness.   Respiratory: Negative for cough and wheezing.    Cardiovascular: Negative for chest pain and palpitations.   Gastrointestinal: Negative for diarrhea, nausea and vomiting.   Genitourinary: Negative for dysuria and frequency.   Musculoskeletal: Negative for back pain and neck pain.   Skin: Positive for wound.        Abscess   Neurological: Negative for dizziness and headaches.       Physical Exam      Initial Vitals [05/06/19 1130]   BP Pulse Resp Temp SpO2   132/85 78 20 97.6 °F (36.4 °C) 97 %      MAP       --          Physical Exam  Nursing Notes and Vital Signs Reviewed.  Constitutional: Patient is in no apparent distress. Awake and alert. Well-developed and well-nourished.  Head: Atraumatic. Normocephalic.  Eyes: PERRL. EOM intact. Conjunctivae are not pale. No scleral icterus.  ENT: Mucous membranes are moist. Oropharynx is clear and symmetric.    Neck: Supple. Full ROM. No lymphadenopathy.  Cardiovascular: Regular rate. Regular rhythm. No murmurs, rubs, or gallops. Distal pulses are 2+ and symmetric.  Pulmonary/Chest: No respiratory distress. Clear to auscultation bilaterally. No wheezing,  "rales, or rhonchi.  Abdominal: Soft and non-distended.  There is no tenderness.  No rebound, guarding, or rigidity. Good bowel sounds.  Genitourinary: No CVA tenderness  Musculoskeletal: Moves all extremities. No obvious deformities. No edema. No calf tenderness.  Skin: Warm and dry.  2.5 cm area of erythema of left posterior leg; indurated, no fluctuance.    Neurological:  Alert, awake, and appropriate.  Normal speech.  No acute focal neurological deficits are appreciated.  Psychiatric: Normal affect. Good eye contact. Appropriate in content.    ED Course    I & D - Incision and Drainage  Date/Time: 2019 2:08 PM  Performed by: Carola Hassan PA-C  Authorized by: Carola Hassan PA-C   Consent Done: Yes  Consent: Verbal consent obtained.  Risks and benefits: risks, benefits and alternatives were discussed  Consent given by: patient  Patient identity confirmed: , verbally with patient and name  Type: abscess  Body area: lower extremity  Location details: left leg  Anesthesia: local infiltration    Anesthesia:  Local Anesthetic: lidocaine 1% without epinephrine  Scalpel size: 11  Incision type: single straight  Complexity: simple  Drainage: serosanguinous  Drainage amount: scant  Wound treatment: incision,  drainage,  deloculation and  wound packed  Packing material: 1/4 in gauze  Complications: No  Patient tolerance: Patient tolerated the procedure well with no immediate complications        ED Vital Signs:  Vitals:    19 1130 19 1447   BP: 132/85 128/81   Pulse: 78 82   Resp: 20 18   Temp: 97.6 °F (36.4 °C) 97.9 °F (36.6 °C)   TempSrc: Oral    SpO2: 97% 98%   Weight: 85.8 kg (189 lb 2.5 oz)    Height: 5' 4" (1.626 m)        Abnormal Lab Results:  Labs Reviewed   CBC W/ AUTO DIFFERENTIAL - Abnormal; Notable for the following components:       Result Value    WBC 13.38 (*)     Platelets 375 (*)     Gran # (ANC) 9.2 (*)     Eos # 0.6 (*)     All other components within normal limits "   PROCALCITONIN   HIV 1 / 2 ANTIBODY   HEPATITIS C ANTIBODY        All Lab Results:  Results for orders placed or performed during the hospital encounter of 05/06/19   CBC auto differential   Result Value Ref Range    WBC 13.38 (H) 3.90 - 12.70 K/uL    RBC 4.61 4.00 - 5.40 M/uL    Hemoglobin 14.0 12.0 - 16.0 g/dL    Hematocrit 41.8 37.0 - 48.5 %    Mean Corpuscular Volume 91 82 - 98 fL    Mean Corpuscular Hemoglobin 30.4 27.0 - 31.0 pg    Mean Corpuscular Hemoglobin Conc 33.5 32.0 - 36.0 g/dL    RDW 14.0 11.5 - 14.5 %    Platelets 375 (H) 150 - 350 K/uL    MPV 11.0 9.2 - 12.9 fL    Gran # (ANC) 9.2 (H) 1.8 - 7.7 K/uL    Lymph # 2.6 1.0 - 4.8 K/uL    Mono # 0.9 0.3 - 1.0 K/uL    Eos # 0.6 (H) 0.0 - 0.5 K/uL    Baso # 0.02 0.00 - 0.20 K/uL    Gran% 69.0 38.0 - 73.0 %    Lymph% 19.7 18.0 - 48.0 %    Mono% 7.0 4.0 - 15.0 %    Eosinophil% 4.2 0.0 - 8.0 %    Basophil% 0.1 0.0 - 1.9 %    Differential Method Automated    Procalcitonin   Result Value Ref Range    Procalcitonin 0.02 <0.25 ng/mL         Imaging Results:  Imaging Results    None                 The Emergency Provider reviewed the vital signs and test results, which are outlined above.    ED Discussion     2:09 PM: Reassessed pt at this time.  Pt states her condition has improved at this time.  Recommend patient complete course of Clindamycin.  Discussed with pt all pertinent ED information and results. Discussed pt dx and plan of tx. Gave pt all f/u and return to the ED instructions. All questions and concerns were addressed at this time. Pt expresses understanding of information and instructions, and is comfortable with plan to discharge. Pt is stable for discharge.    I discussed with patient and/or family/caretaker that evaluation in the ED does not suggest any emergent or life threatening medical conditions requiring immediate intervention beyond what was provided in the ED, and I believe patient is safe for discharge.  Regardless, an unremarkable  evaluation in the ED does not preclude the development or presence of a serious of life threatening condition. As such, patient was instructed to return immediately for any worsening or change in current symptoms.    Pre-hypertension/Hypertension: The pt has been informed that they may have pre-hypertension or hypertension based on a blood pressure reading in the ED. I recommend that the pt call the PCP listed on their discharge instructions or a physician of their choice this week to arrange f/u for further evaluation of possible pre-hypertension or hypertension.       ED Medication(s):  Medications   lidocaine (PF) 10 mg/ml (1%) injection 100 mg (100 mg Infiltration Given by Other 5/6/19 1347)   clindamycin injection 900 mg (900 mg Intramuscular Given 5/6/19 1427)       Discharge Medication List as of 5/6/2019  2:13 PM          Follow-up Information     Dima Her MD. Schedule an appointment as soon as possible for a visit in 3 days.    Specialty:  Family Medicine  Contact information:  [address] [address] [address] [phone]                     Medical Decision Making        Additional MDM:   Smoking Cessation: The patient is a smoker. The patient was counseled on smoking cessation for: 3 minutes. The patient was counseled on tobacco related  health complications.            Clinical Impression       ICD-10-CM ICD-9-CM   1. Abscess of leg L02.419 682.6   2. Encounter for incision and drainage procedure Z01.89 V72.85   3. Current smoker F17.200 305.1   4. Elevated blood pressure reading with diagnosis of hypertension I10 401.9       Disposition:   Disposition: Discharged  Condition: Stable           Carola Hassan PA-C  05/06/19 1949

## 2019-05-07 LAB
HCV AB SERPL QL IA: NEGATIVE
HIV 1+2 AB+HIV1 P24 AG SERPL QL IA: NEGATIVE

## 2019-05-08 RX ORDER — CLINDAMYCIN PHOSPHATE 11.9 MG/ML
SOLUTION TOPICAL
Qty: 30 ML | Refills: 1 | Status: SHIPPED | OUTPATIENT
Start: 2019-05-08 | End: 2021-03-03 | Stop reason: SDUPTHER

## 2019-05-22 ENCOUNTER — OFFICE VISIT (OUTPATIENT)
Dept: FAMILY MEDICINE | Facility: CLINIC | Age: 54
End: 2019-05-22
Payer: COMMERCIAL

## 2019-05-22 VITALS
TEMPERATURE: 99 F | SYSTOLIC BLOOD PRESSURE: 128 MMHG | DIASTOLIC BLOOD PRESSURE: 104 MMHG | BODY MASS INDEX: 32.96 KG/M2 | WEIGHT: 192 LBS | OXYGEN SATURATION: 98 % | HEART RATE: 75 BPM

## 2019-05-22 DIAGNOSIS — L73.9 FOLLICULITIS: Primary | ICD-10-CM

## 2019-05-22 DIAGNOSIS — L02.219 ABSCESS OF PUBIC REGION: ICD-10-CM

## 2019-05-22 DIAGNOSIS — I10 ESSENTIAL HYPERTENSION: ICD-10-CM

## 2019-05-22 PROCEDURE — 3074F PR MOST RECENT SYSTOLIC BLOOD PRESSURE < 130 MM HG: ICD-10-PCS | Mod: CPTII,S$GLB,, | Performed by: FAMILY MEDICINE

## 2019-05-22 PROCEDURE — 3008F PR BODY MASS INDEX (BMI) DOCUMENTED: ICD-10-PCS | Mod: CPTII,S$GLB,, | Performed by: FAMILY MEDICINE

## 2019-05-22 PROCEDURE — 3080F DIAST BP >= 90 MM HG: CPT | Mod: CPTII,S$GLB,, | Performed by: FAMILY MEDICINE

## 2019-05-22 PROCEDURE — 99999 PR PBB SHADOW E&M-EST. PATIENT-LVL III: CPT | Mod: PBBFAC,,, | Performed by: FAMILY MEDICINE

## 2019-05-22 PROCEDURE — 99214 PR OFFICE/OUTPT VISIT, EST, LEVL IV, 30-39 MIN: ICD-10-PCS | Mod: S$GLB,,, | Performed by: FAMILY MEDICINE

## 2019-05-22 PROCEDURE — 3074F SYST BP LT 130 MM HG: CPT | Mod: CPTII,S$GLB,, | Performed by: FAMILY MEDICINE

## 2019-05-22 PROCEDURE — 99999 PR PBB SHADOW E&M-EST. PATIENT-LVL III: ICD-10-PCS | Mod: PBBFAC,,, | Performed by: FAMILY MEDICINE

## 2019-05-22 PROCEDURE — 3008F BODY MASS INDEX DOCD: CPT | Mod: CPTII,S$GLB,, | Performed by: FAMILY MEDICINE

## 2019-05-22 PROCEDURE — 3080F PR MOST RECENT DIASTOLIC BLOOD PRESSURE >= 90 MM HG: ICD-10-PCS | Mod: CPTII,S$GLB,, | Performed by: FAMILY MEDICINE

## 2019-05-22 PROCEDURE — 99214 OFFICE O/P EST MOD 30 MIN: CPT | Mod: S$GLB,,, | Performed by: FAMILY MEDICINE

## 2019-05-22 RX ORDER — TRAMADOL HYDROCHLORIDE 50 MG/1
50 TABLET ORAL EVERY 8 HOURS PRN
Qty: 12 TABLET | Refills: 0 | Status: SHIPPED | OUTPATIENT
Start: 2019-05-22 | End: 2019-05-26

## 2019-05-22 RX ORDER — MUPIROCIN CALCIUM 20 MG/G
CREAM TOPICAL 2 TIMES DAILY
Qty: 30 G | Refills: 0 | Status: SHIPPED | OUTPATIENT
Start: 2019-05-22 | End: 2019-06-21

## 2019-05-22 RX ORDER — SULFAMETHOXAZOLE AND TRIMETHOPRIM 800; 160 MG/1; MG/1
1 TABLET ORAL 2 TIMES DAILY
Qty: 28 TABLET | Refills: 0 | Status: SHIPPED | OUTPATIENT
Start: 2019-05-22 | End: 2019-06-05

## 2019-05-22 NOTE — PROGRESS NOTES
Subjective:       Patient ID: Chasity Blake is a 53 y.o. female.    Chief Complaint: Recurrent Skin Infections    Today she has one on the left axilla, pubic region, bilateral major labia. And she has one on the inguinal area that is open. And a healing one on the left pre auricular area.  She has pus bumps underneath bilateral breast..  All her abscess and boils are Burning and tender.  Rates pain 10/10.  Denies fever.     Recent HIV screen was negative. Autoimmune work up was negative in 2012.    BP was found to be elevated and she is on propranolol  Past Medical History:   Diagnosis Date    ADD (attention deficit disorder with hyperactivity)     Anxiety     Asthma     mild intermittent    Depression     Hypertension      Family History   Problem Relation Age of Onset    Lung cancer Mother     Coronary artery disease Mother 50    Coronary artery disease Father 38    Breast cancer Neg Hx     Colon cancer Neg Hx     Ovarian cancer Neg Hx      Social History     Socioeconomic History    Marital status:      Spouse name: Not on file    Number of children: Not on file    Years of education: Not on file    Highest education level: Not on file   Occupational History    Not on file   Social Needs    Financial resource strain: Not very hard    Food insecurity:     Worry: Never true     Inability: Never true    Transportation needs:     Medical: No     Non-medical: No   Tobacco Use    Smoking status: Current Every Day Smoker     Packs/day: 0.50     Years: 10.00     Pack years: 5.00     Types: Cigarettes    Smokeless tobacco: Never Used   Substance and Sexual Activity    Alcohol use: No     Alcohol/week: 0.0 oz     Frequency: Never     Drinks per session: Patient refused     Binge frequency: Never    Drug use: No    Sexual activity: Yes     Partners: Male     Birth control/protection: None   Lifestyle    Physical activity:     Days per week: 4 days     Minutes per session: 30 min    Stress:  Rather much   Relationships    Social connections:     Talks on phone: More than three times a week     Gets together: Three times a week     Attends [employer] service: Not on file     Active member of club or organization: Yes     Attends meetings of clubs or organizations: 1 to 4 times per year     Relationship status:    Other Topics Concern    Not on file   Social History Narrative    Not on file     Outpatient Encounter Medications as of 5/22/2019   Medication Sig Dispense Refill    albuterol (ACCUNEB) 1.25 mg/3 mL Nebu USE ONE VIAL IN NEBULIZER EVERY 4 TO 6 HOURS AS NEEDED FOR WHEEZING & SHORTNESS OF BREATH **THANK YOU** 75 mL 6    albuterol (PROAIR HFA) 90 mcg/actuation inhaler INHALE TWO PUFFS INTO THE LUNGS EVERY 6 HOURS AS NEEDED FOR WHEEZING **THANK YOU** 25.5 g 5    albuterol-ipratropium (DUO-NEB) 2.5 mg-0.5 mg/3 mL nebulizer solution Take 3 mLs by nebulization every 6 (six) hours as needed for Wheezing. Rescue 1 Box 2    citalopram (CELEXA) 20 MG tablet TAKE ONE TABLET BY MOUTH ONCE DAILY **THANK YOU** 30 tablet 3    clindamycin (CLEOCIN T) 1 % external solution APPLY TO AFFECTED AREA TOPICALLY TWICE DAILY **THANK YOU** 30 mL 1    estradiol-levonorgestrel (CLIMARAPRO) 0.045-0.015 mg/24 hr Place 1 patch onto the skin once a week. 4 patch 11    fluconazole (DIFLUCAN) 150 MG Tab TAKE ONE TABLET BY MOUTH ONCE. MAY REPEAT DOSE AFTER ONE WEEK IF SYMPTOM IS STILL PRESENT THANK YOU  0    fluticasone (FLONASE) 50 mcg/actuation nasal spray 2 sprays by Each Nare route once daily. 16 g 11    fluticasone-salmeterol diskus inhaler 250-50 mcg Inhale 1 puff into the lungs every 12 (twelve) hours. 60 each 2    levocetirizine (XYZAL) 5 MG tablet TAKE ONE TABLET BY MOUTH EVERY EVENING **THANK YOU** 30 tablet 5    [START ON 5/26/2019] lisdexamfetamine (VYVANSE) 40 MG Cap Take 1 capsule (40 mg total) by mouth every morning. 30 capsule 0    lisdexamfetamine (VYVANSE) 40 MG Cap Take 1 capsule (40 mg  total) by mouth every morning. 30 capsule 0    montelukast (SINGULAIR) 10 mg tablet TAKE ONE TABLET BY MOUTH EVERY DAY **THANK YOU** 90 tablet 4    mupirocin (BACTROBAN) 2 % ointment Apply topically 2 (two) times daily. 1 Tube o    omeprazole (PRILOSEC) 40 MG capsule TAKE ONE CAPSULE BY MOUTH ONCE DAILY **THANK YOU** 90 capsule 3    ondansetron (ZOFRAN-ODT) 4 MG TbDL Take 1 tablet (4 mg total) by mouth every 8 (eight) hours as needed. 14 tablet 1    propranolol (INDERAL LA) 80 MG 24 hr capsule Take 1 capsule (80 mg total) by mouth once daily. 30 capsule 6    tiZANidine (ZANAFLEX) 4 mg Cap Take 1 capsule by mouth 2 (two) times daily as needed. 60 capsule 0    VYVANSE 40 mg Cap TAKE ONE CAPSULE BY MOUTH EVERY MORNING **THANK YOU** 30 capsule 0    aspirin 81 MG Chew Take 1 tablet (81 mg total) by mouth once daily. 30 tablet 0    epinephrine (EPIPEN 2-JHONY) 0.3 mg/0.3 mL AtIn Inject 0.3 mLs (0.3 mg total) into the muscle once. 0.3 mL 12    LORazepam (ATIVAN) 0.5 MG tablet Take 1 tablet (0.5 mg total) by mouth every 12 (twelve) hours as needed for Anxiety. 14 tablet 0    mupirocin calcium 2% (BACTROBAN) 2 % cream Apply topically 2 (two) times daily. 30 g 0    sulfamethoxazole-trimethoprim 800-160mg (BACTRIM DS) 800-160 mg Tab Take 1 tablet by mouth 2 (two) times daily. for 14 days 28 tablet 0    traMADol (ULTRAM) 50 mg tablet Take 1 tablet (50 mg total) by mouth every 8 (eight) hours as needed for Pain. 12 tablet 0     Facility-Administered Encounter Medications as of 5/22/2019   Medication Dose Route Frequency Provider Last Rate Last Dose    [DISCONTINUED] lidocaine (PF) 10 mg/ml (1%) injection 30 mg  3 mL Other Once Gladys Nicolas MD        [DISCONTINUED] lidocaine-EPINEPHrine 1%-1:100,000 injection 3 mL  3 mL Other 1 time in Clinic/HOD Gladys Nicolas MD           Review of Systems   Constitutional: Negative for chills and fever.   HENT: Negative for congestion and facial swelling.    Eyes: Negative  for discharge and itching.   Respiratory: Negative for cough and wheezing.    Cardiovascular: Negative for chest pain and palpitations.   Gastrointestinal: Negative for abdominal pain, nausea and vomiting.   Endocrine: Negative for cold intolerance and heat intolerance.   Genitourinary: Negative for dysuria and flank pain.   Musculoskeletal: Negative for myalgias and neck stiffness.   Skin: Positive for rash and wound. Negative for pallor.        Multiple boils and abscesses   Neurological: Negative for facial asymmetry and weakness.   Psychiatric/Behavioral: Negative for agitation and suicidal ideas.       Objective:      BP (!) 128/104 (BP Location: Right arm, Patient Position: Sitting, BP Method: Medium (Manual))   Pulse 75   Temp 98.5 °F (36.9 °C) (Oral)   Wt 87.1 kg (192 lb 0.3 oz)   SpO2 98%   BMI 32.96 kg/m²   Physical Exam   Constitutional: She is oriented to person, place, and time. She appears well-developed. No distress.   frustrated   HENT:   Head: Normocephalic and atraumatic.   Right Ear: External ear normal.   Left Ear: External ear normal.   Eyes: Conjunctivae and EOM are normal.   Neck: Neck supple. No thyromegaly present.   Cardiovascular: Normal rate and regular rhythm.   Pulmonary/Chest: Effort normal. No respiratory distress.   Abdominal: Soft. She exhibits no distension.   Genitourinary:   Genitourinary Comments: deferred   Musculoskeletal: She exhibits no edema or deformity.   Lymphadenopathy:        Head (right side): No submandibular adenopathy present.        Head (left side): No submandibular adenopathy present.     She has no cervical adenopathy.   Neurological: She is alert and oriented to person, place, and time.   Skin: Skin is warm and dry.   Abscess to pubic area has not come to a head yet.  Multiple pus filled folliculitis underneath the breast fold,  labias and abd.    Psychiatric: She has a normal mood and affect. Her behavior is normal.   Nursing note and vitals reviewed.       Results for orders placed or performed during the hospital encounter of 05/06/19   HIV 1/2 Ag/Ab (4th Gen)   Result Value Ref Range    HIV 1/2 Ag/Ab Negative Negative   Hepatitis C antibody   Result Value Ref Range    Hepatitis C Ab Negative    CBC auto differential   Result Value Ref Range    WBC 13.38 (H) 3.90 - 12.70 K/uL    RBC 4.61 4.00 - 5.40 M/uL    Hemoglobin 14.0 12.0 - 16.0 g/dL    Hematocrit 41.8 37.0 - 48.5 %    Mean Corpuscular Volume 91 82 - 98 fL    Mean Corpuscular Hemoglobin 30.4 27.0 - 31.0 pg    Mean Corpuscular Hemoglobin Conc 33.5 32.0 - 36.0 g/dL    RDW 14.0 11.5 - 14.5 %    Platelets 375 (H) 150 - 350 K/uL    MPV 11.0 9.2 - 12.9 fL    Gran # (ANC) 9.2 (H) 1.8 - 7.7 K/uL    Lymph # 2.6 1.0 - 4.8 K/uL    Mono # 0.9 0.3 - 1.0 K/uL    Eos # 0.6 (H) 0.0 - 0.5 K/uL    Baso # 0.02 0.00 - 0.20 K/uL    Gran% 69.0 38.0 - 73.0 %    Lymph% 19.7 18.0 - 48.0 %    Mono% 7.0 4.0 - 15.0 %    Eosinophil% 4.2 0.0 - 8.0 %    Basophil% 0.1 0.0 - 1.9 %    Differential Method Automated    Procalcitonin   Result Value Ref Range    Procalcitonin 0.02 <0.25 ng/mL     Assessment:       1. Folliculitis    2. Abscess of pubic region    3. Essential hypertension        Plan:   Folliculitis  Comments:  generalized  Orders:  -     sulfamethoxazole-trimethoprim 800-160mg (BACTRIM DS) 800-160 mg Tab; Take 1 tablet by mouth 2 (two) times daily. for 14 days  Dispense: 28 tablet; Refill: 0  -     mupirocin calcium 2% (BACTROBAN) 2 % cream; Apply topically 2 (two) times daily.  Dispense: 30 g; Refill: 0    Abscess of pubic region: allow to come to a head  Warm water compress, continue bleach bath and RTC in 2 days  -     traMADol (ULTRAM) 50 mg tablet; Take 1 tablet (50 mg total) by mouth every 8 (eight) hours as needed for Pain.  Dispense: 12 tablet; Refill: 0  HTN: chronic uncontrolled but she is in pain and this is likely transient  Monitor blood pressure at home and record.  Bring record to clinic on your next  visit  Recommended Heat lifestyle.  Recommended DASH diet   Choose a diet rich in fruits, vegetables, and low-fat dairy products, but low in meats, sweets, and refined grains   Be more active. If you are able, walk for 35 mins 5 times a week.  Return To Clinic for re-evaluation

## 2019-05-22 NOTE — PATIENT INSTRUCTIONS
Folliculitis  Folliculitis is an inflammation of a hair follicle. A hair follicle is the little pocket where a hair grows out of the skin. Bacteria normally live on the skin. But sometimes bacteria can get trapped in a follicle and cause infection. This causes a bumpy rash. The area over the follicles is red and raised. It may itch or be painful. The bumps may have fluid (pus) inside. The pus may leak and then form crusts. Sores can spread to other areas of the body. Once it goes away, folliculitis can come back at any time. Severe cases may cause permanent hair loss and scarring.  Folliculitis can happen anywhere on the body where hair grows. It can be caused by rubbing from tight clothing. Ingrown hairs can cause it. Soaking in a hot tub or swimming pool that has bacteria in the water can cause it. It may also occur if a hair follicle is blocked by a bandage.  Sores often go away in a few days with no treatment. In some cases, medicine may be given. A small piece of skin or pus may be taken to find the type of bacteria causing the infection.  Home care  The healthcare provider may prescribe an antibiotic cream or ointment.  Oral antibiotics may also be prescribed. Or you may be told to use an over-the-counter antibiotic cream. Follow all instructions when using any of these medicines.  General care:  · Apply warm, moist compresses to the sores for 20 minutes up to 3 times a day. You can make a compress by soaking a cloth in warm water. Squeeze out excess water.  · Dont cut, poke, or squeeze the sores. This can be painful and spread infection.  · Dont scratch the affected area. Scratching can delay healing.  · Dont shave the areas affected by folliculitis.  · If the sores leak fluid, cover the area with a nonstick gauze bandage. Use as little tape as possible. Carefully discard all soiled bandages.  · Dress in loose cotton clothing.  · Change sheets and blankets if they are soiled by pus. Wash all clothes,  towels, sheets, and cloth diapers in soap and hot water. Do not share clothes, towels, or sheets with other family members.  · Do not soak the sores in bath water. This can spread infection. Instead, keep the area clean by gently washing sores with soap and warm water.  · Wash your hands or use antibacterial gels often to prevent spreading the bacteria.  Follow-up care  Follow up with your healthcare provider, or as advised.  When to seek medical advice  Call your healthcare provider right away if any of these occur:  · Fever of 100.4°F (38°C) or higher  · Spreading of the rash  · Rash does not get better with treatment  · Redness or swelling that gets worse  · Rash becomes more painful  · Foul-smelling fluid leaking from the skin  · Rash improves, but then comes back   Date Last Reviewed: 11/1/2016  © 7575-2863 [employer]. [address] [place] [address], [place] [address]. All rights reserved. This information is not intended as a substitute for professional medical care. Always follow your healthcare professional's instructions.

## 2019-05-24 ENCOUNTER — OFFICE VISIT (OUTPATIENT)
Dept: FAMILY MEDICINE | Facility: CLINIC | Age: 54
End: 2019-05-24
Payer: MEDICARE

## 2019-05-24 VITALS
TEMPERATURE: 99 F | SYSTOLIC BLOOD PRESSURE: 128 MMHG | OXYGEN SATURATION: 98 % | BODY MASS INDEX: 32.35 KG/M2 | WEIGHT: 188.5 LBS | HEART RATE: 79 BPM | DIASTOLIC BLOOD PRESSURE: 82 MMHG

## 2019-05-24 DIAGNOSIS — D84.9 IMMUNE DEFICIENCY DISORDER: Primary | ICD-10-CM

## 2019-05-24 DIAGNOSIS — Z76.89 ENCOUNTER FOR INCISION AND DRAINAGE PROCEDURE: ICD-10-CM

## 2019-05-24 PROCEDURE — 3074F SYST BP LT 130 MM HG: CPT | Mod: CPTII,S$GLB,, | Performed by: FAMILY MEDICINE

## 2019-05-24 PROCEDURE — 3079F PR MOST RECENT DIASTOLIC BLOOD PRESSURE 80-89 MM HG: ICD-10-PCS | Mod: CPTII,S$GLB,, | Performed by: FAMILY MEDICINE

## 2019-05-24 PROCEDURE — 99499 RISK ADDL DX/OHS AUDIT: ICD-10-PCS | Mod: S$GLB,,, | Performed by: FAMILY MEDICINE

## 2019-05-24 PROCEDURE — 3008F PR BODY MASS INDEX (BMI) DOCUMENTED: ICD-10-PCS | Mod: CPTII,S$GLB,, | Performed by: FAMILY MEDICINE

## 2019-05-24 PROCEDURE — 99214 PR OFFICE/OUTPT VISIT, EST, LEVL IV, 30-39 MIN: ICD-10-PCS | Mod: S$GLB,,, | Performed by: FAMILY MEDICINE

## 2019-05-24 PROCEDURE — 3008F BODY MASS INDEX DOCD: CPT | Mod: CPTII,S$GLB,, | Performed by: FAMILY MEDICINE

## 2019-05-24 PROCEDURE — 3074F PR MOST RECENT SYSTOLIC BLOOD PRESSURE < 130 MM HG: ICD-10-PCS | Mod: CPTII,S$GLB,, | Performed by: FAMILY MEDICINE

## 2019-05-24 PROCEDURE — 99214 OFFICE O/P EST MOD 30 MIN: CPT | Mod: S$GLB,,, | Performed by: FAMILY MEDICINE

## 2019-05-24 PROCEDURE — 99499 UNLISTED E&M SERVICE: CPT | Mod: S$GLB,,, | Performed by: FAMILY MEDICINE

## 2019-05-24 PROCEDURE — 99999 PR PBB SHADOW E&M-EST. PATIENT-LVL V: CPT | Mod: PBBFAC,,, | Performed by: FAMILY MEDICINE

## 2019-05-24 PROCEDURE — 3079F DIAST BP 80-89 MM HG: CPT | Mod: CPTII,S$GLB,, | Performed by: FAMILY MEDICINE

## 2019-05-24 PROCEDURE — 99999 PR PBB SHADOW E&M-EST. PATIENT-LVL V: ICD-10-PCS | Mod: PBBFAC,,, | Performed by: FAMILY MEDICINE

## 2019-05-24 NOTE — PROGRESS NOTES
Subjective:       Patient ID: Chasity Blake is a 53 y.o. female.    Chief Complaint: Follow-up (abscess)      HPI     Follow-up      Additional comments: abscess          Last edited by Alexsandra Terrell MA on 5/24/2019  1:11 PM. (History)       Abscesses and boils: seen 2 days ago and since then she has developed 2 more. She has one on the left axilla, pubic region, bilateral major labia. And she has one on the inguinal area that is open. And a healing one on the left pre auricular area.  She has pus bumps underneath bilateral breast.  She one on her right forearm that she popped and it is open with yellowish fibrin.  All her abscess and boils are Burning and tender.  Rates pain 10/10.  Denies fever.  She is currently on bactrim.      Recent HIV screen was negative. Autoimmune work up was negative in 2012.       Past Medical History:   Diagnosis Date    ADD (attention deficit disorder with hyperactivity)     Anxiety     Asthma     mild intermittent    Depression     Hypertension      Family History   Problem Relation Age of Onset    Lung cancer Mother     Coronary artery disease Mother 50    Coronary artery disease Father 38    Breast cancer Neg Hx     Colon cancer Neg Hx     Ovarian cancer Neg Hx      Social History     Socioeconomic History    Marital status:      Spouse name: Not on file    Number of children: Not on file    Years of education: Not on file    Highest education level: Not on file   Occupational History    Not on file   Social Needs    Financial resource strain: Not very hard    Food insecurity:     Worry: Never true     Inability: Never true    Transportation needs:     Medical: No     Non-medical: No   Tobacco Use    Smoking status: Current Every Day Smoker     Packs/day: 0.50     Years: 10.00     Pack years: 5.00     Types: Cigarettes    Smokeless tobacco: Never Used   Substance and Sexual Activity    Alcohol use: No     Alcohol/week: 0.0 oz     Frequency: Never      Drinks per session: Patient refused     Binge frequency: Never    Drug use: No    Sexual activity: Yes     Partners: Male     Birth control/protection: None   Lifestyle    Physical activity:     Days per week: 4 days     Minutes per session: 30 min    Stress: Rather much   Relationships    Social connections:     Talks on phone: More than three times a week     Gets together: Three times a week     Attends [employer] service: Not on file     Active member of club or organization: Yes     Attends meetings of clubs or organizations: 1 to 4 times per year     Relationship status:    Other Topics Concern    Not on file   Social History Narrative    Not on file     Outpatient Encounter Medications as of 5/24/2019   Medication Sig Dispense Refill    albuterol (ACCUNEB) 1.25 mg/3 mL Nebu USE ONE VIAL IN NEBULIZER EVERY 4 TO 6 HOURS AS NEEDED FOR WHEEZING & SHORTNESS OF BREATH **THANK YOU** 75 mL 6    albuterol (PROAIR HFA) 90 mcg/actuation inhaler INHALE TWO PUFFS INTO THE LUNGS EVERY 6 HOURS AS NEEDED FOR WHEEZING **THANK YOU** 25.5 g 5    albuterol-ipratropium (DUO-NEB) 2.5 mg-0.5 mg/3 mL nebulizer solution Take 3 mLs by nebulization every 6 (six) hours as needed for Wheezing. Rescue 1 Box 2    citalopram (CELEXA) 20 MG tablet TAKE ONE TABLET BY MOUTH ONCE DAILY **THANK YOU** 30 tablet 3    clindamycin (CLEOCIN T) 1 % external solution APPLY TO AFFECTED AREA TOPICALLY TWICE DAILY **THANK YOU** 30 mL 1    estradiol-levonorgestrel (CLIMARAPRO) 0.045-0.015 mg/24 hr Place 1 patch onto the skin once a week. 4 patch 11    fluconazole (DIFLUCAN) 150 MG Tab TAKE ONE TABLET BY MOUTH ONCE. MAY REPEAT DOSE AFTER ONE WEEK IF SYMPTOM IS STILL PRESENT THANK YOU  0    fluticasone (FLONASE) 50 mcg/actuation nasal spray 2 sprays by Each Nare route once daily. 16 g 11    fluticasone-salmeterol diskus inhaler 250-50 mcg Inhale 1 puff into the lungs every 12 (twelve) hours. 60 each 2    levocetirizine (XYZAL) 5 MG  tablet TAKE ONE TABLET BY MOUTH EVERY EVENING **THANK YOU** 30 tablet 5    [START ON 2019] lisdexamfetamine (VYVANSE) 40 MG Cap Take 1 capsule (40 mg total) by mouth every morning. 30 capsule 0    lisdexamfetamine (VYVANSE) 40 MG Cap Take 1 capsule (40 mg total) by mouth every morning. 30 capsule 0    montelukast (SINGULAIR) 10 mg tablet TAKE ONE TABLET BY MOUTH EVERY DAY **THANK YOU** 90 tablet 4    mupirocin calcium 2% (BACTROBAN) 2 % cream Apply topically 2 (two) times daily. 30 g 0    omeprazole (PRILOSEC) 40 MG capsule TAKE ONE CAPSULE BY MOUTH ONCE DAILY **THANK YOU** 90 capsule 3    ondansetron (ZOFRAN-ODT) 4 MG TbDL Take 1 tablet (4 mg total) by mouth every 8 (eight) hours as needed. 14 tablet 1    propranolol (INDERAL LA) 80 MG 24 hr capsule Take 1 capsule (80 mg total) by mouth once daily. 30 capsule 6    sulfamethoxazole-trimethoprim 800-160mg (BACTRIM DS) 800-160 mg Tab Take 1 tablet by mouth 2 (two) times daily. for 14 days 28 tablet 0    tiZANidine (ZANAFLEX) 4 mg Cap Take 1 capsule by mouth 2 (two) times daily as needed. 60 capsule 0    traMADol (ULTRAM) 50 mg tablet Take 1 tablet (50 mg total) by mouth every 8 (eight) hours as needed for Pain. 12 tablet 0    VYVANSE 40 mg Cap TAKE ONE CAPSULE BY MOUTH EVERY MORNING **THANK YOU** 30 capsule 0    aspirin 81 MG Chew Take 1 tablet (81 mg total) by mouth once daily. 30 tablet 0    epinephrine (EPIPEN 2-JHONY) 0.3 mg/0.3 mL AtIn Inject 0.3 mLs (0.3 mg total) into the muscle once. 0.3 mL 12    LORazepam (ATIVAN) 0.5 MG tablet Take 1 tablet (0.5 mg total) by mouth every 12 (twelve) hours as needed for Anxiety. 14 tablet 0    [] mupirocin (BACTROBAN) 2 % ointment Apply topically 2 (two) times daily. 1 Tube o     No facility-administered encounter medications on file as of 2019.        Review of Systems   Constitutional: Negative for chills and fever.   HENT: Negative for congestion and facial swelling.    Eyes: Negative for  discharge and itching.   Respiratory: Negative for cough and wheezing.    Cardiovascular: Negative for chest pain and palpitations.   Gastrointestinal: Negative for abdominal pain, nausea and vomiting.   Endocrine: Negative for cold intolerance and heat intolerance.   Genitourinary: Negative for dysuria and flank pain.   Musculoskeletal: Negative for myalgias and neck stiffness.   Skin: Negative for pallor and wound.        Multiple boils and abscesses.see HPI   Neurological: Negative for facial asymmetry and weakness.   Psychiatric/Behavioral: Negative for agitation and suicidal ideas.       Objective:      /82 (BP Location: Right arm, Patient Position: Sitting, BP Method: Large (Manual))   Pulse 79   Temp 99.2 °F (37.3 °C) (Oral)   Wt 85.5 kg (188 lb 7.9 oz)   SpO2 98%   BMI 32.35 kg/m²   Physical Exam   Constitutional: She is oriented to person, place, and time. She appears well-developed. No distress.   Crying and frustrated   HENT:   Head: Normocephalic and atraumatic.   Right Ear: External ear normal.   Left Ear: External ear normal.   Eyes: Conjunctivae and EOM are normal.   Neck: Neck supple. No thyromegaly present.   Cardiovascular: Normal rate and regular rhythm.   Pulmonary/Chest: Effort normal. No respiratory distress.   Abdominal: Soft. She exhibits no distension.   Genitourinary:   Genitourinary Comments: deferred   Musculoskeletal: She exhibits no edema or deformity.   Lymphadenopathy:        Head (right side): No submandibular adenopathy present.        Head (left side): No submandibular adenopathy present.     She has no cervical adenopathy.   Neurological: She is alert and oriented to person, place, and time.   Skin: Skin is warm and dry.   Multiple abscesses on boils in different stage of healing. The more prominent ones are slated for I&D today to help relief pressure and heal.    Psychiatric: She has a normal mood and affect. Her behavior is normal.   Nursing note and vitals  reviewed.      Results for orders placed or performed during the hospital encounter of 05/06/19   HIV 1/2 Ag/Ab (4th Gen)   Result Value Ref Range    HIV 1/2 Ag/Ab Negative Negative   Hepatitis C antibody   Result Value Ref Range    Hepatitis C Ab Negative    CBC auto differential   Result Value Ref Range    WBC 13.38 (H) 3.90 - 12.70 K/uL    RBC 4.61 4.00 - 5.40 M/uL    Hemoglobin 14.0 12.0 - 16.0 g/dL    Hematocrit 41.8 37.0 - 48.5 %    Mean Corpuscular Volume 91 82 - 98 fL    Mean Corpuscular Hemoglobin 30.4 27.0 - 31.0 pg    Mean Corpuscular Hemoglobin Conc 33.5 32.0 - 36.0 g/dL    RDW 14.0 11.5 - 14.5 %    Platelets 375 (H) 150 - 350 K/uL    MPV 11.0 9.2 - 12.9 fL    Gran # (ANC) 9.2 (H) 1.8 - 7.7 K/uL    Lymph # 2.6 1.0 - 4.8 K/uL    Mono # 0.9 0.3 - 1.0 K/uL    Eos # 0.6 (H) 0.0 - 0.5 K/uL    Baso # 0.02 0.00 - 0.20 K/uL    Gran% 69.0 38.0 - 73.0 %    Lymph% 19.7 18.0 - 48.0 %    Mono% 7.0 4.0 - 15.0 %    Eosinophil% 4.2 0.0 - 8.0 %    Basophil% 0.1 0.0 - 1.9 %    Differential Method Automated    Procalcitonin   Result Value Ref Range    Procalcitonin 0.02 <0.25 ng/mL     Assessment:       1. Immune deficiency disorder        Plan:   Immune deficiency disorder  -     Ambulatory referral to Immunology

## 2019-05-24 NOTE — PATIENT INSTRUCTIONS
Abscess (Incision & Drainage)  An abscess is sometimes called a boil. It happens when bacteria get trapped under the skin and start to grow. Pus forms inside the abscess as the body responds to the bacteria. An abscess can happen with an insect bite, ingrown hair, blocked oil gland, pimple, cyst, or puncture wound.  Your healthcare provider has drained the pus from your abscess. If the abscess pocket was large, your healthcare provider may have put in gauze packing. Your provider will need to remove it on your next visit. He or she may also replace it at that time. You may not need antibiotics to treat a simple abscess, unless the infection is spreading into the skin around the wound (cellulitis).  The wound will take about 1 to 2 weeks to heal, depending on the size of the abscess. Healthy tissue will grow from the bottom and sides of the opening until it seals over.  Home care  These tips can help your wound heal:  · The wound may drain for the first 2 days. Cover the wound with a clean dry dressing. Change the dressing if it becomes soaked with blood or pus.  · If a gauze packing was placed inside the abscess pocket, you may be told to remove it yourself. You may do this in the shower. Once the packing is removed, you should wash the area in the shower, or clean the area as directed by your provider. Continue to do this until the skin opening has closed. Make sure you wash your hands after changing the packing or cleaning the wound.  · If you were prescribed antibiotics, take them as directed until they are all gone.  · You may use acetaminophen or ibuprofen to control pain, unless another pain medicine was prescribed. If you have liver disease or ever had a stomach ulcer, talk with your doctor before using these medicines.  Follow-up care  Follow up with your healthcare provider, or as advised. If a gauze packing was put in your wound, it should be removed in 1 to 2 days. Check your wound every day for any  signs that the infection is getting worse. The signs are listed below.  When to seek medical advice  Call your healthcare provider right away if any of these occur:  · Increasing redness or swelling  · Red streaks in the skin leading away from the wound  · Increasing local pain or swelling  · Continued pus draining from the wound 2 days after treatment  · Fever of 100.4ºF (38ºC) or higher, or as directed by your healthcare provider  · Boil returns when you are at home  Date Last Reviewed: 9/1/2016  © 9141-9693 [employer]. [address] [place] [id number]. All rights reserved. This information is not intended as a substitute for professional medical care. Always follow your healthcare professional's instructions.

## 2019-05-24 NOTE — PROCEDURES
"Incision & Drainage  Date/Time: 5/24/2019 1:00 PM  Performed by: Gladys Nicolas MD  Authorized by: Gladys Nicolas MD     Time out: Immediately prior to procedure a "time out" was called to verify the correct patient, procedure, equipment, support staff and site/side marked as required.    Consent Done?:  Yes (Written)    Type:  Abscess  Body area:  Anogenital (pubic region, right labia abd left axilla)  Anesthesia:  Local infiltration  Local anesthetic: lidocaine 1% with epinephrine  Anesthetic total (ml):  7  Scalpel size:  10  Incision type:  Single straight  Complexity:  Simple  Drainage:  Serosanguinous  Drainage amount:  Scant  Wound treatment:  Expression of material and deloculation  Patient tolerance:  Patient tolerated the procedure well with no immediate complications    Home care discussed with patient      "

## 2019-05-28 ENCOUNTER — PATIENT MESSAGE (OUTPATIENT)
Dept: FAMILY MEDICINE | Facility: CLINIC | Age: 54
End: 2019-05-28

## 2019-06-05 ENCOUNTER — DOCUMENTATION ONLY (OUTPATIENT)
Dept: FAMILY MEDICINE | Facility: CLINIC | Age: 54
End: 2019-06-05

## 2019-06-05 ENCOUNTER — PATIENT MESSAGE (OUTPATIENT)
Dept: FAMILY MEDICINE | Facility: CLINIC | Age: 54
End: 2019-06-05

## 2019-06-11 ENCOUNTER — PATIENT MESSAGE (OUTPATIENT)
Dept: FAMILY MEDICINE | Facility: CLINIC | Age: 54
End: 2019-06-11

## 2019-07-11 ENCOUNTER — OFFICE VISIT (OUTPATIENT)
Dept: FAMILY MEDICINE | Facility: CLINIC | Age: 54
End: 2019-07-11
Payer: COMMERCIAL

## 2019-07-11 VITALS
SYSTOLIC BLOOD PRESSURE: 133 MMHG | BODY MASS INDEX: 32.53 KG/M2 | WEIGHT: 189.5 LBS | TEMPERATURE: 98 F | OXYGEN SATURATION: 96 % | HEART RATE: 75 BPM | DIASTOLIC BLOOD PRESSURE: 84 MMHG

## 2019-07-11 DIAGNOSIS — M79.7 FIBROMYALGIA: ICD-10-CM

## 2019-07-11 DIAGNOSIS — T75.3XXD MOTION SICKNESS, SUBSEQUENT ENCOUNTER: ICD-10-CM

## 2019-07-11 DIAGNOSIS — Z12.39 BREAST CANCER SCREENING: ICD-10-CM

## 2019-07-11 DIAGNOSIS — Z12.11 COLON CANCER SCREENING: ICD-10-CM

## 2019-07-11 DIAGNOSIS — F98.8 ATTENTION DEFICIT DISORDER, UNSPECIFIED HYPERACTIVITY PRESENCE: Primary | ICD-10-CM

## 2019-07-11 DIAGNOSIS — T78.2XXD ANAPHYLAXIS, SUBSEQUENT ENCOUNTER: ICD-10-CM

## 2019-07-11 PROCEDURE — 3008F BODY MASS INDEX DOCD: CPT | Mod: CPTII,S$GLB,, | Performed by: FAMILY MEDICINE

## 2019-07-11 PROCEDURE — 99214 PR OFFICE/OUTPT VISIT, EST, LEVL IV, 30-39 MIN: ICD-10-PCS | Mod: S$GLB,,, | Performed by: FAMILY MEDICINE

## 2019-07-11 PROCEDURE — 3079F PR MOST RECENT DIASTOLIC BLOOD PRESSURE 80-89 MM HG: ICD-10-PCS | Mod: CPTII,S$GLB,, | Performed by: FAMILY MEDICINE

## 2019-07-11 PROCEDURE — 3079F DIAST BP 80-89 MM HG: CPT | Mod: CPTII,S$GLB,, | Performed by: FAMILY MEDICINE

## 2019-07-11 PROCEDURE — 99214 OFFICE O/P EST MOD 30 MIN: CPT | Mod: S$GLB,,, | Performed by: FAMILY MEDICINE

## 2019-07-11 PROCEDURE — 3075F PR MOST RECENT SYSTOLIC BLOOD PRESS GE 130-139MM HG: ICD-10-PCS | Mod: CPTII,S$GLB,, | Performed by: FAMILY MEDICINE

## 2019-07-11 PROCEDURE — 99999 PR PBB SHADOW E&M-EST. PATIENT-LVL III: ICD-10-PCS | Mod: PBBFAC,,, | Performed by: FAMILY MEDICINE

## 2019-07-11 PROCEDURE — 3075F SYST BP GE 130 - 139MM HG: CPT | Mod: CPTII,S$GLB,, | Performed by: FAMILY MEDICINE

## 2019-07-11 PROCEDURE — 99999 PR PBB SHADOW E&M-EST. PATIENT-LVL III: CPT | Mod: PBBFAC,,, | Performed by: FAMILY MEDICINE

## 2019-07-11 PROCEDURE — 3008F PR BODY MASS INDEX (BMI) DOCUMENTED: ICD-10-PCS | Mod: CPTII,S$GLB,, | Performed by: FAMILY MEDICINE

## 2019-07-11 RX ORDER — LISDEXAMFETAMINE DIMESYLATE 40 MG/1
CAPSULE ORAL
Qty: 30 CAPSULE | Refills: 0 | Status: SHIPPED | OUTPATIENT
Start: 2019-07-11 | End: 2019-09-18 | Stop reason: SDUPTHER

## 2019-07-11 RX ORDER — LISDEXAMFETAMINE DIMESYLATE 40 MG/1
40 CAPSULE ORAL DAILY
Qty: 30 CAPSULE | Refills: 0 | Status: SHIPPED | OUTPATIENT
Start: 2019-08-10 | End: 2019-09-09

## 2019-07-11 RX ORDER — LISDEXAMFETAMINE DIMESYLATE 40 MG/1
40 CAPSULE ORAL DAILY
Qty: 30 CAPSULE | Refills: 0 | Status: SHIPPED | OUTPATIENT
Start: 2019-09-11 | End: 2019-09-18 | Stop reason: SDUPTHER

## 2019-07-11 RX ORDER — TIZANIDINE HYDROCHLORIDE 4 MG/1
1 CAPSULE, GELATIN COATED ORAL 2 TIMES DAILY PRN
Qty: 60 CAPSULE | Refills: 0 | Status: SHIPPED | OUTPATIENT
Start: 2019-07-11 | End: 2021-08-27 | Stop reason: SDUPTHER

## 2019-07-11 RX ORDER — ONDANSETRON 4 MG/1
4 TABLET, ORALLY DISINTEGRATING ORAL EVERY 8 HOURS PRN
Qty: 14 TABLET | Refills: 1 | Status: SHIPPED | OUTPATIENT
Start: 2019-07-11 | End: 2020-08-12 | Stop reason: SDUPTHER

## 2019-07-11 RX ORDER — EPINEPHRINE 0.3 MG/.3ML
1 INJECTION SUBCUTANEOUS ONCE
Qty: 0.3 ML | Refills: 12 | Status: SHIPPED | OUTPATIENT
Start: 2019-07-11 | End: 2019-07-11

## 2019-07-11 NOTE — PROGRESS NOTES
Subjective:       Patient ID: Chasity Blake is a 53 y.o. female.    Chief Complaint:  Follow-up on ADD, generalized body ache, and flat ache reaction, and motion sickness.    ADD:  Here for ADD FU  Chronic condition, symptoms are controlled.  Current medication is Vyvanse 40 mg daily  Denies side effects of medication./Side effect is controlled.  Will need a refill today.  Generalized body msk aches ; acute on chronic due to occupation.  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  Tizanidine prn helps and a prescription would last a year.  Denies any new symptoms.  She has been diagnosed with fibromyalgia.  Motion sickness:  Present most of her life, while traveling on land or air.  She likes to keep Zofran as needed for nausea, asking for a refill.    Past Medical History:   Diagnosis Date    ADD (attention deficit disorder with hyperactivity)     Anxiety     Asthma     mild intermittent    Depression     Hypertension      Family History   Problem Relation Age of Onset    Lung cancer Mother     Coronary artery disease Mother 50    Coronary artery disease Father 38    Breast cancer Neg Hx     Colon cancer Neg Hx     Ovarian cancer Neg Hx      Social History     Socioeconomic History    Marital status:      Spouse name: Not on file    Number of children: Not on file    Years of education: Not on file    Highest education level: Not on file   Occupational History    Not on file   Social Needs    Financial resource strain: Not very hard    Food insecurity:     Worry: Never true     Inability: Never true    Transportation needs:     Medical: No     Non-medical: No   Tobacco Use    Smoking status: Current Every Day Smoker     Packs/day: 0.50     Years: 10.00     Pack years: 5.00     Types: Cigarettes    Smokeless tobacco: Never Used   Substance and Sexual Activity    Alcohol use: No     Alcohol/week: 0.0 oz     Frequency: Never     Drinks per  session: Patient refused     Binge frequency: Never    Drug use: No    Sexual activity: Yes     Partners: Male     Birth control/protection: None   Lifestyle    Physical activity:     Days per week: 4 days     Minutes per session: 30 min    Stress: Rather much   Relationships    Social connections:     Talks on phone: More than three times a week     Gets together: Three times a week     Attends [employer] service: Not on file     Active member of club or organization: Yes     Attends meetings of clubs or organizations: 1 to 4 times per year     Relationship status:    Other Topics Concern    Not on file   Social History Narrative    Not on file     Outpatient Encounter Medications as of 7/11/2019   Medication Sig Dispense Refill    albuterol (ACCUNEB) 1.25 mg/3 mL Nebu USE ONE VIAL IN NEBULIZER EVERY 4 TO 6 HOURS AS NEEDED FOR WHEEZING & SHORTNESS OF BREATH **THANK YOU** 75 mL 6    albuterol (PROAIR HFA) 90 mcg/actuation inhaler INHALE TWO PUFFS INTO THE LUNGS EVERY 6 HOURS AS NEEDED FOR WHEEZING **THANK YOU** 25.5 g 5    albuterol-ipratropium (DUO-NEB) 2.5 mg-0.5 mg/3 mL nebulizer solution Take 3 mLs by nebulization every 6 (six) hours as needed for Wheezing. Rescue 1 Box 2    aspirin 81 MG Chew Take 1 tablet (81 mg total) by mouth once daily. 30 tablet 0    citalopram (CELEXA) 20 MG tablet TAKE ONE TABLET BY MOUTH ONCE DAILY **THANK YOU** 30 tablet 3    clindamycin (CLEOCIN T) 1 % external solution APPLY TO AFFECTED AREA TOPICALLY TWICE DAILY **THANK YOU** 30 mL 1    EPINEPHrine (EPIPEN 2-JHONY) 0.3 mg/0.3 mL AtIn Inject 0.3 mLs (0.3 mg total) into the muscle once. for 1 dose 0.3 mL 12    estradiol-levonorgestrel (CLIMARAPRO) 0.045-0.015 mg/24 hr Place 1 patch onto the skin once a week. 4 patch 11    fluconazole (DIFLUCAN) 150 MG Tab TAKE ONE TABLET BY MOUTH ONCE. MAY REPEAT DOSE AFTER ONE WEEK IF SYMPTOM IS STILL PRESENT THANK YOU  0    fluticasone (FLONASE) 50 mcg/actuation nasal spray 2  sprays by Each Nare route once daily. 16 g 11    fluticasone-salmeterol diskus inhaler 250-50 mcg Inhale 1 puff into the lungs every 12 (twelve) hours. 60 each 2    levocetirizine (XYZAL) 5 MG tablet TAKE ONE TABLET BY MOUTH EVERY EVENING **THANK YOU** 30 tablet 5    lisdexamfetamine (VYVANSE) 40 MG Cap TAKE ONE CAPSULE BY MOUTH EVERY MORNING **THANK YOU** 30 capsule 0    LORazepam (ATIVAN) 0.5 MG tablet Take 1 tablet (0.5 mg total) by mouth every 12 (twelve) hours as needed for Anxiety. 14 tablet 0    montelukast (SINGULAIR) 10 mg tablet TAKE ONE TABLET BY MOUTH EVERY DAY **THANK YOU** 90 tablet 4    omeprazole (PRILOSEC) 40 MG capsule TAKE ONE CAPSULE BY MOUTH ONCE DAILY **THANK YOU** 90 capsule 3    ondansetron (ZOFRAN-ODT) 4 MG TbDL Take 1 tablet (4 mg total) by mouth every 8 (eight) hours as needed. 14 tablet 1    propranolol (INDERAL LA) 80 MG 24 hr capsule Take 1 capsule (80 mg total) by mouth once daily. 30 capsule 6    tiZANidine (ZANAFLEX) 4 mg Cap Take 1 capsule by mouth 2 (two) times daily as needed. 60 capsule 0    [DISCONTINUED] epinephrine (EPIPEN 2-JHONY) 0.3 mg/0.3 mL AtIn Inject 0.3 mLs (0.3 mg total) into the muscle once. 0.3 mL 12    [DISCONTINUED] ondansetron (ZOFRAN-ODT) 4 MG TbDL Take 1 tablet (4 mg total) by mouth every 8 (eight) hours as needed. 14 tablet 1    [DISCONTINUED] tiZANidine (ZANAFLEX) 4 mg Cap Take 1 capsule by mouth 2 (two) times daily as needed. 60 capsule 0    [DISCONTINUED] VYVANSE 40 mg Cap TAKE ONE CAPSULE BY MOUTH EVERY MORNING **THANK YOU** 30 capsule 0    [START ON 8/10/2019] lisdexamfetamine (VYVANSE) 40 MG Cap Take 1 capsule (40 mg total) by mouth once daily. 30 capsule 0    [START ON 9/11/2019] lisdexamfetamine (VYVANSE) 40 MG Cap Take 1 capsule (40 mg total) by mouth once daily. 30 capsule 0     No facility-administered encounter medications on file as of 7/11/2019.        Review of Systems   Constitutional: Negative for chills and fever.   HENT:  Negative for congestion and facial swelling.    Eyes: Negative for discharge and itching.   Respiratory: Negative for cough and wheezing.    Cardiovascular: Negative for chest pain and palpitations.   Gastrointestinal: Negative for abdominal pain, nausea and vomiting.   Endocrine: Negative for cold intolerance and heat intolerance.   Genitourinary: Negative for dysuria and flank pain.   Musculoskeletal: Positive for back pain and myalgias. Negative for neck stiffness.   Skin: Negative for pallor and wound.   Neurological: Negative for facial asymmetry and weakness.   Psychiatric/Behavioral: Negative for agitation and suicidal ideas.       Objective:      /84 (BP Location: Right arm, Patient Position: Sitting, BP Method: Medium (Automatic))   Pulse 75   Temp 98.1 °F (36.7 °C) (Oral)   Wt 86 kg (189 lb 7.8 oz)   SpO2 96%   BMI 32.53 kg/m²   Physical Exam   Constitutional: She is oriented to person, place, and time. She appears well-developed. No distress.   HENT:   Head: Normocephalic and atraumatic.   Right Ear: External ear normal.   Left Ear: External ear normal.   Eyes: Conjunctivae and EOM are normal.   Neck: Neck supple. No thyromegaly present.   Cardiovascular: Normal rate and regular rhythm.   Pulmonary/Chest: Effort normal. No respiratory distress.   Abdominal: Soft. She exhibits no distension.   Genitourinary:   Genitourinary Comments: deferred   Musculoskeletal: Normal range of motion. She exhibits no edema or deformity.        Right shoulder: She exhibits normal range of motion.        Arms:  Paraspinal tenderness   Lymphadenopathy:        Head (right side): No submandibular adenopathy present.        Head (left side): No submandibular adenopathy present.     She has no cervical adenopathy.   Neurological: She is alert and oriented to person, place, and time.   Skin: Skin is warm and dry.   Psychiatric: She has a normal mood and affect. Her behavior is normal.   Nursing note and vitals  reviewed.      Results for orders placed or performed during the hospital encounter of 05/06/19   HIV 1/2 Ag/Ab (4th Gen)   Result Value Ref Range    HIV 1/2 Ag/Ab Negative Negative   Hepatitis C antibody   Result Value Ref Range    Hepatitis C Ab Negative    CBC auto differential   Result Value Ref Range    WBC 13.38 (H) 3.90 - 12.70 K/uL    RBC 4.61 4.00 - 5.40 M/uL    Hemoglobin 14.0 12.0 - 16.0 g/dL    Hematocrit 41.8 37.0 - 48.5 %    Mean Corpuscular Volume 91 82 - 98 fL    Mean Corpuscular Hemoglobin 30.4 27.0 - 31.0 pg    Mean Corpuscular Hemoglobin Conc 33.5 32.0 - 36.0 g/dL    RDW 14.0 11.5 - 14.5 %    Platelets 375 (H) 150 - 350 K/uL    MPV 11.0 9.2 - 12.9 fL    Gran # (ANC) 9.2 (H) 1.8 - 7.7 K/uL    Lymph # 2.6 1.0 - 4.8 K/uL    Mono # 0.9 0.3 - 1.0 K/uL    Eos # 0.6 (H) 0.0 - 0.5 K/uL    Baso # 0.02 0.00 - 0.20 K/uL    Gran% 69.0 38.0 - 73.0 %    Lymph% 19.7 18.0 - 48.0 %    Mono% 7.0 4.0 - 15.0 %    Eosinophil% 4.2 0.0 - 8.0 %    Basophil% 0.1 0.0 - 1.9 %    Differential Method Automated    Procalcitonin   Result Value Ref Range    Procalcitonin 0.02 <0.25 ng/mL     Assessment:       1. Attention deficit disorder, unspecified hyperactivity presence    2. Motion sickness, subsequent encounter    3. Fibromyalgia    4. Anaphylaxis, subsequent encounter    5. Breast cancer screening    6. Colon cancer screening        Plan:   Attention deficit disorder, unspecified hyperactivity presence  -     lisdexamfetamine (VYVANSE) 40 MG Cap; TAKE ONE CAPSULE BY MOUTH EVERY MORNING **THANK YOU**  Dispense: 30 capsule; Refill: 0  -     lisdexamfetamine (VYVANSE) 40 MG Cap; Take 1 capsule (40 mg total) by mouth once daily.  Dispense: 30 capsule; Refill: 0  -     lisdexamfetamine (VYVANSE) 40 MG Cap; Take 1 capsule (40 mg total) by mouth once daily.  Dispense: 30 capsule; Refill: 0    Motion sickness, subsequent encounter  -     ondansetron (ZOFRAN-ODT) 4 MG TbDL; Take 1 tablet (4 mg total) by mouth every 8 (eight) hours  as needed.  Dispense: 14 tablet; Refill: 1    Fibromyalgia  -     tiZANidine (ZANAFLEX) 4 mg Cap; Take 1 capsule by mouth 2 (two) times daily as needed.  Dispense: 60 capsule; Refill: 0    Anaphylaxis, subsequent encounter  -     EPINEPHrine (EPIPEN 2-JHONY) 0.3 mg/0.3 mL AtIn; Inject 0.3 mLs (0.3 mg total) into the muscle once. for 1 dose  Dispense: 0.3 mL; Refill: 12    Breast cancer screening  -     Mammo Digital Screening Bilateral With CAD; Future; Expected date: 07/11/2019    Colon cancer screening  -     Fecal Immunochemical Test (iFOBT); Future; Expected date: 07/11/2019

## 2019-07-11 NOTE — PATIENT INSTRUCTIONS
Anaphylaxis  Anaphylaxis is a severe allergic reaction that can be life threatening. This reaction can happen in a few minutes, or a few hours after exposure to what you are allergic to. Some people are more prone to this than others.  The symptoms of an anaphylactic reaction may at first seem similar to other allergic reactions. If this has happened to you in the past, do not let the initial mild symptoms, such as a rash, hives and itching, mislead you. Your reaction can worsen very quickly and become much more severe and life threatening within minutes.  More severe symptoms include:  · Trouble swallowing, feeling like your throat is closing  · Trouble breathing, wheezing  · Cool, moist or pale (blue in color) skin  · Hoarse voice or trouble speaking  · Nausea, vomiting, diarrhea, stomach cramps, or pain  · Feeling faint or lightheaded, rapid heart rate, low blood pressure  · Feeling dizzy or confused  · Becoming very drowsy, poorly responsive, or trouble awakening  · Seizure  Sometimes the cause may be obvious, like knowing you are allergic to peanuts. To help identify your allergen, remember:  · When it started  · What you were doing at the time or just before that  · Any activities you were involved in  · Any new products or contacts   Here are some common causes, but remember almost anything can cause a reaction, and you may not even be aware that you came into contact with one of these things.  · Dust, mold, pollen  · Plants, such as poison ivy and poison oak  · Animals  · Foods such as shrimp, shellfish, peanuts, milk products, gluten, eggs; also colorings, flavorings, additives  · Insect bites or stings such as bees, mosquitos, fleas, ticks  · Medicines such as penicillin, sulfa drugs, amoxicillin, aspirin, ibuprofen; any medicine can cause a reaction  · Jewelry such as nickel, or gold (new, or something youve worn for a while including zippers, and buttons)  · Latex such as in gloves, clothes, toys,  balloons, or some tapes (some people allergic to latex may also  have problems with foods like bananas, avocados, kiwi, papaya, or chestnuts)  · Lotions, perfumes, cosmetics, soaps, shampoos, skincare products, nail products  · Chemicals or dyes in clothing, linen, , hair dyes, soaps, iodine  If you are exposed to the same substance again, you may have the same or more severe reaction. Treatment for anaphylaxis is epinephrine (adrenalin). This is available by prescription as a self-injectable pen. If the cause of your reaction is known, you should avoid exposure in the future. If the cause is not known, follow up with your doctor for special testing to determine what you are allergic to.     Home care  If it was safe for you to go home, watch for any worsening of symptoms. You may need to be treated again.  Medicines  Injectable epinephrine  One of the key tools in treating anaphylaxis is early use of epinephrine. If you had a severe allergic or anaphylactic reaction, the doctor may prescribe a self- injectable epinephrine kit. If this was prescribed, carry it at all times. It can be life saving. Epinephrine can help stop the progression of an allergic reaction. Its effects are brief, so after you use the medicine, it is still very important to call 911 and get to an emergency room.  When to use injectable epinephrine. Use the epinephrine if you have a history of severe reactions or any of the following symptoms:  · Swelling in your mouth or throat   · Trouble speaking or swallowing  · Trouble breathing  · Feeling faint, low blood pressure, or becoming drowsy or poorly responsive  · Worsening rash  How to use injectable epinephrine:  · Hold the syringe firmly in your hand with the orange (or black) needle end away from your thumb  · Be careful not to stick your fingers or hand with the needle.  · At the opposite end, pull off the activation cap- the blue or grey tab  · Holding the syringe tightly, jab it  into the outer part of your upper thigh. This is one of the softest, fleshiest parts of the upper leg, and is not near a major blood vessel or nerve. Be careful not to inject it into your hip or any place that there is a pulse.  · You can inject it through pants, but make sure not to inject it into the seam of the pants.  · Do not pull it out right away. Try to hold the needle in place for 10 seconds.  · Massage the spot for a few seconds or as directed by your healthcare provider.  · If you are injecting it in someone else or a child, try to hold them or their leg still. If they jerk or yank their legs away as you are doing it, it can cause a cut on their leg.  You may feel shaky, jittery, nervous, and anxious after the injection. Although it is difficult, try to relax. This is a side effect of the epinephrine, and should stop after a few minutes   Important  · Get to the emergency room after using the epinephrine. Its affect will wear off, and you may have a second reaction. This could even happen hours later.  · Never intentionally eat, use, or expose yourself to the substance that caused the anaphylactic reaction.  Nothing is foolproof, including the injectable epinephrine.  Other medicines  The doctor may prescribe medicine to relieve swelling, itching, and pain. Follow the doctors instructions when using this medicine.   · Oral diphenhydramine is an antihistamine available at drug and grocery stores. Unless a prescription antihistamine was given, diphenhydramine may be used to reduce itching if large areas of the skin are involved. It may make you sleepy, so be careful using it in the daytime or when going to school, working, or driving.   · Do not use diphenhydramine cream on your skin, because in some people it can cause a further reaction.  · You may use over-the-counter acetaminophen or ibuprofen to control pain, unless another pain medicine was prescribed.  · If you were prescribed any medicines to  prevent symptoms from returning, be sure to take them exactly as directed.  General care  · Rest at home for the next 24 hours.  · Avoid tobacco and alcohol consumption. These may worsen your symptoms.  · If you know what caused your reaction today, avoid that in the future since the next reaction may be worse. Let your family members, friends and personal physician know about your allergic reaction.  · If your allergy was to food, learn how to read food labels so you can check for that ingredient. If a product does not have a label, it is best to avoid it.  · Consider carrying an identification card or getting a medical alert bracelet to inform medical personnel of your condition in case you cannot tell them.  · Tell all of your healthcare providers that you had an anaphylactic reaction. Make certain the information is added to your electronic or paper medical records.  Follow-up care  Follow up with your doctor or as advised if you are not improving over the next 1 to 2 days.  Call 911  Call 911 if any of these occur:  · Trouble breathing or swallowing, wheezing  · Hoarse voice or trouble speaking  · Chest pain  · Confused  · Very drowsy or trouble awakening  · Fainting or loss of consciousness  · Rapid heart rate  · Vomiting blood, or large amounts of blood in stool  · Seizure  · Swelling in the eyes, mouth, face, or tongue  · Dizziness or weakness  · Cool, moist, or pale (blue in color) skin  · Nausea, vomiting, diarrhea, stomach cramps, or abdominal pain  When to seek medical advice  Call your healthcare provider right away if any of these occur:  · Worsening of your symptoms  · New symptoms develop  · Symptoms don't go away or come back  Date Last Reviewed: 4/1/2017  © 2559-4077 [employer]. [address] [place] [address], [place] [address]. All rights reserved. This information is not intended as a substitute for professional medical care. Always follow your healthcare professional's  instructions.        Myalgias  Myalgias are another word for muscle aches and soreness. This is a symptom, not a disease. Myalgias can have many causes. A cold, the flu, or an acute infection can cause them. So can any illness with a high fever. They may happen after exertion (such as heavy exercise) or injury (such as an accident or fall). Some medicines (such as statins and certain antidepressants) can cause myalgias. They can also be a symptom of chronic or ongoing medical problems (such as lupus, chronic fatigue, or hypothyroidism). With these illnesses, other serious symptoms often occur in addition to muscle pain and soreness.    Myalgias most often go away on their own. If they don't go away, come back, or are severe, testing may be needed to help find the cause.  Home care  · Rest until you feel better.  · Follow instructions that you were given for how to care for yourself. This may depend on the cause of your myalgias.   · If myalgia is thought to be due to a medicine, be sure to talk to the doctor that prescribed the medicine about the best course of action.  · To control pain, take prescription or over-the-counter medicines as directed. Unless told not to, you can try acetaminophen or ibuprofen.  Follow-up care  Follow up with your healthcare provider or as advised. If your symptoms do not go away in a few days or if they come back, follow up with your healthcare provider for an exam and testing.  When to see medical advice  Call your healthcare provider for any of the following:  · Fever of 100.4°F (38ºC) or higher, or as directed by your healthcare provider  · Pain that gets worse and not better, or that goes away and comes back  · New joint pains  · New rash  · Severe headache, neck pain, drowsiness, or confusion  Date Last Reviewed: 3/1/2017  © 1053-3320 [employer]. [address] [place] [address], [place] [address]. All rights reserved. This information is not intended as a substitute for  professional medical care. Always follow your healthcare professional's instructions.

## 2019-07-18 ENCOUNTER — PATIENT OUTREACH (OUTPATIENT)
Dept: ADMINISTRATIVE | Facility: HOSPITAL | Age: 54
End: 2019-07-18

## 2019-07-18 NOTE — PROGRESS NOTES
Contacted patient to follow up on overdue fit kit. Patient stated she was going to do the kit today but doesn't have her car, but she will do it tomorrow.

## 2019-08-01 DIAGNOSIS — F41.1 GAD (GENERALIZED ANXIETY DISORDER): ICD-10-CM

## 2019-08-01 RX ORDER — OMEPRAZOLE 40 MG/1
CAPSULE, DELAYED RELEASE ORAL
Qty: 90 CAPSULE | Refills: 3 | OUTPATIENT
Start: 2019-08-01

## 2019-08-01 RX ORDER — CITALOPRAM 20 MG/1
TABLET, FILM COATED ORAL
Qty: 30 TABLET | Refills: 3 | OUTPATIENT
Start: 2019-08-01

## 2019-08-02 ENCOUNTER — PATIENT MESSAGE (OUTPATIENT)
Dept: FAMILY MEDICINE | Facility: CLINIC | Age: 54
End: 2019-08-02

## 2019-08-02 DIAGNOSIS — F41.1 GAD (GENERALIZED ANXIETY DISORDER): ICD-10-CM

## 2019-08-05 RX ORDER — OMEPRAZOLE 40 MG/1
CAPSULE, DELAYED RELEASE ORAL
Qty: 90 CAPSULE | Refills: 3 | OUTPATIENT
Start: 2019-08-05

## 2019-08-05 RX ORDER — CITALOPRAM 20 MG/1
TABLET, FILM COATED ORAL
Qty: 30 TABLET | Refills: 3 | OUTPATIENT
Start: 2019-08-05

## 2019-08-06 ENCOUNTER — PATIENT OUTREACH (OUTPATIENT)
Dept: ADMINISTRATIVE | Facility: HOSPITAL | Age: 54
End: 2019-08-06

## 2019-08-06 NOTE — PROGRESS NOTES
Health Maintenance reviewed.   Immunizations: Abstracted.  Care Everywhere abstracted: No new results found.  Attempting to contact pt to schedule over due HM:Pap & Colon:Due. Unable to reach patient at this time. Left voicemail.

## 2019-08-07 ENCOUNTER — LAB VISIT (OUTPATIENT)
Dept: LAB | Facility: HOSPITAL | Age: 54
End: 2019-08-07
Attending: ALLERGY & IMMUNOLOGY
Payer: COMMERCIAL

## 2019-08-07 ENCOUNTER — OFFICE VISIT (OUTPATIENT)
Dept: ALLERGY | Facility: CLINIC | Age: 54
End: 2019-08-07
Payer: COMMERCIAL

## 2019-08-07 VITALS
SYSTOLIC BLOOD PRESSURE: 100 MMHG | HEIGHT: 64 IN | DIASTOLIC BLOOD PRESSURE: 70 MMHG | HEART RATE: 74 BPM | BODY MASS INDEX: 32.6 KG/M2 | RESPIRATION RATE: 15 BRPM | WEIGHT: 190.94 LBS | TEMPERATURE: 99 F

## 2019-08-07 DIAGNOSIS — L50.9 URTICARIA, UNSPECIFIED: ICD-10-CM

## 2019-08-07 DIAGNOSIS — K21.9 GASTROESOPHAGEAL REFLUX DISEASE, ESOPHAGITIS PRESENCE NOT SPECIFIED: ICD-10-CM

## 2019-08-07 DIAGNOSIS — L29.9 ITCHING: ICD-10-CM

## 2019-08-07 DIAGNOSIS — L30.9 DERMATITIS: Primary | ICD-10-CM

## 2019-08-07 DIAGNOSIS — L30.9 DERMATITIS: ICD-10-CM

## 2019-08-07 PROCEDURE — 86774 TETANUS ANTIBODY: CPT

## 2019-08-07 PROCEDURE — 3074F SYST BP LT 130 MM HG: CPT | Mod: CPTII,S$GLB,, | Performed by: ALLERGY & IMMUNOLOGY

## 2019-08-07 PROCEDURE — 99999 PR PBB SHADOW E&M-EST. PATIENT-LVL III: ICD-10-PCS | Mod: PBBFAC,,, | Performed by: ALLERGY & IMMUNOLOGY

## 2019-08-07 PROCEDURE — 86255 FLUORESCENT ANTIBODY SCREEN: CPT | Mod: 91

## 2019-08-07 PROCEDURE — 3008F BODY MASS INDEX DOCD: CPT | Mod: CPTII,S$GLB,, | Performed by: ALLERGY & IMMUNOLOGY

## 2019-08-07 PROCEDURE — 84165 PROTEIN E-PHORESIS SERUM: CPT | Mod: 26,,, | Performed by: PATHOLOGY

## 2019-08-07 PROCEDURE — 82785 ASSAY OF IGE: CPT

## 2019-08-07 PROCEDURE — 99204 PR OFFICE/OUTPT VISIT, NEW, LEVL IV, 45-59 MIN: ICD-10-PCS | Mod: S$GLB,,, | Performed by: ALLERGY & IMMUNOLOGY

## 2019-08-07 PROCEDURE — 84165 PROTEIN E-PHORESIS SERUM: CPT

## 2019-08-07 PROCEDURE — 83520 IMMUNOASSAY QUANT NOS NONAB: CPT

## 2019-08-07 PROCEDURE — 36415 COLL VENOUS BLD VENIPUNCTURE: CPT

## 2019-08-07 PROCEDURE — 82784 ASSAY IGA/IGD/IGG/IGM EACH: CPT | Mod: 59

## 2019-08-07 PROCEDURE — 86162 COMPLEMENT TOTAL (CH50): CPT

## 2019-08-07 PROCEDURE — 86038 ANTINUCLEAR ANTIBODIES: CPT

## 2019-08-07 PROCEDURE — 84165 PATHOLOGIST INTERPRETATION SPE: ICD-10-PCS | Mod: 26,,, | Performed by: PATHOLOGY

## 2019-08-07 PROCEDURE — 3078F PR MOST RECENT DIASTOLIC BLOOD PRESSURE < 80 MM HG: ICD-10-PCS | Mod: CPTII,S$GLB,, | Performed by: ALLERGY & IMMUNOLOGY

## 2019-08-07 PROCEDURE — 3008F PR BODY MASS INDEX (BMI) DOCUMENTED: ICD-10-PCS | Mod: CPTII,S$GLB,, | Performed by: ALLERGY & IMMUNOLOGY

## 2019-08-07 PROCEDURE — 82787 IGG 1 2 3 OR 4 EACH: CPT

## 2019-08-07 PROCEDURE — 3074F PR MOST RECENT SYSTOLIC BLOOD PRESSURE < 130 MM HG: ICD-10-PCS | Mod: CPTII,S$GLB,, | Performed by: ALLERGY & IMMUNOLOGY

## 2019-08-07 PROCEDURE — 3078F DIAST BP <80 MM HG: CPT | Mod: CPTII,S$GLB,, | Performed by: ALLERGY & IMMUNOLOGY

## 2019-08-07 PROCEDURE — 99204 OFFICE O/P NEW MOD 45 MIN: CPT | Mod: S$GLB,,, | Performed by: ALLERGY & IMMUNOLOGY

## 2019-08-07 PROCEDURE — 99999 PR PBB SHADOW E&M-EST. PATIENT-LVL III: CPT | Mod: PBBFAC,,, | Performed by: ALLERGY & IMMUNOLOGY

## 2019-08-07 PROCEDURE — 86160 COMPLEMENT ANTIGEN: CPT | Mod: 59

## 2019-08-07 PROCEDURE — 86160 COMPLEMENT ANTIGEN: CPT

## 2019-08-07 RX ORDER — EPINEPHRINE 0.3 MG/.3ML
INJECTION SUBCUTANEOUS
Refills: 12 | COMMUNITY
Start: 2019-07-11

## 2019-08-07 NOTE — LETTER
August 7, 2019      Dima Her MD  [id number] [address] [address]           [facility] Allergy/ Immunology  [address] [facility] [address]  Phone: [phone]  Fax: [phone]          Patient: Chasity Blake   MR Number: [id number]   YOB: 1965   Date of Visit: 8/7/2019       Dear Dr. Dima Her:    Thank you for referring Chasity Blake to me for evaluation. Attached you will find relevant portions of my assessment and plan of care.    If you have questions, please do not hesitate to call me. I look forward to following Chasity Blake along with you.    Sincerely,    Ahmet Richmond MD    Enclosure  CC:  No Recipients    If you would like to receive this communication electronically, please contact [email or web address] or [phone] to request more information on [employer] access.    For providers and/or their staff who would like to refer a patient to [facility], please contact us through our one-stop-shop provider referral line, [facility], at [phone].    If you feel you have received this communication in error or would no longer like to receive these types of communications, please e-mail [email or web address]

## 2019-08-07 NOTE — PROGRESS NOTES
Chief complaint:  Hives    This note was dictated using voice recognition software and may contain errors.    History:    She had a 1:00 p.m. appointment on Wednesday August 7, 2019.  She was accompanied by an 7-year-old male .  He was present throughout the entire appointment.    Information in her medical record regarding her past medical history, family history, and social history, was reviewed and updated today.  Significant additions if any are as noted below.    She had an appointment with me on May 7, 2009.  That is my last contact with her.  I reviewed with her information in my note of her appointment with me on May 7, 2009.    Diagnostic immediate hypersensitivity prick skin tests were performed May 7, 2009.  Skin testing revealed positive reactions for cat and 5 grass pollens.  Rye grass pollen did not react.  The other allergens tested were negative.    We reviewed her history of systemic reactions.    During the past 5 years she stated that she continues to develop episodes of urticaria perhaps 2 or 3 times per year.  She reviewed with me photographs which she had taken using her cellular telephone of the appearance of her skin demonstrating lesions consistent with urticaria.    She stated about 2 years ago she began experiencing the development of abscesses of the skin.  She has developed these lesions on the extremities trunk and scalp.  She stated that she requires antibiotics to improve along with incision and drainage of the lesions.  She stated that she uses Hibiclens at home when she bathes and also takes baths using water with bleach.    She stated about age 29 she developed adult acne.  She stated that she has a history of eczema    Social history:  She stated that she works.  She cleans a total of 3 houses.  This work is done on a weekly basis.    Past medical history: she stated that she has a history of irritable bowel syndrome.  She reviewed with me photos which she had taken  using her cellular telephone of the appearance of some of the skin abscesses which have developed.    Review of systems:  At the time of her appointment this afternoon she is feeling well in general.    Exam:    In general she is in no distress.  She is alert oriented well-developed in good mood and attentive  Gait steady  Skin no urticaria noted today.  No acute abscesses are noted.  Head no swelling noted  Eyes scleral white conjunctiva pink  Nose patent no polyp seen  Mouth no inflammation or swelling of the lips tongue or in the throat noted  Ears not inflamed tympanic membranes not inflamed  Nose patent no polyp seen  Neck no masses or thyromegaly noted  Lymph nodes no significant cervical or epitrochlear lymphadenopathy noted  Lungs clear to auscultation  Heart regular rhythm no murmurs heard  Extremities no swelling or inflammation of the hands legs noted    Impression:    1.  Chronic idiopathic recurrent urticaria  2.  Dermatitis, more than 1 type, history of cutaneous abscesses  3.  GE reflux  4.  Other health concerns as noted in her medical record    Assessment and plan:    Her appointment was 50 min in duration spent entirely in face-to-face contact.  More than 50% of the visit was spent in counseling and coordination of care.    I recommended that diagnostic laboratory tests be performed.  When the results of the tests are available to review with her I will do so.  Additional recommendations may be made at that time.    She was given the office phone number.  Should she have additional questions or concerns she was instructed to call.

## 2019-08-08 LAB
ALBUMIN SERPL ELPH-MCNC: 3.98 G/DL (ref 3.35–5.55)
ALPHA1 GLOB SERPL ELPH-MCNC: 0.34 G/DL (ref 0.17–0.41)
ALPHA2 GLOB SERPL ELPH-MCNC: 0.77 G/DL (ref 0.43–0.99)
ANA SER QL IF: NORMAL
B-GLOBULIN SERPL ELPH-MCNC: 0.75 G/DL (ref 0.5–1.1)
C3 SERPL-MCNC: 141 MG/DL (ref 50–180)
C4 SERPL-MCNC: 26 MG/DL (ref 11–44)
GAMMA GLOB SERPL ELPH-MCNC: 0.65 G/DL (ref 0.67–1.58)
IGA SERPL-MCNC: 135 MG/DL (ref 40–350)
IGE SERPL-ACNC: 310 IU/ML (ref 0–100)
IGG SERPL-MCNC: 653 MG/DL (ref 650–1600)
IGM SERPL-MCNC: 81 MG/DL (ref 50–300)
PATHOLOGIST INTERPRETATION SPE: NORMAL
PROT SERPL-MCNC: 6.5 G/DL (ref 6–8.4)

## 2019-08-09 LAB — TRYPTASE LEVEL: 7.6 NG/ML

## 2019-08-12 LAB
ANCA AB TITR SER IF: NORMAL TITER
CH50 SERPL-ACNC: >98 U/ML (ref 42–95)
IGG1 SER-MCNC: 361 MG/DL (ref 382–929)
IGG2 SER-MCNC: 228 MG/DL (ref 242–700)
IGG3 SER-MCNC: 97 MG/DL (ref 22–176)
IGG4 SER-MCNC: 13 MG/DL (ref 4–86)
P-ANCA TITR SER IF: NORMAL TITER

## 2019-08-13 ENCOUNTER — TELEPHONE (OUTPATIENT)
Dept: FAMILY MEDICINE | Facility: CLINIC | Age: 54
End: 2019-08-13

## 2019-08-13 ENCOUNTER — OFFICE VISIT (OUTPATIENT)
Dept: FAMILY MEDICINE | Facility: CLINIC | Age: 54
End: 2019-08-13
Payer: COMMERCIAL

## 2019-08-13 VITALS
BODY MASS INDEX: 32.64 KG/M2 | HEART RATE: 67 BPM | SYSTOLIC BLOOD PRESSURE: 127 MMHG | WEIGHT: 190.13 LBS | DIASTOLIC BLOOD PRESSURE: 85 MMHG | OXYGEN SATURATION: 98 % | TEMPERATURE: 99 F

## 2019-08-13 DIAGNOSIS — K21.9 GASTROESOPHAGEAL REFLUX DISEASE WITHOUT ESOPHAGITIS: ICD-10-CM

## 2019-08-13 DIAGNOSIS — F41.1 GAD (GENERALIZED ANXIETY DISORDER): ICD-10-CM

## 2019-08-13 DIAGNOSIS — D84.9 IMMUNODEFICIENCY: ICD-10-CM

## 2019-08-13 DIAGNOSIS — L02.419 CUTANEOUS ABSCESS OF UPPER EXTREMITY, UNSPECIFIED LATERALITY: ICD-10-CM

## 2019-08-13 DIAGNOSIS — I10 HTN, GOAL BELOW 130/80: Primary | ICD-10-CM

## 2019-08-13 DIAGNOSIS — J45.21 MILD INTERMITTENT ASTHMA WITH ACUTE EXACERBATION: ICD-10-CM

## 2019-08-13 PROCEDURE — 3074F PR MOST RECENT SYSTOLIC BLOOD PRESSURE < 130 MM HG: ICD-10-PCS | Mod: CPTII,S$GLB,, | Performed by: FAMILY MEDICINE

## 2019-08-13 PROCEDURE — 99215 OFFICE O/P EST HI 40 MIN: CPT | Mod: S$GLB,,, | Performed by: FAMILY MEDICINE

## 2019-08-13 PROCEDURE — 3008F PR BODY MASS INDEX (BMI) DOCUMENTED: ICD-10-PCS | Mod: CPTII,S$GLB,, | Performed by: FAMILY MEDICINE

## 2019-08-13 PROCEDURE — 99999 PR PBB SHADOW E&M-EST. PATIENT-LVL III: ICD-10-PCS | Mod: PBBFAC,,, | Performed by: FAMILY MEDICINE

## 2019-08-13 PROCEDURE — 99999 PR PBB SHADOW E&M-EST. PATIENT-LVL III: CPT | Mod: PBBFAC,,, | Performed by: FAMILY MEDICINE

## 2019-08-13 PROCEDURE — 3074F SYST BP LT 130 MM HG: CPT | Mod: CPTII,S$GLB,, | Performed by: FAMILY MEDICINE

## 2019-08-13 PROCEDURE — 3079F PR MOST RECENT DIASTOLIC BLOOD PRESSURE 80-89 MM HG: ICD-10-PCS | Mod: CPTII,S$GLB,, | Performed by: FAMILY MEDICINE

## 2019-08-13 PROCEDURE — 3079F DIAST BP 80-89 MM HG: CPT | Mod: CPTII,S$GLB,, | Performed by: FAMILY MEDICINE

## 2019-08-13 PROCEDURE — 99215 PR OFFICE/OUTPT VISIT, EST, LEVL V, 40-54 MIN: ICD-10-PCS | Mod: S$GLB,,, | Performed by: FAMILY MEDICINE

## 2019-08-13 PROCEDURE — 3008F BODY MASS INDEX DOCD: CPT | Mod: CPTII,S$GLB,, | Performed by: FAMILY MEDICINE

## 2019-08-13 RX ORDER — OMEPRAZOLE 40 MG/1
CAPSULE, DELAYED RELEASE ORAL
Qty: 90 CAPSULE | Refills: 1 | Status: SHIPPED | OUTPATIENT
Start: 2019-08-13 | End: 2020-08-12

## 2019-08-13 RX ORDER — CITALOPRAM 20 MG/1
TABLET, FILM COATED ORAL
Qty: 30 TABLET | Refills: 3 | Status: SHIPPED | OUTPATIENT
Start: 2019-08-13 | End: 2020-06-12 | Stop reason: SDUPTHER

## 2019-08-13 RX ORDER — ALBUTEROL SULFATE 90 UG/1
AEROSOL, METERED RESPIRATORY (INHALATION)
Qty: 25.5 G | Refills: 2 | Status: SHIPPED | OUTPATIENT
Start: 2019-08-13 | End: 2019-10-16 | Stop reason: SDUPTHER

## 2019-08-13 RX ORDER — MONTELUKAST SODIUM 10 MG/1
TABLET ORAL
Qty: 90 TABLET | Refills: 1 | Status: SHIPPED | OUTPATIENT
Start: 2019-08-13 | End: 2020-10-13

## 2019-08-13 RX ORDER — IPRATROPIUM BROMIDE AND ALBUTEROL SULFATE 2.5; .5 MG/3ML; MG/3ML
3 SOLUTION RESPIRATORY (INHALATION) EVERY 6 HOURS PRN
Qty: 1 BOX | Refills: 2 | Status: SHIPPED | OUTPATIENT
Start: 2019-08-13 | End: 2019-10-16 | Stop reason: SDUPTHER

## 2019-08-13 RX ORDER — PROPRANOLOL HYDROCHLORIDE 80 MG/1
80 CAPSULE, EXTENDED RELEASE ORAL DAILY
Qty: 30 CAPSULE | Refills: 2 | Status: SHIPPED | OUTPATIENT
Start: 2019-08-13 | End: 2020-02-04 | Stop reason: SDUPTHER

## 2019-08-13 RX ORDER — CLINDAMYCIN HYDROCHLORIDE 300 MG/1
300 CAPSULE ORAL EVERY 6 HOURS
Qty: 30 CAPSULE | Refills: 0 | Status: SHIPPED | OUTPATIENT
Start: 2019-08-13 | End: 2019-08-23

## 2019-08-13 NOTE — PATIENT INSTRUCTIONS
Discharge Instructions for Immunocompromised Patients  You have either undergone a procedure or been diagnosed with an illness that has made you immunocompromised. This means that your immune system is very weak, making it difficult to fight off infection. The ability to fight off infection varies. It depends on your specific condition and the treatment you have. Certain cancers, cancer treatments, HIV infection, and transplant surgery are examples of things that can make you immunocompromised. You must be very careful--even the slightest infection can carry the risk of hospitalization or death. The following information will help you protect yourself from infection.  · Make an appointment to see your healthcare provider as soon as possible.  · Follow the instructions until your healthcare provider tells you that you can stop.  · Some of the instructions may not be necessary. Ask your provider what's necessary for you.  Medicine  · Take your medicines exactly as directed.  · Dont take any other medicines, including those available over-the-counter unless your healthcare provider says its OK.  · Tell your provider about any side effects you have.  Skin care  · Wash your hands often, especially after using the bathroom. Make sure you wash them before and after changing any dressing or bandages.  · Avoid direct sun exposure. And use sunscreen that is labeled hydroallergenic and has an SPF of 30 or higher.   · Use an electric razor for shaving so you don't cut yourself.  · Check your skin daily for irritation, cracks, or rashes.  Keep your home clean  · Clean floors, carpets, furniture, and countertops regularly. Use products that kill germs.  · Be sure your kitchen is clean and all foods are safely stored.  · Be sure your bathroom is clean.  · Don't keep plants or flowers indoors. If you garden, wear gloves.  · Wash your hands after handling trash.  Prevent colds and the flu  · Wash your hands or use hand   often. Try to keep your hands away from your mouth, nose, and face. Make sure you wash your hands before eating.  · Avoid public places such as shopping malls, especially when crowded.  · Limit visits with young children. They often have colds or the flu.  · Avoid contact with anyone who has a cold, the flu, or another contagious condition (such as measles, chickenpox, herpes, pinkeye, cough, or sore throat).  · Check with your provider about whether or not you should wear a mask when you are around people.  · Check with your provider about recommended immunizations or vaccines.  Other ways to lower your risk of infection  · Check with your provider before having close contact with others.  · Ask your provider before using cosmetics, contact lenses, tampons, or douches.  · Dont smoke or use tobacco products.  · Dont use portable humidifiers or vaporizers.  · Avoid contact with animals.  ¨ If you do touch an animal, wash your hands immediately afterward.  ¨ Avoid contact with pet urine or feces.  ¨ Dont clean litter boxes, cages, or aquariums.  · Check with your provider before cutting your nails. It may be advised that you file your nails instead. If you have trouble cutting or filing your own toenails, a podiatrist or foot healthcare provider can help.  · To avoid injuring your feet or coming in contact with germs, always wear shoes.  Follow-up care  Make sure you see your provider as soon as possible. You will likely have an exam and additional tests, if necessary. You will also have a chance to ask questions.  Make a follow-up appointment as directed by our staff.     When to call your healthcare provider  Call your healthcare provider right away if you have any of the following:  · Blurred vision or eye problems  · Trouble concentrating  · Ongoing fatigue  · Shortness of breath  · Rapid, irregular heartbeat  · Dizziness or lightheadedness  · Rash or hives  · Wheezing or trouble breathing  · Skin cut  or sore that swells, turns red, feels hot or painful, or begins to ooze  · Fever of 100.4°F (38.0°C) or higher, or chills  · Diarrhea that does not go away after 2 loose stools  · Pain or cramping in the belly   Date Last Reviewed: 9/1/2016  © 2045-7035 [employer]. [address] [place] [address], [place] [address]. All rights reserved. This information is not intended as a substitute for professional medical care. Always follow your healthcare professional's instructions.

## 2019-08-13 NOTE — PROGRESS NOTES
Subjective:       Patient ID: Chasity Blake is a 53 y.o. female.    Chief Complaint: Follow up on asthma, anxiety, depression and HTN and       History of Present Illness:   Chasity Blake 53 y.o. female presents today with     Abscess: Bilateral arms, subcutaneous abscess.  Associated with tenderness, and erythema.  She has several spots on her arms bilaterally and her posterior neck. She was able to express some pus from some of them but the surrounding erythema is worse    I recently referred her to immunology and she had labs done that shows elevated IgE and  IgG1/2 def, more results pare pending and she is yet to follow up with immunology.    She has STAR ; chronic stable on Celexa and she will need a refill.    Hypertension:  Chronic stable on propranolol, indication reviewed, no side effects, will need a refill today.    GERD:  Chronic stable on omeprazole 40 mg p.o. daily, will need a refill today    Asthma:  Mild intermittent, controlled on DuoNeb, albuterol and will need a refill today.      Past Medical History:   Diagnosis Date    ADD (attention deficit disorder with hyperactivity)     Anxiety     Asthma     mild intermittent    Depression     Hypertension      Family History   Problem Relation Age of Onset    Lung cancer Mother     Coronary artery disease Mother 50    Coronary artery disease Father 38    Breast cancer Neg Hx     Colon cancer Neg Hx     Ovarian cancer Neg Hx      Social History     Socioeconomic History    Marital status:      Spouse name: Not on file    Number of children: Not on file    Years of education: Not on file    Highest education level: Not on file   Occupational History    Not on file   Social Needs    Financial resource strain: Not very hard    Food insecurity:     Worry: Never true     Inability: Never true    Transportation needs:     Medical: No     Non-medical: No   Tobacco Use    Smoking status: Current Every Day Smoker     Packs/day: 0.50      Years: 10.00     Pack years: 5.00     Types: Cigarettes    Smokeless tobacco: Never Used   Substance and Sexual Activity    Alcohol use: No     Alcohol/week: 0.0 oz     Frequency: Never     Drinks per session: Patient refused     Binge frequency: Never    Drug use: No    Sexual activity: Yes     Partners: Male     Birth control/protection: None   Lifestyle    Physical activity:     Days per week: 4 days     Minutes per session: 30 min    Stress: Rather much   Relationships    Social connections:     Talks on phone: More than three times a week     Gets together: Three times a week     Attends [employer] service: Not on file     Active member of club or organization: Yes     Attends meetings of clubs or organizations: 1 to 4 times per year     Relationship status:    Other Topics Concern    Not on file   Social History Narrative    Not on file     Outpatient Encounter Medications as of 8/13/2019   Medication Sig Dispense Refill    albuterol (ACCUNEB) 1.25 mg/3 mL Nebu USE ONE VIAL IN NEBULIZER EVERY 4 TO 6 HOURS AS NEEDED FOR WHEEZING & SHORTNESS OF BREATH **THANK YOU** 75 mL 6    albuterol (PROAIR HFA) 90 mcg/actuation inhaler INHALE TWO PUFFS INTO THE LUNGS EVERY 6 HOURS AS NEEDED FOR WHEEZING **THANK YOU** 25.5 g 2    albuterol-ipratropium (DUO-NEB) 2.5 mg-0.5 mg/3 mL nebulizer solution Take 3 mLs by nebulization every 6 (six) hours as needed for Wheezing. Rescue 1 Box 2    aspirin 81 MG Chew Take 1 tablet (81 mg total) by mouth once daily. 30 tablet 0    citalopram (CELEXA) 20 MG tablet TAKE ONE TABLET BY MOUTH ONCE DAILY **THANK YOU** 30 tablet 3    clindamycin (CLEOCIN T) 1 % external solution APPLY TO AFFECTED AREA TOPICALLY TWICE DAILY **THANK YOU** 30 mL 1    clindamycin (CLEOCIN) 300 MG capsule Take 1 capsule (300 mg total) by mouth every 6 (six) hours. for 10 days 30 capsule 0    EPINEPHrine (EPIPEN) 0.3 mg/0.3 mL AtIn INJECT 0.3mL INTRAMUSCULARLY X1 DOSE THANK YOU  12     estradiol-levonorgestrel (CLIMARAPRO) 0.045-0.015 mg/24 hr Place 1 patch onto the skin once a week. 4 patch 11    fluconazole (DIFLUCAN) 150 MG Tab TAKE ONE TABLET BY MOUTH ONCE. MAY REPEAT DOSE AFTER ONE WEEK IF SYMPTOM IS STILL PRESENT THANK YOU  0    fluticasone (FLONASE) 50 mcg/actuation nasal spray 2 sprays by Each Nare route once daily. 16 g 11    fluticasone-salmeterol diskus inhaler 250-50 mcg Inhale 1 puff into the lungs every 12 (twelve) hours. 60 each 2    levocetirizine (XYZAL) 5 MG tablet TAKE ONE TABLET BY MOUTH EVERY EVENING **THANK YOU** 30 tablet 5    lisdexamfetamine (VYVANSE) 40 MG Cap TAKE ONE CAPSULE BY MOUTH EVERY MORNING **THANK YOU** 30 capsule 0    lisdexamfetamine (VYVANSE) 40 MG Cap Take 1 capsule (40 mg total) by mouth once daily. 30 capsule 0    [START ON 9/11/2019] lisdexamfetamine (VYVANSE) 40 MG Cap Take 1 capsule (40 mg total) by mouth once daily. 30 capsule 0    LORazepam (ATIVAN) 0.5 MG tablet Take 1 tablet (0.5 mg total) by mouth every 12 (twelve) hours as needed for Anxiety. 14 tablet 0    montelukast (SINGULAIR) 10 mg tablet TAKE ONE TABLET BY MOUTH EVERY DAY **THANK YOU** 90 tablet 1    omeprazole (PRILOSEC) 40 MG capsule TAKE ONE CAPSULE BY MOUTH ONCE DAILY **THANK YOU** 90 capsule 1    ondansetron (ZOFRAN-ODT) 4 MG TbDL Take 1 tablet (4 mg total) by mouth every 8 (eight) hours as needed. 14 tablet 1    propranolol (INDERAL LA) 80 MG 24 hr capsule Take 1 capsule (80 mg total) by mouth once daily. 30 capsule 2    tiZANidine (ZANAFLEX) 4 mg Cap Take 1 capsule by mouth 2 (two) times daily as needed. 60 capsule 0    [DISCONTINUED] albuterol (PROAIR HFA) 90 mcg/actuation inhaler INHALE TWO PUFFS INTO THE LUNGS EVERY 6 HOURS AS NEEDED FOR WHEEZING **THANK YOU** 25.5 g 5    [DISCONTINUED] albuterol-ipratropium (DUO-NEB) 2.5 mg-0.5 mg/3 mL nebulizer solution Take 3 mLs by nebulization every 6 (six) hours as needed for Wheezing. Rescue 1 Box 2    [DISCONTINUED]  citalopram (CELEXA) 20 MG tablet TAKE ONE TABLET BY MOUTH ONCE DAILY **THANK YOU** 30 tablet 3    [DISCONTINUED] montelukast (SINGULAIR) 10 mg tablet TAKE ONE TABLET BY MOUTH EVERY DAY **THANK YOU** 90 tablet 4    [DISCONTINUED] omeprazole (PRILOSEC) 40 MG capsule TAKE ONE CAPSULE BY MOUTH ONCE DAILY **THANK YOU** 90 capsule 3    [DISCONTINUED] propranolol (INDERAL LA) 80 MG 24 hr capsule Take 1 capsule (80 mg total) by mouth once daily. 30 capsule 6     No facility-administered encounter medications on file as of 8/13/2019.        Review of Systems   Constitutional: Negative for chills and fever.   HENT: Negative for congestion and facial swelling.    Eyes: Negative for discharge and itching.   Respiratory: Negative for cough and wheezing.    Cardiovascular: Negative for chest pain and palpitations.   Gastrointestinal: Negative for abdominal pain, nausea and vomiting.   Endocrine: Negative for cold intolerance and heat intolerance.   Genitourinary: Negative for dysuria and flank pain.   Musculoskeletal: Negative for myalgias and neck stiffness.   Skin: Negative for pallor and wound.   Neurological: Negative for facial asymmetry and weakness.   Psychiatric/Behavioral: Negative for agitation and suicidal ideas.       Objective:      /85 (BP Location: Right arm, Patient Position: Sitting, BP Method: Medium (Automatic))   Pulse 67   Temp 98.8 °F (37.1 °C) (Oral)   Wt 86.3 kg (190 lb 2.4 oz)   SpO2 98%   BMI 32.64 kg/m²   Physical Exam   Constitutional: She is oriented to person, place, and time. She appears well-developed. No distress.   HENT:   Head: Normocephalic and atraumatic.   Right Ear: External ear normal.   Left Ear: External ear normal.   Eyes: Conjunctivae and EOM are normal.   Neck: Neck supple. No thyromegaly present.   Cardiovascular: Normal rate and regular rhythm.   Pulmonary/Chest: Effort normal. No respiratory distress.   Abdominal: Soft. She exhibits no distension.   Genitourinary:    Genitourinary Comments: deferred   Musculoskeletal: She exhibits no edema or deformity.   Lymphadenopathy:        Head (right side): No submandibular adenopathy present.        Head (left side): No submandibular adenopathy present.     She has no cervical adenopathy.   Neurological: She is alert and oriented to person, place, and time.   Skin: Skin is warm and dry.        Psychiatric: She has a normal mood and affect. Her behavior is normal.   Nursing note and vitals reviewed.      Results for orders placed or performed in visit on 08/07/19   RAJIV Screen w/Reflex   Result Value Ref Range    RAJIV Screen Negative <1:160 Negative <1:160   Anti-neutrophilic cytoplasmic antibody   Result Value Ref Range    Cytoplasmic Neutrophilic Ab <1:20 <1:20 Titer    Perinuclear (P-ANCA) <1:20 <1:20 Titer   Complement, total   Result Value Ref Range    Complement,Total, Serum >98 (H) 42 - 95 U/mL   C4 complement   Result Value Ref Range    Complement (C-4) 26 11 - 44 mg/dL   C3 complement   Result Value Ref Range    Complement (C-3) 141 50 - 180 mg/dL   Tryptase   Result Value Ref Range    Tryptase 7.6 <11.5 ng/mL   Protein electrophoresis, serum   Result Value Ref Range    Protein, Serum 6.5 6.0 - 8.4 g/dL    Albumin grams/dl 3.98 3.35 - 5.55 g/dL    Alpha-1 grams/dl 0.34 0.17 - 0.41 g/dL    Alpha-2 grams/dl 0.77 0.43 - 0.99 g/dL    Beta grams/dl 0.75 0.50 - 1.10 g/dL    Gamma grams/dl 0.65 (L) 0.67 - 1.58 g/dL   Immunoglobulins (IgG, IgA, IgM) Quantitative   Result Value Ref Range    IgG - Serum 653 650 - 1600 mg/dL    IgA 135 40 - 350 mg/dL    IgM 81 50 - 300 mg/dL   Humoral Immune Eval (Pneumo Serotypes) With H. Flu   Result Value Ref Range    S.pneumoniae Type 1 1.2 mcg/mL    S.pneumoniae Type 3 1.3 mcg/mL    Strep pneumo Type 4 0.4 mcg/mL    S.pneumoniae Type 5 2.6 mcg/mL    S.pneumoniae Type 8 9.4 mcg/mL    S.pneumoniae Type 9N 20.1 mcg/mL    S.pneumoniae Type 12F <0.3 mcg/mL    Strep pneumo Type 14 39.1 mcg/mL    S.pneumoniae  Type 19F 2.6 mcg/mL    S.pneumoniae Type 23F 0.5 mcg/mL    S.pneumoniae Type 6B 2.4 mcg/mL    S.pneumoniae Type 7F 3.4 mcg/mL    S.pneumoniae Type 18C 3.1 mcg/mL    S.pneumoniae Type 9V Abs 1.7 mcg/mL   IgG 1, 2, 3, and 4   Result Value Ref Range    IgG 1 361 (L) 382 - 929 mg/dL    IgG 2 228 (L) 242 - 700 mg/dL    IgG 3 97 22 - 176 mg/dL    IgG 4 13 4 - 86 mg/dL   IgE   Result Value Ref Range    IgE 310 (H) 0 - 100 IU/mL   Pathologist Interpretation SPE   Result Value Ref Range    Pathologist Interpretation SPE REVIEWED      Assessment:       1. Immunodeficiency    2. STAR (generalized anxiety disorder)    3. HTN, goal below 130/80    4. Cutaneous abscess of upper extremity, unspecified laterality    5. Gastroesophageal reflux disease without esophagitis    6. Mild intermittent asthma with acute exacerbation        Plan:   Immunodeficiency    STAR (generalized anxiety disorder)  -     citalopram (CELEXA) 20 MG tablet; TAKE ONE TABLET BY MOUTH ONCE DAILY **THANK YOU**  Dispense: 30 tablet; Refill: 3    HTN, goal below 130/80  -     propranolol (INDERAL LA) 80 MG 24 hr capsule; Take 1 capsule (80 mg total) by mouth once daily.  Dispense: 30 capsule; Refill: 2    Cutaneous abscess of upper extremity, unspecified laterality  Comments:  generalized  Orders:  -     clindamycin (CLEOCIN) 300 MG capsule; Take 1 capsule (300 mg total) by mouth every 6 (six) hours. for 10 days  Dispense: 30 capsule; Refill: 0    Gastroesophageal reflux disease without esophagitis  -     omeprazole (PRILOSEC) 40 MG capsule; TAKE ONE CAPSULE BY MOUTH ONCE DAILY **THANK YOU**  Dispense: 90 capsule; Refill: 1    Mild intermittent asthma with acute exacerbation  -     montelukast (SINGULAIR) 10 mg tablet; TAKE ONE TABLET BY MOUTH EVERY DAY **THANK YOU**  Dispense: 90 tablet; Refill: 1  -     albuterol-ipratropium (DUO-NEB) 2.5 mg-0.5 mg/3 mL nebulizer solution; Take 3 mLs by nebulization every 6 (six) hours as needed for Wheezing. Rescue  Dispense:  1 Box; Refill: 2  -     albuterol (PROAIR HFA) 90 mcg/actuation inhaler; INHALE TWO PUFFS INTO THE LUNGS EVERY 6 HOURS AS NEEDED FOR WHEEZING **THANK YOU**  Dispense: 25.5 g; Refill: 2      [id number]  MEDICAL DECISION MAKING: Moderate to high complexity.  Overall, the multiple problems listed are of moderate to high severity that may impact quality of life and activities of daily living. Side effects of medications, treatment plan as well as options and alternatives reviewed and discussed with patient. There was counseling of patient concerning these issues.     Total time spent in face to face counseling and coordination of care - 40 minutes over 50% of time was used in discussion of prognosis, risks, benefits of treatment, instructions and compliance with regimen . Discussion with other physicians or health care providers.

## 2019-08-13 NOTE — TELEPHONE ENCOUNTER
----- Message from Mao Turcios sent at 8/13/2019  2:43 PM CDT -----  Contact: pt   .Type:  Pharmacy Calling to Clarify an RX    Name of Caller: xena  Pharmacy Name:     [facility] [address] [place] [id number] [address] [address]  Phone: [phone] Fax: [phone]    Prescription Name: clindamycin 300 MG  What do they need to clarify?: med direction and qty are not adding up right on this med   Best Call Back Number:  Additional Information:

## 2019-08-16 LAB
C DIPHTHERIAE AB SER IA-ACNC: 0.02 IU/ML
C TETANI AB SER-ACNC: 2.88 IU/ML
DEPRECATED S PNEUM 1 IGG SER-MCNC: 1.2 MCG/ML
DEPRECATED S PNEUM12 IGG SER-MCNC: <0.3 MCG/ML
DEPRECATED S PNEUM14 IGG SER-MCNC: 39.1 MCG/ML
DEPRECATED S PNEUM19 IGG SER-MCNC: 2.6 MCG/ML
DEPRECATED S PNEUM23 IGG SER-MCNC: 0.5 MCG/ML
DEPRECATED S PNEUM3 IGG SER-MCNC: 1.3 MCG/ML
DEPRECATED S PNEUM4 IGG SER-MCNC: 0.4 MCG/ML
DEPRECATED S PNEUM5 IGG SER-MCNC: 2.6 MCG/ML
DEPRECATED S PNEUM8 IGG SER-MCNC: 9.4 MCG/ML
DEPRECATED S PNEUM9 IGG SER-MCNC: 20.1 MCG/ML
HAEM INFLU B IGG SER-MCNC: 2.88 MCG/ML
S PNEUM DA 18C IGG SER-MCNC: 3.1 MCG/ML
S PNEUM DA 6B IGG SER-MCNC: 2.4 MCG/ML
S PNEUM DA 7F IGG SER-MCNC: 3.4 MCG/ML
S PNEUM DA 9V IGG SER-MCNC: 1.7 MCG/ML

## 2019-08-21 ENCOUNTER — TELEPHONE (OUTPATIENT)
Dept: ALLERGY | Facility: CLINIC | Age: 54
End: 2019-08-21

## 2019-08-21 NOTE — TELEPHONE ENCOUNTER
I called her on the telephone at 3:41 p.m. on Wednesday August 21, 2019.  She had sent a message yesterday, August 20 at 5:47 p.m..  I left a message on the answering system of her telephone stating that I would try calling again

## 2019-08-23 ENCOUNTER — TELEPHONE (OUTPATIENT)
Dept: ALLERGY | Facility: CLINIC | Age: 54
End: 2019-08-23

## 2019-08-23 NOTE — TELEPHONE ENCOUNTER
I completed my telephone conversation with her on Friday August 03/20/2019 at 1:54 p.m..  I reviewed with her the results of the laboratory tests which were performed after her recent appointment with me.  I recommended she call after September 2 and make arrangements to have blood drawn again.  IgG subclasses will be measured in September.    This note was dictated using voice recognition software and may contain errors.

## 2019-09-06 ENCOUNTER — PATIENT OUTREACH (OUTPATIENT)
Dept: ADMINISTRATIVE | Facility: HOSPITAL | Age: 54
End: 2019-09-06

## 2019-09-06 NOTE — PROGRESS NOTES
Health Maintenance reviewed.   Immunizations: Abstracted.  Care Everywhere abstracted: No new results found.  Health Maintenance Due   Topic    Pneumococcal Vaccine (Highest Risk) (2 of 3 - PCV13)    Colonoscopy     Pap Smear     Mammogram    MAMMO:TIERA PAP:TIERA COLON:DUE

## 2019-09-11 ENCOUNTER — TELEPHONE (OUTPATIENT)
Dept: ALLERGY | Facility: CLINIC | Age: 54
End: 2019-09-11

## 2019-09-11 DIAGNOSIS — D80.3 SELECTIVE DEFICIENCY OF IGG SUBCLASSES: Primary | ICD-10-CM

## 2019-09-11 NOTE — TELEPHONE ENCOUNTER
She sent a message on September 11, 2019 at 1:03 p.m. which I received 2:44 p.m..  Please refer to my previous notes.  She contacted me today as requested.  Arrangements will be made to have a laboratory appointment scheduled for repeat measurement IgG subclasses.  Quantitative immunoglobulins will also be repeated.  When the results are available to review with her I will do so.    This note was dictated using voice recognition software and may contain errors.

## 2019-09-12 ENCOUNTER — PATIENT MESSAGE (OUTPATIENT)
Dept: ALLERGY | Facility: CLINIC | Age: 54
End: 2019-09-12

## 2019-09-18 ENCOUNTER — APPOINTMENT (OUTPATIENT)
Dept: LAB | Facility: HOSPITAL | Age: 54
End: 2019-09-18
Attending: FAMILY MEDICINE
Payer: COMMERCIAL

## 2019-09-18 ENCOUNTER — LAB VISIT (OUTPATIENT)
Dept: LAB | Facility: HOSPITAL | Age: 54
End: 2019-09-18
Attending: ALLERGY & IMMUNOLOGY
Payer: COMMERCIAL

## 2019-09-18 ENCOUNTER — OFFICE VISIT (OUTPATIENT)
Dept: FAMILY MEDICINE | Facility: CLINIC | Age: 54
End: 2019-09-18
Payer: COMMERCIAL

## 2019-09-18 VITALS
OXYGEN SATURATION: 99 % | HEIGHT: 64 IN | WEIGHT: 192.44 LBS | DIASTOLIC BLOOD PRESSURE: 84 MMHG | BODY MASS INDEX: 32.85 KG/M2 | TEMPERATURE: 99 F | HEART RATE: 58 BPM | SYSTOLIC BLOOD PRESSURE: 128 MMHG

## 2019-09-18 DIAGNOSIS — N89.8 VAGINAL DISCHARGE: ICD-10-CM

## 2019-09-18 DIAGNOSIS — Z01.419 ENCOUNTER FOR WELL WOMAN EXAM: Primary | ICD-10-CM

## 2019-09-18 DIAGNOSIS — F98.8 ATTENTION DEFICIT DISORDER, UNSPECIFIED HYPERACTIVITY PRESENCE: ICD-10-CM

## 2019-09-18 DIAGNOSIS — D80.3 SELECTIVE DEFICIENCY OF IGG SUBCLASSES: ICD-10-CM

## 2019-09-18 DIAGNOSIS — N95.1 VASOMOTOR SYMPTOMS DUE TO MENOPAUSE: ICD-10-CM

## 2019-09-18 PROCEDURE — 99499 UNLISTED E&M SERVICE: CPT | Mod: S$GLB,,, | Performed by: FAMILY MEDICINE

## 2019-09-18 PROCEDURE — 82787 IGG 1 2 3 OR 4 EACH: CPT

## 2019-09-18 PROCEDURE — 3074F SYST BP LT 130 MM HG: CPT | Mod: CPTII,S$GLB,, | Performed by: FAMILY MEDICINE

## 2019-09-18 PROCEDURE — 3079F DIAST BP 80-89 MM HG: CPT | Mod: CPTII,S$GLB,, | Performed by: FAMILY MEDICINE

## 2019-09-18 PROCEDURE — 99999 PR PBB SHADOW E&M-EST. PATIENT-LVL IV: ICD-10-PCS | Mod: PBBFAC,,, | Performed by: FAMILY MEDICINE

## 2019-09-18 PROCEDURE — 87624 HPV HI-RISK TYP POOLED RSLT: CPT

## 2019-09-18 PROCEDURE — 82784 ASSAY IGA/IGD/IGG/IGM EACH: CPT | Mod: 59

## 2019-09-18 PROCEDURE — 99396 PREV VISIT EST AGE 40-64: CPT | Mod: S$GLB,,, | Performed by: FAMILY MEDICINE

## 2019-09-18 PROCEDURE — 87481 CANDIDA DNA AMP PROBE: CPT | Mod: 59

## 2019-09-18 PROCEDURE — 99999 PR PBB SHADOW E&M-EST. PATIENT-LVL IV: CPT | Mod: PBBFAC,,, | Performed by: FAMILY MEDICINE

## 2019-09-18 PROCEDURE — 3079F PR MOST RECENT DIASTOLIC BLOOD PRESSURE 80-89 MM HG: ICD-10-PCS | Mod: CPTII,S$GLB,, | Performed by: FAMILY MEDICINE

## 2019-09-18 PROCEDURE — 88175 CYTOPATH C/V AUTO FLUID REDO: CPT

## 2019-09-18 PROCEDURE — 3074F PR MOST RECENT SYSTOLIC BLOOD PRESSURE < 130 MM HG: ICD-10-PCS | Mod: CPTII,S$GLB,, | Performed by: FAMILY MEDICINE

## 2019-09-18 PROCEDURE — 99499 RISK ADDL DX/OHS AUDIT: ICD-10-PCS | Mod: S$GLB,,, | Performed by: FAMILY MEDICINE

## 2019-09-18 PROCEDURE — 99396 PR PREVENTIVE VISIT,EST,40-64: ICD-10-PCS | Mod: S$GLB,,, | Performed by: FAMILY MEDICINE

## 2019-09-18 RX ORDER — LISDEXAMFETAMINE DIMESYLATE 40 MG/1
40 CAPSULE ORAL DAILY
Qty: 30 CAPSULE | Refills: 0 | Status: SHIPPED | OUTPATIENT
Start: 2019-12-11 | End: 2019-10-11 | Stop reason: SDUPTHER

## 2019-09-18 RX ORDER — ESTRADIOL 1 MG/1
1 TABLET ORAL DAILY
Qty: 30 TABLET | Refills: 5 | Status: SHIPPED | OUTPATIENT
Start: 2019-09-18 | End: 2020-04-07 | Stop reason: SDUPTHER

## 2019-09-18 RX ORDER — LISDEXAMFETAMINE DIMESYLATE 40 MG/1
40 CAPSULE ORAL DAILY
Qty: 30 CAPSULE | Refills: 0 | Status: SHIPPED | OUTPATIENT
Start: 2019-11-11 | End: 2019-10-11 | Stop reason: SDUPTHER

## 2019-09-18 RX ORDER — LISDEXAMFETAMINE DIMESYLATE 40 MG/1
40 CAPSULE ORAL DAILY
Qty: 30 CAPSULE | Refills: 0 | Status: SHIPPED | OUTPATIENT
Start: 2019-10-11 | End: 2019-10-11 | Stop reason: SDUPTHER

## 2019-09-18 NOTE — PROGRESS NOTES
Subjective:       Patient ID: Chasity Blake is a 53 y.o. female.    Chief Complaint: Gynecologic Exam (pap)      History of Present Illness:   Chasity Blake 53 y.o. female presents today with well woman  HPI     Gynecologic Exam      Additional comments: pap     here for wellwoman  Ablation done, , complaint of hot flashes on estradiol levorgesterol patch, does not last 7 days due to humidity, asking for pills.  Denies FHx of cervical and ovarian and breast cancer, stroke and blood clots.  Past Medical History:   Diagnosis Date    ADD (attention deficit disorder with hyperactivity)     Anxiety     Asthma     mild intermittent    Depression     Hypertension      Family History   Problem Relation Age of Onset    Lung cancer Mother     Coronary artery disease Mother 50    Coronary artery disease Father 38    Breast cancer Neg Hx     Colon cancer Neg Hx     Ovarian cancer Neg Hx      Social History     Socioeconomic History    Marital status:      Spouse name: Not on file    Number of children: Not on file    Years of education: Not on file    Highest education level: Not on file   Occupational History    Not on file   Social Needs    Financial resource strain: Not very hard    Food insecurity:     Worry: Never true     Inability: Never true    Transportation needs:     Medical: No     Non-medical: No   Tobacco Use    Smoking status: Current Every Day Smoker     Packs/day: 0.50     Years: 10.00     Pack years: 5.00     Types: Cigarettes    Smokeless tobacco: Never Used   Substance and Sexual Activity    Alcohol use: No     Alcohol/week: 0.0 oz     Frequency: Never     Drinks per session: Patient refused     Binge frequency: Never    Drug use: No    Sexual activity: Yes     Partners: Male     Birth control/protection: None   Lifestyle    Physical activity:     Days per week: 4 days     Minutes per session: 30 min    Stress: Rather much   Relationships    Social connections:      Talks on phone: More than three times a week     Gets together: Three times a week     Attends [employer] service: Not on file     Active member of club or organization: Yes     Attends meetings of clubs or organizations: 1 to 4 times per year     Relationship status:    Other Topics Concern    Not on file   Social History Narrative    Not on file     Outpatient Encounter Medications as of 9/18/2019   Medication Sig Dispense Refill    albuterol (ACCUNEB) 1.25 mg/3 mL Nebu USE ONE VIAL IN NEBULIZER EVERY 4 TO 6 HOURS AS NEEDED FOR WHEEZING & SHORTNESS OF BREATH **THANK YOU** 75 mL 6    albuterol (PROAIR HFA) 90 mcg/actuation inhaler INHALE TWO PUFFS INTO THE LUNGS EVERY 6 HOURS AS NEEDED FOR WHEEZING **THANK YOU** 25.5 g 2    albuterol-ipratropium (DUO-NEB) 2.5 mg-0.5 mg/3 mL nebulizer solution Take 3 mLs by nebulization every 6 (six) hours as needed for Wheezing. Rescue 1 Box 2    citalopram (CELEXA) 20 MG tablet TAKE ONE TABLET BY MOUTH ONCE DAILY **THANK YOU** 30 tablet 3    clindamycin (CLEOCIN T) 1 % external solution APPLY TO AFFECTED AREA TOPICALLY TWICE DAILY **THANK YOU** 30 mL 1    EPINEPHrine (EPIPEN) 0.3 mg/0.3 mL AtIn INJECT 0.3mL INTRAMUSCULARLY X1 DOSE THANK YOU  12    fluticasone (FLONASE) 50 mcg/actuation nasal spray 2 sprays by Each Nare route once daily. 16 g 11    fluticasone-salmeterol diskus inhaler 250-50 mcg Inhale 1 puff into the lungs every 12 (twelve) hours. 60 each 2    levocetirizine (XYZAL) 5 MG tablet TAKE ONE TABLET BY MOUTH EVERY EVENING **THANK YOU** 30 tablet 5    [START ON 12/11/2019] lisdexamfetamine (VYVANSE) 40 MG Cap Take 1 capsule (40 mg total) by mouth once daily. TAKE ONE CAPSULE BY MOUTH EVERY MORNING **THANK YOU** 30 capsule 0    [START ON 11/11/2019] lisdexamfetamine (VYVANSE) 40 MG Cap Take 1 capsule (40 mg total) by mouth once daily. 30 capsule 0    montelukast (SINGULAIR) 10 mg tablet TAKE ONE TABLET BY MOUTH EVERY DAY **THANK YOU** 90 tablet 1     omeprazole (PRILOSEC) 40 MG capsule TAKE ONE CAPSULE BY MOUTH ONCE DAILY **THANK YOU** 90 capsule 1    ondansetron (ZOFRAN-ODT) 4 MG TbDL Take 1 tablet (4 mg total) by mouth every 8 (eight) hours as needed. 14 tablet 1    propranolol (INDERAL LA) 80 MG 24 hr capsule Take 1 capsule (80 mg total) by mouth once daily. 30 capsule 2    tiZANidine (ZANAFLEX) 4 mg Cap Take 1 capsule by mouth 2 (two) times daily as needed. 60 capsule 0    [DISCONTINUED] estradiol-levonorgestrel (CLIMARAPRO) 0.045-0.015 mg/24 hr Place 1 patch onto the skin once a week. 4 patch 11    [DISCONTINUED] lisdexamfetamine (VYVANSE) 40 MG Cap TAKE ONE CAPSULE BY MOUTH EVERY MORNING **THANK YOU** 30 capsule 0    [DISCONTINUED] lisdexamfetamine (VYVANSE) 40 MG Cap Take 1 capsule (40 mg total) by mouth once daily. 30 capsule 0    aspirin 81 MG Chew Take 1 tablet (81 mg total) by mouth once daily. 30 tablet 0    estradiol (ESTRACE) 1 MG tablet Take 1 tablet (1 mg total) by mouth once daily. 30 tablet 5    fluconazole (DIFLUCAN) 150 MG Tab TAKE ONE TABLET BY MOUTH ONCE. MAY REPEAT DOSE AFTER ONE WEEK IF SYMPTOM IS STILL PRESENT THANK YOU  0    [START ON 10/11/2019] lisdexamfetamine (VYVANSE) 40 MG Cap Take 1 capsule (40 mg total) by mouth once daily. 30 capsule 0    LORazepam (ATIVAN) 0.5 MG tablet Take 1 tablet (0.5 mg total) by mouth every 12 (twelve) hours as needed for Anxiety. 14 tablet 0     No facility-administered encounter medications on file as of 9/18/2019.        Review of Systems   Constitutional: Negative for chills and fever.   HENT: Negative for congestion and facial swelling.    Eyes: Negative for discharge and itching.   Respiratory: Negative for cough and wheezing.    Cardiovascular: Negative for chest pain and palpitations.   Gastrointestinal: Negative for abdominal pain, nausea and vomiting.   Endocrine: Negative for cold intolerance and heat intolerance.   Genitourinary: Negative for dysuria and flank pain.  "  Musculoskeletal: Negative for myalgias and neck stiffness.   Skin: Negative for pallor and wound.   Neurological: Negative for facial asymmetry and weakness.   Psychiatric/Behavioral: Negative for agitation and suicidal ideas.       Objective:      /84 (BP Location: Right arm, Patient Position: Sitting, BP Method: Large (Manual))   Pulse (!) 58   Temp 98.5 °F (36.9 °C) (Oral)   Ht 5' 4" (1.626 m)   Wt 87.3 kg (192 lb 7.4 oz)   SpO2 99%   BMI 33.04 kg/m²   Physical Exam   Constitutional: She is oriented to person, place, and time. Vital signs are normal. She appears well-developed and well-nourished.   HENT:   Head: Normocephalic and atraumatic.   Eyes: Conjunctivae and lids are normal.   Cardiovascular: Normal rate, regular rhythm, S1 normal and S2 normal.   Pulmonary/Chest: No respiratory distress. She exhibits no mass and no retraction. Right breast exhibits no inverted nipple, no mass, no nipple discharge, no skin change and no tenderness. Left breast exhibits no inverted nipple, no mass, no nipple discharge, no skin change and no tenderness. Breasts are symmetrical.   Abdominal: Soft. Normal appearance and bowel sounds are normal. She exhibits no distension.   Genitourinary: Uterus normal. Pelvic exam was performed with patient supine. There is no rash, tenderness, lesion or injury on the right labia. There is no rash, tenderness, lesion or injury on the left labia. Cervix exhibits no motion tenderness, no discharge and no friability. Right adnexum displays no mass, no tenderness and no fullness. Left adnexum displays no mass, no tenderness and no fullness. No erythema or bleeding in the vagina. No foreign body in the vagina. No signs of injury around the vagina. Vaginal discharge found.   Musculoskeletal: Normal range of motion.   Neurological: She is alert and oriented to person, place, and time.   Psychiatric: She has a normal mood and affect. Her behavior is normal.       Results for orders " placed or performed in visit on 08/07/19   RAJIV Screen w/Reflex   Result Value Ref Range    RAJIV Screen Negative <1:160 Negative <1:160   Anti-neutrophilic cytoplasmic antibody   Result Value Ref Range    Cytoplasmic Neutrophilic Ab <1:20 <1:20 Titer    Perinuclear (P-ANCA) <1:20 <1:20 Titer   Complement, total   Result Value Ref Range    Complement,Total, Serum >98 (H) 42 - 95 U/mL   C4 complement   Result Value Ref Range    Complement (C-4) 26 11 - 44 mg/dL   C3 complement   Result Value Ref Range    Complement (C-3) 141 50 - 180 mg/dL   Tryptase   Result Value Ref Range    Tryptase 7.6 <11.5 ng/mL   Protein electrophoresis, serum   Result Value Ref Range    Protein, Serum 6.5 6.0 - 8.4 g/dL    Albumin grams/dl 3.98 3.35 - 5.55 g/dL    Alpha-1 grams/dl 0.34 0.17 - 0.41 g/dL    Alpha-2 grams/dl 0.77 0.43 - 0.99 g/dL    Beta grams/dl 0.75 0.50 - 1.10 g/dL    Gamma grams/dl 0.65 (L) 0.67 - 1.58 g/dL   Immunoglobulins (IgG, IgA, IgM) Quantitative   Result Value Ref Range    IgG - Serum 653 650 - 1600 mg/dL    IgA 135 40 - 350 mg/dL    IgM 81 50 - 300 mg/dL   Humoral Immune Eval (Pneumo Serotypes) With H. Flu   Result Value Ref Range    H influenza B Ab 2.88 >=1.00 mcg/mL    Diphtheria Toxoid Ab 0.020 (A) >=0.10 IU/mL    Tetanus Ab 2.880 >0.10 IU/mL    S.pneumoniae Type 1 1.2 mcg/mL    S.pneumoniae Type 3 1.3 mcg/mL    Strep pneumo Type 4 0.4 mcg/mL    S.pneumoniae Type 5 2.6 mcg/mL    S.pneumoniae Type 8 9.4 mcg/mL    S.pneumoniae Type 9N 20.1 mcg/mL    S.pneumoniae Type 12F <0.3 mcg/mL    Strep pneumo Type 14 39.1 mcg/mL    S.pneumoniae Type 19F 2.6 mcg/mL    S.pneumoniae Type 23F 0.5 mcg/mL    S.pneumoniae Type 6B 2.4 mcg/mL    S.pneumoniae Type 7F 3.4 mcg/mL    S.pneumoniae Type 18C 3.1 mcg/mL    S.pneumoniae Type 9V Abs 1.7 mcg/mL   IgG 1, 2, 3, and 4   Result Value Ref Range    IgG 1 361 (L) 382 - 929 mg/dL    IgG 2 228 (L) 242 - 700 mg/dL    IgG 3 97 22 - 176 mg/dL    IgG 4 13 4 - 86 mg/dL   IgE   Result Value Ref  Range    IgE 310 (H) 0 - 100 IU/mL   Pathologist Interpretation SPE   Result Value Ref Range    Pathologist Interpretation SPE REVIEWED      Assessment:       1. Encounter for well woman exam    2. Vaginal discharge    3. Vasomotor symptoms due to menopause    4. Attention deficit disorder, unspecified hyperactivity presence        Plan:   Encounter for well woman exam  -     Liquid-based pap smear, screening  -     HPV High Risk Genotypes, PCR    Vaginal discharge  -     Vaginosis Screen by DNA Probe    Vasomotor symptoms due to menopause  Stop the patch  Side effects of medication discussed with pt-risks of VTE and stroke appear to be higher with oral when compared with transdermal estrogen. She verbalized understanding of info given    -     estradiol (ESTRACE) 1 MG tablet; Take 1 tablet (1 mg total) by mouth once daily.  Dispense: 30 tablet; Refill: 5    Attention deficit disorder (ADD); see note below                              C/O ADD FU  Here for ADD FU  Chronic condition, symptoms are controlled.  Current medication is vyvyanse  Denies side effects of medication./Side effect is controlled.  Will need a refill today.  ROS: all 12 pts are otherwise negative  A/P  ADD: Chronic Condition, Stable, latest lab result reviewed, medications reviewed-no side effects, counseled to take all meds as prescribed. No change in medication today, continue current regimen.    -     lisdexamfetamine (VYVANSE) 40 MG Cap; Take 1 capsule (40 mg total) by mouth once daily. TAKE ONE CAPSULE BY MOUTH EVERY MORNING **THANK YOU**  Dispense: 30 capsule; Refill: 0  -     lisdexamfetamine (VYVANSE) 40 MG Cap; Take 1 capsule (40 mg total) by mouth once daily.  Dispense: 30 capsule; Refill: 0  -     lisdexamfetamine (VYVANSE) 40 MG Cap; Take 1 capsule (40 mg total) by mouth once daily.  Dispense: 30 capsule; Refill: 0

## 2019-09-18 NOTE — PATIENT INSTRUCTIONS
Pap  Does this test have other names?  Pap smear, cervical cytology, Papanicolaou test, Pap smear test, vaginal smear technique  What is this test?  This test check the cells from inside the cervix for any changes that could lead to cancer. The cervix is the lower part of a woman's uterus that opens into the vagina.  This test is named after Antonino Maya MD, one of the doctors who developed this technique of testing for cervical cancer.  Why do I need this test?  The Pap test is most often used as a screening test to look for cervical cancer or changes in cervical cells that might eventually lead to cancer. Major medical groups generally recommend that women get regular Pap tests, usually every 2 to 3 years, starting at age 21. Getting a regular Pap test can be life-saving. Cervical cancer is one of the most serious types of cancer in women.  If your test shows abnormal cells, your healthcare provider may be able to find and treat cervical problems right away, or stop cervical cancer before it becomes life-threatening. Pap tests can also diagnose serious infections and pelvic inflammation.   What other tests might I have along with this test?  You will likely have a pelvic exam along with this test. Depending on your age and other factors, your tissue samples may also be tested for human papillomavirus (HPV) infection at the same time your Pap test is done. Infection with some types of HPV puts you at risk for cervical cancer.  If you have an abnormal Pap test result, your healthcare provider may order other tests. These may include:  · Colposcopy. Your cervix and vagina are looked at with a microscope called a colposcope, which magnifies any abnormal areas.  · Endocervical curettage. Cells are taken from the opening of your cervix with a spoon-shaped tool and looked at under a microscope. This may be done during the colposcopy.  · Biopsy. A small tissue sample is taken from your cervix and looked at  under a microscope. This may be done during the colposcopy.   What do my test results mean?  Many things may affect your lab test results. These include the method each lab uses to do the test. Even if your test results are different from the normal value, you may not have a problem. To learn what the results mean for you, talk with your healthcare provider.  Your results will either be normal or abnormal. If you get an abnormal result, this usually does not mean that you have cancer. It often means a minor cervical problem. Your healthcare provider may do another Pap test to confirm the initial results. Or he or she may recommend other tests such as colposcopy.  Occasionally a lab test has a false-positive result. This means you do not have a cervical problem even though the test result shows you do.   How is this test done?  This test requires a sample of cervical cells. For the test, you lie on your back with your knees bent and your feet in stirrups, then relax and spread your legs. As part of a pelvic exam, your healthcare provider first checks your vagina and reproductive organs for infections and health problems.  Then your provider uses a device called a speculum to open the vagina. The provider examines your cervix and scrapes off a few cells from inside your cervix..  Some women may have slight discomfort when the speculum is inserted.  Does this test pose any risks?  This test poses no known risks.  What might affect my test results?  Using vaginal lubricants, cleansers, contraceptives, or creams may mask your symptoms. Avoid using vaginal douches and abstain from sex for 2 days before an exam. Using these products or having sex may wash away or disguise abnormal cervical cells.  How do I get ready for this test?  It may seem like a good idea to wash up before having a Pap test, but this can actually erase the signs of a health problem. For accurate test results, avoid having sex or using tampons,  douches, vaginal creams, deodorant sprays and powders, and contraceptive foams and jellies for 2 days before your exam.  Don't have the test while you're menstruating. The ideal time to have a Pap test is 10 to 20 days after the first day of your last period.           © 3703-5355 [employer]. [address] [place] [address]. All rights reserved. This information is not intended as a substitute for professional medical care. Always follow your healthcare professional's instructions.        What is ADHD?  Does your child have trouble sitting still or paying attention? You may have been told that ADHD (attention deficit hyperactivity disorder) may be the cause. A child with ADHD might have a hard time staying focused (attention deficit). He or she may also have trouble controlling impulses (hyperactivity disorder). A child with one or both of these problems struggles daily to perform and behave well. ADHD is no ones fault. But if left untreated, it can deprive a child of self-esteem and limit success.    Which of the following describe your child?  These are some of the symptoms of ADHD:  Attention deficit  · Lacks mental focus  · Performs inconsistently  · Is distracted easily  · Has trouble shifting between tasks or settings  · Is messy, or loses things  · Forgets  Hyperactive/impulsive  · Has trouble controlling impulses; might talk too much, interrupt, or have a hard time taking turns  · Is easy to upset or anger  · Is always moving (sometimes without purpose)  · Does not learn from mistakes  What happens in the brain?  The brain controls your body, thoughts, and feelings. It does so with the help of neurotransmitters. These chemicals help the brain send and receive messages. With ADHD, the level of these chemicals often varies. This may cause signs of ADHD to come and go.  When messages are not received  With ADHD, chemicals in certain parts of the brain can be in short supply. Because of  this, some messages do not travel between nerve cells. Messages that signal a person to control behavior or pay attention arent passed along. As a result, traits common to ADHD may occur.  Remember your childs strengths  Children with ADHD can be challenging to raise. Because of this, its easy to overlook their good traits. Whats special about your child? Do your best to value and support your childs unique talents, strengths, and interests.   Date Last Reviewed: 12/1/2016 © 2000-2017 [employer]. [address] [place] [address], [place] [address]. All rights reserved. This information is not intended as a substitute for professional medical care. Always follow your healthcare professional's instructions.

## 2019-09-19 LAB
BACTERIAL VAGINOSIS DNA: NEGATIVE
CANDIDA GLABRATA DNA: NEGATIVE
CANDIDA KRUSEI DNA: NEGATIVE
CANDIDA RRNA VAG QL PROBE: NEGATIVE
IGA SERPL-MCNC: 129 MG/DL (ref 40–350)
IGG SERPL-MCNC: 581 MG/DL (ref 650–1600)
IGM SERPL-MCNC: 79 MG/DL (ref 50–300)
T VAGINALIS RRNA GENITAL QL PROBE: NEGATIVE

## 2019-09-20 LAB
IGG1 SER-MCNC: 329 MG/DL (ref 382–929)
IGG2 SER-MCNC: 217 MG/DL (ref 242–700)
IGG3 SER-MCNC: 87 MG/DL (ref 22–176)
IGG4 SER-MCNC: 11 MG/DL (ref 4–86)

## 2019-09-23 ENCOUNTER — PATIENT OUTREACH (OUTPATIENT)
Dept: ADMINISTRATIVE | Facility: HOSPITAL | Age: 54
End: 2019-09-23

## 2019-09-23 ENCOUNTER — TELEPHONE (OUTPATIENT)
Dept: ALLERGY | Facility: CLINIC | Age: 54
End: 2019-09-23

## 2019-09-23 ENCOUNTER — PATIENT MESSAGE (OUTPATIENT)
Dept: ALLERGY | Facility: CLINIC | Age: 54
End: 2019-09-23

## 2019-09-23 DIAGNOSIS — D80.1 HYPOGAMMAGLOBULINEMIA: ICD-10-CM

## 2019-09-23 DIAGNOSIS — D80.3 SELECTIVE DEFICIENCY OF IGG SUBCLASSES: Primary | ICD-10-CM

## 2019-09-23 NOTE — TELEPHONE ENCOUNTER
I completed my 25 min telephone conversation with her at 4:50 p.m. on Monday September 23, 2019.  I reviewed with her the results of laboratory tests.  Blood was drawn on September 18.    Her total IgG is slightly reduced.  Her IgG 1 and IgG 2 subclass levels are still reduced.    I recommended that blood be drawn again for repeat measurement of quantitative immunoglobulins and IgG subclass levels the week of November 18-22, 2019.  She will call and schedule an appointment in the laboratory.  When the results are available to review with her I will do so.    I told her it would be appropriate to consider obtaining consultation with an infectious diseases specialist.    I recommended that she keep a written record of all skin infections.    Potential benefits and potential adverse effects and risks associated with administration of immunoglobulin were discussed.    I informed her of my [facility].  I informed her that Dr. Anali Bell is working in the department.    She was given the office phone number.  For additional questions or concerns she was instructed to call.    This note was dictated using voice recognition software and may contain errors.

## 2019-09-24 LAB
HPV HR 12 DNA CVX QL NAA+PROBE: NEGATIVE
HPV16 AG SPEC QL: NEGATIVE
HPV18 DNA SPEC QL NAA+PROBE: NEGATIVE

## 2019-09-25 ENCOUNTER — PATIENT MESSAGE (OUTPATIENT)
Dept: FAMILY MEDICINE | Facility: CLINIC | Age: 54
End: 2019-09-25

## 2019-09-25 DIAGNOSIS — L02.92 RECURRENT BOILS: ICD-10-CM

## 2019-09-25 DIAGNOSIS — L08.9 RECURRENT INFECTION OF SKIN: Primary | ICD-10-CM

## 2019-09-25 DIAGNOSIS — R76.8 LOW SERUM IGG FOR AGE: ICD-10-CM

## 2019-09-26 NOTE — TELEPHONE ENCOUNTER
She went to see Dr Richmond and he told her she needs to see ID. Can you put in a referral to Dr Massey and I will send a message to his nurse

## 2019-09-27 ENCOUNTER — HOSPITAL ENCOUNTER (OUTPATIENT)
Dept: RADIOLOGY | Facility: HOSPITAL | Age: 54
Discharge: HOME | End: 2019-09-27
Attending: FAMILY MEDICINE
Payer: COMMERCIAL

## 2019-09-27 DIAGNOSIS — Z12.39 BREAST CANCER SCREENING: ICD-10-CM

## 2019-09-27 PROCEDURE — 77067 MAMMO DIGITAL SCREENING BILAT WITH TOMOSYNTHESIS_CAD: ICD-10-PCS | Mod: 26,,, | Performed by: RADIOLOGY

## 2019-09-27 PROCEDURE — 77067 SCR MAMMO BI INCL CAD: CPT | Mod: 26,,, | Performed by: RADIOLOGY

## 2019-09-27 PROCEDURE — 77063 MAMMO DIGITAL SCREENING BILAT WITH TOMOSYNTHESIS_CAD: ICD-10-PCS | Mod: 26,,, | Performed by: RADIOLOGY

## 2019-09-27 PROCEDURE — 77067 SCR MAMMO BI INCL CAD: CPT | Mod: TC

## 2019-09-27 PROCEDURE — 77063 BREAST TOMOSYNTHESIS BI: CPT | Mod: 26,,, | Performed by: RADIOLOGY

## 2019-09-30 ENCOUNTER — OFFICE VISIT (OUTPATIENT)
Dept: FAMILY MEDICINE | Facility: CLINIC | Age: 54
End: 2019-09-30
Payer: COMMERCIAL

## 2019-09-30 ENCOUNTER — PATIENT MESSAGE (OUTPATIENT)
Dept: FAMILY MEDICINE | Facility: CLINIC | Age: 54
End: 2019-09-30

## 2019-09-30 VITALS
SYSTOLIC BLOOD PRESSURE: 120 MMHG | BODY MASS INDEX: 32.83 KG/M2 | OXYGEN SATURATION: 98 % | HEART RATE: 70 BPM | WEIGHT: 191.25 LBS | TEMPERATURE: 99 F | DIASTOLIC BLOOD PRESSURE: 82 MMHG

## 2019-09-30 DIAGNOSIS — F41.9 ACUTE ANXIETY: ICD-10-CM

## 2019-09-30 DIAGNOSIS — L02.01 CUTANEOUS ABSCESS OF FACE: Primary | ICD-10-CM

## 2019-09-30 DIAGNOSIS — F41.1 GAD (GENERALIZED ANXIETY DISORDER): ICD-10-CM

## 2019-09-30 PROCEDURE — 3074F SYST BP LT 130 MM HG: CPT | Mod: CPTII,S$GLB,, | Performed by: FAMILY MEDICINE

## 2019-09-30 PROCEDURE — 3008F BODY MASS INDEX DOCD: CPT | Mod: CPTII,S$GLB,, | Performed by: FAMILY MEDICINE

## 2019-09-30 PROCEDURE — 99499 UNLISTED E&M SERVICE: CPT | Mod: S$GLB,,, | Performed by: FAMILY MEDICINE

## 2019-09-30 PROCEDURE — 3074F PR MOST RECENT SYSTOLIC BLOOD PRESSURE < 130 MM HG: ICD-10-PCS | Mod: CPTII,S$GLB,, | Performed by: FAMILY MEDICINE

## 2019-09-30 PROCEDURE — 99999 PR PBB SHADOW E&M-EST. PATIENT-LVL III: ICD-10-PCS | Mod: PBBFAC,,, | Performed by: FAMILY MEDICINE

## 2019-09-30 PROCEDURE — 99999 PR PBB SHADOW E&M-EST. PATIENT-LVL III: CPT | Mod: PBBFAC,,, | Performed by: FAMILY MEDICINE

## 2019-09-30 PROCEDURE — 3079F DIAST BP 80-89 MM HG: CPT | Mod: CPTII,S$GLB,, | Performed by: FAMILY MEDICINE

## 2019-09-30 PROCEDURE — 3008F PR BODY MASS INDEX (BMI) DOCUMENTED: ICD-10-PCS | Mod: CPTII,S$GLB,, | Performed by: FAMILY MEDICINE

## 2019-09-30 PROCEDURE — 99214 OFFICE O/P EST MOD 30 MIN: CPT | Mod: S$GLB,,, | Performed by: FAMILY MEDICINE

## 2019-09-30 PROCEDURE — 99214 PR OFFICE/OUTPT VISIT, EST, LEVL IV, 30-39 MIN: ICD-10-PCS | Mod: S$GLB,,, | Performed by: FAMILY MEDICINE

## 2019-09-30 PROCEDURE — 3079F PR MOST RECENT DIASTOLIC BLOOD PRESSURE 80-89 MM HG: ICD-10-PCS | Mod: CPTII,S$GLB,, | Performed by: FAMILY MEDICINE

## 2019-09-30 PROCEDURE — 99499 RISK ADDL DX/OHS AUDIT: ICD-10-PCS | Mod: S$GLB,,, | Performed by: FAMILY MEDICINE

## 2019-09-30 RX ORDER — MUPIROCIN CALCIUM 20 MG/G
CREAM TOPICAL 3 TIMES DAILY
Qty: 30 G | Refills: 0 | Status: SHIPPED | OUTPATIENT
Start: 2019-09-30 | End: 2019-10-07

## 2019-09-30 RX ORDER — SULFAMETHOXAZOLE AND TRIMETHOPRIM 800; 160 MG/1; MG/1
1 TABLET ORAL 2 TIMES DAILY
Qty: 20 TABLET | Refills: 0 | Status: SHIPPED | OUTPATIENT
Start: 2019-09-30 | End: 2019-10-10

## 2019-09-30 RX ORDER — HYDROXYZINE PAMOATE 100 MG/1
100 CAPSULE ORAL DAILY PRN
Qty: 30 CAPSULE | Refills: 0 | Status: SHIPPED | OUTPATIENT
Start: 2019-09-30 | End: 2019-10-08

## 2019-09-30 NOTE — PATIENT INSTRUCTIONS
Abscess (Antibiotic Treatment Only)  An abscess (sometimes called a boil) happens when bacteria get trapped under the skin and start to grow. Pus forms inside the abscess as the body responds to the bacteria. An abscess can happen with an insect bite, ingrown hair, blocked oil gland, pimple, cyst, or puncture wound.  In the early stages, your wound may be red and tender. For this stage, you may get antibiotics. If the abscess does not get better with antibiotics, it will need to be drained with a small cut.  Home care  These tips will help you care for your abscess at home:  · Soak the wound in hot water or apply hot packs (small towel soaked in hot water) to the area for 20 minutes at a time. Do this 3 to 4 times a day.  · Do not cut, squeeze, or pop the boil yourself.  · Apply antibiotic cream or ointment to the skin 3 to 4 times a day, unless something else was prescribed. Some ointments include an antibiotic plus a pain reliever.  · If your doctor prescribed antibiotics, do not stop taking them until you have finished the medicine or the doctor tells you to stop.  · You may use an over-the-counter pain medicine to control pain, unless another pain medicine was prescribed. If you have chronic liver or kidney disease or ever had a stomach ulcer or gastrointestinal bleeding, talk with your doctor before using these any of these.  Follow-up care  Follow up with your healthcare provider, or as advised. Check your wound each day for the signs of worsening infection listed below.  When to seek medical advice  Get prompt medical attention if any of these occur:  · An increase in redness or swelling  · Red streaks in the skin leading away from the abscess  · An increase in local pain or swelling  · Fever of 100.4ºF (38ºC) or higher, or as directed by your healthcare provider  · Pus or fluid coming from the abscess  · Boil returns after getting better  Date Last Reviewed: 9/1/2016  © 8151-8719 [employer] [address] [place] [address]. All rights reserved. This information is not intended as a substitute for professional medical care. Always follow your healthcare professional's instructions.

## 2019-09-30 NOTE — PROGRESS NOTES
Subjective:       Patient ID: Chasity Blake is a 53 y.o. female.    Chief Complaint: Abscess      History of Present Illness:   Chasity Blake 53 y.o. female presents today with  Abscess   Abscess to face-jaw:  X 2 spots  She opened them and manually expressed them this morning-purulent drainage.  Areas are red, tender and painful.  Also with pustules on the lower face. She is following up with immunologist and is yet to see infectious disease for recurrent  Abscesses and boils in the presence of low IgG.  Anxiety: acute. Due to the abscess. Asking for medication for her nerves. She has STAR d/o which is being treated with celexa.  She is on xyzal and she takes 2.5 mg and it does not make her sleepy per patient    Past Medical History:   Diagnosis Date    ADD (attention deficit disorder with hyperactivity)     Anxiety     Asthma     mild intermittent    Depression     Hypertension      Family History   Problem Relation Age of Onset    Lung cancer Mother     Coronary artery disease Mother 50    Coronary artery disease Father 38    Breast cancer Neg Hx     Colon cancer Neg Hx     Ovarian cancer Neg Hx      Social History     Socioeconomic History    Marital status:      Spouse name: Not on file    Number of children: Not on file    Years of education: Not on file    Highest education level: Not on file   Occupational History    Not on file   Social Needs    Financial resource strain: Not very hard    Food insecurity:     Worry: Never true     Inability: Never true    Transportation needs:     Medical: No     Non-medical: No   Tobacco Use    Smoking status: Current Every Day Smoker     Packs/day: 0.50     Years: 10.00     Pack years: 5.00     Types: Cigarettes    Smokeless tobacco: Never Used   Substance and Sexual Activity    Alcohol use: No     Alcohol/week: 0.0 standard drinks     Frequency: Never     Drinks per session: Patient refused     Binge frequency: Never    Drug use: No    Sexual  activity: Yes     Partners: Male     Birth control/protection: None   Lifestyle    Physical activity:     Days per week: 4 days     Minutes per session: 30 min    Stress: Rather much   Relationships    Social connections:     Talks on phone: More than three times a week     Gets together: Three times a week     Attends [employer] service: Not on file     Active member of club or organization: Yes     Attends meetings of clubs or organizations: 1 to 4 times per year     Relationship status:    Other Topics Concern    Not on file   Social History Narrative    Not on file     Outpatient Encounter Medications as of 9/30/2019   Medication Sig Dispense Refill    albuterol (ACCUNEB) 1.25 mg/3 mL Nebu USE ONE VIAL IN NEBULIZER EVERY 4 TO 6 HOURS AS NEEDED FOR WHEEZING & SHORTNESS OF BREATH **THANK YOU** 75 mL 6    albuterol (PROAIR HFA) 90 mcg/actuation inhaler INHALE TWO PUFFS INTO THE LUNGS EVERY 6 HOURS AS NEEDED FOR WHEEZING **THANK YOU** 25.5 g 2    albuterol-ipratropium (DUO-NEB) 2.5 mg-0.5 mg/3 mL nebulizer solution Take 3 mLs by nebulization every 6 (six) hours as needed for Wheezing. Rescue 1 Box 2    citalopram (CELEXA) 20 MG tablet TAKE ONE TABLET BY MOUTH ONCE DAILY **THANK YOU** 30 tablet 3    clindamycin (CLEOCIN T) 1 % external solution APPLY TO AFFECTED AREA TOPICALLY TWICE DAILY **THANK YOU** 30 mL 1    EPINEPHrine (EPIPEN) 0.3 mg/0.3 mL AtIn INJECT 0.3mL INTRAMUSCULARLY X1 DOSE THANK YOU  12    estradiol (ESTRACE) 1 MG tablet Take 1 tablet (1 mg total) by mouth once daily. 30 tablet 5    fluconazole (DIFLUCAN) 150 MG Tab TAKE ONE TABLET BY MOUTH ONCE. MAY REPEAT DOSE AFTER ONE WEEK IF SYMPTOM IS STILL PRESENT THANK YOU  0    fluticasone (FLONASE) 50 mcg/actuation nasal spray 2 sprays by Each Nare route once daily. 16 g 11    fluticasone-salmeterol diskus inhaler 250-50 mcg Inhale 1 puff into the lungs every 12 (twelve) hours. 60 each 2    levocetirizine (XYZAL) 5 MG tablet TAKE ONE  TABLET BY MOUTH EVERY EVENING **THANK YOU** 30 tablet 5    [START ON 12/11/2019] lisdexamfetamine (VYVANSE) 40 MG Cap Take 1 capsule (40 mg total) by mouth once daily. TAKE ONE CAPSULE BY MOUTH EVERY MORNING **THANK YOU** 30 capsule 0    [START ON 11/11/2019] lisdexamfetamine (VYVANSE) 40 MG Cap Take 1 capsule (40 mg total) by mouth once daily. 30 capsule 0    [START ON 10/11/2019] lisdexamfetamine (VYVANSE) 40 MG Cap Take 1 capsule (40 mg total) by mouth once daily. 30 capsule 0    montelukast (SINGULAIR) 10 mg tablet TAKE ONE TABLET BY MOUTH EVERY DAY **THANK YOU** 90 tablet 1    omeprazole (PRILOSEC) 40 MG capsule TAKE ONE CAPSULE BY MOUTH ONCE DAILY **THANK YOU** 90 capsule 1    ondansetron (ZOFRAN-ODT) 4 MG TbDL Take 1 tablet (4 mg total) by mouth every 8 (eight) hours as needed. 14 tablet 1    propranolol (INDERAL LA) 80 MG 24 hr capsule Take 1 capsule (80 mg total) by mouth once daily. 30 capsule 2    tiZANidine (ZANAFLEX) 4 mg Cap Take 1 capsule by mouth 2 (two) times daily as needed. 60 capsule 0    aspirin 81 MG Chew Take 1 tablet (81 mg total) by mouth once daily. 30 tablet 0    hydrOXYzine (VISTARIL) 100 MG capsule Take 1 capsule (100 mg total) by mouth daily as needed for Itching. 30 capsule 0    LORazepam (ATIVAN) 0.5 MG tablet Take 1 tablet (0.5 mg total) by mouth every 12 (twelve) hours as needed for Anxiety. 14 tablet 0    mupirocin calcium 2% (BACTROBAN) 2 % cream Apply topically 3 (three) times daily. for 7 days 30 g 0    sulfamethoxazole-trimethoprim 800-160mg (BACTRIM DS) 800-160 mg Tab Take 1 tablet by mouth 2 (two) times daily. for 10 days 20 tablet 0     No facility-administered encounter medications on file as of 9/30/2019.        Review of Systems   Constitutional: Negative for chills and fever.   HENT: Negative for congestion and facial swelling.    Eyes: Negative for discharge and itching.   Respiratory: Negative for cough and wheezing.    Cardiovascular: Negative for chest  pain and palpitations.   Gastrointestinal: Negative for abdominal pain, nausea and vomiting.   Endocrine: Negative for cold intolerance and heat intolerance.   Genitourinary: Negative for dysuria and flank pain.   Musculoskeletal: Negative for myalgias and neck stiffness.   Skin: Negative for pallor and wound.   Neurological: Negative for facial asymmetry and weakness.   Psychiatric/Behavioral: Negative for agitation, self-injury and suicidal ideas. The patient is nervous/anxious.        Objective:      /82 (BP Location: Right arm, Patient Position: Sitting, BP Method: Medium (Manual))   Pulse 70   Temp 98.6 °F (37 °C) (Oral)   Wt 86.7 kg (191 lb 4 oz)   SpO2 98%   BMI 32.83 kg/m²   Physical Exam   Constitutional: She is oriented to person, place, and time. She appears well-developed. No distress.   HENT:   Head: Normocephalic and atraumatic.       Right Ear: External ear normal.   Left Ear: External ear normal.   Pustules scattered to jaw area.  2 spots of 6 mm diameter of erythema, fluctuance and TTP as noted on the diagram, no drainage on manual expression.   Eyes: Conjunctivae and EOM are normal.   Neck: Neck supple. No thyromegaly present.   Cardiovascular: Normal rate and regular rhythm.   Pulmonary/Chest: Effort normal. No respiratory distress.   Abdominal: Soft. She exhibits no distension.   Genitourinary:   Genitourinary Comments: deferred   Musculoskeletal: She exhibits no edema or deformity.   Lymphadenopathy:        Head (right side): No submandibular adenopathy present.        Head (left side): No submandibular adenopathy present.     She has no cervical adenopathy.   Neurological: She is alert and oriented to person, place, and time.   Skin: Skin is warm and dry.   Psychiatric: Her behavior is normal. Her mood appears anxious. She expresses no suicidal ideation. She expresses no suicidal plans.   Nursing note and vitals reviewed.        Assessment:       1. Cutaneous abscess of face    2.  STAR (generalized anxiety disorder)        Plan:   Cutaneous abscess of face  Continue warm compress  -     sulfamethoxazole-trimethoprim 800-160mg (BACTRIM DS) 800-160 mg Tab; Take 1 tablet by mouth 2 (two) times daily. for 10 days  Dispense: 20 tablet; Refill: 0  -     mupirocin calcium 2% (BACTROBAN) 2 % cream; Apply topically 3 (three) times daily. for 7 days  Dispense: 30 g; Refill: 0    STAR (generalized anxiety disorder)  -     hydrOXYzine (VISTARIL) 100 MG capsule; Take 1 capsule (100 mg total) by mouth daily as needed for Itching.  Dispense: 30 capsule; Refill: 0

## 2019-10-08 PROBLEM — L02.01 FACIAL ABSCESS: Status: ACTIVE | Noted: 2019-10-08

## 2019-10-10 ENCOUNTER — PATIENT MESSAGE (OUTPATIENT)
Dept: FAMILY MEDICINE | Facility: CLINIC | Age: 54
End: 2019-10-10

## 2019-10-11 DIAGNOSIS — F98.8 ATTENTION DEFICIT DISORDER, UNSPECIFIED HYPERACTIVITY PRESENCE: ICD-10-CM

## 2019-10-11 RX ORDER — LISDEXAMFETAMINE DIMESYLATE 40 MG/1
40 CAPSULE ORAL DAILY
Qty: 30 CAPSULE | Refills: 0 | Status: SHIPPED | OUTPATIENT
Start: 2019-11-11 | End: 2019-12-11

## 2019-10-11 RX ORDER — LISDEXAMFETAMINE DIMESYLATE 40 MG/1
40 CAPSULE ORAL DAILY
Qty: 30 CAPSULE | Refills: 0 | Status: SHIPPED | OUTPATIENT
Start: 2019-10-11 | End: 2019-11-10

## 2019-10-11 RX ORDER — LISDEXAMFETAMINE DIMESYLATE 40 MG/1
40 CAPSULE ORAL DAILY
Qty: 30 CAPSULE | Refills: 0 | Status: SHIPPED | OUTPATIENT
Start: 2019-12-11 | End: 2020-01-17 | Stop reason: SDUPTHER

## 2019-10-16 ENCOUNTER — OFFICE VISIT (OUTPATIENT)
Dept: FAMILY MEDICINE | Facility: CLINIC | Age: 54
End: 2019-10-16
Payer: COMMERCIAL

## 2019-10-16 VITALS
BODY MASS INDEX: 32.69 KG/M2 | HEIGHT: 64 IN | OXYGEN SATURATION: 98 % | SYSTOLIC BLOOD PRESSURE: 138 MMHG | HEART RATE: 57 BPM | RESPIRATION RATE: 20 BRPM | TEMPERATURE: 98 F | WEIGHT: 191.5 LBS | DIASTOLIC BLOOD PRESSURE: 72 MMHG

## 2019-10-16 DIAGNOSIS — J11.1 INFLUENZA-LIKE ILLNESS: Primary | ICD-10-CM

## 2019-10-16 DIAGNOSIS — J45.21 MILD INTERMITTENT ASTHMA WITH ACUTE EXACERBATION: ICD-10-CM

## 2019-10-16 DIAGNOSIS — R05.9 COUGH: ICD-10-CM

## 2019-10-16 PROCEDURE — 3008F PR BODY MASS INDEX (BMI) DOCUMENTED: ICD-10-PCS | Mod: CPTII,S$GLB,, | Performed by: FAMILY MEDICINE

## 2019-10-16 PROCEDURE — 99999 PR PBB SHADOW E&M-EST. PATIENT-LVL III: ICD-10-PCS | Mod: PBBFAC,,, | Performed by: FAMILY MEDICINE

## 2019-10-16 PROCEDURE — 3075F SYST BP GE 130 - 139MM HG: CPT | Mod: CPTII,S$GLB,, | Performed by: FAMILY MEDICINE

## 2019-10-16 PROCEDURE — 3078F PR MOST RECENT DIASTOLIC BLOOD PRESSURE < 80 MM HG: ICD-10-PCS | Mod: CPTII,S$GLB,, | Performed by: FAMILY MEDICINE

## 2019-10-16 PROCEDURE — 3075F PR MOST RECENT SYSTOLIC BLOOD PRESS GE 130-139MM HG: ICD-10-PCS | Mod: CPTII,S$GLB,, | Performed by: FAMILY MEDICINE

## 2019-10-16 PROCEDURE — 3078F DIAST BP <80 MM HG: CPT | Mod: CPTII,S$GLB,, | Performed by: FAMILY MEDICINE

## 2019-10-16 PROCEDURE — 99999 PR PBB SHADOW E&M-EST. PATIENT-LVL III: CPT | Mod: PBBFAC,,, | Performed by: FAMILY MEDICINE

## 2019-10-16 PROCEDURE — 3008F BODY MASS INDEX DOCD: CPT | Mod: CPTII,S$GLB,, | Performed by: FAMILY MEDICINE

## 2019-10-16 PROCEDURE — 99213 PR OFFICE/OUTPT VISIT, EST, LEVL III, 20-29 MIN: ICD-10-PCS | Mod: S$GLB,,, | Performed by: FAMILY MEDICINE

## 2019-10-16 PROCEDURE — 99213 OFFICE O/P EST LOW 20 MIN: CPT | Mod: S$GLB,,, | Performed by: FAMILY MEDICINE

## 2019-10-16 RX ORDER — ALBUTEROL SULFATE 90 UG/1
AEROSOL, METERED RESPIRATORY (INHALATION)
Qty: 25.5 G | Refills: 2 | Status: SHIPPED | OUTPATIENT
Start: 2019-10-16 | End: 2020-01-17 | Stop reason: SDUPTHER

## 2019-10-16 RX ORDER — IPRATROPIUM BROMIDE AND ALBUTEROL SULFATE 2.5; .5 MG/3ML; MG/3ML
3 SOLUTION RESPIRATORY (INHALATION) EVERY 6 HOURS PRN
Qty: 1 BOX | Refills: 2 | Status: SHIPPED | OUTPATIENT
Start: 2019-10-16 | End: 2020-03-16 | Stop reason: SDUPTHER

## 2019-10-16 NOTE — PATIENT INSTRUCTIONS
Preventing Common Respiratory Infections  Respiratory infections such as colds and influenza (the flu) are common in winter. These infections are often caused by viruses. They may share some symptoms, but not all respiratory infections are the same. Some make you more sick than others. You can take steps to prevent common respiratory infections. And if you get sick, you can take care of yourself to keep the infection from getting worse.    What is a cold?  · Symptoms include runny nose, coughing and sneezing, and sore throat. Cold symptoms tend to be milder than flu symptoms.  · Symptoms tend to come on slowly. They last for a few days to about a week.  · With a cold, you can still do most of the things you usually do.  What is the flu?  · Symptoms include fever, headache, fatigue, cough, sore throat, runny nose, and muscle aches. Children may have upset stomach and vomiting, but adults usually dont.  · Symptoms tend to come on quickly. Some, such as fatigue and cough, can last a few weeks.  · With the flu, you may feel worn out and not able to do normal activities.  · Its most likely NOT the flu if an adult has vomiting or diarrhea for a day or two. This so-called stomach flu is probably a GI (gastrointestinal) infection.  When the infection gets worse  Without proper care, a respiratory infection can get worse. It can lead to serious complications and death. If you arent getting better, call your healthcare provider. Complications can include:  · Bronchitis (infection of the airways that leads to shortness of breath and coughing up thick yellow or green mucus)  · Pneumonia (infection of the lungs in which fluid and mucus settle in the lungs, making breathing difficult)  · Worsening of chronic conditions such as heart failure, chronic lung disease, asthma, or diabetes  · Severe dehydration (loss of fluids)  · Sinus problems  · Ear infections   Get a flu vaccine  A flu vaccine protects you from influenza  (but not other colds or infections). Get a vaccine each fall, before flu season starts. This can be done at a clinic, healthcare providers office, drugstore, senior center, or through your workplace.  Get pneumococcal vaccines  Pneumonia can be a complication of influenza. There are 2 pneumococcal pneumonia vaccines that protect against many types of pneumonia. Talk with your healthcare provider about these important vaccines.   Keep germs from spreading  No one likes getting sick. To protect yourself and others from cold and flu germs:  · Wash your hands often. Use alcohol-based hand  when you dont have access to soap and water.  · Dont touch your eyes, nose, and mouth. This may help you keep germs out of your body.  · Try to avoid people with respiratory infections. You may want to stay out of crowds during flu season (winter).  · Ask your healthcare provider if you should get a pneumonia vaccination.  How to wash your hands  · Use warm water and plenty of soap. Work up a good lather.  · Clean your whole hand, under your nails, between your fingers, and up your wrists. Wash for at least 15 to 20 seconds. Dont just wipe--rub well.  · Rinse. Let the water run down your fingertips, not up your wrists.  · In a public restroom, use a paper towel to turn off the faucet and open the door.   Date Last Reviewed: 12/1/2016  © 6281-5251 [employer]. [address] [place] [address], [place] [address]. All rights reserved. This information is not intended as a substitute for professional medical care. Always follow your healthcare professional's instructions.

## 2019-10-16 NOTE — PROGRESS NOTES
Subjective:       Patient ID: Chasity Blake is a 53 y.o. female.    Chief Complaint: Sinus Problem (feeling very bad, cough, congested)      History of Present Illness:   Chasity Blake 53 y.o. female presents today with Sinus and cough     Sinus and cough x one day.  Reports that she gets it 2 x a year and this is her second one.  Asso with nasal congestion and cough.  Denies fever, malaise and sinus pain.  She completed a course of tedizolid yesterday for recurrent boils and abscesses.       Past Medical History:   Diagnosis Date    ADD (attention deficit disorder with hyperactivity)     Anxiety     Asthma     mild intermittent    Depression     Hypertension      Family History   Problem Relation Age of Onset    Lung cancer Mother     Coronary artery disease Mother 50    Coronary artery disease Father 38    Breast cancer Neg Hx     Colon cancer Neg Hx     Ovarian cancer Neg Hx      Social History     Socioeconomic History    Marital status:      Spouse name: Not on file    Number of children: Not on file    Years of education: Not on file    Highest education level: Not on file   Occupational History    Not on file   Social Needs    Financial resource strain: Not very hard    Food insecurity:     Worry: Never true     Inability: Never true    Transportation needs:     Medical: No     Non-medical: No   Tobacco Use    Smoking status: Current Every Day Smoker     Packs/day: 0.50     Years: 10.00     Pack years: 5.00     Types: Cigarettes    Smokeless tobacco: Never Used   Substance and Sexual Activity    Alcohol use: No     Alcohol/week: 0.0 standard drinks     Frequency: Never     Drinks per session: Patient refused     Binge frequency: Never    Drug use: No    Sexual activity: Yes     Partners: Male     Birth control/protection: None   Lifestyle    Physical activity:     Days per week: 4 days     Minutes per session: 30 min    Stress: Rather much   Relationships    Social connections:      Talks on phone: More than three times a week     Gets together: Three times a week     Attends Zoroastrian service: Not on file     Active member of club or organization: Yes     Attends meetings of clubs or organizations: 1 to 4 times per year     Relationship status:    Other Topics Concern    Not on file   Social History Narrative    Not on file     Outpatient Encounter Medications as of 10/16/2019   Medication Sig Dispense Refill    albuterol (ACCUNEB) 1.25 mg/3 mL Nebu USE ONE VIAL IN NEBULIZER EVERY 4 TO 6 HOURS AS NEEDED FOR WHEEZING & SHORTNESS OF BREATH **THANK YOU** 75 mL 6    albuterol (PROAIR HFA) 90 mcg/actuation inhaler INHALE TWO PUFFS INTO THE LUNGS EVERY 6 HOURS AS NEEDED FOR WHEEZING **THANK YOU** 25.5 g 2    albuterol-ipratropium (DUO-NEB) 2.5 mg-0.5 mg/3 mL nebulizer solution Take 3 mLs by nebulization every 6 (six) hours as needed for Wheezing. Rescue 1 Box 2    aspirin 81 MG Chew Take 1 tablet (81 mg total) by mouth once daily. 30 tablet 0    citalopram (CELEXA) 20 MG tablet TAKE ONE TABLET BY MOUTH ONCE DAILY **THANK YOU** 30 tablet 3    clindamycin (CLEOCIN T) 1 % external solution APPLY TO AFFECTED AREA TOPICALLY TWICE DAILY **THANK YOU** 30 mL 1    EPINEPHrine (EPIPEN) 0.3 mg/0.3 mL AtIn INJECT 0.3mL INTRAMUSCULARLY X1 DOSE THANK YOU  12    estradiol (ESTRACE) 1 MG tablet Take 1 tablet (1 mg total) by mouth once daily. 30 tablet 5    fluconazole (DIFLUCAN) 150 MG Tab TAKE ONE TABLET BY MOUTH ONCE. MAY REPEAT DOSE AFTER ONE WEEK IF SYMPTOM IS STILL PRESENT THANK YOU  0    fluticasone (FLONASE) 50 mcg/actuation nasal spray 2 sprays by Each Nare route once daily. 16 g 11    fluticasone-salmeterol diskus inhaler 250-50 mcg Inhale 1 puff into the lungs every 12 (twelve) hours. 60 each 2    hydrOXYzine pamoate (VISTARIL) 50 MG Cap TAKE TWO CAPSULES BY MOUTH DAILY AS NEEDED FOR ITCHING THANK YOU  0    levocetirizine (XYZAL) 5 MG tablet TAKE ONE TABLET BY MOUTH EVERY EVENING  **THANK YOU** 30 tablet 5    [START ON 11/11/2019] lisdexamfetamine (VYVANSE) 40 MG Cap Take 1 capsule (40 mg total) by mouth once daily. 30 capsule 0    LORazepam (ATIVAN) 0.5 MG tablet Take 1 tablet (0.5 mg total) by mouth every 12 (twelve) hours as needed for Anxiety. 14 tablet 0    montelukast (SINGULAIR) 10 mg tablet TAKE ONE TABLET BY MOUTH EVERY DAY **THANK YOU** 90 tablet 1    mupirocin (BACTROBAN) 2 % ointment APPLY TO AFFECTED AREA TOPICALLY THREE TIMES DAILY FOR 7 DAYS THANK YOU  0    omeprazole (PRILOSEC) 40 MG capsule TAKE ONE CAPSULE BY MOUTH ONCE DAILY **THANK YOU** 90 capsule 1    ondansetron (ZOFRAN-ODT) 4 MG TbDL Take 1 tablet (4 mg total) by mouth every 8 (eight) hours as needed. 14 tablet 1    propranolol (INDERAL LA) 80 MG 24 hr capsule Take 1 capsule (80 mg total) by mouth once daily. 30 capsule 2    tedizolid 200 mg Tab Take 200 mg by mouth once daily. 6 tablet 0    tiZANidine (ZANAFLEX) 4 mg Cap Take 1 capsule by mouth 2 (two) times daily as needed. 60 capsule 0    lisdexamfetamine (VYVANSE) 40 MG Cap Take 1 capsule (40 mg total) by mouth once daily. 30 capsule 0    [START ON 12/11/2019] lisdexamfetamine (VYVANSE) 40 MG Cap Take 1 capsule (40 mg total) by mouth once daily. TAKE ONE CAPSULE BY MOUTH EVERY MORNING **THANK YOU** 30 capsule 0     No facility-administered encounter medications on file as of 10/16/2019.        Review of Systems   Constitutional: Negative for chills and fever.   HENT: Positive for congestion. Negative for facial swelling.    Eyes: Negative for discharge and itching.   Respiratory: Positive for cough. Negative for wheezing.    Cardiovascular: Negative for chest pain and palpitations.   Gastrointestinal: Negative for abdominal pain, nausea and vomiting.   Endocrine: Negative for cold intolerance and heat intolerance.   Genitourinary: Negative for dysuria and flank pain.   Musculoskeletal: Negative for myalgias and neck stiffness.   Skin: Negative for pallor  "and wound.   Neurological: Negative for facial asymmetry and weakness.   Psychiatric/Behavioral: Negative for agitation and suicidal ideas.       Objective:      /72 (BP Location: Right arm, Patient Position: Sitting, BP Method: Large (Manual))   Pulse (!) 57   Temp 98.1 °F (36.7 °C) (Oral)   Resp 20   Ht 5' 4" (1.626 m)   Wt 86.8 kg (191 lb 7.5 oz)   SpO2 98%   BMI 32.87 kg/m²   Physical Exam   Constitutional: She is oriented to person, place, and time. She appears well-developed. No distress.   HENT:   Head: Normocephalic and atraumatic.   Right Ear: External ear normal.   Left Ear: External ear normal.   Nose: Right sinus exhibits no maxillary sinus tenderness and no frontal sinus tenderness. Left sinus exhibits no maxillary sinus tenderness and no frontal sinus tenderness.   Eyes: Conjunctivae and EOM are normal.   Neck: Neck supple. No thyromegaly present.   Cardiovascular: Normal rate and regular rhythm.   Pulmonary/Chest: Effort normal and breath sounds normal. No respiratory distress.   Abdominal: Soft. She exhibits no distension.   Genitourinary:   Genitourinary Comments: deferred   Musculoskeletal: She exhibits no edema or deformity.   Lymphadenopathy:        Head (right side): No submandibular adenopathy present.        Head (left side): No submandibular adenopathy present.     She has no cervical adenopathy.   Neurological: She is alert and oriented to person, place, and time.   Skin: Skin is warm and dry.   Psychiatric: She has a normal mood and affect. Her behavior is normal.   Nursing note and vitals reviewed.      Results for orders placed or performed in visit on 09/18/19   HPV High Risk Genotypes, PCR   Result Value Ref Range    HPV High Risk type 16, PCR Negative Negative    HPV High Risk type 18, PCR Negative Negative    HPV other High Risk types, PCR Negative Negative   Vaginosis Screen by DNA Probe   Result Value Ref Range    Trichomonas vaginalis Negative Negative    Candida sp " Negative Negative    Ibis glabrata DNA Negative Negative    Candida krusei DNA Negative Negative    Bacterial vaginosis DNA Negative Negative     Assessment:       No diagnosis found.    Plan:   There are no diagnoses linked to this encounter.

## 2020-01-17 ENCOUNTER — OFFICE VISIT (OUTPATIENT)
Dept: FAMILY MEDICINE | Facility: CLINIC | Age: 55
End: 2020-01-17
Payer: COMMERCIAL

## 2020-01-17 VITALS
DIASTOLIC BLOOD PRESSURE: 70 MMHG | RESPIRATION RATE: 18 BRPM | SYSTOLIC BLOOD PRESSURE: 130 MMHG | OXYGEN SATURATION: 98 % | HEART RATE: 70 BPM | BODY MASS INDEX: 33.63 KG/M2 | HEIGHT: 64 IN | TEMPERATURE: 98 F | WEIGHT: 197 LBS

## 2020-01-17 DIAGNOSIS — D80.1 HYPOGAMMAGLOBULINEMIA: ICD-10-CM

## 2020-01-17 DIAGNOSIS — J01.90 ACUTE NON-RECURRENT SINUSITIS, UNSPECIFIED LOCATION: Primary | ICD-10-CM

## 2020-01-17 DIAGNOSIS — F17.200 CURRENT SMOKER: ICD-10-CM

## 2020-01-17 DIAGNOSIS — D80.3 SELECTIVE DEFICIENCY OF IGG SUBCLASSES: ICD-10-CM

## 2020-01-17 DIAGNOSIS — J45.21 MILD INTERMITTENT ASTHMA WITH ACUTE EXACERBATION: ICD-10-CM

## 2020-01-17 DIAGNOSIS — F98.8 ATTENTION DEFICIT DISORDER, UNSPECIFIED HYPERACTIVITY PRESENCE: ICD-10-CM

## 2020-01-17 PROCEDURE — 3008F PR BODY MASS INDEX (BMI) DOCUMENTED: ICD-10-PCS | Mod: CPTII,S$GLB,, | Performed by: FAMILY MEDICINE

## 2020-01-17 PROCEDURE — 99214 PR OFFICE/OUTPT VISIT, EST, LEVL IV, 30-39 MIN: ICD-10-PCS | Mod: S$GLB,,, | Performed by: FAMILY MEDICINE

## 2020-01-17 PROCEDURE — 99499 RISK ADDL DX/OHS AUDIT: ICD-10-PCS | Mod: S$GLB,,, | Performed by: FAMILY MEDICINE

## 2020-01-17 PROCEDURE — 3008F BODY MASS INDEX DOCD: CPT | Mod: CPTII,S$GLB,, | Performed by: FAMILY MEDICINE

## 2020-01-17 PROCEDURE — 99214 OFFICE O/P EST MOD 30 MIN: CPT | Mod: S$GLB,,, | Performed by: FAMILY MEDICINE

## 2020-01-17 PROCEDURE — 3078F DIAST BP <80 MM HG: CPT | Mod: CPTII,S$GLB,, | Performed by: FAMILY MEDICINE

## 2020-01-17 PROCEDURE — 3078F PR MOST RECENT DIASTOLIC BLOOD PRESSURE < 80 MM HG: ICD-10-PCS | Mod: CPTII,S$GLB,, | Performed by: FAMILY MEDICINE

## 2020-01-17 PROCEDURE — 99999 PR PBB SHADOW E&M-EST. PATIENT-LVL III: ICD-10-PCS | Mod: PBBFAC,,, | Performed by: FAMILY MEDICINE

## 2020-01-17 PROCEDURE — 3075F PR MOST RECENT SYSTOLIC BLOOD PRESS GE 130-139MM HG: ICD-10-PCS | Mod: CPTII,S$GLB,, | Performed by: FAMILY MEDICINE

## 2020-01-17 PROCEDURE — 99499 UNLISTED E&M SERVICE: CPT | Mod: S$GLB,,, | Performed by: FAMILY MEDICINE

## 2020-01-17 PROCEDURE — 99999 PR PBB SHADOW E&M-EST. PATIENT-LVL III: CPT | Mod: PBBFAC,,, | Performed by: FAMILY MEDICINE

## 2020-01-17 PROCEDURE — 3075F SYST BP GE 130 - 139MM HG: CPT | Mod: CPTII,S$GLB,, | Performed by: FAMILY MEDICINE

## 2020-01-17 RX ORDER — LISDEXAMFETAMINE DIMESYLATE 40 MG/1
40 CAPSULE ORAL DAILY
Qty: 30 CAPSULE | Refills: 0 | Status: SHIPPED | OUTPATIENT
Start: 2020-01-17 | End: 2020-02-16

## 2020-01-17 RX ORDER — PROMETHAZINE HYDROCHLORIDE AND DEXTROMETHORPHAN HYDROBROMIDE 6.25; 15 MG/5ML; MG/5ML
5 SYRUP ORAL EVERY 6 HOURS PRN
Qty: 180 ML | Refills: 0 | Status: SHIPPED | OUTPATIENT
Start: 2020-01-17 | End: 2020-01-27

## 2020-01-17 RX ORDER — ALBUTEROL SULFATE 90 UG/1
AEROSOL, METERED RESPIRATORY (INHALATION)
Qty: 25.5 G | Refills: 2 | Status: SHIPPED | OUTPATIENT
Start: 2020-01-17 | End: 2020-11-30 | Stop reason: SDUPTHER

## 2020-01-17 RX ORDER — LISDEXAMFETAMINE DIMESYLATE 40 MG/1
40 CAPSULE ORAL DAILY
Qty: 30 CAPSULE | Refills: 0 | Status: SHIPPED | OUTPATIENT
Start: 2020-02-17 | End: 2020-03-18

## 2020-01-17 RX ORDER — FLUTICASONE PROPIONATE 50 MCG
2 SPRAY, SUSPENSION (ML) NASAL DAILY
Qty: 16 G | Refills: 2 | Status: SHIPPED | OUTPATIENT
Start: 2020-01-17 | End: 2020-02-16

## 2020-01-17 RX ORDER — LISDEXAMFETAMINE DIMESYLATE 40 MG/1
40 CAPSULE ORAL DAILY
Qty: 30 CAPSULE | Refills: 0 | Status: SHIPPED | OUTPATIENT
Start: 2020-03-17 | End: 2020-04-27 | Stop reason: SDUPTHER

## 2020-01-17 NOTE — PATIENT INSTRUCTIONS

## 2020-01-17 NOTE — PROGRESS NOTES
Subjective:       Patient ID: Chasity Blake is a 54 y.o. female.    Chief Complaint: Sinus Problem and follow-up for ADD      History of Present Illness:   Chasity Blake 54 y.o. female presents today with   Sinusitis started on 12/26-nasal congestion with thick green discharge and it has been that way for 2 weeks. Not changing. Not on any medication.  Associated with cough-productive, clear phlegm.  History of asthma.              Here for ADD FU           Chronic condition, symptoms are controlled.           Current medication is vyvanse 40 mg          Denies side effects of medication./Side effect is controlled.          Will need a refill today.    Mild asthma: chronic controlled, uses duoneb, albuterol, cutting back on smoking and will need refill on albuterol  Past Medical History:   Diagnosis Date    ADD (attention deficit disorder with hyperactivity)     Anxiety     Asthma     mild intermittent    Depression     Hypertension      Family History   Problem Relation Age of Onset    Lung cancer Mother     Coronary artery disease Mother 50    Coronary artery disease Father 38    Breast cancer Neg Hx     Colon cancer Neg Hx     Ovarian cancer Neg Hx      Social History     Socioeconomic History    Marital status:      Spouse name: Not on file    Number of children: Not on file    Years of education: Not on file    Highest education level: Not on file   Occupational History    Not on file   Social Needs    Financial resource strain: Not very hard    Food insecurity:     Worry: Never true     Inability: Never true    Transportation needs:     Medical: No     Non-medical: No   Tobacco Use    Smoking status: Current Every Day Smoker     Packs/day: 0.50     Years: 10.00     Pack years: 5.00     Types: Cigarettes    Smokeless tobacco: Never Used   Substance and Sexual Activity    Alcohol use: No     Alcohol/week: 0.0 standard drinks     Frequency: Never     Drinks per session: Patient refused      Binge frequency: Never    Drug use: No    Sexual activity: Yes     Partners: Male     Birth control/protection: None   Lifestyle    Physical activity:     Days per week: 4 days     Minutes per session: 30 min    Stress: Rather much   Relationships    Social connections:     Talks on phone: More than three times a week     Gets together: Three times a week     Attends Shinto service: Not on file     Active member of club or organization: Yes     Attends meetings of clubs or organizations: 1 to 4 times per year     Relationship status:    Other Topics Concern    Not on file   Social History Narrative    Not on file     Outpatient Encounter Medications as of 1/17/2020   Medication Sig Dispense Refill    albuterol (ACCUNEB) 1.25 mg/3 mL Nebu USE ONE VIAL IN NEBULIZER EVERY 4 TO 6 HOURS AS NEEDED FOR WHEEZING & SHORTNESS OF BREATH **THANK YOU** 75 mL 6    albuterol (PROAIR HFA) 90 mcg/actuation inhaler INHALE TWO PUFFS INTO THE LUNGS EVERY 6 HOURS AS NEEDED FOR WHEEZING **THANK YOU** 25.5 g 2    albuterol-ipratropium (DUO-NEB) 2.5 mg-0.5 mg/3 mL nebulizer solution Take 3 mLs by nebulization every 6 (six) hours as needed for Wheezing. Rescue 1 Box 2    aspirin 81 MG Chew Take 1 tablet (81 mg total) by mouth once daily. 30 tablet 0    citalopram (CELEXA) 20 MG tablet TAKE ONE TABLET BY MOUTH ONCE DAILY **THANK YOU** 30 tablet 3    EPINEPHrine (EPIPEN) 0.3 mg/0.3 mL AtIn INJECT 0.3mL INTRAMUSCULARLY X1 DOSE THANK YOU  12    estradiol (ESTRACE) 1 MG tablet Take 1 tablet (1 mg total) by mouth once daily. 30 tablet 5    fluticasone propionate (FLONASE) 50 mcg/actuation nasal spray 2 sprays (100 mcg total) by Each Nostril route once daily. 16 g 2    fluticasone-salmeterol diskus inhaler 250-50 mcg Inhale 1 puff into the lungs every 12 (twelve) hours. 60 each 2    hydrOXYzine pamoate (VISTARIL) 50 MG Cap TAKE TWO CAPSULES BY MOUTH DAILY AS NEEDED FOR ITCHING THANK YOU  0    levocetirizine (XYZAL)  5 MG tablet TAKE ONE TABLET BY MOUTH EVERY EVENING **THANK YOU** 30 tablet 5    [START ON 3/17/2020] lisdexamfetamine (VYVANSE) 40 MG Cap Take 1 capsule (40 mg total) by mouth once daily. 30 capsule 0    LORazepam (ATIVAN) 0.5 MG tablet Take 1 tablet (0.5 mg total) by mouth every 12 (twelve) hours as needed for Anxiety. 14 tablet 0    montelukast (SINGULAIR) 10 mg tablet TAKE ONE TABLET BY MOUTH EVERY DAY **THANK YOU** 90 tablet 1    mupirocin (BACTROBAN) 2 % ointment APPLY TO AFFECTED AREA TOPICALLY THREE TIMES DAILY FOR 7 DAYS THANK YOU  0    omeprazole (PRILOSEC) 40 MG capsule TAKE ONE CAPSULE BY MOUTH ONCE DAILY **THANK YOU** 90 capsule 1    ondansetron (ZOFRAN-ODT) 4 MG TbDL Take 1 tablet (4 mg total) by mouth every 8 (eight) hours as needed. 14 tablet 1    propranolol (INDERAL LA) 80 MG 24 hr capsule Take 1 capsule (80 mg total) by mouth once daily. 30 capsule 2    tiZANidine (ZANAFLEX) 4 mg Cap Take 1 capsule by mouth 2 (two) times daily as needed. 60 capsule 0    [DISCONTINUED] albuterol (PROAIR HFA) 90 mcg/actuation inhaler INHALE TWO PUFFS INTO THE LUNGS EVERY 6 HOURS AS NEEDED FOR WHEEZING **THANK YOU** 25.5 g 2    [DISCONTINUED] fluticasone (FLONASE) 50 mcg/actuation nasal spray 2 sprays by Each Nare route once daily. 16 g 11    [DISCONTINUED] lisdexamfetamine (VYVANSE) 40 MG Cap Take 1 capsule (40 mg total) by mouth once daily. TAKE ONE CAPSULE BY MOUTH EVERY MORNING **THANK YOU** 30 capsule 0    clindamycin (CLEOCIN T) 1 % external solution APPLY TO AFFECTED AREA TOPICALLY TWICE DAILY **THANK YOU** (Patient not taking: Reported on 1/17/2020) 30 mL 1    fluconazole (DIFLUCAN) 150 MG Tab TAKE ONE TABLET BY MOUTH ONCE. MAY REPEAT DOSE AFTER ONE WEEK IF SYMPTOM IS STILL PRESENT THANK YOU  0    [START ON 2/17/2020] lisdexamfetamine (VYVANSE) 40 MG Cap Take 1 capsule (40 mg total) by mouth once daily. 30 capsule 0    lisdexamfetamine (VYVANSE) 40 MG Cap Take 1 capsule (40 mg total) by mouth  "once daily. 30 capsule 0    promethazine-dextromethorphan (PROMETHAZINE-DM) 6.25-15 mg/5 mL Syrp Take 5 mLs by mouth every 6 (six) hours as needed. 180 mL 0     No facility-administered encounter medications on file as of 1/17/2020.        Review of Systems   Constitutional: Negative for activity change, appetite change, chills and fever.   HENT: Positive for congestion. Negative for facial swelling.    Eyes: Negative for discharge and itching.   Respiratory: Positive for cough. Negative for wheezing.    Cardiovascular: Negative for chest pain and palpitations.   Gastrointestinal: Negative for abdominal pain, nausea and vomiting.   Endocrine: Negative for cold intolerance and heat intolerance.   Genitourinary: Negative for dysuria and flank pain.   Musculoskeletal: Negative for myalgias and neck stiffness.   Skin: Negative for pallor and wound.   Neurological: Negative for facial asymmetry and weakness.   Psychiatric/Behavioral: Negative for agitation and suicidal ideas.       Objective:      /70 (BP Location: Right arm, Patient Position: Sitting, BP Method: X-Large (Manual))   Pulse 70   Temp 98 °F (36.7 °C) (Oral)   Resp 18   Ht 5' 4" (1.626 m)   Wt 89.4 kg (196 lb 15.7 oz)   SpO2 98%   BMI 33.81 kg/m²   Physical Exam   Constitutional: She is oriented to person, place, and time. She appears well-developed. No distress.   HENT:   Head: Normocephalic and atraumatic.   Right Ear: External ear normal.   Left Ear: External ear normal.   Nose: Right sinus exhibits no maxillary sinus tenderness and no frontal sinus tenderness. Left sinus exhibits no maxillary sinus tenderness and no frontal sinus tenderness.   Eyes: Conjunctivae and EOM are normal.   Neck: Neck supple. No thyromegaly present.   Cardiovascular: Normal rate and regular rhythm.   Pulmonary/Chest: Effort normal and breath sounds normal. No respiratory distress.   Abdominal: Soft. She exhibits no distension.   Genitourinary:   Genitourinary " Comments: deferred   Musculoskeletal: She exhibits no edema or deformity.   Lymphadenopathy:        Head (right side): No submandibular adenopathy present.        Head (left side): No submandibular adenopathy present.     She has no cervical adenopathy.   Neurological: She is alert and oriented to person, place, and time.   Skin: Skin is warm and dry.   Psychiatric: She has a normal mood and affect. Her behavior is normal.   Nursing note and vitals reviewed.        Assessment:       1. Acute non-recurrent sinusitis, unspecified location    2. Attention deficit disorder, unspecified hyperactivity presence    3. Mild intermittent asthma with acute exacerbation    4. Current smoker    5. Hypogammaglobulinemia    6. Selective deficiency of IgG subclasses        Plan:     Acute non-recurrent sinusitis, unspecified location  -     fluticasone propionate (FLONASE) 50 mcg/actuation nasal spray; 2 sprays (100 mcg total) by Each Nostril route once daily.  Dispense: 16 g; Refill: 2  -     promethazine-dextromethorphan (PROMETHAZINE-DM) 6.25-15 mg/5 mL Syrp; Take 5 mLs by mouth every 6 (six) hours as needed.  Dispense: 180 mL; Refill: 0    Attention deficit disorder, unspecified hyperactivity presence  -     lisdexamfetamine (VYVANSE) 40 MG Cap; Take 1 capsule (40 mg total) by mouth once daily.  Dispense: 30 capsule; Refill: 0  -     lisdexamfetamine (VYVANSE) 40 MG Cap; Take 1 capsule (40 mg total) by mouth once daily.  Dispense: 30 capsule; Refill: 0  -     lisdexamfetamine (VYVANSE) 40 MG Cap; Take 1 capsule (40 mg total) by mouth once daily.  Dispense: 30 capsule; Refill: 0    Mild intermittent asthma with acute exacerbation  -     albuterol (PROAIR HFA) 90 mcg/actuation inhaler; INHALE TWO PUFFS INTO THE LUNGS EVERY 6 HOURS AS NEEDED FOR WHEEZING **THANK YOU**  Dispense: 25.5 g; Refill: 2    Current smoker: advised to quit    Hypogammaglobulinemia:  Managed by immunology  -     IMMUNOGLOBULINS (IGG, IGA, IGM) QUANTITATIVE;  Future; Expected date: 01/17/2020    Selective deficiency of IgG subclasses ; managed by immunology the  -     IGG 1, 2, 3, AND 4; Future; Expected date: 01/17/2020

## 2020-02-04 DIAGNOSIS — I10 HTN, GOAL BELOW 130/80: ICD-10-CM

## 2020-02-04 RX ORDER — PROPRANOLOL HYDROCHLORIDE 80 MG/1
80 CAPSULE, EXTENDED RELEASE ORAL DAILY
Qty: 30 CAPSULE | Refills: 2 | Status: SHIPPED | OUTPATIENT
Start: 2020-02-04 | End: 2020-02-06 | Stop reason: SDUPTHER

## 2020-02-06 ENCOUNTER — LAB VISIT (OUTPATIENT)
Dept: LAB | Facility: HOSPITAL | Age: 55
End: 2020-02-06
Attending: FAMILY MEDICINE
Payer: COMMERCIAL

## 2020-02-06 ENCOUNTER — OFFICE VISIT (OUTPATIENT)
Dept: FAMILY MEDICINE | Facility: CLINIC | Age: 55
End: 2020-02-06
Payer: COMMERCIAL

## 2020-02-06 VITALS
HEIGHT: 64 IN | RESPIRATION RATE: 18 BRPM | WEIGHT: 196.19 LBS | HEART RATE: 99 BPM | OXYGEN SATURATION: 98 % | BODY MASS INDEX: 33.49 KG/M2 | SYSTOLIC BLOOD PRESSURE: 138 MMHG | TEMPERATURE: 98 F | DIASTOLIC BLOOD PRESSURE: 84 MMHG

## 2020-02-06 DIAGNOSIS — B96.89 BACTERIAL SINUSITIS: Primary | ICD-10-CM

## 2020-02-06 DIAGNOSIS — I10 HTN, GOAL BELOW 130/80: ICD-10-CM

## 2020-02-06 DIAGNOSIS — D80.3 SELECTIVE DEFICIENCY OF IGG SUBCLASSES: ICD-10-CM

## 2020-02-06 DIAGNOSIS — D80.1 HYPOGAMMAGLOBULINEMIA: ICD-10-CM

## 2020-02-06 DIAGNOSIS — J32.9 BACTERIAL SINUSITIS: Primary | ICD-10-CM

## 2020-02-06 PROCEDURE — 99214 PR OFFICE/OUTPT VISIT, EST, LEVL IV, 30-39 MIN: ICD-10-PCS | Mod: S$GLB,,, | Performed by: FAMILY MEDICINE

## 2020-02-06 PROCEDURE — 99999 PR PBB SHADOW E&M-EST. PATIENT-LVL III: ICD-10-PCS | Mod: PBBFAC,,, | Performed by: FAMILY MEDICINE

## 2020-02-06 PROCEDURE — 99214 OFFICE O/P EST MOD 30 MIN: CPT | Mod: S$GLB,,, | Performed by: FAMILY MEDICINE

## 2020-02-06 PROCEDURE — 99499 UNLISTED E&M SERVICE: CPT | Mod: S$GLB,,, | Performed by: FAMILY MEDICINE

## 2020-02-06 PROCEDURE — 3075F SYST BP GE 130 - 139MM HG: CPT | Mod: CPTII,S$GLB,, | Performed by: FAMILY MEDICINE

## 2020-02-06 PROCEDURE — 99999 PR PBB SHADOW E&M-EST. PATIENT-LVL III: CPT | Mod: PBBFAC,,, | Performed by: FAMILY MEDICINE

## 2020-02-06 PROCEDURE — 82787 IGG 1 2 3 OR 4 EACH: CPT | Mod: 59

## 2020-02-06 PROCEDURE — 36415 COLL VENOUS BLD VENIPUNCTURE: CPT | Mod: PO

## 2020-02-06 PROCEDURE — 3075F PR MOST RECENT SYSTOLIC BLOOD PRESS GE 130-139MM HG: ICD-10-PCS | Mod: CPTII,S$GLB,, | Performed by: FAMILY MEDICINE

## 2020-02-06 PROCEDURE — 82784 ASSAY IGA/IGD/IGG/IGM EACH: CPT | Mod: 59

## 2020-02-06 PROCEDURE — 3079F DIAST BP 80-89 MM HG: CPT | Mod: CPTII,S$GLB,, | Performed by: FAMILY MEDICINE

## 2020-02-06 PROCEDURE — 3008F PR BODY MASS INDEX (BMI) DOCUMENTED: ICD-10-PCS | Mod: CPTII,S$GLB,, | Performed by: FAMILY MEDICINE

## 2020-02-06 PROCEDURE — 3008F BODY MASS INDEX DOCD: CPT | Mod: CPTII,S$GLB,, | Performed by: FAMILY MEDICINE

## 2020-02-06 PROCEDURE — 99499 RISK ADDL DX/OHS AUDIT: ICD-10-PCS | Mod: S$GLB,,, | Performed by: FAMILY MEDICINE

## 2020-02-06 PROCEDURE — 3079F PR MOST RECENT DIASTOLIC BLOOD PRESSURE 80-89 MM HG: ICD-10-PCS | Mod: CPTII,S$GLB,, | Performed by: FAMILY MEDICINE

## 2020-02-06 RX ORDER — PROMETHAZINE HYDROCHLORIDE AND CODEINE PHOSPHATE 6.25; 1 MG/5ML; MG/5ML
5 SOLUTION ORAL EVERY 4 HOURS PRN
Qty: 118 ML | Refills: 0 | Status: SHIPPED | OUTPATIENT
Start: 2020-02-06 | End: 2020-02-13

## 2020-02-06 RX ORDER — PROPRANOLOL HYDROCHLORIDE 80 MG/1
80 CAPSULE, EXTENDED RELEASE ORAL DAILY
Qty: 30 CAPSULE | Refills: 2 | Status: SHIPPED | OUTPATIENT
Start: 2020-02-06 | End: 2020-08-11

## 2020-02-06 RX ORDER — AMOXICILLIN AND CLAVULANATE POTASSIUM 875; 125 MG/1; MG/1
1 TABLET, FILM COATED ORAL 2 TIMES DAILY
Qty: 20 TABLET | Refills: 0 | Status: SHIPPED | OUTPATIENT
Start: 2020-02-06 | End: 2020-02-16

## 2020-02-06 NOTE — PATIENT INSTRUCTIONS
When to Use Antibiotics   Antibiotics are medicines used to treat infections caused by bacteria. They dont work for illnesses caused by viruses or an allergic reaction. In fact, taking antibiotics for reasons other than a bacterial infection can cause problems. For example, you may have side effects from the medicine. And if you really need an antibiotic, it may not work well.                                                                                                                                              When antibiotics wont help  Your healthcare provider wont usually prescribe antibiotics for the following conditions. You can help by not asking for them if you have:   · A cold. This type of illness is caused by a virus. It can cause a runny nose, stuffed-up nose, sneezing, coughing, headache, mild body aches, and low fever. A cold gets better on its own in a few days to a week.  · The flu (influenza). This is a respiratory illness caused by a virus. The flu usually goes away on its own in a week or so. It can cause fever, body aches, sore throat, and fatigue.  · Bronchitis. This is an infection in the lungs most often caused by a virus. You may have coughing, phlegm, body aches, and a low fever. A common type of bronchitis is known as a chest cold (acute bronchitis). This often happens after you have a respiratory infection like a common cold. Bronchitis can take weeks to go away, but antibiotics usually dont help.  · Most sore throats. Sore throats are most often caused by viruses. Your throat may feel scratchy or achy, and it may hurt to swallow. You may also have a low fever and body aches. A sore throat usually gets better in a few days.  · Most ear infections. An ear infection may be caused by a virus or bacteria. It causes pain in the ear. Antibiotics usually dont help, and the infection goes away on its own.  · Most sinus infections (sinusitis). This kind of infection causes sinus pain and  swelling, and a runny nose. In most cases, sinusitis goes away on its own, and antibiotics dont make recovery quicker.  · Allergic rhinitis. This is a set of symptoms caused by an allergic reaction. You may have sneezing, a runny nose, itchy or watery eyes, or a sore throat. Allergies are not treated with antibiotics.  · Low fever. A mild fever thats less than 100.4°F (38°C) most likely doesnt need treatment with antibiotics.   When antibiotics can help   Antibiotics can be used to treat:                                                     · Strep throat. This is a throat infectioncaused by a certain type of bacteria. Symptoms of strep throat include a sore throat, white patches on the tonsils, red spots on the roof of the mouth, fever, body aches, and nausea and vomiting.  · Urinary tract infection (UTI). This is a bacterial infection of the bladder and the tube that takes urine out of the body. It can cause burning pain and urine thats cloudy or tinted with blood. UTIs are very common. Antibiotics usually help treat these infections.  · Some ear infections. In some cases, a healthcare provider may prescribe antibiotics for an ear infection. You may need a test to show whats causing the ear infection.  · Some sinus infections. In some cases, yourhealthcare provider may give you antibiotics. He or she may first need to make sure your symptoms arent caused by a virus, fungus, allergies, or air pollutants such as smoke.   Your doctor may also recommend antibiotics if you have a condition that can affect your immune system, such as diabetes or cancer.   Self-care at home   If your infection cant be treated with antibiotics, you can take other steps to feel better. Try the remedies below. In general:   · Rest and sleep as much as needed.  · Drink water and other clear fluids.  · Dont smoke, and avoid smoke from other people.  · Use over-the-counter medicine such as acetaminophen to ease pain or fever, as  directed by your healthcare provider.   To treat sinus pain or nasal congestion:   · Put a warm, moist washcloth on your face where you feel sinus pain or pressure.  · Use a nasal spray with medicine or saline, as directed by your healthcare provider.  · Breathe in steam from a hot shower.  · Use a humidifier or cool mist vaporizer.   To quiet a cough:   · Use a humidifier or cool mist vaporizer.  · Breathe in steam from a hot shower.  · Use cough lozenges.   To sooth a sore throat:   · Suck on ice chips, popsicles, or lozenges.  · Use a sore throat spray.  · Use a humidifier or cool mist vaporizer.  · Gargle with saltwater.  · Drink warm liquids.   To ease ear pain:   · Hold a warm, moist washcloth on the ear for 10 minutes at a time.  Date Last Reviewed: 9/1/2016  © 8158-6829 [employer]. [address] [place] [address]. All rights reserved. This information is not intended as a substitute for professional medical care. Always follow your healthcare professional's instructions.        Sinusitis (Antibiotic Treatment)    The sinuses are air-filled spaces within the bones of the face. They connect to the inside of the nose. Sinusitis is an inflammation of the tissue lining the sinus cavity. Sinus inflammation can occur during a cold. It can also be due to allergies to pollens and other particles in the air. Sinusitis can cause symptoms of sinus congestion and fullness. A sinus infection causes fever, headache and facial pain. There is often green or yellow drainage from the nose or into the back of the throat (post-nasal drip). You have been given antibiotics to treat this condition.  Home care:  · Take the full course of antibiotics as instructed. Do not stop taking them, even if you feel better.  · Drink plenty of water, hot tea, and other liquids. This may help thin mucus. It also may promote sinus drainage.  · Heat may help soothe painful areas of the face. Use a towel soaked in hot  water. Or,  the shower and direct the hot spray onto your face. Using a vaporizer along with a menthol rub at night may also help.   · An expectorant containing guaifenesin may help thin the mucus and promote drainage from the sinuses.  · Over-the-counter decongestants may be used unless a similar medicine was prescribed. Nasal sprays work the fastest. Use one that contains phenylephrine or oxymetazoline. First blow the nose gently. Then use the spray. Do not use these medicines more often than directed on the label or symptoms may get worse. You may also use tablets containing pseudoephedrine. Avoid products that combine ingredients, because side effects may be increased. Read labels. You can also ask the pharmacist for help. (NOTE: Persons with high blood pressure should not use decongestants. They can raise blood pressure.)  · Over-the-counter antihistamines may help if allergies contributed to your sinusitis.    · Do not use nasal rinses or irrigation during an acute sinus infection, unless told to by your health care provider. Rinsing may spread the infection to other sinuses.  · Use acetaminophen or ibuprofen to control pain, unless another pain medicine was prescribed. (If you have chronic liver or kidney disease or ever had a stomach ulcer, talk with your doctor before using these medicines. Aspirin should never be used in anyone under 18 years of age who is ill with a fever. It may cause severe liver damage.)  · Don't smoke. This can worsen symptoms.  Follow-up care  Follow up with your healthcare provider or our staff if you are not improving within the next week.  When to seek medical advice  Call your healthcare provider if any of these occur:  · Facial pain or headache becoming more severe  · Stiff neck  · Unusual drowsiness or confusion  · Swelling of the forehead or eyelids  · Vision problems, including blurred or double vision  · Fever of 100.4ºF (38ºC) or higher, or as directed by your  healthcare provider  · Seizure  · Breathing problems  · Symptoms not resolving within 10 days  Date Last Reviewed: 4/13/2015  © 3114-8839 [employer]. [address] [place] [address], [place] [address]. All rights reserved. This information is not intended as a substitute for professional medical care. Always follow your healthcare professional's instructions.

## 2020-02-06 NOTE — PROGRESS NOTES
Subjective:       Patient ID: Chasity Blake is a 54 y.o. female.    Chief Complaint: Sinus Problem and Cough      History of Present Illness:   Chasity Blake 54 y.o. female presents today with sinusitis      Nasal congestion x 6 weeks. With thick green discharge.   Associated with post nasal drip-thick post nasal mucus sensation, sinus tenderness and cough-productive, yellow thick phellem.   She was seen for this problem 2 weeks ago and was treated with pro-dm, did not help much.  Denies fever. History of asthma.  Past Medical History:   Diagnosis Date    ADD (attention deficit disorder with hyperactivity)     Anxiety     Asthma     mild intermittent    Depression     Hypertension      Family History   Problem Relation Age of Onset    Lung cancer Mother     Coronary artery disease Mother 50    Coronary artery disease Father 38    Breast cancer Neg Hx     Colon cancer Neg Hx     Ovarian cancer Neg Hx      Social History     Socioeconomic History    Marital status:      Spouse name: Not on file    Number of children: Not on file    Years of education: Not on file    Highest education level: Not on file   Occupational History    Not on file   Social Needs    Financial resource strain: Not very hard    Food insecurity:     Worry: Never true     Inability: Never true    Transportation needs:     Medical: No     Non-medical: No   Tobacco Use    Smoking status: Current Every Day Smoker     Packs/day: 0.50     Years: 10.00     Pack years: 5.00     Types: Cigarettes    Smokeless tobacco: Never Used   Substance and Sexual Activity    Alcohol use: No     Alcohol/week: 0.0 standard drinks     Frequency: Never     Drinks per session: Patient refused     Binge frequency: Never    Drug use: No    Sexual activity: Yes     Partners: Male     Birth control/protection: None   Lifestyle    Physical activity:     Days per week: 4 days     Minutes per session: 30 min    Stress: Rather much   Relationships     Social connections:     Talks on phone: More than three times a week     Gets together: Three times a week     Attends Orthodoxy service: Not on file     Active member of club or organization: Yes     Attends meetings of clubs or organizations: 1 to 4 times per year     Relationship status:    Other Topics Concern    Not on file   Social History Narrative    Not on file     Outpatient Encounter Medications as of 2/6/2020   Medication Sig Dispense Refill    albuterol (ACCUNEB) 1.25 mg/3 mL Nebu USE ONE VIAL IN NEBULIZER EVERY 4 TO 6 HOURS AS NEEDED FOR WHEEZING & SHORTNESS OF BREATH **THANK YOU** 75 mL 6    albuterol (PROAIR HFA) 90 mcg/actuation inhaler INHALE TWO PUFFS INTO THE LUNGS EVERY 6 HOURS AS NEEDED FOR WHEEZING **THANK YOU** 25.5 g 2    albuterol-ipratropium (DUO-NEB) 2.5 mg-0.5 mg/3 mL nebulizer solution Take 3 mLs by nebulization every 6 (six) hours as needed for Wheezing. Rescue 1 Box 2    aspirin 81 MG Chew Take 1 tablet (81 mg total) by mouth once daily. 30 tablet 0    citalopram (CELEXA) 20 MG tablet TAKE ONE TABLET BY MOUTH ONCE DAILY **THANK YOU** 30 tablet 3    EPINEPHrine (EPIPEN) 0.3 mg/0.3 mL AtIn INJECT 0.3mL INTRAMUSCULARLY X1 DOSE THANK YOU  12    estradiol (ESTRACE) 1 MG tablet Take 1 tablet (1 mg total) by mouth once daily. 30 tablet 5    fluticasone propionate (FLONASE) 50 mcg/actuation nasal spray 2 sprays (100 mcg total) by Each Nostril route once daily. 16 g 2    fluticasone-salmeterol diskus inhaler 250-50 mcg Inhale 1 puff into the lungs every 12 (twelve) hours. 60 each 2    hydrOXYzine pamoate (VISTARIL) 50 MG Cap TAKE TWO CAPSULES BY MOUTH DAILY AS NEEDED FOR ITCHING THANK YOU  0    levocetirizine (XYZAL) 5 MG tablet TAKE ONE TABLET BY MOUTH EVERY EVENING **THANK YOU** 30 tablet 5    [START ON 3/17/2020] lisdexamfetamine (VYVANSE) 40 MG Cap Take 1 capsule (40 mg total) by mouth once daily. 30 capsule 0    [START ON 2/17/2020] lisdexamfetamine (VYVANSE) 40  MG Cap Take 1 capsule (40 mg total) by mouth once daily. 30 capsule 0    lisdexamfetamine (VYVANSE) 40 MG Cap Take 1 capsule (40 mg total) by mouth once daily. 30 capsule 0    montelukast (SINGULAIR) 10 mg tablet TAKE ONE TABLET BY MOUTH EVERY DAY **THANK YOU** 90 tablet 1    mupirocin (BACTROBAN) 2 % ointment APPLY TO AFFECTED AREA TOPICALLY THREE TIMES DAILY FOR 7 DAYS THANK YOU  0    omeprazole (PRILOSEC) 40 MG capsule TAKE ONE CAPSULE BY MOUTH ONCE DAILY **THANK YOU** 90 capsule 1    ondansetron (ZOFRAN-ODT) 4 MG TbDL Take 1 tablet (4 mg total) by mouth every 8 (eight) hours as needed. 14 tablet 1    propranolol (INDERAL LA) 80 MG 24 hr capsule Take 1 capsule (80 mg total) by mouth once daily. 30 capsule 2    tiZANidine (ZANAFLEX) 4 mg Cap Take 1 capsule by mouth 2 (two) times daily as needed. 60 capsule 0    [DISCONTINUED] propranolol (INDERAL LA) 80 MG 24 hr capsule Take 1 capsule (80 mg total) by mouth once daily. 30 capsule 2    amoxicillin-clavulanate 875-125mg (AUGMENTIN) 875-125 mg per tablet Take 1 tablet by mouth 2 (two) times daily. for 10 days 20 tablet 0    clindamycin (CLEOCIN T) 1 % external solution APPLY TO AFFECTED AREA TOPICALLY TWICE DAILY **THANK YOU** (Patient not taking: Reported on 1/17/2020) 30 mL 1    fluconazole (DIFLUCAN) 150 MG Tab TAKE ONE TABLET BY MOUTH ONCE. MAY REPEAT DOSE AFTER ONE WEEK IF SYMPTOM IS STILL PRESENT THANK YOU  0    LORazepam (ATIVAN) 0.5 MG tablet Take 1 tablet (0.5 mg total) by mouth every 12 (twelve) hours as needed for Anxiety. (Patient not taking: Reported on 2/6/2020) 14 tablet 0    promethazine-codeine 6.25-10 mg/5 ml (PHENERGAN WITH CODEINE) 6.25-10 mg/5 mL syrup Take 5 mLs by mouth every 4 (four) hours as needed for Cough. 118 mL 0     No facility-administered encounter medications on file as of 2/6/2020.        Review of Systems   Constitutional: Positive for activity change. Negative for chills and fever.   HENT: Positive for postnasal drip  "and voice change. Negative for congestion and facial swelling.    Eyes: Negative for discharge and itching.   Respiratory: Positive for cough. Negative for chest tightness, shortness of breath, wheezing and stridor.    Cardiovascular: Negative for chest pain and palpitations.   Gastrointestinal: Negative for abdominal pain, nausea and vomiting.   Endocrine: Negative for cold intolerance and heat intolerance.   Genitourinary: Negative for dysuria and flank pain.   Musculoskeletal: Negative for myalgias and neck stiffness.   Skin: Negative for pallor and wound.   Neurological: Negative for facial asymmetry and weakness.   Psychiatric/Behavioral: Negative for agitation and suicidal ideas.       Objective:      /84 (BP Location: Right arm, Patient Position: Sitting, BP Method: Large (Manual))   Pulse 99   Temp 98 °F (36.7 °C) (Oral)   Resp 18   Ht 5' 4" (1.626 m)   Wt 89 kg (196 lb 3.4 oz)   SpO2 98%   BMI 33.68 kg/m²   Physical Exam   Constitutional: She is oriented to person, place, and time. She appears well-developed. No distress.   HENT:   Head: Normocephalic and atraumatic.   Right Ear: External ear normal.   Left Ear: External ear normal.   Nose: Right sinus exhibits maxillary sinus tenderness. Right sinus exhibits no frontal sinus tenderness. Left sinus exhibits maxillary sinus tenderness. Left sinus exhibits no frontal sinus tenderness.   Eyes: Conjunctivae and EOM are normal.   Neck: Neck supple. No thyromegaly present.   Cardiovascular: Normal rate and regular rhythm.   Pulmonary/Chest: Effort normal. No respiratory distress.   Abdominal: Soft. She exhibits no distension.   Genitourinary:   Genitourinary Comments: deferred   Musculoskeletal: She exhibits no edema or deformity.   Lymphadenopathy:        Head (right side): No submandibular adenopathy present.        Head (left side): No submandibular adenopathy present.     She has no cervical adenopathy.   Neurological: She is alert and oriented " to person, place, and time.   Skin: Skin is warm and dry.   Psychiatric: She has a normal mood and affect. Her behavior is normal.   Nursing note and vitals reviewed.        Assessment:       1. Bacterial sinusitis    2. HTN, goal below 130/80        Plan:   Bacterial sinusitis: persistent sxs despite prescribed med. Sensation in the throat and loss of voice is concerning for vocal cord pathology-will treat with abx and if sxs does not resolve, refer to ENT for a nasopharyngoscope.  -     promethazine-codeine 6.25-10 mg/5 ml (PHENERGAN WITH CODEINE) 6.25-10 mg/5 mL syrup; Take 5 mLs by mouth every 4 (four) hours as needed for Cough.  Dispense: 118 mL; Refill: 0  -     amoxicillin-clavulanate 875-125mg (AUGMENTIN) 875-125 mg per tablet; Take 1 tablet by mouth 2 (two) times daily. for 10 days  Dispense: 20 tablet; Refill: 0    HTN, goal below 130/80  -     propranolol (INDERAL LA) 80 MG 24 hr capsule; Take 1 capsule (80 mg total) by mouth once daily.  Dispense: 30 capsule; Refill: 2

## 2020-02-07 LAB
IGA SERPL-MCNC: 146 MG/DL (ref 40–350)
IGG SERPL-MCNC: 680 MG/DL (ref 650–1600)
IGM SERPL-MCNC: 91 MG/DL (ref 50–300)

## 2020-02-10 LAB
IGG1 SER-MCNC: 366 MG/DL (ref 382–929)
IGG2 SER-MCNC: 243 MG/DL (ref 242–700)
IGG3 SER-MCNC: 81 MG/DL (ref 22–176)
IGG4 SER-MCNC: 20 MG/DL (ref 4–86)

## 2020-02-13 ENCOUNTER — PATIENT MESSAGE (OUTPATIENT)
Dept: FAMILY MEDICINE | Facility: CLINIC | Age: 55
End: 2020-02-13

## 2020-02-24 ENCOUNTER — PATIENT MESSAGE (OUTPATIENT)
Dept: FAMILY MEDICINE | Facility: CLINIC | Age: 55
End: 2020-02-24

## 2020-02-24 DIAGNOSIS — J32.9 CHRONIC RECURRENT SINUSITIS: Primary | ICD-10-CM

## 2020-02-26 ENCOUNTER — TELEPHONE (OUTPATIENT)
Dept: ALLERGY | Facility: CLINIC | Age: 55
End: 2020-02-26

## 2020-02-26 NOTE — TELEPHONE ENCOUNTER
Spoke with pt. She repeated the labs and the results are still below normal.     Please advise on next plan of care.

## 2020-02-26 NOTE — TELEPHONE ENCOUNTER
I have never seen her, nor did I order the labs. I would like to see her for a visit, before I can make an educated response.  My apologies.  Thanks

## 2020-02-27 ENCOUNTER — PATIENT OUTREACH (OUTPATIENT)
Dept: ADMINISTRATIVE | Facility: OTHER | Age: 55
End: 2020-02-27

## 2020-03-16 ENCOUNTER — OFFICE VISIT (OUTPATIENT)
Dept: FAMILY MEDICINE | Facility: CLINIC | Age: 55
End: 2020-03-16
Payer: COMMERCIAL

## 2020-03-16 ENCOUNTER — PATIENT OUTREACH (OUTPATIENT)
Dept: ADMINISTRATIVE | Facility: OTHER | Age: 55
End: 2020-03-16

## 2020-03-16 VITALS
DIASTOLIC BLOOD PRESSURE: 104 MMHG | TEMPERATURE: 99 F | SYSTOLIC BLOOD PRESSURE: 132 MMHG | WEIGHT: 200.5 LBS | BODY MASS INDEX: 34.42 KG/M2 | OXYGEN SATURATION: 97 % | HEART RATE: 71 BPM

## 2020-03-16 DIAGNOSIS — I10 HYPERTENSION NOT AT GOAL: ICD-10-CM

## 2020-03-16 DIAGNOSIS — J44.1 COPD WITH ACUTE EXACERBATION: Primary | ICD-10-CM

## 2020-03-16 DIAGNOSIS — J45.21 MILD INTERMITTENT ASTHMA WITH ACUTE EXACERBATION: ICD-10-CM

## 2020-03-16 DIAGNOSIS — F17.200 CURRENT SMOKER: ICD-10-CM

## 2020-03-16 PROCEDURE — 3080F PR MOST RECENT DIASTOLIC BLOOD PRESSURE >= 90 MM HG: ICD-10-PCS | Mod: CPTII,S$GLB,, | Performed by: FAMILY MEDICINE

## 2020-03-16 PROCEDURE — 3075F SYST BP GE 130 - 139MM HG: CPT | Mod: CPTII,S$GLB,, | Performed by: FAMILY MEDICINE

## 2020-03-16 PROCEDURE — 3075F PR MOST RECENT SYSTOLIC BLOOD PRESS GE 130-139MM HG: ICD-10-PCS | Mod: CPTII,S$GLB,, | Performed by: FAMILY MEDICINE

## 2020-03-16 PROCEDURE — 99214 PR OFFICE/OUTPT VISIT, EST, LEVL IV, 30-39 MIN: ICD-10-PCS | Mod: S$GLB,,, | Performed by: FAMILY MEDICINE

## 2020-03-16 PROCEDURE — 99499 RISK ADDL DX/OHS AUDIT: ICD-10-PCS | Mod: S$GLB,,, | Performed by: FAMILY MEDICINE

## 2020-03-16 PROCEDURE — 99999 PR PBB SHADOW E&M-EST. PATIENT-LVL III: ICD-10-PCS | Mod: PBBFAC,,, | Performed by: FAMILY MEDICINE

## 2020-03-16 PROCEDURE — 3080F DIAST BP >= 90 MM HG: CPT | Mod: CPTII,S$GLB,, | Performed by: FAMILY MEDICINE

## 2020-03-16 PROCEDURE — 99499 UNLISTED E&M SERVICE: CPT | Mod: S$GLB,,, | Performed by: FAMILY MEDICINE

## 2020-03-16 PROCEDURE — 99214 OFFICE O/P EST MOD 30 MIN: CPT | Mod: S$GLB,,, | Performed by: FAMILY MEDICINE

## 2020-03-16 PROCEDURE — 3008F PR BODY MASS INDEX (BMI) DOCUMENTED: ICD-10-PCS | Mod: CPTII,S$GLB,, | Performed by: FAMILY MEDICINE

## 2020-03-16 PROCEDURE — 99999 PR PBB SHADOW E&M-EST. PATIENT-LVL III: CPT | Mod: PBBFAC,,, | Performed by: FAMILY MEDICINE

## 2020-03-16 PROCEDURE — 3008F BODY MASS INDEX DOCD: CPT | Mod: CPTII,S$GLB,, | Performed by: FAMILY MEDICINE

## 2020-03-16 RX ORDER — DOXYCYCLINE HYCLATE 100 MG
100 TABLET ORAL EVERY 12 HOURS
Qty: 20 TABLET | Refills: 0 | Status: SHIPPED | OUTPATIENT
Start: 2020-03-16 | End: 2020-03-26

## 2020-03-16 RX ORDER — IPRATROPIUM BROMIDE AND ALBUTEROL SULFATE 2.5; .5 MG/3ML; MG/3ML
3 SOLUTION RESPIRATORY (INHALATION) EVERY 6 HOURS PRN
Qty: 1 BOX | Refills: 2 | Status: SHIPPED | OUTPATIENT
Start: 2020-03-16 | End: 2021-03-03 | Stop reason: SDUPTHER

## 2020-03-16 RX ORDER — PREDNISONE 50 MG/1
50 TABLET ORAL DAILY
Qty: 5 TABLET | Refills: 0 | Status: SHIPPED | OUTPATIENT
Start: 2020-03-16 | End: 2020-03-21

## 2020-03-16 NOTE — PROGRESS NOTES
Chief Complaint:    Chief Complaint   Patient presents with    Cough    Chest Congestion       History of Present Illness:    Presents today complaining of congestion cough wheezing denies any fever she has been coughing up some yellow-green stuff she suffers with asthma.  She has not used much for breathing treatment  Blood pressure is elevated today    ROS:  Review of Systems   Constitutional: Negative for activity change, chills, fatigue, fever and unexpected weight change.   HENT: Positive for congestion. Negative for ear discharge, ear pain, hearing loss, postnasal drip and rhinorrhea.    Eyes: Negative for pain and visual disturbance.   Respiratory: Positive for cough and wheezing. Negative for chest tightness and shortness of breath.    Cardiovascular: Negative for chest pain and palpitations.   Gastrointestinal: Negative for abdominal pain, diarrhea and vomiting.   Endocrine: Negative for heat intolerance.   Genitourinary: Negative for dysuria, flank pain, frequency and hematuria.   Musculoskeletal: Negative for back pain, gait problem and neck pain.   Skin: Negative for color change and rash.   Neurological: Negative for dizziness, tremors, seizures, numbness and headaches.   Psychiatric/Behavioral: Negative for agitation, hallucinations, self-injury, sleep disturbance and suicidal ideas. The patient is not nervous/anxious.        Past Medical History:   Diagnosis Date    ADD (attention deficit disorder with hyperactivity)     Anxiety     Asthma     mild intermittent    Depression     Hypertension        Social History:  Social History     Socioeconomic History    Marital status:      Spouse name: Not on file    Number of children: Not on file    Years of education: Not on file    Highest education level: Not on file   Occupational History    Not on file   Social Needs    Financial resource strain: Not very hard    Food insecurity:     Worry: Never true     Inability: Never true     Transportation needs:     Medical: No     Non-medical: No   Tobacco Use    Smoking status: Current Every Day Smoker     Packs/day: 0.50     Years: 10.00     Pack years: 5.00     Types: Cigarettes    Smokeless tobacco: Never Used   Substance and Sexual Activity    Alcohol use: No     Alcohol/week: 0.0 standard drinks     Frequency: Never     Drinks per session: Patient refused     Binge frequency: Never    Drug use: No    Sexual activity: Yes     Partners: Male     Birth control/protection: None   Lifestyle    Physical activity:     Days per week: 4 days     Minutes per session: 30 min    Stress: Rather much   Relationships    Social connections:     Talks on phone: More than three times a week     Gets together: Three times a week     Attends Shinto service: Not on file     Active member of club or organization: Yes     Attends meetings of clubs or organizations: 1 to 4 times per year     Relationship status:    Other Topics Concern    Not on file   Social History Narrative    Not on file       Family History:   family history includes Coronary artery disease (age of onset: 38) in her father; Coronary artery disease (age of onset: 50) in her mother; Lung cancer in her mother.    Health Maintenance   Topic Date Due    Pneumococcal Vaccine (Highest Risk) (2 of 3 - PCV13) 10/06/2010    Fecal Occult Blood Test (FOBT)/[employer]  09/18/2020    Mammogram  09/27/2020    Lipid Panel  08/01/2022    Pap Smear  09/18/2022    TETANUS VACCINE  10/29/2027    Hepatitis C Screening  Completed       Physical Exam:    Vital Signs  Temp: 98.8 °F (37.1 °C)  Temp src: Oral  Pulse: 71  SpO2: 97 %  BP: (!) 132/104  BP Location: Left arm  Patient Position: Sitting  Pain Score: 0-No pain  Height and Weight  Weight: 90.9 kg (200 lb 8.1 oz)]    Body mass index is 34.42 kg/m².    Physical Exam   Constitutional: She is oriented to person, place, and time. She appears well-developed.   HENT:   Right Ear: External ear  normal.   Left Ear: External ear normal.   Mouth/Throat: Oropharynx is clear and moist.   Eyes: Pupils are equal, round, and reactive to light. Conjunctivae are normal.   Neck: Normal range of motion. Neck supple.   Cardiovascular: Normal rate, regular rhythm and normal heart sounds.   No murmur heard.  Pulmonary/Chest: Effort normal. No respiratory distress. She has wheezes. She has no rales. She exhibits no tenderness.   Abdominal: Soft. She exhibits no distension and no mass. There is no tenderness. There is no guarding.   Musculoskeletal: She exhibits no edema or tenderness.   Lymphadenopathy:     She has no cervical adenopathy.   Neurological: She is alert and oriented to person, place, and time. She has normal reflexes.   Skin: Skin is warm and dry.   Psychiatric: She has a normal mood and affect. Her behavior is normal. Judgment and thought content normal.         Assessment:      ICD-10-CM ICD-9-CM   1. COPD with acute exacerbation J44.1 491.21   2. Current smoker F17.200 305.1   3. Mild intermittent asthma with acute exacerbation J45.21 493.92   4. Hypertension not at goal I10 401.9         Plan:        Treat with doxycycline and prednisone  Please do DuoNebs every 4-6 hours  Start monitoring blood pressure every day 2 times a day and bring the numbers back in 1 week  Smoking cessation advised        No orders of the defined types were placed in this encounter.      Current Outpatient Medications   Medication Sig Dispense Refill    albuterol (ACCUNEB) 1.25 mg/3 mL Nebu USE ONE VIAL IN NEBULIZER EVERY 4 TO 6 HOURS AS NEEDED FOR WHEEZING & SHORTNESS OF BREATH **THANK YOU** 75 mL 6    albuterol (PROAIR HFA) 90 mcg/actuation inhaler INHALE TWO PUFFS INTO THE LUNGS EVERY 6 HOURS AS NEEDED FOR WHEEZING **THANK YOU** 25.5 g 2    albuterol-ipratropium (DUO-NEB) 2.5 mg-0.5 mg/3 mL nebulizer solution Take 3 mLs by nebulization every 6 (six) hours as needed for Wheezing. Rescue 1 Box 2    citalopram (CELEXA) 20 MG  tablet TAKE ONE TABLET BY MOUTH ONCE DAILY **THANK YOU** 30 tablet 3    clindamycin (CLEOCIN T) 1 % external solution APPLY TO AFFECTED AREA TOPICALLY TWICE DAILY **THANK YOU** 30 mL 1    EPINEPHrine (EPIPEN) 0.3 mg/0.3 mL AtIn INJECT 0.3mL INTRAMUSCULARLY X1 DOSE THANK YOU  12    estradiol (ESTRACE) 1 MG tablet Take 1 tablet (1 mg total) by mouth once daily. 30 tablet 5    fluconazole (DIFLUCAN) 150 MG Tab TAKE ONE TABLET BY MOUTH ONCE. MAY REPEAT DOSE AFTER ONE WEEK IF SYMPTOM IS STILL PRESENT THANK YOU  0    fluticasone-salmeterol diskus inhaler 250-50 mcg Inhale 1 puff into the lungs every 12 (twelve) hours. 60 each 2    hydrOXYzine pamoate (VISTARIL) 50 MG Cap TAKE TWO CAPSULES BY MOUTH DAILY AS NEEDED FOR ITCHING THANK YOU  0    levocetirizine (XYZAL) 5 MG tablet TAKE ONE TABLET BY MOUTH EVERY EVENING **THANK YOU** 30 tablet 5    [START ON 3/17/2020] lisdexamfetamine (VYVANSE) 40 MG Cap Take 1 capsule (40 mg total) by mouth once daily. 30 capsule 0    lisdexamfetamine (VYVANSE) 40 MG Cap Take 1 capsule (40 mg total) by mouth once daily. 30 capsule 0    montelukast (SINGULAIR) 10 mg tablet TAKE ONE TABLET BY MOUTH EVERY DAY **THANK YOU** 90 tablet 1    mupirocin (BACTROBAN) 2 % ointment APPLY TO AFFECTED AREA TOPICALLY THREE TIMES DAILY FOR 7 DAYS THANK YOU  0    omeprazole (PRILOSEC) 40 MG capsule TAKE ONE CAPSULE BY MOUTH ONCE DAILY **THANK YOU** 90 capsule 1    ondansetron (ZOFRAN-ODT) 4 MG TbDL Take 1 tablet (4 mg total) by mouth every 8 (eight) hours as needed. 14 tablet 1    propranolol (INDERAL LA) 80 MG 24 hr capsule Take 1 capsule (80 mg total) by mouth once daily. 30 capsule 2    tiZANidine (ZANAFLEX) 4 mg Cap Take 1 capsule by mouth 2 (two) times daily as needed. 60 capsule 0    aspirin 81 MG Chew Take 1 tablet (81 mg total) by mouth once daily. 30 tablet 0    doxycycline (VIBRA-TABS) 100 MG tablet Take 1 tablet (100 mg total) by mouth every 12 (twelve) hours. for 10 days 20 tablet 0     LORazepam (ATIVAN) 0.5 MG tablet Take 1 tablet (0.5 mg total) by mouth every 12 (twelve) hours as needed for Anxiety. (Patient not taking: Reported on 2/6/2020) 14 tablet 0    predniSONE (DELTASONE) 50 MG Tab Take 1 tablet (50 mg total) by mouth once daily. for 5 days 5 tablet 0     No current facility-administered medications for this visit.        Medications Discontinued During This Encounter   Medication Reason    albuterol-ipratropium (DUO-NEB) 2.5 mg-0.5 mg/3 mL nebulizer solution Reorder       No follow-ups on file.      Dima Her MD

## 2020-04-06 ENCOUNTER — PATIENT MESSAGE (OUTPATIENT)
Dept: FAMILY MEDICINE | Facility: CLINIC | Age: 55
End: 2020-04-06

## 2020-04-07 DIAGNOSIS — N95.1 VASOMOTOR SYMPTOMS DUE TO MENOPAUSE: ICD-10-CM

## 2020-04-07 RX ORDER — ESTRADIOL 1 MG/1
1 TABLET ORAL DAILY
Qty: 30 TABLET | Refills: 2 | Status: SHIPPED | OUTPATIENT
Start: 2020-04-07 | End: 2020-07-08

## 2020-04-24 ENCOUNTER — PATIENT MESSAGE (OUTPATIENT)
Dept: FAMILY MEDICINE | Facility: CLINIC | Age: 55
End: 2020-04-24

## 2020-04-24 ENCOUNTER — PATIENT MESSAGE (OUTPATIENT)
Dept: ADMINISTRATIVE | Facility: OTHER | Age: 55
End: 2020-04-24

## 2020-04-27 ENCOUNTER — OFFICE VISIT (OUTPATIENT)
Dept: FAMILY MEDICINE | Facility: CLINIC | Age: 55
End: 2020-04-27
Payer: COMMERCIAL

## 2020-04-27 ENCOUNTER — TELEPHONE (OUTPATIENT)
Dept: FAMILY MEDICINE | Facility: CLINIC | Age: 55
End: 2020-04-27

## 2020-04-27 DIAGNOSIS — F98.8 ATTENTION DEFICIT DISORDER, UNSPECIFIED HYPERACTIVITY PRESENCE: ICD-10-CM

## 2020-04-27 PROCEDURE — 99213 OFFICE O/P EST LOW 20 MIN: CPT | Mod: 95,,, | Performed by: FAMILY MEDICINE

## 2020-04-27 PROCEDURE — 99213 PR OFFICE/OUTPT VISIT, EST, LEVL III, 20-29 MIN: ICD-10-PCS | Mod: 95,,, | Performed by: FAMILY MEDICINE

## 2020-04-27 RX ORDER — LISDEXAMFETAMINE DIMESYLATE 40 MG/1
40 CAPSULE ORAL DAILY
Qty: 30 CAPSULE | Refills: 0 | Status: SHIPPED | OUTPATIENT
Start: 2020-04-27 | End: 2020-05-27

## 2020-04-27 RX ORDER — LISDEXAMFETAMINE DIMESYLATE 40 MG/1
40 CAPSULE ORAL DAILY
Qty: 30 CAPSULE | Refills: 0 | Status: SHIPPED | OUTPATIENT
Start: 2020-06-27 | End: 2020-08-12 | Stop reason: SDUPTHER

## 2020-04-27 RX ORDER — LISDEXAMFETAMINE DIMESYLATE 40 MG/1
40 CAPSULE ORAL DAILY
Qty: 30 CAPSULE | Refills: 0 | Status: SHIPPED | OUTPATIENT
Start: 2020-05-27 | End: 2020-06-26

## 2020-04-27 NOTE — TELEPHONE ENCOUNTER
Called patient and advised her to arrive for her visit @11:40 15 min early. Patient verbalized understanding

## 2020-04-27 NOTE — PROGRESS NOTES
Chief Complaint:  No chief complaint on file.      History of Present Illness:    This is the virtual visit for f/u of ADD/ADHD. Doing well, no side effect from the medications, meds helping him concentrate and do what needs done.      ROS:  Review of Systems   Constitutional: Negative for activity change, appetite change and unexpected weight change.   Cardiovascular: Negative for palpitations.   Gastrointestinal: Negative for abdominal pain.   Musculoskeletal: Negative for myalgias.   Neurological: Negative for dizziness, tremors, light-headedness and headaches.   Psychiatric/Behavioral: Negative for agitation, behavioral problems, confusion, decreased concentration, dysphoric mood, hallucinations, sleep disturbance and suicidal ideas. The patient is not nervous/anxious and is not hyperactive.      Past Medical History:   Diagnosis Date    ADD (attention deficit disorder with hyperactivity)     Anxiety     Asthma     mild intermittent    Depression     Hypertension        Social History:  Social History     Socioeconomic History    Marital status:      Spouse name: Not on file    Number of children: Not on file    Years of education: Not on file    Highest education level: Not on file   Occupational History    Not on file   Social Needs    Financial resource strain: Not very hard    Food insecurity:     Worry: Never true     Inability: Never true    Transportation needs:     Medical: No     Non-medical: No   Tobacco Use    Smoking status: Current Every Day Smoker     Packs/day: 0.50     Years: 10.00     Pack years: 5.00     Types: Cigarettes    Smokeless tobacco: Never Used   Substance and Sexual Activity    Alcohol use: No     Alcohol/week: 0.0 standard drinks     Frequency: Never     Drinks per session: Patient refused     Binge frequency: Never    Drug use: No    Sexual activity: Yes     Partners: Male     Birth control/protection: None   Lifestyle    Physical activity:     Days per  week: 4 days     Minutes per session: 30 min    Stress: Rather much   Relationships    Social connections:     Talks on phone: More than three times a week     Gets together: Three times a week     Attends [employer] service: Not on file     Active member of club or organization: Yes     Attends meetings of clubs or organizations: 1 to 4 times per year     Relationship status:    Other Topics Concern    Not on file   Social History Narrative    Not on file       Family History:   family history includes Coronary artery disease (age of onset: 38) in her father; Coronary artery disease (age of onset: 50) in her mother; Lung cancer in her mother.    Health Maintenance   Topic Date Due    Pneumococcal Vaccine (Highest Risk) (2 of 3 - PCV13) 10/06/2010    Fecal Occult Blood Test (FOBT)/[employer]  09/18/2020    Mammogram  09/27/2020    Lipid Panel  08/01/2022    Pap Smear  09/18/2022    TETANUS VACCINE  10/29/2027    Hepatitis C Screening  Completed       Physical Exam:     ]     Physical Exam     There is no height or weight on file to calculate BMI.      Assessment:      ICD-10-CM ICD-9-CM   1. Attention deficit disorder, unspecified hyperactivity presence F98.8 314.00         Plan:  Please continue current medications.    verified and 3 Rxs issued  F/u 3 mos  The patient location is: Home   The chief complaint leading to consultation is: as below  Visit type: Virtual visit with synchronous audio and video  Total time spent with patient: 20+ mins  Each patient to whom he or she provides medical services by telemedicine is:  (1) informed of the relationship between the physician and patient and the respective role of any other health care provider with respect to management of the patient; and (2) notified that he or she may decline to receive medical services by telemedicine and may withdraw from such care at any time.    Notes: see below      No orders of the defined types were placed in this  encounter.      Current Outpatient Medications   Medication Sig Dispense Refill    albuterol (ACCUNEB) 1.25 mg/3 mL Nebu USE ONE VIAL IN NEBULIZER EVERY 4 TO 6 HOURS AS NEEDED FOR WHEEZING & SHORTNESS OF BREATH **THANK YOU** 75 mL 6    albuterol (PROAIR HFA) 90 mcg/actuation inhaler INHALE TWO PUFFS INTO THE LUNGS EVERY 6 HOURS AS NEEDED FOR WHEEZING **THANK YOU** 25.5 g 2    albuterol-ipratropium (DUO-NEB) 2.5 mg-0.5 mg/3 mL nebulizer solution Take 3 mLs by nebulization every 6 (six) hours as needed for Wheezing. Rescue 1 Box 2    aspirin 81 MG Chew Take 1 tablet (81 mg total) by mouth once daily. 30 tablet 0    citalopram (CELEXA) 20 MG tablet TAKE ONE TABLET BY MOUTH ONCE DAILY **THANK YOU** 30 tablet 3    clindamycin (CLEOCIN T) 1 % external solution APPLY TO AFFECTED AREA TOPICALLY TWICE DAILY **THANK YOU** 30 mL 1    EPINEPHrine (EPIPEN) 0.3 mg/0.3 mL AtIn INJECT 0.3mL INTRAMUSCULARLY X1 DOSE THANK YOU  12    estradioL (ESTRACE) 1 MG tablet Take 1 tablet (1 mg total) by mouth once daily. 30 tablet 2    fluconazole (DIFLUCAN) 150 MG Tab TAKE ONE TABLET BY MOUTH ONCE. MAY REPEAT DOSE AFTER ONE WEEK IF SYMPTOM IS STILL PRESENT THANK YOU  0    fluticasone-salmeterol diskus inhaler 250-50 mcg Inhale 1 puff into the lungs every 12 (twelve) hours. 60 each 2    hydrOXYzine pamoate (VISTARIL) 50 MG Cap TAKE TWO CAPSULES BY MOUTH DAILY AS NEEDED FOR ITCHING THANK YOU  0    levocetirizine (XYZAL) 5 MG tablet TAKE ONE TABLET BY MOUTH EVERY EVENING **THANK YOU** 30 tablet 5    lisdexamfetamine (VYVANSE) 40 MG Cap Take 1 capsule (40 mg total) by mouth once daily. 30 capsule 0    [START ON 5/27/2020] lisdexamfetamine (VYVANSE) 40 MG Cap Take 1 capsule (40 mg total) by mouth once daily. 30 capsule 0    [START ON 6/27/2020] lisdexamfetamine (VYVANSE) 40 MG Cap Take 1 capsule (40 mg total) by mouth once daily. 30 capsule 0    LORazepam (ATIVAN) 0.5 MG tablet Take 1 tablet (0.5 mg total) by mouth every 12  (twelve) hours as needed for Anxiety. (Patient not taking: Reported on 2/6/2020) 14 tablet 0    montelukast (SINGULAIR) 10 mg tablet TAKE ONE TABLET BY MOUTH EVERY DAY **THANK YOU** 90 tablet 1    mupirocin (BACTROBAN) 2 % ointment APPLY TO AFFECTED AREA TOPICALLY THREE TIMES DAILY FOR 7 DAYS THANK YOU  0    omeprazole (PRILOSEC) 40 MG capsule TAKE ONE CAPSULE BY MOUTH ONCE DAILY **THANK YOU** 90 capsule 1    ondansetron (ZOFRAN-ODT) 4 MG TbDL Take 1 tablet (4 mg total) by mouth every 8 (eight) hours as needed. 14 tablet 1    propranolol (INDERAL LA) 80 MG 24 hr capsule Take 1 capsule (80 mg total) by mouth once daily. 30 capsule 2    tiZANidine (ZANAFLEX) 4 mg Cap Take 1 capsule by mouth 2 (two) times daily as needed. 60 capsule 0     No current facility-administered medications for this visit.        Medications Discontinued During This Encounter   Medication Reason    lisdexamfetamine (VYVANSE) 40 MG Cap Reorder             Dima Her MD

## 2020-05-03 ENCOUNTER — HOSPITAL ENCOUNTER (EMERGENCY)
Facility: HOSPITAL | Age: 55
Discharge: HOME | End: 2020-05-03
Attending: EMERGENCY MEDICINE
Payer: COMMERCIAL

## 2020-05-03 VITALS
WEIGHT: 200 LBS | HEART RATE: 65 BPM | DIASTOLIC BLOOD PRESSURE: 70 MMHG | TEMPERATURE: 98 F | BODY MASS INDEX: 34.15 KG/M2 | RESPIRATION RATE: 20 BRPM | HEIGHT: 64 IN | SYSTOLIC BLOOD PRESSURE: 149 MMHG | OXYGEN SATURATION: 99 %

## 2020-05-03 DIAGNOSIS — R11.0 NAUSEA: ICD-10-CM

## 2020-05-03 DIAGNOSIS — K52.9 GASTROENTERITIS: Primary | ICD-10-CM

## 2020-05-03 LAB
ALBUMIN SERPL BCP-MCNC: 3.8 G/DL (ref 3.5–5.2)
ALP SERPL-CCNC: 97 U/L (ref 55–135)
ALT SERPL W/O P-5'-P-CCNC: 14 U/L (ref 10–44)
ANION GAP SERPL CALC-SCNC: 11 MMOL/L (ref 8–16)
AST SERPL-CCNC: 8 U/L (ref 10–40)
BASOPHILS # BLD AUTO: 0.04 K/UL (ref 0–0.2)
BASOPHILS NFR BLD: 0.5 % (ref 0–1.9)
BILIRUB SERPL-MCNC: 0.2 MG/DL (ref 0.1–1)
BILIRUB UR QL STRIP: NEGATIVE
BUN SERPL-MCNC: 6 MG/DL (ref 6–20)
CALCIUM SERPL-MCNC: 8.8 MG/DL (ref 8.7–10.5)
CHLORIDE SERPL-SCNC: 99 MMOL/L (ref 95–110)
CLARITY UR: CLEAR
CO2 SERPL-SCNC: 26 MMOL/L (ref 23–29)
COLOR UR: YELLOW
CREAT SERPL-MCNC: 0.9 MG/DL (ref 0.5–1.4)
DIFFERENTIAL METHOD: ABNORMAL
EOSINOPHIL # BLD AUTO: 0.2 K/UL (ref 0–0.5)
EOSINOPHIL NFR BLD: 2 % (ref 0–8)
ERYTHROCYTE [DISTWIDTH] IN BLOOD BY AUTOMATED COUNT: 13 % (ref 11.5–14.5)
EST. GFR  (AFRICAN AMERICAN): >60 ML/MIN/1.73 M^2
EST. GFR  (NON AFRICAN AMERICAN): >60 ML/MIN/1.73 M^2
GLUCOSE SERPL-MCNC: 96 MG/DL (ref 70–110)
GLUCOSE UR QL STRIP: NEGATIVE
HCT VFR BLD AUTO: 44.6 % (ref 37–48.5)
HCV AB SERPL QL IA: NEGATIVE
HGB BLD-MCNC: 14.3 G/DL (ref 12–16)
HGB UR QL STRIP: ABNORMAL
HIV 1+2 AB+HIV1 P24 AG SERPL QL IA: NEGATIVE
IMM GRANULOCYTES # BLD AUTO: 0.03 K/UL (ref 0–0.04)
IMM GRANULOCYTES NFR BLD AUTO: 0.4 % (ref 0–0.5)
KETONES UR QL STRIP: NEGATIVE
LEUKOCYTE ESTERASE UR QL STRIP: NEGATIVE
LIPASE SERPL-CCNC: 11 U/L (ref 4–60)
LYMPHOCYTES # BLD AUTO: 1.5 K/UL (ref 1–4.8)
LYMPHOCYTES NFR BLD: 17.3 % (ref 18–48)
MCH RBC QN AUTO: 29.7 PG (ref 27–31)
MCHC RBC AUTO-ENTMCNC: 32.1 G/DL (ref 32–36)
MCV RBC AUTO: 93 FL (ref 82–98)
MONOCYTES # BLD AUTO: 0.6 K/UL (ref 0.3–1)
MONOCYTES NFR BLD: 6.7 % (ref 4–15)
NEUTROPHILS # BLD AUTO: 6.2 K/UL (ref 1.8–7.7)
NEUTROPHILS NFR BLD: 73.1 % (ref 38–73)
NITRITE UR QL STRIP: NEGATIVE
NRBC BLD-RTO: 0 /100 WBC
PH UR STRIP: 6 [PH] (ref 5–8)
PLATELET # BLD AUTO: 305 K/UL (ref 150–350)
PMV BLD AUTO: 10.6 FL (ref 9.2–12.9)
POTASSIUM SERPL-SCNC: 3.8 MMOL/L (ref 3.5–5.1)
PROT SERPL-MCNC: 6.9 G/DL (ref 6–8.4)
PROT UR QL STRIP: NEGATIVE
RBC # BLD AUTO: 4.81 M/UL (ref 4–5.4)
SARS-COV-2 RDRP RESP QL NAA+PROBE: NEGATIVE
SODIUM SERPL-SCNC: 136 MMOL/L (ref 136–145)
SP GR UR STRIP: 1.02 (ref 1–1.03)
URN SPEC COLLECT METH UR: ABNORMAL
UROBILINOGEN UR STRIP-ACNC: NEGATIVE EU/DL
WBC # BLD AUTO: 8.51 K/UL (ref 3.9–12.7)

## 2020-05-03 PROCEDURE — 86703 HIV-1/HIV-2 1 RESULT ANTBDY: CPT

## 2020-05-03 PROCEDURE — 36415 COLL VENOUS BLD VENIPUNCTURE: CPT

## 2020-05-03 PROCEDURE — 86803 HEPATITIS C AB TEST: CPT

## 2020-05-03 PROCEDURE — 85025 COMPLETE CBC W/AUTO DIFF WBC: CPT

## 2020-05-03 PROCEDURE — 81003 URINALYSIS AUTO W/O SCOPE: CPT

## 2020-05-03 PROCEDURE — 63600175 PHARM REV CODE 636 W HCPCS: Performed by: EMERGENCY MEDICINE

## 2020-05-03 PROCEDURE — 96374 THER/PROPH/DIAG INJ IV PUSH: CPT

## 2020-05-03 PROCEDURE — U0002 COVID-19 LAB TEST NON-CDC: HCPCS

## 2020-05-03 PROCEDURE — 99284 EMERGENCY DEPT VISIT MOD MDM: CPT | Mod: 25

## 2020-05-03 PROCEDURE — 83690 ASSAY OF LIPASE: CPT

## 2020-05-03 PROCEDURE — 96361 HYDRATE IV INFUSION ADD-ON: CPT

## 2020-05-03 PROCEDURE — 80053 COMPREHEN METABOLIC PANEL: CPT

## 2020-05-03 PROCEDURE — 25000003 PHARM REV CODE 250: Performed by: EMERGENCY MEDICINE

## 2020-05-03 RX ORDER — ONDANSETRON 4 MG/1
4 TABLET, FILM COATED ORAL EVERY 6 HOURS PRN
Qty: 12 TABLET | Refills: 0 | Status: SHIPPED | OUTPATIENT
Start: 2020-05-03 | End: 2021-05-25 | Stop reason: SDUPTHER

## 2020-05-03 RX ORDER — DICYCLOMINE HYDROCHLORIDE 20 MG/1
20 TABLET ORAL 3 TIMES DAILY
Qty: 15 TABLET | Refills: 0 | Status: SHIPPED | OUTPATIENT
Start: 2020-05-03 | End: 2020-05-08

## 2020-05-03 RX ORDER — ONDANSETRON 2 MG/ML
4 INJECTION INTRAMUSCULAR; INTRAVENOUS
Status: COMPLETED | OUTPATIENT
Start: 2020-05-03 | End: 2020-05-03

## 2020-05-03 RX ADMIN — ONDANSETRON 4 MG: 2 INJECTION INTRAMUSCULAR; INTRAVENOUS at 02:05

## 2020-05-03 RX ADMIN — SODIUM CHLORIDE 1000 ML: 0.9 INJECTION, SOLUTION INTRAVENOUS at 02:05

## 2020-05-03 NOTE — ED PROVIDER NOTES
"SCRIBE #1 NOTE: I, Duran Singhu, am scribing for, and in the presence of, Mustapha Maradiaga Jr., MD. I have scribed the entire note.      History      Chief Complaint   Patient presents with    COVID-19 Concerns     PT states, "I have a really bad headaches, diarrhea and vomiting with stomach ache."       Review of patient's allergies indicates:   Allergen Reactions    Percocet [oxycodone-acetaminophen] Itching        HPI   HPI    5/3/2020, 2:10 PM   History obtained from the patient      History of Present Illness: Chasity Blake is a 54 y.o. female patient with a PMHx of asthma, HTN who presents to the Emergency Department for COVID-19 Concerns. Pt reports headaches, generalized body aches, low-grade fevers, generalized abdominal pain, and n/v/d for the past several days. Symptoms are episodic and moderate in severity. No mitigating or exacerbating factors reported. Patient denies any chills, SOB, CP, weakness, numbness, dizziness, and all other sxs at this time. No prior Tx reported. No further complaints or concerns at this time.     Arrival mode: Personal vehicle    PCP: Dima Her MD       Past Medical History:  Past Medical History:   Diagnosis Date    ADD (attention deficit disorder with hyperactivity)     Anxiety     Asthma     mild intermittent    Depression     Hypertension        Past Surgical History:  Past Surgical History:   Procedure Laterality Date    AUGMENTATION OF BREAST Bilateral     saline. About 20 years ago    BELT ABDOMINOPLASTY      Breast Augmentation      BREAST BIOPSY Left     2015. benign    BREAST SURGERY      COSMETIC SURGERY      TONSILLECTOMY      TUBAL LIGATION           Family History:  Family History   Problem Relation Age of Onset    Lung cancer Mother     Coronary artery disease Mother 50    Coronary artery disease Father 38    Breast cancer Neg Hx     Colon cancer Neg Hx     Ovarian cancer Neg Hx        Social History:  Social History     Tobacco Use    " Smoking status: Current Every Day Smoker     Packs/day: 0.50     Years: 10.00     Pack years: 5.00     Types: Cigarettes    Smokeless tobacco: Never Used   Substance and Sexual Activity    Alcohol use: No     Alcohol/week: 0.0 standard drinks     Frequency: Never     Drinks per session: Patient refused     Binge frequency: Never    Drug use: No    Sexual activity: Yes     Partners: Male     Birth control/protection: None       ROS   Review of Systems   Constitutional: Positive for fever (low-grade). Negative for chills.   HENT: Negative for sore throat.    Respiratory: Negative for shortness of breath.    Cardiovascular: Negative for chest pain.   Gastrointestinal: Positive for abdominal pain (generalized), diarrhea, nausea and vomiting.   Genitourinary: Negative for dysuria.   Musculoskeletal: Positive for myalgias (generalized body aches). Negative for back pain.   Skin: Negative for rash.   Neurological: Positive for headaches. Negative for dizziness, seizures, weakness, light-headedness and numbness.   Hematological: Does not bruise/bleed easily.   All other systems reviewed and are negative.    Physical Exam      Initial Vitals [05/03/20 1250]   BP Pulse Resp Temp SpO2   (!) 160/84 73 20 99.2 °F (37.3 °C) 97 %      MAP       --          Physical Exam  Nursing Notes and Vital Signs Reviewed.  Constitutional: Patient is in no acute distress. Well-developed and well-nourished.  Patient tearful and concerned over  Head: Atraumatic. Normocephalic.  Eyes: . EOM intact. Conjunctivae are not pale. No scleral icterus.  ENT: Mucous membranes are moist. Oropharynx is clear and symmetric.  nares clear  Neck: Supple. Full ROM. No lymphadenopathy.  Cardiovascular: Regular rate. Regular rhythm. No murmurs, rubs, or gallops. Distal pulses are 2+ and symmetric.  Pulmonary/Chest: No respiratory distress. Clear to auscultation bilaterally. No wheezing or rales.  Abdominal: Soft and non-distended.  There is no tenderness.  No  "rebound, guarding, or rigidity.   Musculoskeletal: Moves all extremities. No obvious deformities. No edema.  Skin: Warm and dry.  Neurological:  Alert, awake, and appropriate.  Normal speech.  No acute focal neurological deficits are appreciated.  Psychiatric: Normal affect. Good eye contact. Appropriate in content.    ED Course    Procedures  ED Vital Signs:  Vitals:    05/03/20 1250   BP: (!) 160/84   Pulse: 73   Resp: 20   Temp: 99.2 °F (37.3 °C)   TempSrc: Oral   SpO2: 97%   Weight: 90.7 kg (200 lb)   Height: 5' 4" (1.626 m)       Abnormal Lab Results:  Labs Reviewed   CBC W/ AUTO DIFFERENTIAL - Abnormal; Notable for the following components:       Result Value    Gran% 73.1 (*)     Lymph% 17.3 (*)     All other components within normal limits   COMPREHENSIVE METABOLIC PANEL - Abnormal; Notable for the following components:    AST 8 (*)     All other components within normal limits   URINALYSIS, REFLEX TO URINE CULTURE - Abnormal; Notable for the following components:    Occult Blood UA Trace (*)     All other components within normal limits    Narrative:     Preferred Collection Type->Urine, Clean Catch   SARS-COV-2 RNA AMPLIFICATION, QUAL    Narrative:     What symptom criteria does the patient meet?->Fever  What symptom criteria does the patient meet?->Headache  What symptom criteria does the patient meet?->Other (specify)  Please specify:->N/V/D   LIPASE   HIV 1 / 2 ANTIBODY   HEPATITIS C ANTIBODY        All Lab Results:  Results for orders placed or performed during the hospital encounter of 05/03/20   COVID-19 Routine Screening   Result Value Ref Range    SARS-CoV-2 RNA, Amplification, Qual Negative Negative   CBC auto differential   Result Value Ref Range    WBC 8.51 3.90 - 12.70 K/uL    RBC 4.81 4.00 - 5.40 M/uL    Hemoglobin 14.3 12.0 - 16.0 g/dL    Hematocrit 44.6 37.0 - 48.5 %    Mean Corpuscular Volume 93 82 - 98 fL    Mean Corpuscular Hemoglobin 29.7 27.0 - 31.0 pg    Mean Corpuscular Hemoglobin " Conc 32.1 32.0 - 36.0 g/dL    RDW 13.0 11.5 - 14.5 %    Platelets 305 150 - 350 K/uL    MPV 10.6 9.2 - 12.9 fL    Immature Granulocytes 0.4 0.0 - 0.5 %    Gran # (ANC) 6.2 1.8 - 7.7 K/uL    Immature Grans (Abs) 0.03 0.00 - 0.04 K/uL    Lymph # 1.5 1.0 - 4.8 K/uL    Mono # 0.6 0.3 - 1.0 K/uL    Eos # 0.2 0.0 - 0.5 K/uL    Baso # 0.04 0.00 - 0.20 K/uL    nRBC 0 0 /100 WBC    Gran% 73.1 (H) 38.0 - 73.0 %    Lymph% 17.3 (L) 18.0 - 48.0 %    Mono% 6.7 4.0 - 15.0 %    Eosinophil% 2.0 0.0 - 8.0 %    Basophil% 0.5 0.0 - 1.9 %    Differential Method Automated    Comprehensive metabolic panel   Result Value Ref Range    Sodium 136 136 - 145 mmol/L    Potassium 3.8 3.5 - 5.1 mmol/L    Chloride 99 95 - 110 mmol/L    CO2 26 23 - 29 mmol/L    Glucose 96 70 - 110 mg/dL    BUN, Bld 6 6 - 20 mg/dL    Creatinine 0.9 0.5 - 1.4 mg/dL    Calcium 8.8 8.7 - 10.5 mg/dL    Total Protein 6.9 6.0 - 8.4 g/dL    Albumin 3.8 3.5 - 5.2 g/dL    Total Bilirubin 0.2 0.1 - 1.0 mg/dL    Alkaline Phosphatase 97 55 - 135 U/L    AST 8 (L) 10 - 40 U/L    ALT 14 10 - 44 U/L    Anion Gap 11 8 - 16 mmol/L    eGFR if African American >60 >60 mL/min/1.73 m^2    eGFR if non African American >60 >60 mL/min/1.73 m^2   Lipase   Result Value Ref Range    Lipase 11 4 - 60 U/L   Urinalysis, Reflex to Urine Culture Urine, Clean Catch   Result Value Ref Range    Specimen UA Urine, Clean Catch     Color, UA Yellow Yellow, Straw, Tori    Appearance, UA Clear Clear    pH, UA 6.0 5.0 - 8.0    Specific Gravity, UA 1.025 1.005 - 1.030    Protein, UA Negative Negative    Glucose, UA Negative Negative    Ketones, UA Negative Negative    Bilirubin (UA) Negative Negative    Occult Blood UA Trace (A) Negative    Nitrite, UA Negative Negative    Urobilinogen, UA Negative <2.0 EU/dL    Leukocytes, UA Negative Negative     Imaging Results:  Imaging Results          X-Ray Abdomen AP 1 View (KUB) (Final result)  Result time 05/03/20 14:41:58    Final result by Eagle Luis MD  (05/03/20 14:41:58)                 Impression:      No evidence for bowel obstruction.      Electronically signed by: Eagle Luis MD  Date:    05/03/2020  Time:    14:41             Narrative:    EXAMINATION:  XR ABDOMEN AP 1 VIEW    CLINICAL HISTORY:  Nausea    TECHNIQUE:  AP View(s) of the abdomen was performed.    COMPARISON:  None    FINDINGS:  There are no dilated small bowel loops.  There is no supine evidence for free air.  The osseous structures are unremarkable.                                        The Emergency Provider reviewed the vital signs and test results, which are outlined above.    ED Discussion     3:11 PM: Reassessed pt at this time. Discussed with pt all pertinent ED information and results. Discussed pt dx and plan of tx. Gave pt all f/u and return to the ED instructions. All questions and concerns were addressed at this time. Pt expresses understanding of information and instructions, and is comfortable with plan to discharge. Pt is stable for discharge.    I discussed with patient and/or family/caretaker that evaluation in the ED does not suggest any emergent or life threatening medical conditions requiring immediate intervention beyond what was provided in the ED, and I believe patient is safe for discharge.  Regardless, an unremarkable evaluation in the ED does not preclude the development or presence of a serious of life threatening condition. As such, patient was instructed to return immediately for any worsening or change in current symptoms.     3:16 PM  Patient is stable nontoxic.  Patient has nausea vomiting diarrhea with some crampy abdominal pain.  This consistent with gastroenteritis.  COVID test is negative with her major concern.  She feels much better.  She is stable safe for discharge opinion.  I have advised close follow-up tomorrow with primary care advised return sooner if her symptoms worsen in way.  The patient seems very reliable and verbalized her agreement and  understanding with all instructions.    ED Medication(s):  Medications   sodium chloride 0.9% bolus 1,000 mL (1,000 mLs Intravenous New Bag 5/3/20 1417)   ondansetron injection 4 mg (4 mg Intravenous Given 5/3/20 1417)     New Prescriptions    No medications on file           Medical Decision Making    Medical Decision Making:   Clinical Tests:   Lab Tests: Ordered and Reviewed  Radiological Study: Ordered and Reviewed           Scribe Attestation:   Scribe #1: I performed the above scribed service and the documentation accurately describes the services I performed. I attest to the accuracy of the note.    Attending:   Physician Attestation Statement for Scribe #1: I, Mustapha Maradiaga Jr., MD, personally performed the services described in this documentation, as scribed by Duran Enrique, in my presence, and it is both accurate and complete.          Clinical Impression       ICD-10-CM ICD-9-CM   1. Nausea R11.0 787.02       Disposition:   Disposition: Discharged  Condition: Stable         Mustapha Maradiaga Jr., MD  05/03/20 1025

## 2020-05-03 NOTE — DISCHARGE INSTRUCTIONS
Use Tylenol for fever and pain.  Bentyl for cramps.  Zofran for nausea.  Drink plenty of fluids.  Have her abdomen re-examined tomorrow by her primary care doctor.  Return as needed for any worsening symptoms, problems, questions or concerns

## 2020-05-04 ENCOUNTER — HOSPITAL ENCOUNTER (EMERGENCY)
Facility: HOSPITAL | Age: 55
Discharge: HOME | End: 2020-05-05
Attending: FAMILY MEDICINE
Payer: COMMERCIAL

## 2020-05-04 DIAGNOSIS — Z20.822 SUSPECTED COVID-19 VIRUS INFECTION: ICD-10-CM

## 2020-05-04 DIAGNOSIS — K52.9 COLITIS: Primary | ICD-10-CM

## 2020-05-04 LAB
ALBUMIN SERPL BCP-MCNC: 3.7 G/DL (ref 3.5–5.2)
ALP SERPL-CCNC: 89 U/L (ref 55–135)
ALT SERPL W/O P-5'-P-CCNC: 12 U/L (ref 10–44)
ANION GAP SERPL CALC-SCNC: 11 MMOL/L (ref 8–16)
AST SERPL-CCNC: 7 U/L (ref 10–40)
BASOPHILS # BLD AUTO: 0.06 K/UL (ref 0–0.2)
BASOPHILS NFR BLD: 0.6 % (ref 0–1.9)
BILIRUB SERPL-MCNC: 0.3 MG/DL (ref 0.1–1)
BILIRUB UR QL STRIP: NEGATIVE
BNP SERPL-MCNC: 156 PG/ML (ref 0–99)
BUN SERPL-MCNC: 4 MG/DL (ref 6–20)
CALCIUM SERPL-MCNC: 9 MG/DL (ref 8.7–10.5)
CHLORIDE SERPL-SCNC: 102 MMOL/L (ref 95–110)
CK SERPL-CCNC: 49 U/L (ref 20–180)
CLARITY UR: CLEAR
CO2 SERPL-SCNC: 26 MMOL/L (ref 23–29)
COLOR UR: YELLOW
CREAT SERPL-MCNC: 0.8 MG/DL (ref 0.5–1.4)
CRP SERPL-MCNC: 4.9 MG/L (ref 0–8.2)
DIFFERENTIAL METHOD: NORMAL
EOSINOPHIL # BLD AUTO: 0.3 K/UL (ref 0–0.5)
EOSINOPHIL NFR BLD: 3.5 % (ref 0–8)
ERYTHROCYTE [DISTWIDTH] IN BLOOD BY AUTOMATED COUNT: 13.2 % (ref 11.5–14.5)
EST. GFR  (AFRICAN AMERICAN): >60 ML/MIN/1.73 M^2
EST. GFR  (NON AFRICAN AMERICAN): >60 ML/MIN/1.73 M^2
GLUCOSE SERPL-MCNC: 93 MG/DL (ref 70–110)
GLUCOSE UR QL STRIP: NEGATIVE
HCT VFR BLD AUTO: 42.7 % (ref 37–48.5)
HGB BLD-MCNC: 13.7 G/DL (ref 12–16)
HGB UR QL STRIP: NEGATIVE
IMM GRANULOCYTES # BLD AUTO: 0.04 K/UL (ref 0–0.04)
IMM GRANULOCYTES NFR BLD AUTO: 0.4 % (ref 0–0.5)
INFLUENZA A, MOLECULAR: NEGATIVE
INFLUENZA B, MOLECULAR: NEGATIVE
KETONES UR QL STRIP: NEGATIVE
LACTATE SERPL-SCNC: 1.7 MMOL/L (ref 0.5–2.2)
LDH SERPL L TO P-CCNC: 294 U/L (ref 110–260)
LEUKOCYTE ESTERASE UR QL STRIP: NEGATIVE
LIPASE SERPL-CCNC: 9 U/L (ref 4–60)
LYMPHOCYTES # BLD AUTO: 2 K/UL (ref 1–4.8)
LYMPHOCYTES NFR BLD: 21.5 % (ref 18–48)
MCH RBC QN AUTO: 29.8 PG (ref 27–31)
MCHC RBC AUTO-ENTMCNC: 32.1 G/DL (ref 32–36)
MCV RBC AUTO: 93 FL (ref 82–98)
MONOCYTES # BLD AUTO: 0.6 K/UL (ref 0.3–1)
MONOCYTES NFR BLD: 6.8 % (ref 4–15)
NEUTROPHILS # BLD AUTO: 6.3 K/UL (ref 1.8–7.7)
NEUTROPHILS NFR BLD: 67.2 % (ref 38–73)
NITRITE UR QL STRIP: NEGATIVE
NRBC BLD-RTO: 0 /100 WBC
PH UR STRIP: 6 [PH] (ref 5–8)
PLATELET # BLD AUTO: 282 K/UL (ref 150–350)
PMV BLD AUTO: 10.8 FL (ref 9.2–12.9)
POTASSIUM SERPL-SCNC: 4.3 MMOL/L (ref 3.5–5.1)
PROCALCITONIN SERPL IA-MCNC: 0.02 NG/ML
PROT SERPL-MCNC: 6.5 G/DL (ref 6–8.4)
PROT UR QL STRIP: NEGATIVE
RBC # BLD AUTO: 4.59 M/UL (ref 4–5.4)
SARS-COV-2 RDRP RESP QL NAA+PROBE: NEGATIVE
SODIUM SERPL-SCNC: 139 MMOL/L (ref 136–145)
SP GR UR STRIP: <=1.005 (ref 1–1.03)
SPECIMEN SOURCE: NORMAL
TROPONIN I SERPL DL<=0.01 NG/ML-MCNC: 0.02 NG/ML (ref 0–0.03)
URN SPEC COLLECT METH UR: ABNORMAL
UROBILINOGEN UR STRIP-ACNC: NEGATIVE EU/DL
WBC # BLD AUTO: 9.42 K/UL (ref 3.9–12.7)

## 2020-05-04 PROCEDURE — 85025 COMPLETE CBC W/AUTO DIFF WBC: CPT

## 2020-05-04 PROCEDURE — 87502 INFLUENZA DNA AMP PROBE: CPT

## 2020-05-04 PROCEDURE — 81003 URINALYSIS AUTO W/O SCOPE: CPT

## 2020-05-04 PROCEDURE — 99285 EMERGENCY DEPT VISIT HI MDM: CPT | Mod: 25

## 2020-05-04 PROCEDURE — 93010 ELECTROCARDIOGRAM REPORT: CPT | Mod: ,,, | Performed by: INTERNAL MEDICINE

## 2020-05-04 PROCEDURE — 83615 LACTATE (LD) (LDH) ENZYME: CPT

## 2020-05-04 PROCEDURE — 63600175 PHARM REV CODE 636 W HCPCS: Performed by: FAMILY MEDICINE

## 2020-05-04 PROCEDURE — 83690 ASSAY OF LIPASE: CPT

## 2020-05-04 PROCEDURE — 36415 COLL VENOUS BLD VENIPUNCTURE: CPT

## 2020-05-04 PROCEDURE — 83605 ASSAY OF LACTIC ACID: CPT

## 2020-05-04 PROCEDURE — 87040 BLOOD CULTURE FOR BACTERIA: CPT

## 2020-05-04 PROCEDURE — 86140 C-REACTIVE PROTEIN: CPT

## 2020-05-04 PROCEDURE — 80053 COMPREHEN METABOLIC PANEL: CPT

## 2020-05-04 PROCEDURE — 93005 ELECTROCARDIOGRAM TRACING: CPT

## 2020-05-04 PROCEDURE — 82550 ASSAY OF CK (CPK): CPT

## 2020-05-04 PROCEDURE — 84484 ASSAY OF TROPONIN QUANT: CPT

## 2020-05-04 PROCEDURE — 93010 EKG 12-LEAD: ICD-10-PCS | Mod: ,,, | Performed by: INTERNAL MEDICINE

## 2020-05-04 PROCEDURE — 82728 ASSAY OF FERRITIN: CPT

## 2020-05-04 PROCEDURE — 96374 THER/PROPH/DIAG INJ IV PUSH: CPT

## 2020-05-04 PROCEDURE — U0002 COVID-19 LAB TEST NON-CDC: HCPCS

## 2020-05-04 PROCEDURE — 83880 ASSAY OF NATRIURETIC PEPTIDE: CPT

## 2020-05-04 PROCEDURE — 25500020 PHARM REV CODE 255: Performed by: FAMILY MEDICINE

## 2020-05-04 PROCEDURE — 84145 PROCALCITONIN (PCT): CPT

## 2020-05-04 RX ORDER — PROCHLORPERAZINE EDISYLATE 5 MG/ML
10 INJECTION INTRAMUSCULAR; INTRAVENOUS
Status: COMPLETED | OUTPATIENT
Start: 2020-05-04 | End: 2020-05-04

## 2020-05-04 RX ADMIN — IOHEXOL 75 ML: 350 INJECTION, SOLUTION INTRAVENOUS at 11:05

## 2020-05-04 RX ADMIN — PROCHLORPERAZINE EDISYLATE 10 MG: 5 INJECTION INTRAMUSCULAR; INTRAVENOUS at 09:05

## 2020-05-05 VITALS
HEART RATE: 64 BPM | DIASTOLIC BLOOD PRESSURE: 88 MMHG | WEIGHT: 199.94 LBS | RESPIRATION RATE: 18 BRPM | BODY MASS INDEX: 34.32 KG/M2 | OXYGEN SATURATION: 98 % | SYSTOLIC BLOOD PRESSURE: 132 MMHG | TEMPERATURE: 99 F

## 2020-05-05 LAB — FERRITIN SERPL-MCNC: 48 NG/ML (ref 20–300)

## 2020-05-05 RX ORDER — METRONIDAZOLE 500 MG/1
500 TABLET ORAL 3 TIMES DAILY
Qty: 30 TABLET | Refills: 0 | Status: SHIPPED | OUTPATIENT
Start: 2020-05-05 | End: 2020-05-15

## 2020-05-05 RX ORDER — CIPROFLOXACIN 500 MG/1
500 TABLET ORAL 2 TIMES DAILY
Qty: 20 TABLET | Refills: 0 | Status: SHIPPED | OUTPATIENT
Start: 2020-05-05 | End: 2020-05-15

## 2020-05-05 RX ORDER — DICYCLOMINE HYDROCHLORIDE 20 MG/1
20 TABLET ORAL 2 TIMES DAILY
Qty: 20 TABLET | Refills: 0 | Status: SHIPPED | OUTPATIENT
Start: 2020-05-05 | End: 2020-05-15

## 2020-05-05 NOTE — ED PROVIDER NOTES
SCRIBE #1 NOTE: I, Duran Enrique, am scribing for, and in the presence of, Jami Gillespie MD. I have scribed the entire note.      History      Chief Complaint   Patient presents with    Vomiting     N/V, chills, Headache, and generalized body soreness. States was seen here yesterday for same thing; tested negative for covid.       Review of patient's allergies indicates:   Allergen Reactions    Percocet [oxycodone-acetaminophen] Itching        HPI   HPI    5/4/2020, 9:51 PM   History obtained from the patient      History of Present Illness: Chasity Blake is a 54 y.o. female patient who presents to the Emergency Department for emesis, onset 3 days PTA. Pt presented to the ED yesterday for similar sxs, and had tested negative for COVID. Symptoms are episodic and moderate in severity. No mitigating or exacerbating factors reported. Associated sxs include chills, headache, nausea, and diarrhea. Patient denies any fever, SOB, CP, weakness, numbness, dizziness, and all other sxs at this time. Prior Tx includes Zofran. No further complaints or concerns at this time.     Arrival mode: Personal vehicle    PCP: Dima Her MD       Past Medical History:  Past Medical History:   Diagnosis Date    ADD (attention deficit disorder with hyperactivity)     Anxiety     Asthma     mild intermittent    Depression     Hypertension        Past Surgical History:  Past Surgical History:   Procedure Laterality Date    AUGMENTATION OF BREAST Bilateral     saline. About 20 years ago    BELT ABDOMINOPLASTY      Breast Augmentation      BREAST BIOPSY Left     2015. benign    BREAST SURGERY      COSMETIC SURGERY      TONSILLECTOMY      TUBAL LIGATION           Family History:  Family History   Problem Relation Age of Onset    Lung cancer Mother     Coronary artery disease Mother 50    Coronary artery disease Father 38    Breast cancer Neg Hx     Colon cancer Neg Hx     Ovarian cancer Neg Hx        Social History:  Social  History     Tobacco Use    Smoking status: Current Every Day Smoker     Packs/day: 0.50     Years: 10.00     Pack years: 5.00     Types: Cigarettes    Smokeless tobacco: Never Used   Substance and Sexual Activity    Alcohol use: No     Alcohol/week: 0.0 standard drinks     Frequency: Never     Drinks per session: Patient refused     Binge frequency: Never    Drug use: No    Sexual activity: Yes     Partners: Male     Birth control/protection: None       ROS   Review of Systems   Constitutional: Positive for chills. Negative for diaphoresis, fatigue and fever.   HENT: Negative for sore throat.    Respiratory: Negative for shortness of breath.    Cardiovascular: Negative for chest pain.   Gastrointestinal: Positive for diarrhea, nausea and vomiting.   Genitourinary: Negative for dysuria.   Musculoskeletal: Negative for back pain.   Skin: Negative for rash.   Neurological: Positive for headaches. Negative for dizziness, seizures, speech difficulty, weakness, light-headedness and numbness.   Hematological: Does not bruise/bleed easily.   All other systems reviewed and are negative.    Physical Exam      Initial Vitals [05/04/20 1933]   BP Pulse Resp Temp SpO2   (!) 143/87 61 20 99 °F (37.2 °C) 97 %      MAP       --          Physical Exam  Nursing Notes and Vital Signs Reviewed.  Constitutional: Patient is in no acute distress. Well-developed and well-nourished.  Head: Atraumatic. Normocephalic.  Eyes: PERRL. EOM intact. Conjunctivae are not pale. No scleral icterus.  ENT: Mucous membranes are dry. Oropharynx is clear and symmetric.    Neck: Supple. Full ROM. No lymphadenopathy.  Cardiovascular: Regular rate. Regular rhythm. No murmurs, rubs, or gallops. Distal pulses are 2+ and symmetric.  Pulmonary/Chest: No respiratory distress. Clear to auscultation bilaterally. No wheezing or rales.  Abdominal: Soft and non-distended.  There is no tenderness.  No rebound, guarding, or rigidity.  Musculoskeletal: Moves all  extremities. No obvious deformities. No edema.  Skin: Warm and dry.  Neurological:  Alert, awake, and appropriate.  Normal speech.  No acute focal neurological deficits are appreciated.  Psychiatric: Normal affect. Good eye contact. Appropriate in content.    ED Course    Procedures  ED Vital Signs:  Vitals:    05/04/20 1933 05/05/20 0028   BP: (!) 143/87 132/88   Pulse: 61 64   Resp: 20 18   Temp: 99 °F (37.2 °C)    TempSrc: Oral    SpO2: 97% 98%   Weight: 90.7 kg (199 lb 15.3 oz)        Abnormal Lab Results:  Labs Reviewed   LACTATE DEHYDROGENASE - Abnormal; Notable for the following components:       Result Value     (*)     All other components within normal limits   B-TYPE NATRIURETIC PEPTIDE - Abnormal; Notable for the following components:     (*)     All other components within normal limits   URINALYSIS, REFLEX TO URINE CULTURE - Abnormal; Notable for the following components:    Specific Gravity, UA <=1.005 (*)     All other components within normal limits    Narrative:     Preferred Collection Type->Urine, Clean Catch   COMPREHENSIVE METABOLIC PANEL - Abnormal; Notable for the following components:    BUN, Bld 4 (*)     AST 7 (*)     All other components within normal limits   CULTURE, BLOOD    Narrative:     Aerobic and anaerobic   CULTURE, BLOOD    Narrative:     Aerobic and anaerobic   INFLUENZA A & B BY MOLECULAR   CBC W/ AUTO DIFFERENTIAL   FERRITIN   LACTIC ACID, PLASMA   SARS-COV-2 RNA AMPLIFICATION, QUAL    Narrative:     What symptom criteria does the patient meet?->Muscle pain   PROCALCITONIN   CK   C-REACTIVE PROTEIN   TROPONIN I   LIPASE        All Lab Results:  Results for orders placed or performed during the hospital encounter of 05/04/20   Blood culture x two cultures. Draw prior to antibiotics.   Result Value Ref Range    Blood Culture, Routine No Growth to date     Blood Culture, Routine No Growth to date    Blood culture x two cultures. Draw prior to antibiotics.   Result  Value Ref Range    Blood Culture, Routine No Growth to date     Blood Culture, Routine No Growth to date    Influenza A & B by Molecular   Result Value Ref Range    Influenza A, Molecular Negative Negative    Influenza B, Molecular Negative Negative    Flu A & B Source Nasal swab    CBC auto differential   Result Value Ref Range    WBC 9.42 3.90 - 12.70 K/uL    RBC 4.59 4.00 - 5.40 M/uL    Hemoglobin 13.7 12.0 - 16.0 g/dL    Hematocrit 42.7 37.0 - 48.5 %    Mean Corpuscular Volume 93 82 - 98 fL    Mean Corpuscular Hemoglobin 29.8 27.0 - 31.0 pg    Mean Corpuscular Hemoglobin Conc 32.1 32.0 - 36.0 g/dL    RDW 13.2 11.5 - 14.5 %    Platelets 282 150 - 350 K/uL    MPV 10.8 9.2 - 12.9 fL    Immature Granulocytes 0.4 0.0 - 0.5 %    Gran # (ANC) 6.3 1.8 - 7.7 K/uL    Immature Grans (Abs) 0.04 0.00 - 0.04 K/uL    Lymph # 2.0 1.0 - 4.8 K/uL    Mono # 0.6 0.3 - 1.0 K/uL    Eos # 0.3 0.0 - 0.5 K/uL    Baso # 0.06 0.00 - 0.20 K/uL    nRBC 0 0 /100 WBC    Gran% 67.2 38.0 - 73.0 %    Lymph% 21.5 18.0 - 48.0 %    Mono% 6.8 4.0 - 15.0 %    Eosinophil% 3.5 0.0 - 8.0 %    Basophil% 0.6 0.0 - 1.9 %    Differential Method Automated    Ferritin   Result Value Ref Range    Ferritin 48 20.0 - 300.0 ng/mL   Lactate Dehydrogenase   Result Value Ref Range     (H) 110 - 260 U/L   Lactic Acid, Plasma   Result Value Ref Range    Lactate (Lactic Acid) 1.7 0.5 - 2.2 mmol/L   COVID-19 Rapid Screening   Result Value Ref Range    SARS-CoV-2 RNA, Amplification, Qual Negative Negative   Procalcitonin   Result Value Ref Range    Procalcitonin 0.02 <0.25 ng/mL   Brain Natriuretic Peptide   Result Value Ref Range     (H) 0 - 99 pg/mL   Urinalysis, Reflex to Urine Culture Urine, Clean Catch   Result Value Ref Range    Specimen UA Urine, Clean Catch     Color, UA Yellow Yellow, Straw, Tori    Appearance, UA Clear Clear    pH, UA 6.0 5.0 - 8.0    Specific Gravity, UA <=1.005 (A) 1.005 - 1.030    Protein, UA Negative Negative    Glucose, UA  Negative Negative    Ketones, UA Negative Negative    Bilirubin (UA) Negative Negative    Occult Blood UA Negative Negative    Nitrite, UA Negative Negative    Urobilinogen, UA Negative <2.0 EU/dL    Leukocytes, UA Negative Negative   CPK   Result Value Ref Range    CPK 49 20 - 180 U/L   C-reactive protein   Result Value Ref Range    CRP 4.9 0.0 - 8.2 mg/L   Comprehensive metabolic panel   Result Value Ref Range    Sodium 139 136 - 145 mmol/L    Potassium 4.3 3.5 - 5.1 mmol/L    Chloride 102 95 - 110 mmol/L    CO2 26 23 - 29 mmol/L    Glucose 93 70 - 110 mg/dL    BUN, Bld 4 (L) 6 - 20 mg/dL    Creatinine 0.8 0.5 - 1.4 mg/dL    Calcium 9.0 8.7 - 10.5 mg/dL    Total Protein 6.5 6.0 - 8.4 g/dL    Albumin 3.7 3.5 - 5.2 g/dL    Total Bilirubin 0.3 0.1 - 1.0 mg/dL    Alkaline Phosphatase 89 55 - 135 U/L    AST 7 (L) 10 - 40 U/L    ALT 12 10 - 44 U/L    Anion Gap 11 8 - 16 mmol/L    eGFR if African American >60 >60 mL/min/1.73 m^2    eGFR if non African American >60 >60 mL/min/1.73 m^2   Troponin I   Result Value Ref Range    Troponin I 0.016 0.000 - 0.026 ng/mL   Lipase   Result Value Ref Range    Lipase 9 4 - 60 U/L     Imaging Results:  Imaging Results          CT Abdomen Pelvis With Contrast (Final result)  Result time 05/04/20 23:42:49    Final result by Albaro Urban III, MD (05/04/20 23:42:49)                 Impression:      1.  Question segments of colonic wall thickening of at least a mild degree although this might be related to incomplete distention.  Lack of oral contrast limits detail.    2.  No evidence of acute appendicitis.  No definite kidney stones or obstruction.  No lincoln bowel obstruction or free air.    All CT scans at this facility are performed  using dose modulation techniques as appropriate to performed exam including the following:  automated exposure control; adjustment of mA and/or kV according to the patients size (this includes techniques or standardized protocols for targeted exams  where dose is matched to indication/reason for exam: i.e. extremities or head);  iterative reconstruction technique.      Electronically signed by: Albaro Urban MD  Date:    05/04/2020  Time:    23:42             Narrative:    EXAMINATION:  CT ABDOMEN PELVIS WITH CONTRAST    CLINICAL HISTORY:  Nausea, vomiting, diarrhea;    TECHNIQUE:  Axial CT imaging was performed through the abdomen and pelvis with  75cc  of intravenous contrast. Multiplanar reformats were performed and interpreted.    COMPARISON:  None    FINDINGS:  The heart size is normal.  Lung bases are clear.  There is no free intraperitoneal air or bowel obstruction.  Bony windows show no gross acute abnormality.    The liver and spleen show no acute abnormality.  Gallbladder is unremarkable.  There is no adrenal mass or enlargement and no pancreatic abnormality is seen.  No ascites or adenopathy.  No solid renal mass or obstruction.    Lack of oral contrast limits the evaluation.  There is no abnormality in the right lower quadrant to suggest acute appendicitis.  Bladder is unremarkable.    I question wall thickening throughout portions of the colon including portions of the transverse, descending, and sigmoid regions.  In part this may be related to under distention but I cannot exclude an element of colitis.                               X-Ray Chest AP Portable (Final result)  Result time 05/04/20 21:00:56    Final result by Flynn Fraser MD (05/04/20 21:00:56)                 Impression:      No acute findings.      Electronically signed by: Flynn Fraser  Date:    05/04/2020  Time:    21:00             Narrative:    EXAMINATION:  XR CHEST AP PORTABLE    CLINICAL HISTORY:  Suspected Covid-19 Virus Infection;    COMPARISON:  None    FINDINGS:  Lungs are clear.  Heart size within normal limits.No significant bony findings.                               The EKG was ordered, reviewed, and independently interpreted by the ED  provider.  Interpretation time: 21:43  Rate: 58 BPM  Rhythm: Sinus bradycardia with short NJ.  Interpretation: No acute ST changes. No STEMI.           The Emergency Provider reviewed the vital signs and test results, which are outlined above.    ED Discussion     12:20 AM: Reassessed pt at this time. Discussed with pt all pertinent ED information and results. Discussed pt dx and plan of tx. Gave pt all f/u and return to the ED instructions. All questions and concerns were addressed at this time. Pt expresses understanding of information and instructions, and is comfortable with plan to discharge. Pt is stable for discharge.    I discussed with patient and/or family/caretaker that evaluation in the ED does not suggest any emergent or life threatening medical conditions requiring immediate intervention beyond what was provided in the ED, and I believe patient is safe for discharge.  Regardless, an unremarkable evaluation in the ED does not preclude the development or presence of a serious of life threatening condition. As such, patient was instructed to return immediately for any worsening or change in current symptoms.         ED Medication(s):  Medications   prochlorperazine injection Soln 10 mg (10 mg Intravenous Given 5/4/20 8016)   iohexoL (OMNIPAQUE 350) injection 100 mL (75 mLs Intravenous Given 5/4/20 1133)     Discharge Medication List as of 5/5/2020 12:12 AM      START taking these medications    Details   ciprofloxacin HCl (CIPRO) 500 MG tablet Take 1 tablet (500 mg total) by mouth 2 (two) times daily. for 10 days, Starting Tue 5/5/2020, Until Fri 5/15/2020, Print      !! dicyclomine (BENTYL) 20 mg tablet Take 1 tablet (20 mg total) by mouth 2 (two) times daily. for 10 days, Starting Tue 5/5/2020, Until Fri 5/15/2020, Print      metroNIDAZOLE (FLAGYL) 500 MG tablet Take 1 tablet (500 mg total) by mouth 3 (three) times daily. for 10 days, Starting Tue 5/5/2020, Until Fri 5/15/2020, Print       !! -  "Potential duplicate medications found. Please discuss with provider.        Follow-up Information     Dima Her MD. Schedule an appointment as soon as possible for a visit in 3 days.    Specialty:  Family Medicine  Why:  As needed, If symptoms worsen  Contact information:  [address] [address] [address]  [phone]                     Medical Decision Making    Medical Decision Making:   Clinical Tests:   Lab Tests: Ordered and Reviewed  Radiological Study: Ordered and Reviewed  Medical Tests: Ordered and Reviewed           Scribe Attestation:   Scribe #1: I performed the above scribed service and the documentation accurately describes the services I performed. I attest to the accuracy of the note.    Attending:   Physician Attestation Statement for Scribe #1: I, Jami Gillespie MD, personally performed the services described in this documentation, as scribed by Duran Enrique, in my presence, and it is both accurate and complete.          Clinical Impression       ICD-10-CM ICD-9-CM   1. Colitis K52.9 558.9   2. Suspected Covid-19 Virus Infection R68.89        Disposition:   Disposition: Discharged  Condition: Stable  Portions of this note may have been created with voice recognition software. Occasional "wrong-word" or "sound-a-like" substitutions may have occurred due to the inherent limitations of voice recognition software. Please, read the note carefully and recognize, using context, where substitutions have occurred.            Jami Gillespie MD  05/07/20 0448    "

## 2020-05-05 NOTE — DISCHARGE INSTRUCTIONS
Please take antibiotics as prescribed and until completion.  Take Bentyl as needed for abdominal cramping.  And continue with Zofran as prescribed for nausea.  If he develops any fevers chills, worsening pain, or do not feel comfortable, do not hesitate back to emergency department or call your doctor.    Discussed the signs and symptoms of gastroenteritis with patient including: abdominal pain; nausea, vomiting, and diarrhea; chills; clammy skin; excessive sweating; fever; joint stiffness; fecal incontinence; muscle pain; and weight loss. Advised patient to drink water or sports beverages such as Gatorade for electrolyte replacement; do NOT use fruit juice (including apple juice), sodas or cola (flat or bubbly), Jell-O, or broth due to high sugar content; drink small amounts of fluid (2-4 oz.) every 30-60 minutes; and eat small amounts of food, when tolerable such as cereals, bread, potatoes, apples, bananas, and vegetables.  I also instructed on disease transmission and need for frequent hand washing.

## 2020-05-09 LAB
BACTERIA BLD CULT: NORMAL
BACTERIA BLD CULT: NORMAL

## 2020-05-17 ENCOUNTER — PATIENT OUTREACH (OUTPATIENT)
Dept: ADMINISTRATIVE | Facility: OTHER | Age: 55
End: 2020-05-17

## 2020-05-29 ENCOUNTER — PATIENT MESSAGE (OUTPATIENT)
Dept: FAMILY MEDICINE | Facility: CLINIC | Age: 55
End: 2020-05-29

## 2020-06-01 ENCOUNTER — OFFICE VISIT (OUTPATIENT)
Dept: FAMILY MEDICINE | Facility: CLINIC | Age: 55
End: 2020-06-01
Payer: COMMERCIAL

## 2020-06-01 VITALS
BODY MASS INDEX: 33.91 KG/M2 | RESPIRATION RATE: 18 BRPM | DIASTOLIC BLOOD PRESSURE: 68 MMHG | TEMPERATURE: 97 F | SYSTOLIC BLOOD PRESSURE: 122 MMHG | OXYGEN SATURATION: 98 % | HEART RATE: 97 BPM | WEIGHT: 197.56 LBS

## 2020-06-01 DIAGNOSIS — N76.0 ABSCESS, VAGINA: Primary | ICD-10-CM

## 2020-06-01 PROCEDURE — 3074F PR MOST RECENT SYSTOLIC BLOOD PRESSURE < 130 MM HG: ICD-10-PCS | Mod: CPTII,S$GLB,, | Performed by: FAMILY MEDICINE

## 2020-06-01 PROCEDURE — 3008F BODY MASS INDEX DOCD: CPT | Mod: CPTII,S$GLB,, | Performed by: FAMILY MEDICINE

## 2020-06-01 PROCEDURE — 99999 PR PBB SHADOW E&M-EST. PATIENT-LVL III: CPT | Mod: PBBFAC,,, | Performed by: FAMILY MEDICINE

## 2020-06-01 PROCEDURE — 56405 INCISION & DRAINAGE: ICD-10-PCS | Mod: S$GLB,,, | Performed by: FAMILY MEDICINE

## 2020-06-01 PROCEDURE — 3008F PR BODY MASS INDEX (BMI) DOCUMENTED: ICD-10-PCS | Mod: CPTII,S$GLB,, | Performed by: FAMILY MEDICINE

## 2020-06-01 PROCEDURE — 99214 OFFICE O/P EST MOD 30 MIN: CPT | Mod: 25,S$GLB,, | Performed by: FAMILY MEDICINE

## 2020-06-01 PROCEDURE — 3078F PR MOST RECENT DIASTOLIC BLOOD PRESSURE < 80 MM HG: ICD-10-PCS | Mod: CPTII,S$GLB,, | Performed by: FAMILY MEDICINE

## 2020-06-01 PROCEDURE — 56405 I&D VULVA/PERINEAL ABSCESS: CPT | Mod: S$GLB,,, | Performed by: FAMILY MEDICINE

## 2020-06-01 PROCEDURE — 99214 PR OFFICE/OUTPT VISIT, EST, LEVL IV, 30-39 MIN: ICD-10-PCS | Mod: 25,S$GLB,, | Performed by: FAMILY MEDICINE

## 2020-06-01 PROCEDURE — 3074F SYST BP LT 130 MM HG: CPT | Mod: CPTII,S$GLB,, | Performed by: FAMILY MEDICINE

## 2020-06-01 PROCEDURE — 99999 PR PBB SHADOW E&M-EST. PATIENT-LVL III: ICD-10-PCS | Mod: PBBFAC,,, | Performed by: FAMILY MEDICINE

## 2020-06-01 PROCEDURE — 3078F DIAST BP <80 MM HG: CPT | Mod: CPTII,S$GLB,, | Performed by: FAMILY MEDICINE

## 2020-06-01 RX ORDER — CLINDAMYCIN HYDROCHLORIDE 300 MG/1
300 CAPSULE ORAL EVERY 6 HOURS
Qty: 28 CAPSULE | Refills: 0 | Status: SHIPPED | OUTPATIENT
Start: 2020-06-01 | End: 2020-06-08

## 2020-06-01 RX ORDER — TRAMADOL HYDROCHLORIDE 50 MG/1
50 TABLET ORAL EVERY 8 HOURS PRN
Qty: 12 TABLET | Refills: 0 | Status: SHIPPED | OUTPATIENT
Start: 2020-06-01 | End: 2020-06-05

## 2020-06-01 NOTE — PROCEDURES
"Incision & Drainage  Date/Time: 6/1/2020 1:20 PM  Performed by: Gladys Nicolas MD  Authorized by: Gladys Nicolas MD     Time out: Immediately prior to procedure a "time out" was called to verify the correct patient, procedure, equipment, support staff and site/side marked as required.      Type:  Abscess  Body area:  Anogenital  Location details:  Vulva  Local anesthetic: lidocaine spray  Scalpel size:  11  Incision type:  Single straight  Complexity:  Simple  Drainage:  Pus  Drainage amount:  Moderate  Wound treatment:  Wound packed  Packing material:  1/4 in iodoform gauze  Patient tolerance:  Patient tolerated the procedure well with no immediate complications      "

## 2020-06-01 NOTE — PROGRESS NOTES
Subjective:       Patient ID: Chasity Blake is a 54 y.o. female.    Chief Complaint: Abscess in the vagina      History of Present Illness:   Chasity Blake 54 y.o. female presents today with  Abscess on the vagina x one week, spontaneously open and draining pus. But the pain is worsening.  Associated with hard nodule, redness and erythema and pain. Denies fever.    Past Medical History:   Diagnosis Date    ADD (attention deficit disorder with hyperactivity)     Anxiety     Asthma     mild intermittent    Depression     Hypertension      Family History   Problem Relation Age of Onset    Lung cancer Mother     Coronary artery disease Mother 50    Coronary artery disease Father 38    Breast cancer Neg Hx     Colon cancer Neg Hx     Ovarian cancer Neg Hx      Social History     Socioeconomic History    Marital status:      Spouse name: Not on file    Number of children: Not on file    Years of education: Not on file    Highest education level: Not on file   Occupational History    Not on file   Social Needs    Financial resource strain: Not very hard    Food insecurity:     Worry: Never true     Inability: Never true    Transportation needs:     Medical: No     Non-medical: No   Tobacco Use    Smoking status: Current Every Day Smoker     Packs/day: 0.50     Years: 10.00     Pack years: 5.00     Types: Cigarettes    Smokeless tobacco: Never Used   Substance and Sexual Activity    Alcohol use: No     Alcohol/week: 0.0 standard drinks     Frequency: Never     Drinks per session: Patient refused     Binge frequency: Never    Drug use: No    Sexual activity: Yes     Partners: Male     Birth control/protection: None   Lifestyle    Physical activity:     Days per week: 4 days     Minutes per session: 30 min    Stress: Rather much   Relationships    Social connections:     Talks on phone: More than three times a week     Gets together: Three times a week     Attends [employer] service: Not on file      Active member of club or organization: Yes     Attends meetings of clubs or organizations: 1 to 4 times per year     Relationship status:    Other Topics Concern    Not on file   Social History Narrative    Not on file     Outpatient Encounter Medications as of 6/1/2020   Medication Sig Dispense Refill    albuterol (ACCUNEB) 1.25 mg/3 mL Nebu USE ONE VIAL IN NEBULIZER EVERY 4 TO 6 HOURS AS NEEDED FOR WHEEZING & SHORTNESS OF BREATH **THANK YOU** 75 mL 6    albuterol (PROAIR HFA) 90 mcg/actuation inhaler INHALE TWO PUFFS INTO THE LUNGS EVERY 6 HOURS AS NEEDED FOR WHEEZING **THANK YOU** 25.5 g 2    albuterol-ipratropium (DUO-NEB) 2.5 mg-0.5 mg/3 mL nebulizer solution Take 3 mLs by nebulization every 6 (six) hours as needed for Wheezing. Rescue 1 Box 2    citalopram (CELEXA) 20 MG tablet TAKE ONE TABLET BY MOUTH ONCE DAILY **THANK YOU** 30 tablet 3    clindamycin (CLEOCIN T) 1 % external solution APPLY TO AFFECTED AREA TOPICALLY TWICE DAILY **THANK YOU** 30 mL 1    EPINEPHrine (EPIPEN) 0.3 mg/0.3 mL AtIn INJECT 0.3mL INTRAMUSCULARLY X1 DOSE THANK YOU  12    estradioL (ESTRACE) 1 MG tablet Take 1 tablet (1 mg total) by mouth once daily. 30 tablet 2    fluconazole (DIFLUCAN) 150 MG Tab TAKE ONE TABLET BY MOUTH ONCE. MAY REPEAT DOSE AFTER ONE WEEK IF SYMPTOM IS STILL PRESENT THANK YOU  0    hydrOXYzine pamoate (VISTARIL) 50 MG Cap TAKE TWO CAPSULES BY MOUTH DAILY AS NEEDED FOR ITCHING THANK YOU  0    levocetirizine (XYZAL) 5 MG tablet TAKE ONE TABLET BY MOUTH EVERY EVENING **THANK YOU** 30 tablet 5    lisdexamfetamine (VYVANSE) 40 MG Cap Take 1 capsule (40 mg total) by mouth once daily. 30 capsule 0    [START ON 6/27/2020] lisdexamfetamine (VYVANSE) 40 MG Cap Take 1 capsule (40 mg total) by mouth once daily. 30 capsule 0    montelukast (SINGULAIR) 10 mg tablet TAKE ONE TABLET BY MOUTH EVERY DAY **THANK YOU** 90 tablet 1    mupirocin (BACTROBAN) 2 % ointment APPLY TO AFFECTED AREA TOPICALLY THREE  TIMES DAILY FOR 7 DAYS THANK YOU  0    omeprazole (PRILOSEC) 40 MG capsule TAKE ONE CAPSULE BY MOUTH ONCE DAILY **THANK YOU** 90 capsule 1    ondansetron (ZOFRAN) 4 MG tablet Take 1 tablet (4 mg total) by mouth every 6 (six) hours as needed for Nausea. 12 tablet 0    ondansetron (ZOFRAN-ODT) 4 MG TbDL Take 1 tablet (4 mg total) by mouth every 8 (eight) hours as needed. 14 tablet 1    propranolol (INDERAL LA) 80 MG 24 hr capsule Take 1 capsule (80 mg total) by mouth once daily. 30 capsule 2    tiZANidine (ZANAFLEX) 4 mg Cap Take 1 capsule by mouth 2 (two) times daily as needed. 60 capsule 0    aspirin 81 MG Chew Take 1 tablet (81 mg total) by mouth once daily. 30 tablet 0    clindamycin (CLEOCIN) 300 MG capsule Take 1 capsule (300 mg total) by mouth every 6 (six) hours. for 7 days 28 capsule 0    fluticasone-salmeterol diskus inhaler 250-50 mcg Inhale 1 puff into the lungs every 12 (twelve) hours. 60 each 2    LORazepam (ATIVAN) 0.5 MG tablet Take 1 tablet (0.5 mg total) by mouth every 12 (twelve) hours as needed for Anxiety. (Patient not taking: Reported on 2/6/2020) 14 tablet 0    traMADoL (ULTRAM) 50 mg tablet Take 1 tablet (50 mg total) by mouth every 8 (eight) hours as needed for Pain. 12 tablet 0     No facility-administered encounter medications on file as of 6/1/2020.        Review of Systems   Constitutional: Negative for chills and fever.   HENT: Negative for congestion and facial swelling.    Eyes: Negative for discharge and itching.   Respiratory: Negative for cough and wheezing.    Cardiovascular: Negative for chest pain and palpitations.   Gastrointestinal: Negative for abdominal pain, nausea and vomiting.   Endocrine: Negative for cold intolerance and heat intolerance.   Genitourinary: Negative for dysuria and flank pain.   Musculoskeletal: Negative for myalgias and neck stiffness.   Skin: Positive for wound. Negative for pallor.   Neurological: Negative for facial asymmetry and weakness.    Psychiatric/Behavioral: Negative for agitation and suicidal ideas.       Objective:      /68 (BP Location: Right arm, Patient Position: Sitting, BP Method: Large (Automatic))   Pulse 97   Temp 97.3 °F (36.3 °C) (Oral)   Resp 18   Wt 89.6 kg (197 lb 8.5 oz)   SpO2 98%   BMI 33.91 kg/m²   Physical Exam   Constitutional: She is oriented to person, place, and time. She appears well-developed. No distress.   HENT:   Head: Normocephalic and atraumatic.   Right Ear: External ear normal.   Left Ear: External ear normal.   Eyes: Conjunctivae and EOM are normal.   Neck: Neck supple. No thyromegaly present.   Cardiovascular: Normal rate and regular rhythm.   Pulmonary/Chest: Effort normal. No respiratory distress.   Abdominal: Soft. She exhibits no distension.   Genitourinary:         Genitourinary Comments: deferred   Musculoskeletal: She exhibits no edema or deformity.   Lymphadenopathy:        Head (right side): No submandibular adenopathy present.        Head (left side): No submandibular adenopathy present.     She has no cervical adenopathy.   Neurological: She is alert and oriented to person, place, and time.   Skin: Skin is warm and dry.   Psychiatric: She has a normal mood and affect. Her behavior is normal.   Nursing note and vitals reviewed.      Results for orders placed or performed during the hospital encounter of 05/04/20   Blood culture x two cultures. Draw prior to antibiotics.   Result Value Ref Range    Blood Culture, Routine No Growth after 4 days.     Blood culture x two cultures. Draw prior to antibiotics.   Result Value Ref Range    Blood Culture, Routine No Growth after 4 days.     Influenza A & B by Molecular   Result Value Ref Range    Influenza A, Molecular Negative Negative    Influenza B, Molecular Negative Negative    Flu A & B Source Nasal swab    CBC auto differential   Result Value Ref Range    WBC 9.42 3.90 - 12.70 K/uL    RBC 4.59 4.00 - 5.40 M/uL    Hemoglobin 13.7 12.0 - 16.0  g/dL    Hematocrit 42.7 37.0 - 48.5 %    Mean Corpuscular Volume 93 82 - 98 fL    Mean Corpuscular Hemoglobin 29.8 27.0 - 31.0 pg    Mean Corpuscular Hemoglobin Conc 32.1 32.0 - 36.0 g/dL    RDW 13.2 11.5 - 14.5 %    Platelets 282 150 - 350 K/uL    MPV 10.8 9.2 - 12.9 fL    Immature Granulocytes 0.4 0.0 - 0.5 %    Gran # (ANC) 6.3 1.8 - 7.7 K/uL    Immature Grans (Abs) 0.04 0.00 - 0.04 K/uL    Lymph # 2.0 1.0 - 4.8 K/uL    Mono # 0.6 0.3 - 1.0 K/uL    Eos # 0.3 0.0 - 0.5 K/uL    Baso # 0.06 0.00 - 0.20 K/uL    nRBC 0 0 /100 WBC    Gran% 67.2 38.0 - 73.0 %    Lymph% 21.5 18.0 - 48.0 %    Mono% 6.8 4.0 - 15.0 %    Eosinophil% 3.5 0.0 - 8.0 %    Basophil% 0.6 0.0 - 1.9 %    Differential Method Automated    Ferritin   Result Value Ref Range    Ferritin 48 20.0 - 300.0 ng/mL   Lactate Dehydrogenase   Result Value Ref Range     (H) 110 - 260 U/L   Lactic Acid, Plasma   Result Value Ref Range    Lactate (Lactic Acid) 1.7 0.5 - 2.2 mmol/L   COVID-19 Rapid Screening   Result Value Ref Range    SARS-CoV-2 RNA, Amplification, Qual Negative Negative   Procalcitonin   Result Value Ref Range    Procalcitonin 0.02 <0.25 ng/mL   Brain Natriuretic Peptide   Result Value Ref Range     (H) 0 - 99 pg/mL   Urinalysis, Reflex to Urine Culture Urine, Clean Catch   Result Value Ref Range    Specimen UA Urine, Clean Catch     Color, UA Yellow Yellow, Straw, Tori    Appearance, UA Clear Clear    pH, UA 6.0 5.0 - 8.0    Specific Gravity, UA <=1.005 (A) 1.005 - 1.030    Protein, UA Negative Negative    Glucose, UA Negative Negative    Ketones, UA Negative Negative    Bilirubin (UA) Negative Negative    Occult Blood UA Negative Negative    Nitrite, UA Negative Negative    Urobilinogen, UA Negative <2.0 EU/dL    Leukocytes, UA Negative Negative   CPK   Result Value Ref Range    CPK 49 20 - 180 U/L   C-reactive protein   Result Value Ref Range    CRP 4.9 0.0 - 8.2 mg/L   Comprehensive metabolic panel   Result Value Ref Range     Sodium 139 136 - 145 mmol/L    Potassium 4.3 3.5 - 5.1 mmol/L    Chloride 102 95 - 110 mmol/L    CO2 26 23 - 29 mmol/L    Glucose 93 70 - 110 mg/dL    BUN, Bld 4 (L) 6 - 20 mg/dL    Creatinine 0.8 0.5 - 1.4 mg/dL    Calcium 9.0 8.7 - 10.5 mg/dL    Total Protein 6.5 6.0 - 8.4 g/dL    Albumin 3.7 3.5 - 5.2 g/dL    Total Bilirubin 0.3 0.1 - 1.0 mg/dL    Alkaline Phosphatase 89 55 - 135 U/L    AST 7 (L) 10 - 40 U/L    ALT 12 10 - 44 U/L    Anion Gap 11 8 - 16 mmol/L    eGFR if African American >60 >60 mL/min/1.73 m^2    eGFR if non African American >60 >60 mL/min/1.73 m^2   Troponin I   Result Value Ref Range    Troponin I 0.016 0.000 - 0.026 ng/mL   Lipase   Result Value Ref Range    Lipase 9 4 - 60 U/L     Assessment:       1. Abscess, vagina        Plan:   Abscess, vagina  -     clindamycin (CLEOCIN) 300 MG capsule; Take 1 capsule (300 mg total) by mouth every 6 (six) hours. for 7 days  Dispense: 28 capsule; Refill: 0  -     traMADoL (ULTRAM) 50 mg tablet; Take 1 tablet (50 mg total) by mouth every 8 (eight) hours as needed for Pain.  Dispense: 12 tablet; Refill: 0

## 2020-06-01 NOTE — PATIENT INSTRUCTIONS
Abscess (Incision & Drainage)  An abscess is sometimes called a boil. It happens when bacteria get trapped under the skin and start to grow. Pus forms inside the abscess as the body responds to the bacteria. An abscess can happen with an insect bite, ingrown hair, blocked oil gland, pimple, cyst, or puncture wound.  Your healthcare provider has drained the pus from your abscess. If the abscess pocket was large, your healthcare provider may have put in gauze packing. Your provider will need to remove it on your next visit. He or she may also replace it at that time. You may not need antibiotics to treat a simple abscess, unless the infection is spreading into the skin around the wound (cellulitis).  The wound will take about 1 to 2 weeks to heal, depending on the size of the abscess. Healthy tissue will grow from the bottom and sides of the opening until it seals over.  Home care  These tips can help your wound heal:  · The wound may drain for the first 2 days. Cover the wound with a clean dry dressing. Change the dressing if it becomes soaked with blood or pus.  · If a gauze packing was placed inside the abscess pocket, you may be told to remove it yourself. You may do this in the shower. Once the packing is removed, you should wash the area in the shower, or clean the area as directed by your provider. Continue to do this until the skin opening has closed. Make sure you wash your hands after changing the packing or cleaning the wound.  · If you were prescribed antibiotics, take them as directed until they are all gone.  · You may use acetaminophen or ibuprofen to control pain, unless another pain medicine was prescribed. If you have liver disease or ever had a stomach ulcer, talk with your doctor before using these medicines.  Follow-up care  Follow up with your healthcare provider, or as advised. If a gauze packing was put in your wound, it should be removed in 1 to 2 days. Check your wound every day for any  signs that the infection is getting worse. The signs are listed below.  When to seek medical advice  Call your healthcare provider right away if any of these occur:  · Increasing redness or swelling  · Red streaks in the skin leading away from the wound  · Increasing local pain or swelling  · Continued pus draining from the wound 2 days after treatment  · Fever of 100.4ºF (38ºC) or higher, or as directed by your healthcare provider  · Boil returns when you are at home  Date Last Reviewed: 9/1/2016  © 2268-5758 [employer]. [address] [place] [id number]. All rights reserved. This information is not intended as a substitute for professional medical care. Always follow your healthcare professional's instructions.

## 2020-06-12 DIAGNOSIS — F41.1 GAD (GENERALIZED ANXIETY DISORDER): ICD-10-CM

## 2020-06-12 RX ORDER — CITALOPRAM 20 MG/1
TABLET, FILM COATED ORAL
Qty: 30 TABLET | Refills: 3 | Status: SHIPPED | OUTPATIENT
Start: 2020-06-12 | End: 2020-10-13

## 2020-08-11 DIAGNOSIS — F98.8 ATTENTION DEFICIT DISORDER, UNSPECIFIED HYPERACTIVITY PRESENCE: ICD-10-CM

## 2020-08-11 RX ORDER — LISDEXAMFETAMINE DIMESYLATE 40 MG/1
CAPSULE ORAL
Qty: 30 CAPSULE | Refills: 0 | OUTPATIENT
Start: 2020-08-11

## 2020-08-12 ENCOUNTER — OFFICE VISIT (OUTPATIENT)
Dept: FAMILY MEDICINE | Facility: CLINIC | Age: 55
End: 2020-08-12
Payer: COMMERCIAL

## 2020-08-12 DIAGNOSIS — F98.8 ATTENTION DEFICIT DISORDER, UNSPECIFIED HYPERACTIVITY PRESENCE: ICD-10-CM

## 2020-08-12 DIAGNOSIS — T75.3XXD MOTION SICKNESS, SUBSEQUENT ENCOUNTER: ICD-10-CM

## 2020-08-12 DIAGNOSIS — F98.8 ATTENTION DEFICIT DISORDER (ADD) WITHOUT HYPERACTIVITY: Primary | ICD-10-CM

## 2020-08-12 PROCEDURE — 99213 OFFICE O/P EST LOW 20 MIN: CPT | Mod: 95,,, | Performed by: FAMILY MEDICINE

## 2020-08-12 PROCEDURE — 99213 PR OFFICE/OUTPT VISIT, EST, LEVL III, 20-29 MIN: ICD-10-PCS | Mod: 95,,, | Performed by: FAMILY MEDICINE

## 2020-08-12 RX ORDER — ONDANSETRON 4 MG/1
4 TABLET, ORALLY DISINTEGRATING ORAL EVERY 8 HOURS PRN
Qty: 14 TABLET | Refills: 1 | Status: SHIPPED | OUTPATIENT
Start: 2020-08-12 | End: 2020-11-30 | Stop reason: SDUPTHER

## 2020-08-12 RX ORDER — LISDEXAMFETAMINE DIMESYLATE 40 MG/1
40 CAPSULE ORAL DAILY
Qty: 30 CAPSULE | Refills: 0 | Status: SHIPPED | OUTPATIENT
Start: 2020-08-12 | End: 2020-09-11

## 2020-08-12 RX ORDER — LISDEXAMFETAMINE DIMESYLATE 40 MG/1
40 CAPSULE ORAL DAILY
Qty: 30 CAPSULE | Refills: 0 | Status: SHIPPED | OUTPATIENT
Start: 2020-09-12 | End: 2020-10-12

## 2020-08-12 RX ORDER — LISDEXAMFETAMINE DIMESYLATE 40 MG/1
40 CAPSULE ORAL DAILY
Qty: 30 CAPSULE | Refills: 0 | Status: SHIPPED | OUTPATIENT
Start: 2020-10-12 | End: 2020-11-12 | Stop reason: SDUPTHER

## 2020-08-12 NOTE — PROGRESS NOTES
Chief Complaint:  No chief complaint on file.      History of Present Illness:    This is the virtual visit for f/u of ADD/ADHD. Doing well, no side effect from the medications, meds helping him concentrate and do what needs done.      ROS:  Review of Systems   Constitutional: Negative for activity change, appetite change and unexpected weight change.   Cardiovascular: Negative for palpitations.   Gastrointestinal: Negative for abdominal pain.   Musculoskeletal: Negative for myalgias.   Neurological: Negative for dizziness, tremors, light-headedness and headaches.   Psychiatric/Behavioral: Negative for agitation, behavioral problems, confusion, decreased concentration, dysphoric mood, hallucinations, sleep disturbance and suicidal ideas. The patient is not nervous/anxious and is not hyperactive.      Past Medical History:   Diagnosis Date    ADD (attention deficit disorder with hyperactivity)     Anxiety     Asthma     mild intermittent    Depression     Hypertension        Social History:  Social History     Socioeconomic History    Marital status:      Spouse name: Not on file    Number of children: Not on file    Years of education: Not on file    Highest education level: Not on file   Occupational History    Not on file   Social Needs    Financial resource strain: Not very hard    Food insecurity     Worry: Never true     Inability: Never true    Transportation needs     Medical: No     Non-medical: No   Tobacco Use    Smoking status: Current Every Day Smoker     Packs/day: 0.50     Years: 10.00     Pack years: 5.00     Types: Cigarettes    Smokeless tobacco: Never Used   Substance and Sexual Activity    Alcohol use: No     Alcohol/week: 0.0 standard drinks     Frequency: Never     Drinks per session: Patient refused     Binge frequency: Never    Drug use: No    Sexual activity: Yes     Partners: Male     Birth control/protection: None   Lifestyle    Physical activity     Days per week:  4 days     Minutes per session: 30 min    Stress: Rather much   Relationships    Social connections     Talks on phone: More than three times a week     Gets together: Three times a week     Attends [employer] service: Not on file     Active member of club or organization: Yes     Attends meetings of clubs or organizations: 1 to 4 times per year     Relationship status:    Other Topics Concern    Not on file   Social History Narrative    Not on file       Family History:   family history includes Coronary artery disease (age of onset: 38) in her father; Coronary artery disease (age of onset: 50) in her mother; Lung cancer in her mother.    Health Maintenance   Topic Date Due    Pneumococcal Vaccine (Highest Risk) (2 of 3 - PCV13) 10/06/2010    Mammogram  09/27/2020    Lipid Panel  08/01/2022    Pap Smear  09/18/2022    TETANUS VACCINE  10/29/2027    Hepatitis C Screening  Completed       Physical Exam:     ]     Physical Exam     There is no height or weight on file to calculate BMI.      Assessment:      ICD-10-CM ICD-9-CM   1. Attention deficit disorder (ADD) without hyperactivity  F98.8 314.00   2. Attention deficit disorder, unspecified hyperactivity presence  F98.8 314.00   3. Motion sickness, subsequent encounter  T75.3XXD V58.89     994.6         Plan:  Please continue current medications.    verified and 3 Rxs issued  F/u 3 mos  The patient location is: Home   The chief complaint leading to consultation is: as below  Visit type: Virtual visit with synchronous audio and video  Total time spent with patient: 20+ mins  Each patient to whom he or she provides medical services by telemedicine is:  (1) informed of the relationship between the physician and patient and the respective role of any other health care provider with respect to management of the patient; and (2) notified that he or she may decline to receive medical services by telemedicine and may withdraw from such care at any  time.    Notes: see below      No orders of the defined types were placed in this encounter.      Current Outpatient Medications   Medication Sig Dispense Refill    albuterol (ACCUNEB) 1.25 mg/3 mL Nebu USE ONE VIAL IN NEBULIZER EVERY 4 TO 6 HOURS AS NEEDED FOR WHEEZING & SHORTNESS OF BREATH **THANK YOU** 75 mL 6    albuterol (PROAIR HFA) 90 mcg/actuation inhaler INHALE TWO PUFFS INTO THE LUNGS EVERY 6 HOURS AS NEEDED FOR WHEEZING **THANK YOU** 25.5 g 2    albuterol-ipratropium (DUO-NEB) 2.5 mg-0.5 mg/3 mL nebulizer solution Take 3 mLs by nebulization every 6 (six) hours as needed for Wheezing. Rescue 1 Box 2    aspirin 81 MG Chew Take 1 tablet (81 mg total) by mouth once daily. 30 tablet 0    citalopram (CELEXA) 20 MG tablet TAKE ONE TABLET BY MOUTH ONCE DAILY **THANK YOU** 30 tablet 3    clindamycin (CLEOCIN T) 1 % external solution APPLY TO AFFECTED AREA TOPICALLY TWICE DAILY **THANK YOU** 30 mL 1    EPINEPHrine (EPIPEN) 0.3 mg/0.3 mL AtIn INJECT 0.3mL INTRAMUSCULARLY X1 DOSE THANK YOU  12    estradioL (ESTRACE) 1 MG tablet TAKE ONE TABLET BY MOUTH ONCE DAILY **THANK YOU** 30 tablet 2    fluticasone-salmeterol diskus inhaler 250-50 mcg Inhale 1 puff into the lungs every 12 (twelve) hours. 60 each 2    hydrOXYzine pamoate (VISTARIL) 50 MG Cap TAKE TWO CAPSULES BY MOUTH DAILY AS NEEDED FOR ITCHING THANK YOU  0    levocetirizine (XYZAL) 5 MG tablet TAKE ONE TABLET BY MOUTH EVERY EVENING **THANK YOU** 30 tablet 5    lisdexamfetamine (VYVANSE) 40 MG Cap Take 1 capsule (40 mg total) by mouth once daily. 30 capsule 0    [START ON 9/12/2020] lisdexamfetamine (VYVANSE) 40 MG Cap Take 1 capsule (40 mg total) by mouth once daily. 30 capsule 0    [START ON 10/12/2020] lisdexamfetamine (VYVANSE) 40 MG Cap Take 1 capsule (40 mg total) by mouth once daily. 30 capsule 0    LORazepam (ATIVAN) 0.5 MG tablet Take 1 tablet (0.5 mg total) by mouth every 12 (twelve) hours as needed for Anxiety. (Patient not taking:  Reported on 2/6/2020) 14 tablet 0    montelukast (SINGULAIR) 10 mg tablet TAKE ONE TABLET BY MOUTH EVERY DAY **THANK YOU** 90 tablet 1    mupirocin (BACTROBAN) 2 % ointment APPLY TO AFFECTED AREA TOPICALLY THREE TIMES DAILY FOR 7 DAYS THANK YOU  0    omeprazole (PRILOSEC) 40 MG capsule TAKE ONE CAPSULE BY MOUTH ONCE DAILY **THANK YOU** 90 capsule 1    ondansetron (ZOFRAN) 4 MG tablet Take 1 tablet (4 mg total) by mouth every 6 (six) hours as needed for Nausea. 12 tablet 0    ondansetron (ZOFRAN-ODT) 4 MG TbDL Take 1 tablet (4 mg total) by mouth every 8 (eight) hours as needed. 14 tablet 1    propranoloL (INDERAL LA) 80 MG 24 hr capsule TAKE ONE CAPSULE BY MOUTH ONCE DAILY **THANK YOU** 30 capsule 2    tiZANidine (ZANAFLEX) 4 mg Cap Take 1 capsule by mouth 2 (two) times daily as needed. 60 capsule 0     No current facility-administered medications for this visit.        Medications Discontinued During This Encounter   Medication Reason    ondansetron (ZOFRAN-ODT) 4 MG TbDL Reorder    lisdexamfetamine (VYVANSE) 40 MG Cap Reorder             Dima Her MD

## 2020-10-30 ENCOUNTER — PATIENT MESSAGE (OUTPATIENT)
Dept: ADMINISTRATIVE | Facility: HOSPITAL | Age: 55
End: 2020-10-30

## 2020-11-12 ENCOUNTER — PATIENT MESSAGE (OUTPATIENT)
Dept: FAMILY MEDICINE | Facility: CLINIC | Age: 55
End: 2020-11-12

## 2020-11-12 ENCOUNTER — OFFICE VISIT (OUTPATIENT)
Dept: FAMILY MEDICINE | Facility: CLINIC | Age: 55
End: 2020-11-12
Payer: COMMERCIAL

## 2020-11-12 DIAGNOSIS — Z79.890 PREMATURE SURGICAL MENOPAUSE ON HRT: ICD-10-CM

## 2020-11-12 DIAGNOSIS — F98.8 ATTENTION DEFICIT DISORDER, UNSPECIFIED HYPERACTIVITY PRESENCE: ICD-10-CM

## 2020-11-12 DIAGNOSIS — F98.8 ATTENTION DEFICIT DISORDER (ADD) WITHOUT HYPERACTIVITY: Primary | ICD-10-CM

## 2020-11-12 DIAGNOSIS — I10 HTN, GOAL BELOW 130/80: ICD-10-CM

## 2020-11-12 DIAGNOSIS — E89.40 PREMATURE SURGICAL MENOPAUSE ON HRT: ICD-10-CM

## 2020-11-12 PROCEDURE — 99499 RISK ADDL DX/OHS AUDIT: ICD-10-PCS | Mod: 95,,, | Performed by: FAMILY MEDICINE

## 2020-11-12 PROCEDURE — 99499 UNLISTED E&M SERVICE: CPT | Mod: 95,,, | Performed by: FAMILY MEDICINE

## 2020-11-12 PROCEDURE — 99214 OFFICE O/P EST MOD 30 MIN: CPT | Mod: 95,,, | Performed by: FAMILY MEDICINE

## 2020-11-12 PROCEDURE — 99214 PR OFFICE/OUTPT VISIT, EST, LEVL IV, 30-39 MIN: ICD-10-PCS | Mod: 95,,, | Performed by: FAMILY MEDICINE

## 2020-11-12 RX ORDER — ALBUTEROL SULFATE 1.25 MG/3ML
SOLUTION RESPIRATORY (INHALATION)
Qty: 75 ML | Refills: 6 | Status: SHIPPED | OUTPATIENT
Start: 2020-11-12 | End: 2022-11-21 | Stop reason: SDUPTHER

## 2020-11-12 RX ORDER — LISDEXAMFETAMINE DIMESYLATE 40 MG/1
40 CAPSULE ORAL DAILY
Qty: 30 CAPSULE | Refills: 0 | Status: SHIPPED | OUTPATIENT
Start: 2021-01-12 | End: 2021-02-08 | Stop reason: SDUPTHER

## 2020-11-12 RX ORDER — LISDEXAMFETAMINE DIMESYLATE 40 MG/1
40 CAPSULE ORAL DAILY
Qty: 30 CAPSULE | Refills: 0 | Status: SHIPPED | OUTPATIENT
Start: 2020-12-12 | End: 2021-01-11

## 2020-11-12 RX ORDER — PROPRANOLOL HYDROCHLORIDE 80 MG/1
80 CAPSULE, EXTENDED RELEASE ORAL DAILY
Qty: 30 CAPSULE | Refills: 6 | Status: SHIPPED | OUTPATIENT
Start: 2020-11-12 | End: 2021-01-10 | Stop reason: SDUPTHER

## 2020-11-12 RX ORDER — LISDEXAMFETAMINE DIMESYLATE 40 MG/1
40 CAPSULE ORAL DAILY
Qty: 30 CAPSULE | Refills: 0 | Status: SHIPPED | OUTPATIENT
Start: 2020-11-12 | End: 2020-12-12

## 2020-11-12 RX ORDER — PROPRANOLOL HYDROCHLORIDE 80 MG/1
CAPSULE, EXTENDED RELEASE ORAL
Qty: 90 CAPSULE | Refills: 1 | Status: SHIPPED | OUTPATIENT
Start: 2020-11-12 | End: 2021-03-03 | Stop reason: SDUPTHER

## 2020-11-12 NOTE — PROGRESS NOTES
Chief Complaint:  No chief complaint on file.      History of Present Illness:    Requesting refill for Vyvanse for ADD working well  Also requesting refill on on the albuterol for asthma  Requesting refill on propranolol for hypertension working well  She is on estradiol 1 mg daily for hot flashes history of uterine ablation many years back wants to increase the does she is also a smoker.    ROS:  Review of Systems   Constitutional: Negative for activity change and unexpected weight change.   HENT: Negative for hearing loss, rhinorrhea and trouble swallowing.    Eyes: Negative for discharge and visual disturbance.   Respiratory: Positive for wheezing. Negative for chest tightness.    Cardiovascular: Negative for chest pain and palpitations.   Gastrointestinal: Negative for blood in stool, constipation and vomiting.   Endocrine: Negative for polydipsia and polyuria.   Genitourinary: Negative for difficulty urinating, dysuria, hematuria and menstrual problem.   Musculoskeletal: Positive for arthralgias and joint swelling. Negative for neck pain.   Neurological: Negative for weakness and headaches.   Psychiatric/Behavioral: Negative for confusion and dysphoric mood.       Past Medical History:   Diagnosis Date    ADD (attention deficit disorder with hyperactivity)     Anxiety     Asthma     mild intermittent    Depression     Hypertension        Social History:  Social History     Socioeconomic History    Marital status:      Spouse name: Not on file    Number of children: Not on file    Years of education: Not on file    Highest education level: Not on file   Occupational History    Not on file   Social Needs    Financial resource strain: Not very hard    Food insecurity     Worry: Never true     Inability: Never true    Transportation needs     Medical: No     Non-medical: No   Tobacco Use    Smoking status: Current Every Day Smoker     Packs/day: 0.50     Years: 10.00     Pack years: 5.00      Types: Cigarettes    Smokeless tobacco: Never Used   Substance and Sexual Activity    Alcohol use: No     Alcohol/week: 0.0 standard drinks     Frequency: Never     Drinks per session: Patient refused     Binge frequency: Never    Drug use: No    Sexual activity: Yes     Partners: Male     Birth control/protection: None   Lifestyle    Physical activity     Days per week: 4 days     Minutes per session: 30 min    Stress: Rather much   Relationships    Social connections     Talks on phone: More than three times a week     Gets together: Three times a week     Attends Yazidism service: Not on file     Active member of club or organization: Yes     Attends meetings of clubs or organizations: 1 to 4 times per year     Relationship status:    Other Topics Concern    Not on file   Social History Narrative    Not on file       Family History:   family history includes Coronary artery disease (age of onset: 38) in her father; Coronary artery disease (age of onset: 50) in her mother; Lung cancer in her mother.    Health Maintenance   Topic Date Due    Pneumococcal Vaccine (Highest Risk) (2 of 3 - PCV13) 10/06/2010    Mammogram  09/27/2020    Lipid Panel  08/01/2022    Pap Smear  09/18/2022    TETANUS VACCINE  10/29/2027    Hepatitis C Screening  Completed       Physical Exam:     ]    There is no height or weight on file to calculate BMI.    Physical Exam      Assessment:      ICD-10-CM ICD-9-CM   1. Attention deficit disorder (ADD) without hyperactivity  F98.8 314.00   2. HTN, goal below 130/80  I10 401.9   3. Attention deficit disorder, unspecified hyperactivity presence  F98.8 314.00   4. Premature surgical menopause on HRT  E89.40 256.2    Z79.890          Plan:         checked medication refill today    She has been encouraged to contact Dr. Colunga to discuss her hormone replacement.    The patient location is: Home   The chief complaint leading to consultation is: as below  Visit type:  Virtual visit with synchronous audio and video  Total time spent with patient: 20+ mins  Each patient to whom he or she provides medical services by telemedicine is:  (1) informed of the relationship between the physician and patient and the respective role of any other health care provider with respect to management of the patient; and (2) notified that he or she may decline to receive medical services by telemedicine and may withdraw from such care at any time.    Notes: see below          No orders of the defined types were placed in this encounter.      Current Outpatient Medications   Medication Sig Dispense Refill    albuterol (ACCUNEB) 1.25 mg/3 mL Nebu USE ONE VIAL IN NEBULIZER EVERY 4 TO 6 HOURS AS NEEDED FOR WHEEZING & SHORTNESS OF BREATH **THANK YOU** 75 mL 6    albuterol (PROAIR HFA) 90 mcg/actuation inhaler INHALE TWO PUFFS INTO THE LUNGS EVERY 6 HOURS AS NEEDED FOR WHEEZING **THANK YOU** 25.5 g 2    albuterol-ipratropium (DUO-NEB) 2.5 mg-0.5 mg/3 mL nebulizer solution Take 3 mLs by nebulization every 6 (six) hours as needed for Wheezing. Rescue 1 Box 2    aspirin 81 MG Chew Take 1 tablet (81 mg total) by mouth once daily. 30 tablet 0    citalopram (CELEXA) 20 MG tablet TAKE ONE TABLET BY MOUTH EVERY DAY **THANK YOU** 30 tablet 3    clindamycin (CLEOCIN T) 1 % external solution APPLY TO AFFECTED AREA TOPICALLY TWICE DAILY **THANK YOU** 30 mL 1    EPINEPHrine (EPIPEN) 0.3 mg/0.3 mL AtIn INJECT 0.3mL INTRAMUSCULARLY X1 DOSE THANK YOU  12    estradioL (ESTRACE) 1 MG tablet TAKE ONE TABLET BY MOUTH ONCE DAILY **THANK YOU** 30 tablet 2    fluticasone-salmeterol diskus inhaler 250-50 mcg Inhale 1 puff into the lungs every 12 (twelve) hours. 60 each 2    hydrOXYzine pamoate (VISTARIL) 50 MG Cap TAKE TWO CAPSULES BY MOUTH DAILY AS NEEDED FOR ITCHING THANK YOU  0    levocetirizine (XYZAL) 5 MG tablet TAKE ONE TABLET BY MOUTH EVERY EVENING **THANK YOU** 30 tablet 5    lisdexamfetamine (VYVANSE) 40 MG  Cap Take 1 capsule (40 mg total) by mouth once daily. 30 capsule 0    [START ON 12/12/2020] lisdexamfetamine (VYVANSE) 40 MG Cap Take 1 capsule (40 mg total) by mouth once daily. 30 capsule 0    [START ON 1/12/2021] lisdexamfetamine (VYVANSE) 40 MG Cap Take 1 capsule (40 mg total) by mouth once daily. 30 capsule 0    LORazepam (ATIVAN) 0.5 MG tablet Take 1 tablet (0.5 mg total) by mouth every 12 (twelve) hours as needed for Anxiety. (Patient not taking: Reported on 2/6/2020) 14 tablet 0    montelukast (SINGULAIR) 10 mg tablet TAKE ONE TABLET BY MOUTH EVERY DAY **THANK YOU** 90 tablet 1    mupirocin (BACTROBAN) 2 % ointment APPLY TO AFFECTED AREA TOPICALLY THREE TIMES DAILY FOR 7 DAYS THANK YOU  0    omeprazole (PRILOSEC) 40 MG capsule TAKE ONE CAPSULE BY MOUTH ONCE DAILY **THANK YOU** 90 capsule 1    ondansetron (ZOFRAN) 4 MG tablet Take 1 tablet (4 mg total) by mouth every 6 (six) hours as needed for Nausea. 12 tablet 0    ondansetron (ZOFRAN-ODT) 4 MG TbDL Take 1 tablet (4 mg total) by mouth every 8 (eight) hours as needed. 14 tablet 1    propranoloL (INDERAL LA) 80 MG 24 hr capsule Take 1 capsule (80 mg total) by mouth once daily. 30 capsule 6    tiZANidine (ZANAFLEX) 4 mg Cap Take 1 capsule by mouth 2 (two) times daily as needed. 60 capsule 0     No current facility-administered medications for this visit.        Medications Discontinued During This Encounter   Medication Reason    albuterol (ACCUNEB) 1.25 mg/3 mL Nebu Reorder    propranoloL (INDERAL LA) 80 MG 24 hr capsule Reorder    lisdexamfetamine (VYVANSE) 40 MG Cap Reorder       No follow-ups on file.      Dima Her MD

## 2020-11-30 ENCOUNTER — PATIENT MESSAGE (OUTPATIENT)
Dept: FAMILY MEDICINE | Facility: CLINIC | Age: 55
End: 2020-11-30

## 2020-11-30 DIAGNOSIS — J45.21 MILD INTERMITTENT ASTHMA WITH ACUTE EXACERBATION: ICD-10-CM

## 2020-11-30 DIAGNOSIS — F41.1 GAD (GENERALIZED ANXIETY DISORDER): ICD-10-CM

## 2020-11-30 DIAGNOSIS — K21.9 GASTROESOPHAGEAL REFLUX DISEASE WITHOUT ESOPHAGITIS: ICD-10-CM

## 2020-11-30 DIAGNOSIS — I10 HTN, GOAL BELOW 130/80: ICD-10-CM

## 2020-11-30 DIAGNOSIS — T75.3XXD MOTION SICKNESS, SUBSEQUENT ENCOUNTER: ICD-10-CM

## 2020-11-30 DIAGNOSIS — N95.1 VASOMOTOR SYMPTOMS DUE TO MENOPAUSE: ICD-10-CM

## 2020-11-30 RX ORDER — OMEPRAZOLE 40 MG/1
40 CAPSULE, DELAYED RELEASE ORAL EVERY MORNING
Qty: 90 CAPSULE | Refills: 1 | Status: SHIPPED | OUTPATIENT
Start: 2020-11-30 | End: 2021-06-01 | Stop reason: SDUPTHER

## 2020-11-30 RX ORDER — ESTRADIOL 1 MG/1
1 TABLET ORAL DAILY
Qty: 30 TABLET | Refills: 2 | Status: SHIPPED | OUTPATIENT
Start: 2020-11-30 | End: 2021-03-03 | Stop reason: SDUPTHER

## 2020-11-30 RX ORDER — CITALOPRAM 20 MG/1
TABLET, FILM COATED ORAL
Qty: 30 TABLET | Refills: 3 | Status: SHIPPED | OUTPATIENT
Start: 2020-11-30 | End: 2021-04-02 | Stop reason: SDUPTHER

## 2020-11-30 RX ORDER — PROPRANOLOL HYDROCHLORIDE 80 MG/1
80 CAPSULE, EXTENDED RELEASE ORAL DAILY
Qty: 30 CAPSULE | Refills: 2 | Status: SHIPPED | OUTPATIENT
Start: 2020-11-30 | End: 2021-05-25 | Stop reason: SDUPTHER

## 2020-11-30 RX ORDER — ONDANSETRON 4 MG/1
4 TABLET, ORALLY DISINTEGRATING ORAL EVERY 8 HOURS PRN
Qty: 14 TABLET | Refills: 1 | Status: SHIPPED | OUTPATIENT
Start: 2020-11-30 | End: 2022-01-14 | Stop reason: SDUPTHER

## 2020-11-30 RX ORDER — ALBUTEROL SULFATE 90 UG/1
AEROSOL, METERED RESPIRATORY (INHALATION)
Qty: 25.5 G | Refills: 2 | Status: SHIPPED | OUTPATIENT
Start: 2020-11-30 | End: 2021-12-02 | Stop reason: SDUPTHER

## 2020-11-30 RX ORDER — MONTELUKAST SODIUM 10 MG/1
10 TABLET ORAL DAILY
Qty: 90 TABLET | Refills: 1 | Status: SHIPPED | OUTPATIENT
Start: 2020-11-30 | End: 2021-07-14 | Stop reason: SDUPTHER

## 2020-12-08 ENCOUNTER — TELEPHONE (OUTPATIENT)
Dept: OBSTETRICS AND GYNECOLOGY | Facility: CLINIC | Age: 55
End: 2020-12-08

## 2020-12-08 NOTE — TELEPHONE ENCOUNTER
----- Message from Jeanne Desai MA sent at 11/30/2020  2:18 PM CST -----  Regarding: Virtual appointment  Please contact patient to schedule a virtual visit to discuss hormone therapy. Dr. Sousa spoke with Dr. Gem Woods and she wanted to do a virtual visit with patient. Thank you.

## 2020-12-11 ENCOUNTER — TELEPHONE (OUTPATIENT)
Dept: OBSTETRICS AND GYNECOLOGY | Facility: CLINIC | Age: 55
End: 2020-12-11

## 2020-12-11 ENCOUNTER — PATIENT MESSAGE (OUTPATIENT)
Dept: OBSTETRICS AND GYNECOLOGY | Facility: CLINIC | Age: 55
End: 2020-12-11

## 2020-12-30 ENCOUNTER — PATIENT OUTREACH (OUTPATIENT)
Dept: ADMINISTRATIVE | Facility: HOSPITAL | Age: 55
End: 2020-12-30

## 2021-01-07 ENCOUNTER — PATIENT OUTREACH (OUTPATIENT)
Dept: ADMINISTRATIVE | Facility: OTHER | Age: 56
End: 2021-01-07

## 2021-01-07 DIAGNOSIS — Z12.31 ENCOUNTER FOR SCREENING MAMMOGRAM FOR MALIGNANT NEOPLASM OF BREAST: Primary | ICD-10-CM

## 2021-01-10 DIAGNOSIS — I10 HTN, GOAL BELOW 130/80: ICD-10-CM

## 2021-01-11 RX ORDER — PROPRANOLOL HYDROCHLORIDE 80 MG/1
80 CAPSULE, EXTENDED RELEASE ORAL DAILY
Qty: 30 CAPSULE | Refills: 6 | Status: SHIPPED | OUTPATIENT
Start: 2021-01-11 | End: 2021-09-16 | Stop reason: SDUPTHER

## 2021-01-22 ENCOUNTER — TELEPHONE (OUTPATIENT)
Dept: ADMINISTRATIVE | Facility: HOSPITAL | Age: 56
End: 2021-01-22

## 2021-02-08 ENCOUNTER — OFFICE VISIT (OUTPATIENT)
Dept: FAMILY MEDICINE | Facility: CLINIC | Age: 56
End: 2021-02-08
Payer: COMMERCIAL

## 2021-02-08 DIAGNOSIS — F98.8 ATTENTION DEFICIT DISORDER, UNSPECIFIED HYPERACTIVITY PRESENCE: ICD-10-CM

## 2021-02-08 DIAGNOSIS — F98.8 ATTENTION DEFICIT DISORDER (ADD) WITHOUT HYPERACTIVITY: Primary | ICD-10-CM

## 2021-02-08 PROCEDURE — 99213 OFFICE O/P EST LOW 20 MIN: CPT | Mod: 95,,, | Performed by: FAMILY MEDICINE

## 2021-02-08 PROCEDURE — 99213 PR OFFICE/OUTPT VISIT, EST, LEVL III, 20-29 MIN: ICD-10-PCS | Mod: 95,,, | Performed by: FAMILY MEDICINE

## 2021-02-08 RX ORDER — LISDEXAMFETAMINE DIMESYLATE 40 MG/1
40 CAPSULE ORAL DAILY
Qty: 30 CAPSULE | Refills: 0 | Status: SHIPPED | OUTPATIENT
Start: 2021-03-16 | End: 2021-04-15

## 2021-02-08 RX ORDER — LISDEXAMFETAMINE DIMESYLATE 40 MG/1
40 CAPSULE ORAL DAILY
Qty: 30 CAPSULE | Refills: 0 | Status: SHIPPED | OUTPATIENT
Start: 2021-02-14 | End: 2021-03-16

## 2021-02-08 RX ORDER — LISDEXAMFETAMINE DIMESYLATE 40 MG/1
40 CAPSULE ORAL DAILY
Qty: 30 CAPSULE | Refills: 0 | Status: SHIPPED | OUTPATIENT
Start: 2021-04-16 | End: 2021-05-25 | Stop reason: SDUPTHER

## 2021-03-03 DIAGNOSIS — N95.1 VASOMOTOR SYMPTOMS DUE TO MENOPAUSE: ICD-10-CM

## 2021-03-04 RX ORDER — ESTRADIOL 1 MG/1
1 TABLET ORAL DAILY
Qty: 30 TABLET | Refills: 2 | Status: SHIPPED | OUTPATIENT
Start: 2021-03-04 | End: 2021-06-01 | Stop reason: SDUPTHER

## 2021-04-02 DIAGNOSIS — F41.1 GAD (GENERALIZED ANXIETY DISORDER): ICD-10-CM

## 2021-04-05 RX ORDER — CITALOPRAM 20 MG/1
TABLET, FILM COATED ORAL
Qty: 30 TABLET | Refills: 3 | Status: SHIPPED | OUTPATIENT
Start: 2021-04-05 | End: 2021-07-26 | Stop reason: SDUPTHER

## 2021-04-20 ENCOUNTER — PATIENT OUTREACH (OUTPATIENT)
Dept: ADMINISTRATIVE | Facility: OTHER | Age: 56
End: 2021-04-20

## 2021-05-25 ENCOUNTER — OFFICE VISIT (OUTPATIENT)
Dept: FAMILY MEDICINE | Facility: CLINIC | Age: 56
End: 2021-05-25
Payer: COMMERCIAL

## 2021-05-25 DIAGNOSIS — F41.9 ANXIETY: ICD-10-CM

## 2021-05-25 DIAGNOSIS — F98.8 ATTENTION DEFICIT DISORDER, UNSPECIFIED HYPERACTIVITY PRESENCE: ICD-10-CM

## 2021-05-25 DIAGNOSIS — Z12.11 COLON CANCER SCREENING: Primary | ICD-10-CM

## 2021-05-25 PROCEDURE — 99214 OFFICE O/P EST MOD 30 MIN: CPT | Mod: 95,,, | Performed by: FAMILY MEDICINE

## 2021-05-25 PROCEDURE — 99214 PR OFFICE/OUTPT VISIT, EST, LEVL IV, 30-39 MIN: ICD-10-PCS | Mod: 95,,, | Performed by: FAMILY MEDICINE

## 2021-05-25 RX ORDER — LISDEXAMFETAMINE DIMESYLATE 40 MG/1
40 CAPSULE ORAL DAILY
Qty: 30 CAPSULE | Refills: 0 | Status: SHIPPED | OUTPATIENT
Start: 2021-06-25 | End: 2021-09-07 | Stop reason: SDUPTHER

## 2021-05-25 RX ORDER — LISDEXAMFETAMINE DIMESYLATE 40 MG/1
40 CAPSULE ORAL DAILY
Qty: 30 CAPSULE | Refills: 0 | Status: SHIPPED | OUTPATIENT
Start: 2021-07-25 | End: 2021-08-24

## 2021-05-25 RX ORDER — LISDEXAMFETAMINE DIMESYLATE 40 MG/1
40 CAPSULE ORAL DAILY
Qty: 30 CAPSULE | Refills: 0 | Status: SHIPPED | OUTPATIENT
Start: 2021-05-25 | End: 2021-08-27 | Stop reason: SDUPTHER

## 2021-05-25 RX ORDER — BUSPIRONE HYDROCHLORIDE 10 MG/1
10 TABLET ORAL 2 TIMES DAILY
Qty: 60 TABLET | Refills: 11 | Status: SHIPPED | OUTPATIENT
Start: 2021-05-25 | End: 2021-07-26 | Stop reason: SDUPTHER

## 2021-06-01 DIAGNOSIS — K21.9 GASTROESOPHAGEAL REFLUX DISEASE WITHOUT ESOPHAGITIS: ICD-10-CM

## 2021-06-01 DIAGNOSIS — N95.1 VASOMOTOR SYMPTOMS DUE TO MENOPAUSE: ICD-10-CM

## 2021-06-01 RX ORDER — OMEPRAZOLE 40 MG/1
40 CAPSULE, DELAYED RELEASE ORAL EVERY MORNING
Qty: 90 CAPSULE | Refills: 1 | Status: SHIPPED | OUTPATIENT
Start: 2021-06-01 | End: 2021-09-07 | Stop reason: SDUPTHER

## 2021-06-01 RX ORDER — ESTRADIOL 1 MG/1
1 TABLET ORAL DAILY
Qty: 90 TABLET | Refills: 1 | Status: SHIPPED | OUTPATIENT
Start: 2021-06-01 | End: 2021-09-16 | Stop reason: SDUPTHER

## 2021-07-14 DIAGNOSIS — J45.21 MILD INTERMITTENT ASTHMA WITH ACUTE EXACERBATION: ICD-10-CM

## 2021-07-15 RX ORDER — MONTELUKAST SODIUM 10 MG/1
10 TABLET ORAL DAILY
Qty: 90 TABLET | Refills: 1 | Status: SHIPPED | OUTPATIENT
Start: 2021-07-15 | End: 2021-10-15 | Stop reason: SDUPTHER

## 2021-07-26 ENCOUNTER — TELEPHONE (OUTPATIENT)
Dept: OBSTETRICS AND GYNECOLOGY | Facility: CLINIC | Age: 56
End: 2021-07-26

## 2021-07-26 DIAGNOSIS — F41.1 GAD (GENERALIZED ANXIETY DISORDER): ICD-10-CM

## 2021-07-26 RX ORDER — CITALOPRAM 20 MG/1
TABLET, FILM COATED ORAL
Qty: 30 TABLET | Refills: 3 | Status: SHIPPED | OUTPATIENT
Start: 2021-07-26 | End: 2021-09-16 | Stop reason: SDUPTHER

## 2021-08-10 ENCOUNTER — PATIENT MESSAGE (OUTPATIENT)
Dept: PHARMACY | Facility: CLINIC | Age: 56
End: 2021-08-10

## 2021-08-27 ENCOUNTER — PATIENT MESSAGE (OUTPATIENT)
Dept: FAMILY MEDICINE | Facility: CLINIC | Age: 56
End: 2021-08-27

## 2021-08-27 DIAGNOSIS — M79.7 FIBROMYALGIA: ICD-10-CM

## 2021-08-27 RX ORDER — LISDEXAMFETAMINE DIMESYLATE 40 MG/1
40 CAPSULE ORAL DAILY
Qty: 5 CAPSULE | Refills: 0 | Status: SHIPPED | OUTPATIENT
Start: 2021-08-27 | End: 2021-12-02 | Stop reason: SDUPTHER

## 2021-08-31 RX ORDER — TIZANIDINE HYDROCHLORIDE 4 MG/1
CAPSULE, GELATIN COATED ORAL 2 TIMES DAILY PRN
Qty: 60 CAPSULE | Refills: 0 | Status: SHIPPED | OUTPATIENT
Start: 2021-08-31 | End: 2023-01-09

## 2021-09-03 ENCOUNTER — PATIENT MESSAGE (OUTPATIENT)
Dept: FAMILY MEDICINE | Facility: CLINIC | Age: 56
End: 2021-09-03

## 2021-09-07 ENCOUNTER — OFFICE VISIT (OUTPATIENT)
Dept: FAMILY MEDICINE | Facility: CLINIC | Age: 56
End: 2021-09-07
Payer: COMMERCIAL

## 2021-09-07 DIAGNOSIS — F98.8 ATTENTION DEFICIT DISORDER (ADD) WITHOUT HYPERACTIVITY: Primary | ICD-10-CM

## 2021-09-07 DIAGNOSIS — K21.9 GASTROESOPHAGEAL REFLUX DISEASE WITHOUT ESOPHAGITIS: ICD-10-CM

## 2021-09-07 PROCEDURE — 1160F RVW MEDS BY RX/DR IN RCRD: CPT | Mod: CPTII,,, | Performed by: FAMILY MEDICINE

## 2021-09-07 PROCEDURE — 99499 RISK ADDL DX/OHS AUDIT: ICD-10-PCS | Mod: 95,,, | Performed by: FAMILY MEDICINE

## 2021-09-07 PROCEDURE — 99213 PR OFFICE/OUTPT VISIT, EST, LEVL III, 20-29 MIN: ICD-10-PCS | Mod: 95,,, | Performed by: FAMILY MEDICINE

## 2021-09-07 PROCEDURE — 99213 OFFICE O/P EST LOW 20 MIN: CPT | Mod: 95,,, | Performed by: FAMILY MEDICINE

## 2021-09-07 PROCEDURE — 99499 UNLISTED E&M SERVICE: CPT | Mod: 95,,, | Performed by: FAMILY MEDICINE

## 2021-09-07 PROCEDURE — 1160F PR REVIEW ALL MEDS BY PRESCRIBER/CLIN PHARMACIST DOCUMENTED: ICD-10-PCS | Mod: CPTII,,, | Performed by: FAMILY MEDICINE

## 2021-09-07 PROCEDURE — 1159F MED LIST DOCD IN RCRD: CPT | Mod: CPTII,,, | Performed by: FAMILY MEDICINE

## 2021-09-07 PROCEDURE — 1159F PR MEDICATION LIST DOCUMENTED IN MEDICAL RECORD: ICD-10-PCS | Mod: CPTII,,, | Performed by: FAMILY MEDICINE

## 2021-09-07 RX ORDER — LISDEXAMFETAMINE DIMESYLATE 40 MG/1
40 CAPSULE ORAL DAILY
Qty: 30 CAPSULE | Refills: 0 | Status: SHIPPED | OUTPATIENT
Start: 2021-10-07 | End: 2021-12-02 | Stop reason: SDUPTHER

## 2021-09-07 RX ORDER — LISDEXAMFETAMINE DIMESYLATE 40 MG/1
40 CAPSULE ORAL DAILY
Qty: 30 CAPSULE | Refills: 0 | Status: SHIPPED | OUTPATIENT
Start: 2021-11-07 | End: 2022-02-28 | Stop reason: SDUPTHER

## 2021-09-07 RX ORDER — LISDEXAMFETAMINE DIMESYLATE 40 MG/1
40 CAPSULE ORAL DAILY
Qty: 30 CAPSULE | Refills: 0 | Status: SHIPPED | OUTPATIENT
Start: 2021-09-07 | End: 2021-12-02 | Stop reason: SDUPTHER

## 2021-09-07 RX ORDER — OMEPRAZOLE 40 MG/1
40 CAPSULE, DELAYED RELEASE ORAL EVERY MORNING
Qty: 90 CAPSULE | Refills: 1 | Status: SHIPPED | OUTPATIENT
Start: 2021-09-07 | End: 2022-01-14 | Stop reason: SDUPTHER

## 2021-09-15 ENCOUNTER — PATIENT MESSAGE (OUTPATIENT)
Dept: FAMILY MEDICINE | Facility: CLINIC | Age: 56
End: 2021-09-15

## 2021-09-15 DIAGNOSIS — I10 HTN, GOAL BELOW 130/80: ICD-10-CM

## 2021-09-15 DIAGNOSIS — F41.1 GAD (GENERALIZED ANXIETY DISORDER): ICD-10-CM

## 2021-09-15 DIAGNOSIS — N95.1 VASOMOTOR SYMPTOMS DUE TO MENOPAUSE: ICD-10-CM

## 2021-09-16 ENCOUNTER — PATIENT MESSAGE (OUTPATIENT)
Dept: FAMILY MEDICINE | Facility: CLINIC | Age: 56
End: 2021-09-16

## 2021-09-16 RX ORDER — BUSPIRONE HYDROCHLORIDE 10 MG/1
10 TABLET ORAL 2 TIMES DAILY
Qty: 60 TABLET | Refills: 5 | Status: SHIPPED | OUTPATIENT
Start: 2021-09-16 | End: 2022-01-14 | Stop reason: SDUPTHER

## 2021-09-16 RX ORDER — CITALOPRAM 20 MG/1
TABLET, FILM COATED ORAL
Qty: 30 TABLET | Refills: 5 | Status: SHIPPED | OUTPATIENT
Start: 2021-09-16 | End: 2021-10-15 | Stop reason: SDUPTHER

## 2021-09-16 RX ORDER — PROPRANOLOL HYDROCHLORIDE 80 MG/1
80 CAPSULE, EXTENDED RELEASE ORAL DAILY
Qty: 30 CAPSULE | Refills: 5 | Status: SHIPPED | OUTPATIENT
Start: 2021-09-16 | End: 2022-01-14 | Stop reason: SDUPTHER

## 2021-09-16 RX ORDER — ESTRADIOL 1 MG/1
1 TABLET ORAL DAILY
Qty: 60 TABLET | Refills: 5 | Status: SHIPPED | OUTPATIENT
Start: 2021-09-16 | End: 2022-01-14 | Stop reason: SDUPTHER

## 2021-10-14 ENCOUNTER — PATIENT MESSAGE (OUTPATIENT)
Dept: FAMILY MEDICINE | Facility: CLINIC | Age: 56
End: 2021-10-14
Payer: COMMERCIAL

## 2021-10-15 DIAGNOSIS — J45.21 MILD INTERMITTENT ASTHMA WITH ACUTE EXACERBATION: ICD-10-CM

## 2021-10-15 DIAGNOSIS — F41.1 GAD (GENERALIZED ANXIETY DISORDER): ICD-10-CM

## 2021-10-15 RX ORDER — CITALOPRAM 20 MG/1
TABLET, FILM COATED ORAL
Qty: 90 TABLET | Refills: 1 | Status: SHIPPED | OUTPATIENT
Start: 2021-10-15 | End: 2022-01-14 | Stop reason: SDUPTHER

## 2021-10-15 RX ORDER — MONTELUKAST SODIUM 10 MG/1
10 TABLET ORAL DAILY
Qty: 90 TABLET | Refills: 1 | Status: SHIPPED | OUTPATIENT
Start: 2021-10-15 | End: 2022-01-14 | Stop reason: SDUPTHER

## 2021-10-15 RX ORDER — IPRATROPIUM BROMIDE AND ALBUTEROL SULFATE 2.5; .5 MG/3ML; MG/3ML
3 SOLUTION RESPIRATORY (INHALATION) EVERY 6 HOURS PRN
Qty: 90 ML | Refills: 2 | Status: SHIPPED | OUTPATIENT
Start: 2021-10-15 | End: 2021-12-02 | Stop reason: SDUPTHER

## 2021-10-17 ENCOUNTER — PATIENT OUTREACH (OUTPATIENT)
Dept: ADMINISTRATIVE | Facility: OTHER | Age: 56
End: 2021-10-17

## 2021-10-17 DIAGNOSIS — Z12.11 ENCOUNTER FOR FIT (FECAL IMMUNOCHEMICAL TEST) SCREENING: Primary | ICD-10-CM

## 2021-11-15 ENCOUNTER — TELEPHONE (OUTPATIENT)
Dept: ADMINISTRATIVE | Facility: HOSPITAL | Age: 56
End: 2021-11-15
Payer: COMMERCIAL

## 2021-12-01 ENCOUNTER — PATIENT OUTREACH (OUTPATIENT)
Dept: ADMINISTRATIVE | Facility: OTHER | Age: 56
End: 2021-12-01
Payer: COMMERCIAL

## 2021-12-02 ENCOUNTER — OFFICE VISIT (OUTPATIENT)
Dept: FAMILY MEDICINE | Facility: CLINIC | Age: 56
End: 2021-12-02
Payer: COMMERCIAL

## 2021-12-02 DIAGNOSIS — F98.8 ATTENTION DEFICIT DISORDER (ADD) WITHOUT HYPERACTIVITY: Primary | ICD-10-CM

## 2021-12-02 DIAGNOSIS — J45.21 MILD INTERMITTENT ASTHMA WITH ACUTE EXACERBATION: ICD-10-CM

## 2021-12-02 PROCEDURE — 99499 RISK ADDL DX/OHS AUDIT: ICD-10-PCS | Mod: 95,,, | Performed by: FAMILY MEDICINE

## 2021-12-02 PROCEDURE — 99499 UNLISTED E&M SERVICE: CPT | Mod: 95,,, | Performed by: FAMILY MEDICINE

## 2021-12-02 PROCEDURE — 99213 PR OFFICE/OUTPT VISIT, EST, LEVL III, 20-29 MIN: ICD-10-PCS | Mod: 95,,, | Performed by: FAMILY MEDICINE

## 2021-12-02 PROCEDURE — 99213 OFFICE O/P EST LOW 20 MIN: CPT | Mod: 95,,, | Performed by: FAMILY MEDICINE

## 2021-12-02 RX ORDER — ALBUTEROL SULFATE 90 UG/1
AEROSOL, METERED RESPIRATORY (INHALATION)
Qty: 25.5 G | Refills: 2 | Status: SHIPPED | OUTPATIENT
Start: 2021-12-02 | End: 2022-06-07 | Stop reason: SDUPTHER

## 2021-12-02 RX ORDER — IPRATROPIUM BROMIDE AND ALBUTEROL SULFATE 2.5; .5 MG/3ML; MG/3ML
3 SOLUTION RESPIRATORY (INHALATION) EVERY 6 HOURS PRN
Qty: 90 ML | Refills: 2 | Status: SHIPPED | OUTPATIENT
Start: 2021-12-02 | End: 2022-03-18 | Stop reason: SDUPTHER

## 2021-12-02 RX ORDER — LISDEXAMFETAMINE DIMESYLATE 40 MG/1
40 CAPSULE ORAL DAILY
Qty: 30 CAPSULE | Refills: 0 | Status: SHIPPED | OUTPATIENT
Start: 2021-12-08 | End: 2022-04-13

## 2021-12-02 RX ORDER — LISDEXAMFETAMINE DIMESYLATE 40 MG/1
40 CAPSULE ORAL DAILY
Qty: 30 CAPSULE | Refills: 0 | Status: SHIPPED | OUTPATIENT
Start: 2022-02-08 | End: 2022-02-28 | Stop reason: SDUPTHER

## 2021-12-02 RX ORDER — LISDEXAMFETAMINE DIMESYLATE 40 MG/1
40 CAPSULE ORAL DAILY
Qty: 30 CAPSULE | Refills: 0 | Status: SHIPPED | OUTPATIENT
Start: 2022-01-08 | End: 2022-02-28 | Stop reason: SDUPTHER

## 2021-12-30 ENCOUNTER — PES CALL (OUTPATIENT)
Dept: ADMINISTRATIVE | Facility: CLINIC | Age: 56
End: 2021-12-30
Payer: COMMERCIAL

## 2022-01-13 ENCOUNTER — PATIENT OUTREACH (OUTPATIENT)
Dept: ADMINISTRATIVE | Facility: OTHER | Age: 57
End: 2022-01-13
Payer: COMMERCIAL

## 2022-01-13 NOTE — PROGRESS NOTES
Health Maintenance Due   Topic Date Due    COVID-19 Vaccine (1) Never done    Pneumococcal Vaccines (Age 0-64) (2 of 4 - PCV13) 10/06/2010    Shingles Vaccine (1 of 2) Never done    Colorectal Cancer Screening  09/18/2020    Mammogram  09/27/2020    Influenza Vaccine (1) 09/01/2021     Updates were requested from care everywhere.  Chart was reviewed for overdue Proactive [facility] Encounters ([facility]) topics (CRS, Breast Cancer Screening, Eye exam)  Health Maintenance has been updated.  LINKS immunization registry triggered.  Immunizations were reconciled.

## 2022-01-14 DIAGNOSIS — T75.3XXD MOTION SICKNESS, SUBSEQUENT ENCOUNTER: ICD-10-CM

## 2022-01-14 NOTE — TELEPHONE ENCOUNTER
No new care gaps identified.  Powered by [employer] by [employer]. Reference number: [id number].   1/14/2022 10:53:49 AM CST

## 2022-01-17 RX ORDER — ONDANSETRON 4 MG/1
4 TABLET, ORALLY DISINTEGRATING ORAL EVERY 8 HOURS PRN
Qty: 14 TABLET | Refills: 1 | Status: SHIPPED | OUTPATIENT
Start: 2022-01-17 | End: 2023-05-08 | Stop reason: SDUPTHER

## 2022-01-20 ENCOUNTER — TELEPHONE (OUTPATIENT)
Dept: ADMINISTRATIVE | Facility: HOSPITAL | Age: 57
End: 2022-01-20
Payer: COMMERCIAL

## 2022-02-03 DIAGNOSIS — I10 HYPERTENSION NOT AT GOAL: ICD-10-CM

## 2022-02-08 ENCOUNTER — TELEPHONE (OUTPATIENT)
Dept: ADMINISTRATIVE | Facility: HOSPITAL | Age: 57
End: 2022-02-08
Payer: COMMERCIAL

## 2022-02-10 ENCOUNTER — PATIENT MESSAGE (OUTPATIENT)
Dept: FAMILY MEDICINE | Facility: CLINIC | Age: 57
End: 2022-02-10

## 2022-02-10 ENCOUNTER — OFFICE VISIT (OUTPATIENT)
Dept: FAMILY MEDICINE | Facility: CLINIC | Age: 57
End: 2022-02-10
Payer: COMMERCIAL

## 2022-02-10 ENCOUNTER — TELEPHONE (OUTPATIENT)
Dept: FAMILY MEDICINE | Facility: CLINIC | Age: 57
End: 2022-02-10

## 2022-02-10 DIAGNOSIS — R68.89 FLU-LIKE SYMPTOMS: Primary | ICD-10-CM

## 2022-02-10 DIAGNOSIS — U07.1 COVID-19 VIRUS RNA TEST RESULT POSITIVE AT LIMIT OF DETECTION: ICD-10-CM

## 2022-02-10 LAB
CTP QC/QA: YES
SARS-COV-2 RDRP RESP QL NAA+PROBE: POSITIVE

## 2022-02-10 PROCEDURE — 99213 PR OFFICE/OUTPT VISIT, EST, LEVL III, 20-29 MIN: ICD-10-PCS | Mod: 95,,, | Performed by: FAMILY MEDICINE

## 2022-02-10 PROCEDURE — 99213 OFFICE O/P EST LOW 20 MIN: CPT | Mod: 95,,, | Performed by: FAMILY MEDICINE

## 2022-02-10 RX ORDER — PREDNISONE 20 MG/1
20 TABLET ORAL SEE ADMIN INSTRUCTIONS
Qty: 45 TABLET | Refills: 0 | Status: SHIPPED | OUTPATIENT
Start: 2022-02-10 | End: 2022-04-13

## 2022-02-10 NOTE — TELEPHONE ENCOUNTER
and son positive for covid, she tested negative but now is having more symptoms, wheezing, body aches and low grade fever. '  She is asking if you can give her prednisone, she said she is willing to do a virtual visit with you.    Do you want me to test her ?

## 2022-02-10 NOTE — PROGRESS NOTES
Chief Complaint:  No chief complaint on file.      History of Present Illness:    COVID in the family last few days she has not been feeling good been having some difficulty with wheezing coughing body aches.  Does have underlying asthma having to use her nebulizer bit more.    ROS:  Review of Systems   Constitutional: Negative for chills and fever.   HENT: Positive for ear pain and sore throat. Negative for postnasal drip and rhinorrhea.    Respiratory: Positive for cough, shortness of breath and wheezing.    Cardiovascular: Negative for chest pain.   Musculoskeletal: Positive for myalgias.   Skin: Negative for rash.   Allergic/Immunologic: Positive for environmental allergies.   Neurological: Positive for headaches.       Past Medical History:   Diagnosis Date    ADD (attention deficit disorder with hyperactivity)     Anxiety     Asthma     mild intermittent    Depression     Hypertension        Social History:  Social History     Socioeconomic History    Marital status:    Tobacco Use    Smoking status: Current Every Day Smoker     Packs/day: 0.50     Years: 10.00     Pack years: 5.00     Types: Cigarettes    Smokeless tobacco: Never Used   Substance and Sexual Activity    Alcohol use: No     Alcohol/week: 0.0 standard drinks    Drug use: No    Sexual activity: Yes     Partners: Male     Birth control/protection: None     Social Determinants of Health     Financial Resource Strain: Low Risk     Difficulty of Paying Living Expenses: Not very hard   Food Insecurity: No Food Insecurity    Worried About Running Out of Food in the Last Year: Never true    Ran Out of Food in the Last Year: Never true   Transportation Needs: No Transportation Needs    Lack of Transportation (Medical): No    Lack of Transportation (Non-Medical): No   Physical Activity: Insufficiently Active    Days of Exercise per Week: 2 days    Minutes of Exercise per Session: 20 min   Stress: Stress Concern Present     Feeling of Stress : Rather much   Social Connections: Unknown    Frequency of Communication with Friends and Family: Three times a week    Frequency of Social Gatherings with Friends and Family: Once a week    Active Member of Clubs or Organizations: Yes    Attends Club or Organization Meetings: 1 to 4 times per year    Marital Status:    Housing Stability: Low Risk     Unable to Pay for Housing in the Last Year: No    Number of Places Lived in the Last Year: 1    Unstable Housing in the Last Year: No       Family History:   family history includes Coronary artery disease (age of onset: 38) in her father; Coronary artery disease (age of onset: 50) in her mother; Lung cancer in her mother.    Health Maintenance   Topic Date Due    Mammogram  09/27/2020    Lipid Panel  08/01/2022    Pap Smear  09/18/2022    TETANUS VACCINE  10/29/2027    Hepatitis C Screening  Completed       Physical Exam:     ]    There is no height or weight on file to calculate BMI.    Physical Exam      Assessment:      ICD-10-CM ICD-9-CM   1. Flu-like symptoms  R68.89 780.99   2. COVID-19 virus RNA test result positive at limit of detection  U07.1 079.89         Plan:       2  She is COVID positive risk score is 2 will not qualify for the infusion  Prescription for prednisone sent in for wheezing but will do a COVID test please monitor pulse oxygenation it drops below 90% go to the ED.  The patient location is: Home   The chief complaint leading to consultation is: as below  Visit type: Virtual visit with synchronous audio and video  Total time spent with patient: 20+ mins  Each patient to whom he or she provides medical services by telemedicine is:  (1) informed of the relationship between the physician and patient and the respective role of any other health care provider with respect to management of the patient; and (2) notified that he or she may decline to receive medical services by telemedicine and may withdraw from  such care at any time.    Notes: see below      Orders Placed This Encounter   Procedures    POCT COVID-19 Rapid Screening       Current Outpatient Medications   Medication Sig Dispense Refill    albuterol (ACCUNEB) 1.25 mg/3 mL Nebu USE ONE VIAL IN NEBULIZER EVERY 4 TO 6 HOURS AS NEEDED FOR WHEEZING & SHORTNESS OF BREATH **THANK YOU** 75 mL 6    albuterol (PROAIR HFA) 90 mcg/actuation inhaler INHALE TWO PUFFS INTO THE LUNGS EVERY 6 HOURS AS NEEDED FOR WHEEZING 25.5 g 2    albuterol-ipratropium (DUO-NEB) 2.5 mg-0.5 mg/3 mL nebulizer solution Take 3 mLs by nebulization every 6 (six) hours as needed for Wheezing. Rescue 90 mL 2    aspirin 81 MG Chew Take 1 tablet (81 mg total) by mouth once daily. 30 tablet 0    busPIRone (BUSPAR) 10 MG tablet Take 1 tablet (10 mg total) by mouth 2 (two) times daily. 60 tablet 5    citalopram (CELEXA) 20 MG tablet TAKE ONE TABLET BY MOUTH EVERY DAY 90 tablet 1    clindamycin (CLEOCIN T) 1 % external solution Apply topically 2 (two) times daily. 60 mL 1    EPINEPHrine (EPIPEN) 0.3 mg/0.3 mL AtIn INJECT 0.3mL INTRAMUSCULARLY X1 DOSE THANK YOU  12    estradioL (ESTRACE) 1 MG tablet Take 1 tablet (1 mg total) by mouth once daily. 60 tablet 5    fluticasone-salmeterol diskus inhaler 250-50 mcg Inhale 1 puff into the lungs every 12 (twelve) hours. 60 each 2    hydrOXYzine pamoate (VISTARIL) 50 MG Cap TAKE TWO CAPSULES BY MOUTH DAILY AS NEEDED FOR ITCHING THANK YOU  0    levocetirizine (XYZAL) 5 MG tablet TAKE ONE TABLET BY MOUTH EVERY EVENING **THANK YOU** 30 tablet 5    lisdexamfetamine (VYVANSE) 40 MG Cap Take 1 capsule (40 mg total) by mouth once daily. 30 capsule 0    lisdexamfetamine (VYVANSE) 40 MG Cap Take 1 capsule (40 mg total) by mouth once daily. 30 capsule 0    lisdexamfetamine (VYVANSE) 40 MG Cap Take 1 capsule (40 mg total) by mouth once daily. 30 capsule 0    lisdexamfetamine (VYVANSE) 40 MG Cap Take 1 capsule (40 mg total) by mouth once daily. 30 capsule 0     LORazepam (ATIVAN) 0.5 MG tablet Take 1 tablet (0.5 mg total) by mouth every 12 (twelve) hours as needed for Anxiety. 14 tablet 0    montelukast (SINGULAIR) 10 mg tablet Take 1 tablet (10 mg total) by mouth once daily. 90 tablet 1    mupirocin (BACTROBAN) 2 % ointment APPLY TO AFFECTED AREA TOPICALLY THREE TIMES DAILY FOR 7 DAYS THANK YOU  0    omeprazole (PRILOSEC) 40 MG capsule Take 1 capsule (40 mg total) by mouth every morning. 90 capsule 1    ondansetron (ZOFRAN-ODT) 4 MG TbDL Dissolve 1 tablet (4 mg total) by mouth every 8 (eight) hours as needed. 14 tablet 1    predniSONE (DELTASONE) 20 MG tablet Take 1 tablet (20 mg total) by mouth As instructed. Take 4 tabs x 3 day, then 3 tabs x 4 day, then 2 tabs x 4 days, then 1 tab x 5 days. 45 tablet 0    propranoloL (INDERAL LA) 80 MG 24 hr capsule Take 1 capsule (80 mg total) by mouth once daily. 30 capsule 5    tiZANidine (ZANAFLEX) 4 mg Cap Take 1 capsule by mouth 2 (two) times daily as needed. 60 capsule 0     No current facility-administered medications for this visit.       There are no discontinued medications.    No follow-ups on file.      Dima Her MD

## 2022-02-16 ENCOUNTER — TELEPHONE (OUTPATIENT)
Dept: ADMINISTRATIVE | Facility: HOSPITAL | Age: 57
End: 2022-02-16
Payer: COMMERCIAL

## 2022-02-28 ENCOUNTER — OFFICE VISIT (OUTPATIENT)
Dept: FAMILY MEDICINE | Facility: CLINIC | Age: 57
End: 2022-02-28
Payer: COMMERCIAL

## 2022-02-28 DIAGNOSIS — I10 HYPERTENSION NOT AT GOAL: ICD-10-CM

## 2022-02-28 DIAGNOSIS — Z12.11 COLON CANCER SCREENING: ICD-10-CM

## 2022-02-28 DIAGNOSIS — I49.3 PVCS (PREMATURE VENTRICULAR CONTRACTIONS): ICD-10-CM

## 2022-02-28 DIAGNOSIS — F43.23 ADJUSTMENT REACTION WITH ANXIETY AND DEPRESSION: ICD-10-CM

## 2022-02-28 DIAGNOSIS — F98.8 ATTENTION DEFICIT DISORDER (ADD) WITHOUT HYPERACTIVITY: Primary | ICD-10-CM

## 2022-02-28 PROCEDURE — 1159F MED LIST DOCD IN RCRD: CPT | Mod: CPTII,95,, | Performed by: FAMILY MEDICINE

## 2022-02-28 PROCEDURE — 99214 OFFICE O/P EST MOD 30 MIN: CPT | Mod: 95,,, | Performed by: FAMILY MEDICINE

## 2022-02-28 PROCEDURE — 1159F PR MEDICATION LIST DOCUMENTED IN MEDICAL RECORD: ICD-10-PCS | Mod: CPTII,95,, | Performed by: FAMILY MEDICINE

## 2022-02-28 PROCEDURE — 99214 PR OFFICE/OUTPT VISIT, EST, LEVL IV, 30-39 MIN: ICD-10-PCS | Mod: 95,,, | Performed by: FAMILY MEDICINE

## 2022-02-28 PROCEDURE — 1160F RVW MEDS BY RX/DR IN RCRD: CPT | Mod: CPTII,95,, | Performed by: FAMILY MEDICINE

## 2022-02-28 PROCEDURE — 1160F PR REVIEW ALL MEDS BY PRESCRIBER/CLIN PHARMACIST DOCUMENTED: ICD-10-PCS | Mod: CPTII,95,, | Performed by: FAMILY MEDICINE

## 2022-02-28 RX ORDER — VENLAFAXINE HYDROCHLORIDE 37.5 MG/1
37.5 CAPSULE, EXTENDED RELEASE ORAL DAILY
Qty: 30 CAPSULE | Refills: 11 | Status: SHIPPED | OUTPATIENT
Start: 2022-02-28 | End: 2022-05-12 | Stop reason: SDUPTHER

## 2022-02-28 RX ORDER — LISDEXAMFETAMINE DIMESYLATE 40 MG/1
40 CAPSULE ORAL DAILY
Qty: 30 CAPSULE | Refills: 0 | Status: SHIPPED | OUTPATIENT
Start: 2022-05-11 | End: 2022-06-21 | Stop reason: SDUPTHER

## 2022-02-28 RX ORDER — LISDEXAMFETAMINE DIMESYLATE 40 MG/1
40 CAPSULE ORAL DAILY
Qty: 30 CAPSULE | Refills: 0 | Status: SHIPPED | OUTPATIENT
Start: 2022-04-11 | End: 2022-04-13

## 2022-02-28 RX ORDER — LISDEXAMFETAMINE DIMESYLATE 40 MG/1
40 CAPSULE ORAL DAILY
Qty: 30 CAPSULE | Refills: 0 | Status: SHIPPED | OUTPATIENT
Start: 2022-03-11 | End: 2022-04-13

## 2022-02-28 NOTE — PROGRESS NOTES
Chief Complaint:  No chief complaint on file.      History of Present Illness:  Patient with a hx of ADD, PVCs, HTN, BECK, and insomnia presents today for a virtual visit.    Needs medicine refill of Vyvanse.   Taking Celexa and Buspar. Ex- . Family member passed away. She is requesting something stronger.   Hasn't had mammogram since 2019 due to issues making her appointment.       ROS:  Review of Systems   Constitutional: Negative for activity change, chills, fever and unexpected weight change.   HENT: Negative for hearing loss, postnasal drip, rhinorrhea and trouble swallowing.    Eyes: Negative for discharge and visual disturbance.   Respiratory: Negative for chest tightness.    Cardiovascular: Negative for chest pain and palpitations.   Gastrointestinal: Negative for blood in stool, constipation, diarrhea and vomiting.   Endocrine: Negative for polydipsia and polyuria.   Genitourinary: Negative for difficulty urinating, dysuria, hematuria and menstrual problem.   Musculoskeletal: Negative for arthralgias, joint swelling and neck pain.   Skin: Negative for rash.   Neurological: Negative for weakness.   Psychiatric/Behavioral: Positive for dysphoric mood. Negative for confusion.   All other systems reviewed and are negative.      Past Medical History:   Diagnosis Date    ADD (attention deficit disorder with hyperactivity)     Anxiety     Asthma     mild intermittent    Depression     Hypertension        Social History:  Social History     Socioeconomic History    Marital status:    Tobacco Use    Smoking status: Current Every Day Smoker     Packs/day: 0.50     Years: 10.00     Pack years: 5.00     Types: Cigarettes    Smokeless tobacco: Never Used   Substance and Sexual Activity    Alcohol use: No     Alcohol/week: 0.0 standard drinks    Drug use: No    Sexual activity: Yes     Partners: Male     Birth control/protection: None     Social Determinants of Health     Financial Resource  Strain: Low Risk     Difficulty of Paying Living Expenses: Not hard at all   Food Insecurity: No Food Insecurity    Worried About Running Out of Food in the Last Year: Never true    Ran Out of Food in the Last Year: Never true   Transportation Needs: No Transportation Needs    Lack of Transportation (Medical): No    Lack of Transportation (Non-Medical): No   Physical Activity: Insufficiently Active    Days of Exercise per Week: 2 days    Minutes of Exercise per Session: 20 min   Stress: Stress Concern Present    Feeling of Stress : Rather much   Social Connections: Unknown    Frequency of Communication with Friends and Family: Twice a week    Frequency of Social Gatherings with Friends and Family: Once a week    Active Member of Clubs or Organizations: Yes    Attends Club or Organization Meetings: 1 to 4 times per year    Marital Status:    Housing Stability: Low Risk     Unable to Pay for Housing in the Last Year: No    Number of Places Lived in the Last Year: 1    Unstable Housing in the Last Year: No       Family History:   family history includes Coronary artery disease (age of onset: 38) in her father; Coronary artery disease (age of onset: 50) in her mother; Lung cancer in her mother.    Health Maintenance   Topic Date Due    Mammogram  09/27/2020    Lipid Panel  08/01/2022    TETANUS VACCINE  10/29/2027    Hepatitis C Screening  Completed       Physical Exam:     ]    There is no height or weight on file to calculate BMI.    Physical Exam      Assessment:      ICD-10-CM ICD-9-CM   1. Attention deficit disorder (ADD) without hyperactivity  F98.8 314.00   2. Adjustment reaction with anxiety and depression  F43.23 309.28   3. Colon cancer screening  Z12.11 V76.51   4. Hypertension not at goal  I10 401.9   5. PVCs (premature ventricular contractions)  I49.3 427.69         Plan:      Stop Celexa. Start Venlafaxine. Continue taking Buspar.  Follow up with mammogram in 04/2022.   Complete  Cologuard kit for colon cancer screening.    Continue monitoring blood pressure. Keep < 140/90.  Medication refill  The patient location is: Home   The chief complaint leading to consultation is: as below  Visit type: Virtual visit with synchronous audio and video  Total time spent with patient: 20+ mins  Each patient to whom he or she provides medical services by telemedicine is:  (1) informed of the relationship between the physician and patient and the respective role of any other health care provider with respect to management of the patient; and (2) notified that he or she may decline to receive medical services by telemedicine and may withdraw from such care at any time.    Notes: see below      Orders Placed This Encounter   Procedures    Cologuard Screening (Multitarget Stool DNA)       Current Outpatient Medications   Medication Sig Dispense Refill    albuterol (ACCUNEB) 1.25 mg/3 mL Nebu USE ONE VIAL IN NEBULIZER EVERY 4 TO 6 HOURS AS NEEDED FOR WHEEZING & SHORTNESS OF BREATH **THANK YOU** 75 mL 6    albuterol (PROAIR HFA) 90 mcg/actuation inhaler INHALE TWO PUFFS INTO THE LUNGS EVERY 6 HOURS AS NEEDED FOR WHEEZING 25.5 g 2    albuterol-ipratropium (DUO-NEB) 2.5 mg-0.5 mg/3 mL nebulizer solution Take 3 mLs by nebulization every 6 (six) hours as needed for Wheezing. Rescue 90 mL 2    aspirin 81 MG Chew Take 1 tablet (81 mg total) by mouth once daily. 30 tablet 0    busPIRone (BUSPAR) 10 MG tablet Take 1 tablet (10 mg total) by mouth 2 (two) times daily. 60 tablet 5    clindamycin (CLEOCIN T) 1 % external solution Apply topically 2 (two) times daily. 60 mL 1    EPINEPHrine (EPIPEN) 0.3 mg/0.3 mL AtIn INJECT 0.3mL INTRAMUSCULARLY X1 DOSE THANK YOU  12    estradioL (ESTRACE) 1 MG tablet Take 1 tablet (1 mg total) by mouth once daily. 60 tablet 5    fluticasone-salmeterol diskus inhaler 250-50 mcg Inhale 1 puff into the lungs every 12 (twelve) hours. 60 each 2    hydrOXYzine pamoate (VISTARIL) 50 MG  Cap TAKE TWO CAPSULES BY MOUTH DAILY AS NEEDED FOR ITCHING THANK YOU  0    levocetirizine (XYZAL) 5 MG tablet TAKE ONE TABLET BY MOUTH EVERY EVENING **THANK YOU** 30 tablet 5    lisdexamfetamine (VYVANSE) 40 MG Cap Take 1 capsule (40 mg total) by mouth once daily. 30 capsule 0    [START ON 5/11/2022] lisdexamfetamine (VYVANSE) 40 MG Cap Take 1 capsule (40 mg total) by mouth once daily. 30 capsule 0    [START ON 4/11/2022] lisdexamfetamine (VYVANSE) 40 MG Cap Take 1 capsule (40 mg total) by mouth once daily. 30 capsule 0    [START ON 3/11/2022] lisdexamfetamine (VYVANSE) 40 MG Cap Take 1 capsule (40 mg total) by mouth once daily. 30 capsule 0    LORazepam (ATIVAN) 0.5 MG tablet Take 1 tablet (0.5 mg total) by mouth every 12 (twelve) hours as needed for Anxiety. 14 tablet 0    montelukast (SINGULAIR) 10 mg tablet Take 1 tablet (10 mg total) by mouth once daily. 90 tablet 1    mupirocin (BACTROBAN) 2 % ointment APPLY TO AFFECTED AREA TOPICALLY THREE TIMES DAILY FOR 7 DAYS THANK YOU  0    omeprazole (PRILOSEC) 40 MG capsule Take 1 capsule (40 mg total) by mouth every morning. 90 capsule 1    ondansetron (ZOFRAN-ODT) 4 MG TbDL Dissolve 1 tablet (4 mg total) by mouth every 8 (eight) hours as needed. 14 tablet 1    predniSONE (DELTASONE) 20 MG tablet Take 1 tablet (20 mg total) by mouth As instructed. Take 4 tabs x 3 day, then 3 tabs x 4 day, then 2 tabs x 4 days, then 1 tab x 5 days. 45 tablet 0    propranoloL (INDERAL LA) 80 MG 24 hr capsule Take 1 capsule (80 mg total) by mouth once daily. 30 capsule 5    tiZANidine (ZANAFLEX) 4 mg Cap Take 1 capsule by mouth 2 (two) times daily as needed. 60 capsule 0    venlafaxine (EFFEXOR-XR) 37.5 MG 24 hr capsule Take 1 capsule (37.5 mg total) by mouth once daily. 30 capsule 11     No current facility-administered medications for this visit.       Medications Discontinued During This Encounter   Medication Reason    citalopram (CELEXA) 20 MG tablet      lisdexamfetamine (VYVANSE) 40 MG Cap Reorder    lisdexamfetamine (VYVANSE) 40 MG Cap Reorder    lisdexamfetamine (VYVANSE) 40 MG Cap Reorder       No follow-ups on file.      Dima Her MD  Scribe Attestation:   I, Nicole Baker, am scribing for, and in the presence of, Dr.Arif Her I performed the above scribed service and the documentation accurately describes the services I performed. I attest to the accuracy of the note.    I, Dr. Dima Her, reviewed documentation as scribed above. I performed the services described in this documentation.  I agree that the record reflects my personal performance and is accurate and complete. Dima Her MD.  10/04/2021

## 2022-03-09 ENCOUNTER — TELEPHONE (OUTPATIENT)
Dept: ADMINISTRATIVE | Facility: CLINIC | Age: 57
End: 2022-03-09
Payer: COMMERCIAL

## 2022-03-14 ENCOUNTER — PATIENT OUTREACH (OUTPATIENT)
Dept: ADMINISTRATIVE | Facility: HOSPITAL | Age: 57
End: 2022-03-14
Payer: COMMERCIAL

## 2022-03-14 NOTE — PROGRESS NOTES
Working Mammogram report; chart searched; 2019 mammogram already in pt chart.  I Called pt to schedule appointment for overdue mammogram exam. Unable to reach pt at this time. MARGIM

## 2022-04-12 ENCOUNTER — PATIENT OUTREACH (OUTPATIENT)
Dept: ADMINISTRATIVE | Facility: OTHER | Age: 57
End: 2022-04-12
Payer: COMMERCIAL

## 2022-04-12 DIAGNOSIS — Z12.31 ENCOUNTER FOR SCREENING MAMMOGRAM FOR BREAST CANCER: Primary | ICD-10-CM

## 2022-04-12 NOTE — PROGRESS NOTES
Health Maintenance Due   Topic Date Due    COVID-19 Vaccine (1) Never done    Pneumococcal Vaccines (Age 0-64) (2 - PCV) 10/06/2010    Shingles Vaccine (1 of 2) Never done    Colorectal Cancer Screening  09/18/2020    Mammogram  09/27/2020    Influenza Vaccine (1) 09/01/2021     Updates were requested from care everywhere.  Chart was reviewed for overdue Proactive [facility] Encounters (YENI) topics (CRS, Breast Cancer Screening, Eye exam)  Health Maintenance has been updated.  LINKS immunization registry triggered.  Immunizations were reconciled.

## 2022-04-13 ENCOUNTER — OFFICE VISIT (OUTPATIENT)
Dept: OBSTETRICS AND GYNECOLOGY | Facility: CLINIC | Age: 57
End: 2022-04-13
Payer: COMMERCIAL

## 2022-04-13 VITALS
BODY MASS INDEX: 34.7 KG/M2 | WEIGHT: 203.25 LBS | SYSTOLIC BLOOD PRESSURE: 150 MMHG | HEIGHT: 64 IN | DIASTOLIC BLOOD PRESSURE: 100 MMHG

## 2022-04-13 DIAGNOSIS — N95.1 MENOPAUSAL HOT FLUSHES: Primary | ICD-10-CM

## 2022-04-13 PROCEDURE — 99203 OFFICE O/P NEW LOW 30 MIN: CPT | Mod: S$GLB,,, | Performed by: OBSTETRICS & GYNECOLOGY

## 2022-04-13 PROCEDURE — 1160F RVW MEDS BY RX/DR IN RCRD: CPT | Mod: CPTII,S$GLB,, | Performed by: OBSTETRICS & GYNECOLOGY

## 2022-04-13 PROCEDURE — 99203 PR OFFICE/OUTPT VISIT, NEW, LEVL III, 30-44 MIN: ICD-10-PCS | Mod: S$GLB,,, | Performed by: OBSTETRICS & GYNECOLOGY

## 2022-04-13 PROCEDURE — 1160F PR REVIEW ALL MEDS BY PRESCRIBER/CLIN PHARMACIST DOCUMENTED: ICD-10-PCS | Mod: CPTII,S$GLB,, | Performed by: OBSTETRICS & GYNECOLOGY

## 2022-04-13 PROCEDURE — 1159F MED LIST DOCD IN RCRD: CPT | Mod: CPTII,S$GLB,, | Performed by: OBSTETRICS & GYNECOLOGY

## 2022-04-13 PROCEDURE — 99999 PR PBB SHADOW E&M-EST. PATIENT-LVL IV: ICD-10-PCS | Mod: PBBFAC,,, | Performed by: OBSTETRICS & GYNECOLOGY

## 2022-04-13 PROCEDURE — 3008F PR BODY MASS INDEX (BMI) DOCUMENTED: ICD-10-PCS | Mod: CPTII,S$GLB,, | Performed by: OBSTETRICS & GYNECOLOGY

## 2022-04-13 PROCEDURE — 3080F DIAST BP >= 90 MM HG: CPT | Mod: CPTII,S$GLB,, | Performed by: OBSTETRICS & GYNECOLOGY

## 2022-04-13 PROCEDURE — 3080F PR MOST RECENT DIASTOLIC BLOOD PRESSURE >= 90 MM HG: ICD-10-PCS | Mod: CPTII,S$GLB,, | Performed by: OBSTETRICS & GYNECOLOGY

## 2022-04-13 PROCEDURE — 3077F SYST BP >= 140 MM HG: CPT | Mod: CPTII,S$GLB,, | Performed by: OBSTETRICS & GYNECOLOGY

## 2022-04-13 PROCEDURE — 1159F PR MEDICATION LIST DOCUMENTED IN MEDICAL RECORD: ICD-10-PCS | Mod: CPTII,S$GLB,, | Performed by: OBSTETRICS & GYNECOLOGY

## 2022-04-13 PROCEDURE — 3077F PR MOST RECENT SYSTOLIC BLOOD PRESSURE >= 140 MM HG: ICD-10-PCS | Mod: CPTII,S$GLB,, | Performed by: OBSTETRICS & GYNECOLOGY

## 2022-04-13 PROCEDURE — 99999 PR PBB SHADOW E&M-EST. PATIENT-LVL IV: CPT | Mod: PBBFAC,,, | Performed by: OBSTETRICS & GYNECOLOGY

## 2022-04-13 PROCEDURE — 3008F BODY MASS INDEX DOCD: CPT | Mod: CPTII,S$GLB,, | Performed by: OBSTETRICS & GYNECOLOGY

## 2022-04-13 RX ORDER — PROGESTERONE 100 MG/1
100 CAPSULE ORAL NIGHTLY
Qty: 30 CAPSULE | Refills: 11 | Status: SHIPPED | OUTPATIENT
Start: 2022-04-13 | End: 2022-05-12 | Stop reason: SDUPTHER

## 2022-04-13 RX ORDER — ESTRADIOL 2 MG/1
2 TABLET ORAL DAILY
Qty: 30 TABLET | Refills: 11 | Status: SHIPPED | OUTPATIENT
Start: 2022-04-13 | End: 2022-07-10 | Stop reason: SDUPTHER

## 2022-04-13 NOTE — PROGRESS NOTES
Subjective:       Patient ID: Chasity Blake is a 56 y.o. female.    Chief Complaint:  Menopause      History of Present Illness  HPI  Presents with hormonal issues related to menopause.  Patient underwent EM ablation with me in .  Was starting to go through menopause at that time, and since then, symptoms have only worsened.  Has bothersome hot flushes, body odor, fatigue, decreased motivation, and low libido.  Hot flushes have somewhat improved with estradiol 1mg prescribed by her PCP.  Is not on any progesterone.  PCP keeps up with her pap smears.  Pt also taking effexor.    GYN & OB History  No LMP recorded. Patient has had an ablation.   Date of Last Pap: No result found    OB History    Para Term  AB Living   2 2 2     2   SAB IAB Ectopic Multiple Live Births           2      # Outcome Date GA Lbr Yobany/2nd Weight Sex Delivery Anes PTL Lv   2 Term 11/15/86 38w0d  3.175 kg (7 lb) M Vag-Spont EPI N CARI   1 Term 83 41w0d  3.515 kg (7 lb 12 oz) F Vag-Spont EPI N CARI       Review of Systems  Review of Systems   Constitutional: Positive for fatigue. Negative for fever and unexpected weight change.   Gastrointestinal: Negative for abdominal pain, bloating, blood in stool, constipation, diarrhea, nausea and vomiting.   Endocrine: Positive for hot flashes.   Genitourinary: Negative for decreased libido, dyspareunia, dysuria, flank pain, frequency, genital sores, hematuria, pelvic pain, urgency, vaginal bleeding, vaginal discharge, vaginal pain, urinary incontinence, postcoital bleeding, postmenopausal bleeding and vaginal odor.   Integumentary:  Negative for rash, hair changes, breast mass, nipple discharge and breast skin changes.   Psychiatric/Behavioral: Positive for depression and sleep disturbance. The patient is not nervous/anxious.    Breast: Negative for mass, mastodynia, nipple discharge and skin changes          Objective:    Physical Exam:   Constitutional: She is oriented to person,  place, and time. She appears well-developed and well-nourished. No distress.                           Neurological: She is alert and oriented to person, place, and time.     Psychiatric: She has a normal mood and affect. Her behavior is normal. Judgment and thought content normal.          Assessment:        1. Menopausal hot flushes                Plan:      Chasity was seen today for menopause.    Diagnoses and all orders for this visit:    Menopausal hot flushes  -     estradioL (ESTRACE) 2 MG tablet; Take 1 tablet (2 mg total) by mouth once daily.  -     progesterone (PROMETRIUM) 100 MG capsule; Take 1 capsule (100 mg total) by mouth nightly.    A full discussion of the benefit-risk ratio of hormonal replacement therapy was carried out. Improvement in vasomotor and other climacteric symptoms is discussed, including possible improvements in sleep and mood. Reduction of risk for osteoporosis was explained. We discussed the study data showing increased risk of thrombo-embolic events such as myocardial infarction, stroke and also possibly breast cancer with estrogen replacement, and how this might affect her. The range of side effects such as breast tenderness, weight gain and including possible increases in lifetime risk of breast cancer and possible thrombotic complications was discussed. We also discussed ACOG's recommendation to use hormone replacement therapy for the relief of hot flashes alone and to be on the lowest dose possible for the shortest amount of time.  Alternative such as herbal and soy-based products were reviewed. All of her questions about this therapy were answered.  Increase estradiol to 2mg, and add prometrium for endometrial protection and to help with sleep.  RTC 6 weeks.  If still struggling with low energy and low libido, may consider adding testosterone injections.

## 2022-05-12 DIAGNOSIS — N95.1 MENOPAUSAL HOT FLUSHES: ICD-10-CM

## 2022-05-13 NOTE — TELEPHONE ENCOUNTER
Care Due:                  Date            Visit Type   Department     Provider  --------------------------------------------------------------------------------                                ESTABLISHED                              PATIENT -    [facility]  Last Visit: 02-      [facility]       Dima Her  Next Visit: None Scheduled  None         None Found                                                            Last  Test          Frequency    Reason                     Performed    Due Date  --------------------------------------------------------------------------------    Cr..........  12 months..  venlafaxine..............  Not Found    Overdue    [employer] Embedded Care Gaps. Reference number: [id number]. 5/12/2022   10:20:49 PM CDT

## 2022-05-15 RX ORDER — VENLAFAXINE HYDROCHLORIDE 37.5 MG/1
37.5 CAPSULE, EXTENDED RELEASE ORAL DAILY
Qty: 30 CAPSULE | Refills: 11 | Status: SHIPPED | OUTPATIENT
Start: 2022-05-15 | End: 2023-05-25 | Stop reason: SDUPTHER

## 2022-05-16 RX ORDER — PROGESTERONE 100 MG/1
100 CAPSULE ORAL NIGHTLY
Qty: 30 CAPSULE | Refills: 11 | Status: SHIPPED | OUTPATIENT
Start: 2022-05-16 | End: 2023-06-11 | Stop reason: SDUPTHER

## 2022-05-27 ENCOUNTER — PATIENT MESSAGE (OUTPATIENT)
Dept: ADMINISTRATIVE | Facility: HOSPITAL | Age: 57
End: 2022-05-27
Payer: COMMERCIAL

## 2022-06-01 ENCOUNTER — TELEPHONE (OUTPATIENT)
Dept: OBSTETRICS AND GYNECOLOGY | Facility: CLINIC | Age: 57
End: 2022-06-01
Payer: COMMERCIAL

## 2022-06-01 NOTE — TELEPHONE ENCOUNTER
Yes, she and I discussed that.  Please add her on for a nurse visit next week when I will be in the office so I can place orders for testosterone injections.

## 2022-06-01 NOTE — TELEPHONE ENCOUNTER
----- Message from Freida Albrecht LPN sent at 5/31/2022  3:04 PM CDT -----  Contact: Chasity Tesfaye,       Pt called Stating that her Progesterone and Estradial Medication  is working great for her, However she states she need something for energy, and Inquiring about testosterone injection.      I didn't see any available appts for her 6 month follow up.       Felicity  ----- Message -----  From: Pam Land  Sent: 5/31/2022   2:09 PM CDT  To: [facility] Staff    .Type:  Sooner Apoointment Request    Caller is requesting a sooner appointment.  Caller declined first available appointment listed below.  Caller will not accept being placed on the waitlist and is requesting a message be sent to doctor.  Name of Caller:Chasity  When is the first available appointment?7/26/22  Symptoms:check-up/testosterone injection  Would the patient rather a call back or a response via [facility]? call  Best Call Back Number:[phone]   Additional Information: Patient request to schedule appointment sooner than next availability. Please give patient a call back as requested.   Thank you,  GH

## 2022-06-07 ENCOUNTER — CLINICAL SUPPORT (OUTPATIENT)
Dept: OBSTETRICS AND GYNECOLOGY | Facility: CLINIC | Age: 57
End: 2022-06-07
Payer: COMMERCIAL

## 2022-06-07 DIAGNOSIS — N95.1 MENOPAUSE SYNDROME: Primary | ICD-10-CM

## 2022-06-07 DIAGNOSIS — J45.21 MILD INTERMITTENT ASTHMA WITH ACUTE EXACERBATION: ICD-10-CM

## 2022-06-07 PROCEDURE — 99999 PR PBB SHADOW E&M-EST. PATIENT-LVL III: ICD-10-PCS | Mod: PBBFAC,,,

## 2022-06-07 PROCEDURE — 99999 PR PBB SHADOW E&M-EST. PATIENT-LVL III: CPT | Mod: PBBFAC,,,

## 2022-06-07 PROCEDURE — 96372 THER/PROPH/DIAG INJ SC/IM: CPT | Mod: S$GLB,,, | Performed by: OBSTETRICS & GYNECOLOGY

## 2022-06-07 PROCEDURE — 96372 PR INJECTION,THERAP/PROPH/DIAG2ST, IM OR SUBCUT: ICD-10-PCS | Mod: S$GLB,,, | Performed by: OBSTETRICS & GYNECOLOGY

## 2022-06-07 RX ORDER — TESTOSTERONE CYPIONATE 200 MG/ML
100 INJECTION, SOLUTION INTRAMUSCULAR
Status: ACTIVE | OUTPATIENT
Start: 2022-06-07 | End: 2022-10-25

## 2022-06-07 RX ADMIN — TESTOSTERONE CYPIONATE 100 MG: 200 INJECTION, SOLUTION INTRAMUSCULAR at 11:06

## 2022-06-07 NOTE — PROGRESS NOTES
After identifying patient with 2 identifiers, verifying that patient wished to receive testosterone for menospuase syndrome, reviewing allergies, 100 mg testosterone administered to left ventrogluteal.   Patient tolerated well.  Patient instructed to wait 15 minutes and verbalized understanding.  Date for next injection given and appointment scheduled.

## 2022-06-08 RX ORDER — ALBUTEROL SULFATE 90 UG/1
AEROSOL, METERED RESPIRATORY (INHALATION)
Qty: 25.5 G | Refills: 2 | Status: SHIPPED | OUTPATIENT
Start: 2022-06-08 | End: 2023-07-04 | Stop reason: SDUPTHER

## 2022-06-08 NOTE — TELEPHONE ENCOUNTER
No new care gaps identified.  [facility] Embedded Care Gaps. Reference number: [id number]. 6/07/2022   10:03:26 PM CDT

## 2022-06-20 ENCOUNTER — PATIENT MESSAGE (OUTPATIENT)
Dept: FAMILY MEDICINE | Facility: CLINIC | Age: 57
End: 2022-06-20
Payer: COMMERCIAL

## 2022-06-20 RX ORDER — LISDEXAMFETAMINE DIMESYLATE 40 MG/1
CAPSULE ORAL
Qty: 30 CAPSULE | Refills: 0 | OUTPATIENT
Start: 2022-06-20

## 2022-06-20 NOTE — TELEPHONE ENCOUNTER
No new care gaps identified.  [facility] Embedded Care Gaps. Reference number: [id number]. 6/20/2022   1:35:29 PM CDT

## 2022-06-21 ENCOUNTER — OFFICE VISIT (OUTPATIENT)
Dept: FAMILY MEDICINE | Facility: CLINIC | Age: 57
End: 2022-06-21
Payer: COMMERCIAL

## 2022-06-21 ENCOUNTER — PATIENT MESSAGE (OUTPATIENT)
Dept: FAMILY MEDICINE | Facility: CLINIC | Age: 57
End: 2022-06-21

## 2022-06-21 ENCOUNTER — PATIENT MESSAGE (OUTPATIENT)
Dept: ADMINISTRATIVE | Facility: HOSPITAL | Age: 57
End: 2022-06-21
Payer: COMMERCIAL

## 2022-06-21 VITALS — HEIGHT: 64 IN | WEIGHT: 203.69 LBS | BODY MASS INDEX: 34.77 KG/M2

## 2022-06-21 DIAGNOSIS — I10 HTN, GOAL BELOW 140/90: Primary | ICD-10-CM

## 2022-06-21 DIAGNOSIS — F17.210 NICOTINE DEPENDENCE, CIGARETTES, UNCOMPLICATED: ICD-10-CM

## 2022-06-21 DIAGNOSIS — Z12.2 ENCOUNTER FOR SCREENING FOR LUNG CANCER: ICD-10-CM

## 2022-06-21 DIAGNOSIS — Z12.11 COLON CANCER SCREENING: ICD-10-CM

## 2022-06-21 DIAGNOSIS — F98.8 ATTENTION DEFICIT DISORDER (ADD) WITHOUT HYPERACTIVITY: ICD-10-CM

## 2022-06-21 DIAGNOSIS — Z12.11 SCREENING FOR COLON CANCER: ICD-10-CM

## 2022-06-21 DIAGNOSIS — F17.200 CURRENT SMOKER: ICD-10-CM

## 2022-06-21 PROCEDURE — 99999 PR PBB SHADOW E&M-EST. PATIENT-LVL IV: CPT | Mod: PBBFAC,,, | Performed by: FAMILY MEDICINE

## 2022-06-21 PROCEDURE — 3008F PR BODY MASS INDEX (BMI) DOCUMENTED: ICD-10-PCS | Mod: CPTII,S$GLB,, | Performed by: FAMILY MEDICINE

## 2022-06-21 PROCEDURE — 99214 OFFICE O/P EST MOD 30 MIN: CPT | Mod: S$GLB,,, | Performed by: FAMILY MEDICINE

## 2022-06-21 PROCEDURE — 3008F BODY MASS INDEX DOCD: CPT | Mod: CPTII,S$GLB,, | Performed by: FAMILY MEDICINE

## 2022-06-21 PROCEDURE — 99999 PR PBB SHADOW E&M-EST. PATIENT-LVL IV: ICD-10-PCS | Mod: PBBFAC,,, | Performed by: FAMILY MEDICINE

## 2022-06-21 PROCEDURE — 99214 PR OFFICE/OUTPT VISIT, EST, LEVL IV, 30-39 MIN: ICD-10-PCS | Mod: S$GLB,,, | Performed by: FAMILY MEDICINE

## 2022-06-21 RX ORDER — LISDEXAMFETAMINE DIMESYLATE 40 MG/1
40 CAPSULE ORAL DAILY
Qty: 30 CAPSULE | Refills: 0 | Status: SHIPPED | OUTPATIENT
Start: 2022-06-21 | End: 2022-09-08 | Stop reason: SDUPTHER

## 2022-06-21 RX ORDER — VALSARTAN AND HYDROCHLOROTHIAZIDE 160; 12.5 MG/1; MG/1
1 TABLET, FILM COATED ORAL DAILY
Qty: 90 TABLET | Refills: 3 | Status: SHIPPED | OUTPATIENT
Start: 2022-06-21 | End: 2022-08-05 | Stop reason: SDUPTHER

## 2022-06-21 RX ORDER — LISDEXAMFETAMINE DIMESYLATE 40 MG/1
40 CAPSULE ORAL DAILY
Qty: 30 CAPSULE | Refills: 0 | Status: SHIPPED | OUTPATIENT
Start: 2022-08-21 | End: 2022-09-08 | Stop reason: SDUPTHER

## 2022-06-21 RX ORDER — LISDEXAMFETAMINE DIMESYLATE 40 MG/1
40 CAPSULE ORAL DAILY
Qty: 30 CAPSULE | Refills: 0 | Status: SHIPPED | OUTPATIENT
Start: 2022-07-21 | End: 2022-09-08 | Stop reason: SDUPTHER

## 2022-06-21 NOTE — PROGRESS NOTES
Chief Complaint:    Chief Complaint   Patient presents with    Medication Refill       History of Present Illness:  Patient with ADD presents today for a medication refill.     Notices a difference in her breathing due to the weather but it's okay.   Her blood pressure has been elevated at home. Attributes it to stress. On Inderal la. She reports leg swelling.   She's also on Effexor but her blood pressure was high before starting Effexor.   Mammogram is on 07/25  She started hormone therapy and is following with Dr. Liu  Pap smear in September  Ordered her fit kit today.   Has decreased her smoking. Has smoking for 22 years.      ROS:  Review of Systems   Constitutional: Negative for activity change, appetite change, chills, fever and unexpected weight change.   HENT: Negative for congestion, ear pain, hearing loss, postnasal drip, rhinorrhea, sinus pressure, sinus pain and trouble swallowing.    Eyes: Negative for pain, discharge and visual disturbance.   Respiratory: Negative for cough, chest tightness and shortness of breath.    Cardiovascular: Positive for leg swelling. Negative for chest pain and palpitations.   Gastrointestinal: Negative for abdominal pain, blood in stool, constipation, diarrhea, nausea and vomiting.   Endocrine: Negative for polydipsia and polyuria.   Genitourinary: Negative for difficulty urinating, dysuria, flank pain, hematuria and menstrual problem.   Musculoskeletal: Negative for arthralgias, joint swelling, myalgias and neck pain.   Skin: Negative for pallor, rash and wound.   Neurological: Negative for dizziness, tremors, speech difficulty, weakness, light-headedness and headaches.   Psychiatric/Behavioral: Negative for behavioral problems, confusion, dysphoric mood and sleep disturbance. The patient is not nervous/anxious.    All other systems reviewed and are negative.      Past Medical History:   Diagnosis Date    ADD (attention deficit disorder with hyperactivity)      Anxiety     Asthma     mild intermittent    Depression     Hypertension        Social History:  Social History     Socioeconomic History    Marital status:    Tobacco Use    Smoking status: Current Every Day Smoker     Packs/day: 0.50     Years: 10.00     Pack years: 5.00     Types: Cigarettes    Smokeless tobacco: Never Used   Substance and Sexual Activity    Alcohol use: No     Alcohol/week: 0.0 standard drinks    Drug use: No    Sexual activity: Yes     Partners: Male     Birth control/protection: None     Social Determinants of Health     Financial Resource Strain: Low Risk     Difficulty of Paying Living Expenses: Not hard at all   Food Insecurity: No Food Insecurity    Worried About Running Out of Food in the Last Year: Never true    Ran Out of Food in the Last Year: Never true   Transportation Needs: No Transportation Needs    Lack of Transportation (Medical): No    Lack of Transportation (Non-Medical): No   Physical Activity: Insufficiently Active    Days of Exercise per Week: 3 days    Minutes of Exercise per Session: 20 min   Stress: Stress Concern Present    Feeling of Stress : To some extent   Social Connections: Unknown    Frequency of Communication with Friends and Family: Three times a week    Frequency of Social Gatherings with Friends and Family: Twice a week    Active Member of Clubs or Organizations: No    Attends Club or Organization Meetings: Patient refused    Marital Status:    Housing Stability: Low Risk     Unable to Pay for Housing in the Last Year: No    Number of Places Lived in the Last Year: 1    Unstable Housing in the Last Year: No       Family History:   family history includes Coronary artery disease (age of onset: 38) in her father; Coronary artery disease (age of onset: 50) in her mother; Lung cancer in her mother.    Health Maintenance   Topic Date Due    Mammogram  09/27/2020    Lipid Panel  08/01/2022    TETANUS VACCINE  10/29/2027     "Hepatitis C Screening  Completed       Physical Exam:    Height and Weight  Height: 5' 4" (162.6 cm)  Weight: 92.4 kg (203 lb 11.3 oz)  BSA (Calculated - sq m): 2.04 sq meters  BMI (Calculated): 34.9  Weight in (lb) to have BMI = 25: 145.3]    Body mass index is 34.97 kg/m².    Physical Exam  Vitals and nursing note reviewed.   Constitutional:       Appearance: Normal appearance. She is not toxic-appearing.   HENT:      Head: Normocephalic and atraumatic.      Right Ear: Tympanic membrane normal.      Left Ear: Tympanic membrane normal.   Eyes:      Extraocular Movements: Extraocular movements intact.      Pupils: Pupils are equal, round, and reactive to light.   Cardiovascular:      Rate and Rhythm: Normal rate and regular rhythm.      Heart sounds: Normal heart sounds.   Pulmonary:      Effort: Pulmonary effort is normal.      Breath sounds: Normal breath sounds. No wheezing, rhonchi or rales.   Abdominal:      General: Bowel sounds are normal. There is no distension.      Palpations: Abdomen is soft.      Tenderness: There is no abdominal tenderness.   Musculoskeletal:         General: Normal range of motion.      Cervical back: Normal range of motion.      Right lower leg: Edema present.      Left lower leg: Edema present.   Skin:     General: Skin is warm and dry.      Capillary Refill: Capillary refill takes less than 2 seconds.   Neurological:      General: No focal deficit present.      Mental Status: She is alert and oriented to person, place, and time.   Psychiatric:         Mood and Affect: Mood normal.         Behavior: Behavior normal.         Judgment: Judgment normal.           Assessment:      ICD-10-CM ICD-9-CM   1. HTN, goal below 140/90  I10 401.9   2. Colon cancer screening  Z12.11 V76.51   3. Attention deficit disorder (ADD) without hyperactivity  F98.8 314.00   4. Current smoker  F17.200 305.1   5. Encounter for screening for lung cancer  Z12.2 V76.0   6. Nicotine dependence, cigarettes, " uncomplicated  F17.210 305.1     Plan:  Follow up with mammogram on 07/25 at 10:40 AM. Explained to patient that she should stay up to date on her mammograms since she started hormone therapy.   Complete Cologuard kit for colon cancer screening.    Continue monitoring blood pressure. Keep < 140/90. Recommend Diovan--12.5 mg for hypertension. Continue Inderal LA 80 mg.  Order low dose CT lung scan for cancer screening.   See labs below 1 week from today  Not interested in smoking cessation  Follow up 1 week for hypertension.   Orders Placed This Encounter   Procedures    Cologuard Screening (Multitarget Stool DNA)    CT Chest Lung Screening Low Dose    CBC Auto Differential    Comprehensive Metabolic Panel    Lipid Panel    Hemoglobin A1C    Toxicology screen, urine       Current Outpatient Medications   Medication Sig Dispense Refill    albuterol (ACCUNEB) 1.25 mg/3 mL Nebu USE ONE VIAL IN NEBULIZER EVERY 4 TO 6 HOURS AS NEEDED FOR WHEEZING & SHORTNESS OF BREATH **THANK YOU** 75 mL 6    albuterol (PROAIR HFA) 90 mcg/actuation inhaler INHALE TWO PUFFS INTO THE LUNGS EVERY 6 HOURS AS NEEDED FOR WHEEZING 25.5 g 2    albuterol-ipratropium (DUO-NEB) 2.5 mg-0.5 mg/3 mL nebulizer solution Inhale contents of 1 vial (3 mLs) by nebulization every 6 (six) hours as needed for Wheezing. Rescue 90 mL 2    aspirin 81 MG Chew Take 1 tablet (81 mg total) by mouth once daily. 30 tablet 0    busPIRone (BUSPAR) 10 MG tablet Take 1 tablet (10 mg total) by mouth 2 (two) times daily. 60 tablet 5    clindamycin (CLEOCIN T) 1 % external solution Apply topically 2 (two) times daily. 60 mL 1    EPINEPHrine (EPIPEN) 0.3 mg/0.3 mL AtIn INJECT 0.3mL INTRAMUSCULARLY X1 DOSE THANK YOU  12    estradioL (ESTRACE) 2 MG tablet Take 1 tablet (2 mg total) by mouth once daily. 30 tablet 11    fluticasone-salmeterol diskus inhaler 250-50 mcg Inhale 1 puff into the lungs every 12 (twelve) hours. 60 each 2    hydrOXYzine pamoate  (VISTARIL) 50 MG Cap TAKE TWO CAPSULES BY MOUTH DAILY AS NEEDED FOR ITCHING THANK YOU  0    levocetirizine (XYZAL) 5 MG tablet TAKE ONE TABLET BY MOUTH EVERY EVENING **THANK YOU** 30 tablet 5    LORazepam (ATIVAN) 0.5 MG tablet Take 1 tablet (0.5 mg total) by mouth every 12 (twelve) hours as needed for Anxiety. 14 tablet 0    montelukast (SINGULAIR) 10 mg tablet Take 1 tablet (10 mg total) by mouth once daily. 90 tablet 1    mupirocin (BACTROBAN) 2 % ointment APPLY TO AFFECTED AREA TOPICALLY THREE TIMES DAILY FOR 7 DAYS THANK YOU  0    omeprazole (PRILOSEC) 40 MG capsule Take 1 capsule (40 mg total) by mouth every morning. 90 capsule 1    ondansetron (ZOFRAN-ODT) 4 MG TbDL Dissolve 1 tablet (4 mg total) by mouth every 8 (eight) hours as needed. 14 tablet 1    progesterone (PROMETRIUM) 100 MG capsule Take 1 capsule (100 mg total) by mouth nightly. 30 capsule 11    propranoloL (INDERAL LA) 80 MG 24 hr capsule Take 1 capsule (80 mg total) by mouth once daily. 30 capsule 5    tiZANidine (ZANAFLEX) 4 mg Cap Take 1 capsule by mouth 2 (two) times daily as needed. 60 capsule 0    venlafaxine (EFFEXOR-XR) 37.5 MG 24 hr capsule Take 1 capsule (37.5 mg total) by mouth once daily. 30 capsule 11    lisdexamfetamine (VYVANSE) 40 MG Cap Take 1 capsule (40 mg total) by mouth once daily. 30 capsule 0    [START ON 7/21/2022] lisdexamfetamine (VYVANSE) 40 MG Cap Take 1 capsule (40 mg total) by mouth once daily. 30 capsule 0    [START ON 8/21/2022] lisdexamfetamine (VYVANSE) 40 MG Cap Take 1 capsule (40 mg total) by mouth once daily. 30 capsule 0    valsartan-hydrochlorothiazide (DIOVAN-HCT) 160-12.5 mg per tablet Take 1 tablet by mouth once daily. 90 tablet 3     Current Facility-Administered Medications   Medication Dose Route Frequency Provider Last Rate Last Admin    testosterone cypionate injection 100 mg  100 mg Intramuscular Q28 Days Gem Woods MD   100 mg at 06/07/22 1102       Medications Discontinued  During This Encounter   Medication Reason    lisdexamfetamine (VYVANSE) 40 MG Cap Reorder       No follow-ups on file.      Dima Her MD  Scribe Attestation:   I, Nicole Baker, am scribing for, and in the presence of, Dr.Arif Her I performed the above scribed service and the documentation accurately describes the services I performed. I attest to the accuracy of the note.    I, Dr. Dima Her, reviewed documentation as scribed above. I performed the services described in this documentation.  I agree that the record reflects my personal performance and is accurate and complete. Dima Her MD.  06/21/2022

## 2022-07-05 ENCOUNTER — HOSPITAL ENCOUNTER (OUTPATIENT)
Dept: RADIOLOGY | Facility: HOSPITAL | Age: 57
Discharge: HOME | End: 2022-07-05
Attending: FAMILY MEDICINE
Payer: COMMERCIAL

## 2022-07-05 ENCOUNTER — CLINICAL SUPPORT (OUTPATIENT)
Dept: OBSTETRICS AND GYNECOLOGY | Facility: CLINIC | Age: 57
End: 2022-07-05
Payer: COMMERCIAL

## 2022-07-05 VITALS — HEIGHT: 64 IN | WEIGHT: 203 LBS | BODY MASS INDEX: 34.66 KG/M2

## 2022-07-05 DIAGNOSIS — Z12.31 ENCOUNTER FOR SCREENING MAMMOGRAM FOR BREAST CANCER: ICD-10-CM

## 2022-07-05 DIAGNOSIS — N95.1 MENOPAUSE SYNDROME: Primary | ICD-10-CM

## 2022-07-05 PROCEDURE — 77067 MAMMO DIGITAL SCREENING BILAT WITH TOMO: ICD-10-PCS | Mod: 26,,, | Performed by: RADIOLOGY

## 2022-07-05 PROCEDURE — 77063 MAMMO DIGITAL SCREENING BILAT WITH TOMO: ICD-10-PCS | Mod: 26,,, | Performed by: RADIOLOGY

## 2022-07-05 PROCEDURE — 99999 PR PBB SHADOW E&M-EST. PATIENT-LVL I: CPT | Mod: PBBFAC,,,

## 2022-07-05 PROCEDURE — 99999 PR PBB SHADOW E&M-EST. PATIENT-LVL I: ICD-10-PCS | Mod: PBBFAC,,,

## 2022-07-05 PROCEDURE — 77067 SCR MAMMO BI INCL CAD: CPT | Mod: TC

## 2022-07-05 PROCEDURE — 77063 BREAST TOMOSYNTHESIS BI: CPT | Mod: 26,,, | Performed by: RADIOLOGY

## 2022-07-05 PROCEDURE — 96372 THER/PROPH/DIAG INJ SC/IM: CPT | Mod: S$GLB,,, | Performed by: OBSTETRICS & GYNECOLOGY

## 2022-07-05 PROCEDURE — 77067 SCR MAMMO BI INCL CAD: CPT | Mod: 26,,, | Performed by: RADIOLOGY

## 2022-07-05 PROCEDURE — 96372 PR INJECTION,THERAP/PROPH/DIAG2ST, IM OR SUBCUT: ICD-10-PCS | Mod: S$GLB,,, | Performed by: OBSTETRICS & GYNECOLOGY

## 2022-07-05 RX ADMIN — TESTOSTERONE CYPIONATE 100 MG: 200 INJECTION, SOLUTION INTRAMUSCULAR at 11:07

## 2022-07-05 NOTE — PROGRESS NOTES
Two patient identifiers verified. Patient notified to wait in clinic 15 minutes after injection, patient verbalized understanding. Depo testosterone administered IM to patient's right ventrogluteal. Patient tolerated well. Next injection scheduled. For Menopause Syndrome.

## 2022-07-13 ENCOUNTER — HOSPITAL ENCOUNTER (OUTPATIENT)
Dept: RADIOLOGY | Facility: HOSPITAL | Age: 57
Discharge: HOME | End: 2022-07-13
Attending: FAMILY MEDICINE
Payer: COMMERCIAL

## 2022-07-13 DIAGNOSIS — R92.8 ABNORMAL MAMMOGRAM: ICD-10-CM

## 2022-07-13 PROCEDURE — 76642 ULTRASOUND BREAST LIMITED: CPT | Mod: TC,LT

## 2022-07-13 PROCEDURE — 77065 DX MAMMO INCL CAD UNI: CPT | Mod: 26,LT,, | Performed by: RADIOLOGY

## 2022-07-13 PROCEDURE — 76642 ULTRASOUND BREAST LIMITED: CPT | Mod: 26,LT,, | Performed by: RADIOLOGY

## 2022-07-13 PROCEDURE — 77065 MAMMO DIGITAL DIAGNOSTIC LEFT WITH TOMO: ICD-10-PCS | Mod: 26,LT,, | Performed by: RADIOLOGY

## 2022-07-13 PROCEDURE — 77061 BREAST TOMOSYNTHESIS UNI: CPT | Mod: 26,LT,, | Performed by: RADIOLOGY

## 2022-07-13 PROCEDURE — 77065 DX MAMMO INCL CAD UNI: CPT | Mod: TC,LT

## 2022-07-13 PROCEDURE — 77061 MAMMO DIGITAL DIAGNOSTIC LEFT WITH TOMO: ICD-10-PCS | Mod: 26,LT,, | Performed by: RADIOLOGY

## 2022-07-13 PROCEDURE — 76642 US BREAST LEFT LIMITED: ICD-10-PCS | Mod: 26,LT,, | Performed by: RADIOLOGY

## 2022-08-02 ENCOUNTER — CLINICAL SUPPORT (OUTPATIENT)
Dept: OBSTETRICS AND GYNECOLOGY | Facility: CLINIC | Age: 57
End: 2022-08-02
Payer: COMMERCIAL

## 2022-08-02 DIAGNOSIS — N95.1 MENOPAUSE SYNDROME: Primary | ICD-10-CM

## 2022-08-02 PROCEDURE — 99999 PR PBB SHADOW E&M-EST. PATIENT-LVL III: ICD-10-PCS | Mod: PBBFAC,,,

## 2022-08-02 PROCEDURE — 99999 PR PBB SHADOW E&M-EST. PATIENT-LVL III: CPT | Mod: PBBFAC,,,

## 2022-08-02 PROCEDURE — 96372 PR INJECTION,THERAP/PROPH/DIAG2ST, IM OR SUBCUT: ICD-10-PCS | Mod: S$GLB,,, | Performed by: OBSTETRICS & GYNECOLOGY

## 2022-08-02 PROCEDURE — 96372 THER/PROPH/DIAG INJ SC/IM: CPT | Mod: S$GLB,,, | Performed by: OBSTETRICS & GYNECOLOGY

## 2022-08-02 RX ADMIN — TESTOSTERONE CYPIONATE 100 MG: 200 INJECTION, SOLUTION INTRAMUSCULAR at 01:08

## 2022-08-02 NOTE — PROGRESS NOTES
Two patient identifiers verified. Patient notified to wait in clinic 15 minutes after injection, patient verbalized understanding. Testosterone administered IM to patient's left ventrogluteal. Patient tolerated well. Next injection scheduled.  For menopausal symptoms.

## 2022-08-05 DIAGNOSIS — K21.9 GASTROESOPHAGEAL REFLUX DISEASE WITHOUT ESOPHAGITIS: ICD-10-CM

## 2022-08-05 DIAGNOSIS — J45.21 MILD INTERMITTENT ASTHMA WITH ACUTE EXACERBATION: ICD-10-CM

## 2022-08-05 RX ORDER — MONTELUKAST SODIUM 10 MG/1
10 TABLET ORAL DAILY
Qty: 90 TABLET | Refills: 3 | Status: SHIPPED | OUTPATIENT
Start: 2022-08-05 | End: 2023-10-09 | Stop reason: SDUPTHER

## 2022-08-05 RX ORDER — OMEPRAZOLE 40 MG/1
40 CAPSULE, DELAYED RELEASE ORAL EVERY MORNING
Qty: 90 CAPSULE | Refills: 3 | Status: SHIPPED | OUTPATIENT
Start: 2022-08-05 | End: 2023-07-20 | Stop reason: SDUPTHER

## 2022-08-05 NOTE — TELEPHONE ENCOUNTER
Care Due:                  Date            Visit Type   Department     Provider  --------------------------------------------------------------------------------                                EP -                              PRIMARY      [facility]  Last Visit: 06-      CARE ([facility])   MEDICINE       Dima Her                              MYCHART                              FOLLOWUP/OF  [facility]  Next Visit: 09-      [facility] VISIT   MEDICINE       Dima Her                                                            Last  Test          Frequency    Reason                     Performed    Due Date  --------------------------------------------------------------------------------    CMP.........  12 months..  valsartan-hydrochlorothia  Not Found    Overdue                             power venlafaxine........    [employer] Embedded Care Gaps. Reference number: [id number]. 8/05/2022   10:25:47 AM CDT

## 2022-08-05 NOTE — TELEPHONE ENCOUNTER
Refill Decision Note   Chasity Blake  is requesting a refill authorization.  Brief Assessment and Rationale for Refill:  Approve    -Medication-Related Problems Identified: Requires labs  Medication Therapy Plan:       Medication Reconciliation Completed: No   Comments:     Provider Staff:     Action is required for this patient.   Please see care gap opportunities below in Care Due Message.     Thanks!  [facility]     Appointments      Date Provider   Last Visit   6/21/2022 Dima Her MD   Next Visit   8/5/2022 Dima Her MD     Note composed:12:32 PM 08/05/2022           Note composed:12:32 PM 08/05/2022

## 2022-09-08 ENCOUNTER — OFFICE VISIT (OUTPATIENT)
Dept: FAMILY MEDICINE | Facility: CLINIC | Age: 57
End: 2022-09-08
Payer: COMMERCIAL

## 2022-09-08 ENCOUNTER — LAB VISIT (OUTPATIENT)
Dept: LAB | Facility: HOSPITAL | Age: 57
End: 2022-09-08
Attending: FAMILY MEDICINE
Payer: COMMERCIAL

## 2022-09-08 VITALS
HEART RATE: 81 BPM | OXYGEN SATURATION: 96 % | WEIGHT: 203.13 LBS | SYSTOLIC BLOOD PRESSURE: 126 MMHG | TEMPERATURE: 98 F | BODY MASS INDEX: 34.68 KG/M2 | DIASTOLIC BLOOD PRESSURE: 84 MMHG | HEIGHT: 64 IN

## 2022-09-08 DIAGNOSIS — M70.61 TROCHANTERIC BURSITIS OF RIGHT HIP: ICD-10-CM

## 2022-09-08 DIAGNOSIS — I10 PRIMARY HYPERTENSION: ICD-10-CM

## 2022-09-08 DIAGNOSIS — J20.9 ACUTE BRONCHITIS, UNSPECIFIED ORGANISM: Primary | ICD-10-CM

## 2022-09-08 DIAGNOSIS — F98.8 ATTENTION DEFICIT DISORDER (ADD) WITHOUT HYPERACTIVITY: ICD-10-CM

## 2022-09-08 PROCEDURE — 3074F SYST BP LT 130 MM HG: CPT | Mod: CPTII,S$GLB,, | Performed by: FAMILY MEDICINE

## 2022-09-08 PROCEDURE — 3008F PR BODY MASS INDEX (BMI) DOCUMENTED: ICD-10-PCS | Mod: CPTII,S$GLB,, | Performed by: FAMILY MEDICINE

## 2022-09-08 PROCEDURE — 99214 OFFICE O/P EST MOD 30 MIN: CPT | Mod: S$GLB,,, | Performed by: FAMILY MEDICINE

## 2022-09-08 PROCEDURE — 1159F MED LIST DOCD IN RCRD: CPT | Mod: CPTII,S$GLB,, | Performed by: FAMILY MEDICINE

## 2022-09-08 PROCEDURE — 3008F BODY MASS INDEX DOCD: CPT | Mod: CPTII,S$GLB,, | Performed by: FAMILY MEDICINE

## 2022-09-08 PROCEDURE — 80307 DRUG TEST PRSMV CHEM ANLYZR: CPT | Performed by: FAMILY MEDICINE

## 2022-09-08 PROCEDURE — 99214 PR OFFICE/OUTPT VISIT, EST, LEVL IV, 30-39 MIN: ICD-10-PCS | Mod: S$GLB,,, | Performed by: FAMILY MEDICINE

## 2022-09-08 PROCEDURE — 80061 LIPID PANEL: CPT | Performed by: FAMILY MEDICINE

## 2022-09-08 PROCEDURE — 3079F DIAST BP 80-89 MM HG: CPT | Mod: CPTII,S$GLB,, | Performed by: FAMILY MEDICINE

## 2022-09-08 PROCEDURE — 3074F PR MOST RECENT SYSTOLIC BLOOD PRESSURE < 130 MM HG: ICD-10-PCS | Mod: CPTII,S$GLB,, | Performed by: FAMILY MEDICINE

## 2022-09-08 PROCEDURE — 99999 PR PBB SHADOW E&M-EST. PATIENT-LVL V: ICD-10-PCS | Mod: PBBFAC,,, | Performed by: FAMILY MEDICINE

## 2022-09-08 PROCEDURE — 83036 HEMOGLOBIN GLYCOSYLATED A1C: CPT | Performed by: FAMILY MEDICINE

## 2022-09-08 PROCEDURE — 85025 COMPLETE CBC W/AUTO DIFF WBC: CPT | Performed by: FAMILY MEDICINE

## 2022-09-08 PROCEDURE — 80053 COMPREHEN METABOLIC PANEL: CPT | Performed by: FAMILY MEDICINE

## 2022-09-08 PROCEDURE — 36415 COLL VENOUS BLD VENIPUNCTURE: CPT | Mod: PO | Performed by: FAMILY MEDICINE

## 2022-09-08 PROCEDURE — 1159F PR MEDICATION LIST DOCUMENTED IN MEDICAL RECORD: ICD-10-PCS | Mod: CPTII,S$GLB,, | Performed by: FAMILY MEDICINE

## 2022-09-08 PROCEDURE — 3079F PR MOST RECENT DIASTOLIC BLOOD PRESSURE 80-89 MM HG: ICD-10-PCS | Mod: CPTII,S$GLB,, | Performed by: FAMILY MEDICINE

## 2022-09-08 PROCEDURE — 99999 PR PBB SHADOW E&M-EST. PATIENT-LVL V: CPT | Mod: PBBFAC,,, | Performed by: FAMILY MEDICINE

## 2022-09-08 RX ORDER — FLUCONAZOLE 150 MG/1
150 TABLET ORAL ONCE
Qty: 2 TABLET | Refills: 2 | Status: SHIPPED | OUTPATIENT
Start: 2022-09-08 | End: 2022-09-08

## 2022-09-08 RX ORDER — DOXYCYCLINE HYCLATE 100 MG
100 TABLET ORAL EVERY 12 HOURS
Qty: 20 TABLET | Refills: 0 | Status: SHIPPED | OUTPATIENT
Start: 2022-09-08 | End: 2022-09-19

## 2022-09-08 RX ORDER — PREDNISONE 50 MG/1
50 TABLET ORAL DAILY
Qty: 5 TABLET | Refills: 0 | Status: SHIPPED | OUTPATIENT
Start: 2022-09-08 | End: 2022-09-13

## 2022-09-08 RX ORDER — LISDEXAMFETAMINE DIMESYLATE 40 MG/1
40 CAPSULE ORAL DAILY
Qty: 30 CAPSULE | Refills: 0 | Status: SHIPPED | OUTPATIENT
Start: 2022-11-29 | End: 2022-12-23 | Stop reason: SDUPTHER

## 2022-09-08 RX ORDER — LISDEXAMFETAMINE DIMESYLATE 40 MG/1
40 CAPSULE ORAL DAILY
Qty: 30 CAPSULE | Refills: 0 | Status: SHIPPED | OUTPATIENT
Start: 2022-10-29 | End: 2022-12-23 | Stop reason: SDUPTHER

## 2022-09-08 RX ORDER — LISDEXAMFETAMINE DIMESYLATE 40 MG/1
40 CAPSULE ORAL DAILY
Qty: 30 CAPSULE | Refills: 0 | Status: SHIPPED | OUTPATIENT
Start: 2022-09-29 | End: 2022-12-23 | Stop reason: SDUPTHER

## 2022-09-08 NOTE — PROGRESS NOTES
Chief Complaint:    Chief Complaint   Patient presents with    Medication Problem       History of Present Illness:    She has been coughing congested and wheezing the last several days denies any fever.  She has COPD     Being treated for ADD requesting refill    Hypertension stable    Has decreased her smoking. Has smoking for 22 years.    Also complains of bilateral hip pain worse on the right.      ROS:  Review of Systems   Constitutional:  Negative for activity change, appetite change, chills, fever and unexpected weight change.   HENT:  Negative for congestion, ear pain, hearing loss, postnasal drip, rhinorrhea, sinus pressure, sinus pain and trouble swallowing.    Eyes:  Negative for pain, discharge and visual disturbance.   Respiratory:  Negative for cough, chest tightness and shortness of breath.    Cardiovascular:  Negative for chest pain, palpitations and leg swelling.   Gastrointestinal:  Negative for abdominal pain, blood in stool, constipation, diarrhea, nausea and vomiting.   Endocrine: Negative for polydipsia and polyuria.   Genitourinary:  Negative for difficulty urinating, dysuria, flank pain, hematuria and menstrual problem.   Musculoskeletal:  Negative for arthralgias, joint swelling, myalgias and neck pain.   Skin:  Negative for pallor, rash and wound.   Neurological:  Negative for dizziness, tremors, speech difficulty, weakness, light-headedness and headaches.   Psychiatric/Behavioral:  Negative for behavioral problems, confusion, dysphoric mood and sleep disturbance. The patient is not nervous/anxious.    All other systems reviewed and are negative.    Past Medical History:   Diagnosis Date    ADD (attention deficit disorder with hyperactivity)     Anxiety     Asthma     mild intermittent    Depression     Hypertension        Social History:  Social History     Socioeconomic History    Marital status:    Tobacco Use    Smoking status: Every Day     Packs/day: 0.50     Years: 10.00      "Pack years: 5.00     Types: Cigarettes    Smokeless tobacco: Never   Substance and Sexual Activity    Alcohol use: No     Alcohol/week: 0.0 standard drinks    Drug use: No    Sexual activity: Yes     Partners: Male     Birth control/protection: None     Social Determinants of Health     Financial Resource Strain: Low Risk     Difficulty of Paying Living Expenses: Not hard at all   Food Insecurity: No Food Insecurity    Worried About Running Out of Food in the Last Year: Never true    Ran Out of Food in the Last Year: Never true   Transportation Needs: No Transportation Needs    Lack of Transportation (Medical): No    Lack of Transportation (Non-Medical): No   Physical Activity: Insufficiently Active    Days of Exercise per Week: 3 days    Minutes of Exercise per Session: 20 min   Stress: No Stress Concern Present    Feeling of Stress : Only a little   Social Connections: Unknown    Frequency of Communication with Friends and Family: More than three times a week    Frequency of Social Gatherings with Friends and Family: Twice a week    Active Member of Clubs or Organizations: Yes    Attends Club or Organization Meetings: Never    Marital Status:    Housing Stability: Low Risk     Unable to Pay for Housing in the Last Year: No    Number of Places Lived in the Last Year: 1    Unstable Housing in the Last Year: No       Family History:   family history includes Coronary artery disease (age of onset: 38) in her father; Coronary artery disease (age of onset: 50) in her mother; Lung cancer in her mother.    Health Maintenance   Topic Date Due    Lipid Panel  08/01/2022    Mammogram  07/13/2023    TETANUS VACCINE  10/29/2027    Hepatitis C Screening  Completed       Physical Exam:    Vital Signs  Temp: 97.7 °F (36.5 °C)  Temp src: Temporal  Pulse: 81  SpO2: 96 %  BP: 126/84  BP Location: Left arm  Patient Position: Sitting  Height and Weight  Height: 5' 4" (162.6 cm)  Weight: 92.1 kg (203 lb 2.5 oz)  BSA (Calculated " - sq m): 2.04 sq meters  BMI (Calculated): 34.9  Weight in (lb) to have BMI = 25: 145.3]    Body mass index is 34.87 kg/m².    Physical Exam  Vitals and nursing note reviewed.   Constitutional:       Appearance: Normal appearance. She is not toxic-appearing.   HENT:      Head: Normocephalic and atraumatic.      Right Ear: Tympanic membrane normal.      Left Ear: Tympanic membrane normal.   Eyes:      Extraocular Movements: Extraocular movements intact.      Pupils: Pupils are equal, round, and reactive to light.   Cardiovascular:      Rate and Rhythm: Normal rate and regular rhythm.      Heart sounds: Normal heart sounds.   Pulmonary:      Effort: Pulmonary effort is normal.      Breath sounds: Normal breath sounds. No wheezing, rhonchi or rales.   Abdominal:      General: Bowel sounds are normal. There is no distension.      Palpations: Abdomen is soft.      Tenderness: There is no abdominal tenderness.   Musculoskeletal:         General: Normal range of motion.      Cervical back: Normal range of motion.      Right lower leg: No edema.      Left lower leg: No edema.        Legs:       Comments: Tenderness of bilateral lateral hip   Skin:     General: Skin is warm and dry.      Capillary Refill: Capillary refill takes less than 2 seconds.   Neurological:      General: No focal deficit present.      Mental Status: She is alert and oriented to person, place, and time.   Psychiatric:         Mood and Affect: Mood normal.         Behavior: Behavior normal.         Judgment: Judgment normal.         Assessment:      ICD-10-CM ICD-9-CM   1. Acute bronchitis, unspecified organism  J20.9 466.0   2. Trochanteric bursitis of right hip  M70.61 726.5   3. Attention deficit disorder (ADD) without hyperactivity  F98.8 314.00   4. Primary hypertension  I10 401.9     Plan:  Treat with doxycycline and prednisone continue DuoNebs and albuterol inhaler   Given hip exercises   checked Vyvanse refill  Hypertension stable   Explained  to patient that she must turn over the fit test for colon cancer screening and must come in for a Pap smear in a week.      Orders Placed This Encounter   Procedures    CBC Auto Differential    Comprehensive Metabolic Panel    Hemoglobin A1C    Lipid Panel    Toxicology screen, urine       Current Outpatient Medications   Medication Sig Dispense Refill    albuterol (ACCUNEB) 1.25 mg/3 mL Nebu USE ONE VIAL IN NEBULIZER EVERY 4 TO 6 HOURS AS NEEDED FOR WHEEZING & SHORTNESS OF BREATH **THANK YOU** 75 mL 6    albuterol (PROAIR HFA) 90 mcg/actuation inhaler INHALE TWO PUFFS INTO THE LUNGS EVERY 6 HOURS AS NEEDED FOR WHEEZING 25.5 g 2    albuterol-ipratropium (DUO-NEB) 2.5 mg-0.5 mg/3 mL nebulizer solution Inhale contents of 1 vial (3 mLs) by nebulization every 6 (six) hours as needed for Wheezing. Rescue 90 mL 2    aspirin 81 MG Chew Take 1 tablet (81 mg total) by mouth once daily. 30 tablet 0    busPIRone (BUSPAR) 10 MG tablet Take 1 tablet (10 mg total) by mouth 2 (two) times daily. 60 tablet 5    clindamycin (CLEOCIN T) 1 % external solution Apply topically 2 (two) times daily. 60 mL 1    EPINEPHrine (EPIPEN) 0.3 mg/0.3 mL AtIn INJECT 0.3mL INTRAMUSCULARLY X1 DOSE THANK YOU  12    estradioL (ESTRACE) 2 MG tablet Take 1 tablet (2 mg total) by mouth once daily. 90 tablet 3    fluticasone-salmeterol diskus inhaler 250-50 mcg Inhale 1 puff into the lungs every 12 (twelve) hours. 60 each 2    hydrOXYzine pamoate (VISTARIL) 50 MG Cap TAKE TWO CAPSULES BY MOUTH DAILY AS NEEDED FOR ITCHING THANK YOU  0    levocetirizine (XYZAL) 5 MG tablet TAKE ONE TABLET BY MOUTH EVERY EVENING **THANK YOU** 30 tablet 5    LORazepam (ATIVAN) 0.5 MG tablet Take 1 tablet (0.5 mg total) by mouth every 12 (twelve) hours as needed for Anxiety. 14 tablet 0    montelukast (SINGULAIR) 10 mg tablet Take 1 tablet (10 mg total) by mouth once daily. 90 tablet 3    mupirocin (BACTROBAN) 2 % ointment APPLY TO AFFECTED AREA TOPICALLY THREE TIMES DAILY FOR 7  DAYS THANK YOU  0    omeprazole (PRILOSEC) 40 MG capsule Take 1 capsule (40 mg total) by mouth every morning. 90 capsule 3    ondansetron (ZOFRAN-ODT) 4 MG TbDL Dissolve 1 tablet (4 mg total) by mouth every 8 (eight) hours as needed. 14 tablet 1    progesterone (PROMETRIUM) 100 MG capsule Take 1 capsule (100 mg total) by mouth nightly. 30 capsule 11    propranoloL (INDERAL LA) 80 MG 24 hr capsule Take 1 capsule (80 mg total) by mouth once daily. 30 capsule 5    tiZANidine (ZANAFLEX) 4 mg Cap Take 1 capsule by mouth 2 (two) times daily as needed. 60 capsule 0    valsartan-hydrochlorothiazide (DIOVAN-HCT) 160-12.5 mg per tablet Take 1 tablet by mouth once daily. 90 tablet 3    venlafaxine (EFFEXOR-XR) 37.5 MG 24 hr capsule Take 1 capsule (37.5 mg total) by mouth once daily. 30 capsule 11    doxycycline (VIBRA-TABS) 100 MG tablet Take 1 tablet (100 mg total) by mouth every 12 (twelve) hours. for 10 days 20 tablet 0    fluconazole (DIFLUCAN) 150 MG Tab Take 1 tablet (150 mg total) by mouth once. for 1 dose 2 tablet 2    [START ON 11/29/2022] lisdexamfetamine (VYVANSE) 40 MG Cap Take 1 capsule (40 mg total) by mouth once daily. 30 capsule 0    [START ON 10/29/2022] lisdexamfetamine (VYVANSE) 40 MG Cap Take 1 capsule (40 mg total) by mouth once daily. 30 capsule 0    [START ON 9/29/2022] lisdexamfetamine (VYVANSE) 40 MG Cap Take 1 capsule (40 mg total) by mouth once daily. 30 capsule 0    predniSONE (DELTASONE) 50 MG Tab Take 1 tablet (50 mg total) by mouth once daily. for 5 days 5 tablet 0     Current Facility-Administered Medications   Medication Dose Route Frequency Provider Last Rate Last Admin    testosterone cypionate injection 100 mg  100 mg Intramuscular Q28 Days Gem Woods MD   100 mg at 08/02/22 1329       Medications Discontinued During This Encounter   Medication Reason    lisdexamfetamine (VYVANSE) 40 MG Cap Reorder    lisdexamfetamine (VYVANSE) 40 MG Cap Reorder    lisdexamfetamine (VYVANSE) 40 MG Cap  Reorder       Follow up in about 1 week (around 9/15/2022).      Dima Her MD  Scribe Attestation:   I, Nicole Baker, am scribing for, and in the presence of, Dr.Arif Her I performed the above scribed service and the documentation accurately describes the services I performed. I attest to the accuracy of the note.    I, Dr. Dima Her, reviewed documentation as scribed above. I performed the services described in this documentation.  I agree that the record reflects my personal performance and is accurate and complete. Dima Her MD.  09/08/2022

## 2022-09-09 LAB
ALBUMIN SERPL BCP-MCNC: 3.6 G/DL (ref 3.5–5.2)
ALP SERPL-CCNC: 81 U/L (ref 55–135)
ALT SERPL W/O P-5'-P-CCNC: 14 U/L (ref 10–44)
AMPHET+METHAMPHET UR QL: ABNORMAL
ANION GAP SERPL CALC-SCNC: 9 MMOL/L (ref 8–16)
AST SERPL-CCNC: 8 U/L (ref 10–40)
BARBITURATES UR QL SCN>200 NG/ML: NEGATIVE
BASOPHILS # BLD AUTO: 0.08 K/UL (ref 0–0.2)
BASOPHILS NFR BLD: 0.3 % (ref 0–1.9)
BENZODIAZ UR QL SCN>200 NG/ML: NEGATIVE
BILIRUB SERPL-MCNC: 0.2 MG/DL (ref 0.1–1)
BUN SERPL-MCNC: 9 MG/DL (ref 6–20)
BZE UR QL SCN: NEGATIVE
CALCIUM SERPL-MCNC: 9.3 MG/DL (ref 8.7–10.5)
CANNABINOIDS UR QL SCN: NEGATIVE
CHLORIDE SERPL-SCNC: 101 MMOL/L (ref 95–110)
CHOLEST SERPL-MCNC: 196 MG/DL (ref 120–199)
CHOLEST/HDLC SERPL: 4.2 {RATIO} (ref 2–5)
CO2 SERPL-SCNC: 28 MMOL/L (ref 23–29)
CREAT SERPL-MCNC: 0.8 MG/DL (ref 0.5–1.4)
CREAT UR-MCNC: >450 MG/DL (ref 15–325)
DIFFERENTIAL METHOD: ABNORMAL
EOSINOPHIL # BLD AUTO: 0.2 K/UL (ref 0–0.5)
EOSINOPHIL NFR BLD: 0.9 % (ref 0–8)
ERYTHROCYTE [DISTWIDTH] IN BLOOD BY AUTOMATED COUNT: 13.4 % (ref 11.5–14.5)
EST. GFR  (NO RACE VARIABLE): >60 ML/MIN/1.73 M^2
ESTIMATED AVG GLUCOSE: 111 MG/DL (ref 68–131)
ETHANOL UR-MCNC: ABNORMAL MG/DL
GLUCOSE SERPL-MCNC: 100 MG/DL (ref 70–110)
HBA1C MFR BLD: 5.5 % (ref 4–5.6)
HCT VFR BLD AUTO: 41.3 % (ref 37–48.5)
HDLC SERPL-MCNC: 47 MG/DL (ref 40–75)
HDLC SERPL: 24 % (ref 20–50)
HGB BLD-MCNC: 13.8 G/DL (ref 12–16)
IMM GRANULOCYTES # BLD AUTO: 0.21 K/UL (ref 0–0.04)
IMM GRANULOCYTES NFR BLD AUTO: 0.8 % (ref 0–0.5)
LDLC SERPL CALC-MCNC: 120.4 MG/DL (ref 63–159)
LYMPHOCYTES # BLD AUTO: 4.2 K/UL (ref 1–4.8)
LYMPHOCYTES NFR BLD: 16.5 % (ref 18–48)
MCH RBC QN AUTO: 31.6 PG (ref 27–31)
MCHC RBC AUTO-ENTMCNC: 33.4 G/DL (ref 32–36)
MCV RBC AUTO: 95 FL (ref 82–98)
METHADONE UR QL SCN>300 NG/ML: NEGATIVE
MONOCYTES # BLD AUTO: 1.3 K/UL (ref 0.3–1)
MONOCYTES NFR BLD: 5 % (ref 4–15)
NEUTROPHILS # BLD AUTO: 19.6 K/UL (ref 1.8–7.7)
NEUTROPHILS NFR BLD: 76.5 % (ref 38–73)
NONHDLC SERPL-MCNC: 149 MG/DL
NRBC BLD-RTO: 0 /100 WBC
OPIATES UR QL SCN: NEGATIVE
PCP UR QL SCN>25 NG/ML: NEGATIVE
PLATELET # BLD AUTO: 441 K/UL (ref 150–450)
PMV BLD AUTO: 11.5 FL (ref 9.2–12.9)
POTASSIUM SERPL-SCNC: 3.1 MMOL/L (ref 3.5–5.1)
PROT SERPL-MCNC: 6.7 G/DL (ref 6–8.4)
RBC # BLD AUTO: 4.37 M/UL (ref 4–5.4)
SODIUM SERPL-SCNC: 138 MMOL/L (ref 136–145)
TOXICOLOGY INFORMATION: ABNORMAL
TRIGL SERPL-MCNC: 143 MG/DL (ref 30–150)
WBC # BLD AUTO: 25.58 K/UL (ref 3.9–12.7)

## 2022-09-12 DIAGNOSIS — D72.829 LEUKOCYTOSIS, UNSPECIFIED TYPE: ICD-10-CM

## 2022-09-12 DIAGNOSIS — E87.6 LOW SERUM POTASSIUM: Primary | ICD-10-CM

## 2022-09-12 RX ORDER — VALSARTAN 320 MG/1
320 TABLET ORAL DAILY
Qty: 90 TABLET | Refills: 3 | Status: SHIPPED | OUTPATIENT
Start: 2022-09-12 | End: 2023-03-15 | Stop reason: SDUPTHER

## 2022-09-15 ENCOUNTER — PATIENT MESSAGE (OUTPATIENT)
Dept: FAMILY MEDICINE | Facility: CLINIC | Age: 57
End: 2022-09-15
Payer: COMMERCIAL

## 2022-09-15 RX ORDER — POTASSIUM CHLORIDE 20 MEQ/1
20 TABLET, EXTENDED RELEASE ORAL DAILY
Qty: 5 TABLET | Refills: 0 | Status: SHIPPED | OUTPATIENT
Start: 2022-09-15 | End: 2022-09-19 | Stop reason: SDUPTHER

## 2022-09-15 RX ORDER — FUROSEMIDE 40 MG/1
40 TABLET ORAL DAILY
Qty: 5 TABLET | Refills: 0 | Status: SHIPPED | OUTPATIENT
Start: 2022-09-15 | End: 2022-09-19 | Stop reason: SDUPTHER

## 2022-09-15 NOTE — TELEPHONE ENCOUNTER
Please check a BNP and an echocardiogram    Lasix 40 mg once p.o. daily for 5 days and K-dur 20 mEq 5 days when taking Lasix.    If this does not help with the breathing problem please go to the ER.  Make sure to use a breathing treatment every 4-6 hours.    Need to rule out possibility of developing heart failure.  A possible monitor your oxygen saturation levels and below 90 please go to the ER.

## 2022-09-19 ENCOUNTER — LAB VISIT (OUTPATIENT)
Dept: LAB | Facility: HOSPITAL | Age: 57
End: 2022-09-19
Attending: FAMILY MEDICINE
Payer: COMMERCIAL

## 2022-09-19 ENCOUNTER — OFFICE VISIT (OUTPATIENT)
Dept: FAMILY MEDICINE | Facility: CLINIC | Age: 57
End: 2022-09-19
Payer: COMMERCIAL

## 2022-09-19 ENCOUNTER — PATIENT MESSAGE (OUTPATIENT)
Dept: FAMILY MEDICINE | Facility: CLINIC | Age: 57
End: 2022-09-19

## 2022-09-19 VITALS
WEIGHT: 208.25 LBS | OXYGEN SATURATION: 98 % | HEIGHT: 64 IN | DIASTOLIC BLOOD PRESSURE: 80 MMHG | TEMPERATURE: 98 F | BODY MASS INDEX: 35.55 KG/M2 | SYSTOLIC BLOOD PRESSURE: 105 MMHG | HEART RATE: 78 BPM

## 2022-09-19 DIAGNOSIS — R25.2 LEG CRAMPS: ICD-10-CM

## 2022-09-19 DIAGNOSIS — I10 HTN, GOAL BELOW 130/80: ICD-10-CM

## 2022-09-19 DIAGNOSIS — E87.6 LOW SERUM POTASSIUM: ICD-10-CM

## 2022-09-19 DIAGNOSIS — I10 HYPERTENSION, UNSPECIFIED TYPE: Primary | ICD-10-CM

## 2022-09-19 DIAGNOSIS — R60.0 PEDAL EDEMA: ICD-10-CM

## 2022-09-19 DIAGNOSIS — D72.829 LEUKOCYTOSIS, UNSPECIFIED TYPE: ICD-10-CM

## 2022-09-19 LAB
ANION GAP SERPL CALC-SCNC: 9 MMOL/L (ref 8–16)
BASOPHILS # BLD AUTO: 0.04 K/UL (ref 0–0.2)
BASOPHILS NFR BLD: 0.2 % (ref 0–1.9)
BNP SERPL-MCNC: 19 PG/ML (ref 0–99)
BUN SERPL-MCNC: 9 MG/DL (ref 6–20)
CALCIUM SERPL-MCNC: 9 MG/DL (ref 8.7–10.5)
CHLORIDE SERPL-SCNC: 101 MMOL/L (ref 95–110)
CO2 SERPL-SCNC: 27 MMOL/L (ref 23–29)
CREAT SERPL-MCNC: 0.7 MG/DL (ref 0.5–1.4)
DIFFERENTIAL METHOD: ABNORMAL
EOSINOPHIL # BLD AUTO: 0.6 K/UL (ref 0–0.5)
EOSINOPHIL NFR BLD: 3.2 % (ref 0–8)
ERYTHROCYTE [DISTWIDTH] IN BLOOD BY AUTOMATED COUNT: 13.9 % (ref 11.5–14.5)
EST. GFR  (NO RACE VARIABLE): >60 ML/MIN/1.73 M^2
GLUCOSE SERPL-MCNC: 102 MG/DL (ref 70–110)
HCT VFR BLD AUTO: 43.2 % (ref 37–48.5)
HGB BLD-MCNC: 14.1 G/DL (ref 12–16)
IMM GRANULOCYTES # BLD AUTO: 0.15 K/UL (ref 0–0.04)
IMM GRANULOCYTES NFR BLD AUTO: 0.8 % (ref 0–0.5)
IRON SERPL-MCNC: 60 UG/DL (ref 30–160)
LYMPHOCYTES # BLD AUTO: 3.2 K/UL (ref 1–4.8)
LYMPHOCYTES NFR BLD: 17.7 % (ref 18–48)
MCH RBC QN AUTO: 31.5 PG (ref 27–31)
MCHC RBC AUTO-ENTMCNC: 32.6 G/DL (ref 32–36)
MCV RBC AUTO: 96 FL (ref 82–98)
MONOCYTES # BLD AUTO: 1 K/UL (ref 0.3–1)
MONOCYTES NFR BLD: 5.6 % (ref 4–15)
NEUTROPHILS # BLD AUTO: 13 K/UL (ref 1.8–7.7)
NEUTROPHILS NFR BLD: 72.5 % (ref 38–73)
NRBC BLD-RTO: 0 /100 WBC
PLATELET # BLD AUTO: 364 K/UL (ref 150–450)
PMV BLD AUTO: 11.4 FL (ref 9.2–12.9)
POTASSIUM SERPL-SCNC: 5.3 MMOL/L (ref 3.5–5.1)
RBC # BLD AUTO: 4.48 M/UL (ref 4–5.4)
SATURATED IRON: 14 % (ref 20–50)
SODIUM SERPL-SCNC: 137 MMOL/L (ref 136–145)
TOTAL IRON BINDING CAPACITY: 428 UG/DL (ref 250–450)
TRANSFERRIN SERPL-MCNC: 289 MG/DL (ref 200–375)
WBC # BLD AUTO: 17.89 K/UL (ref 3.9–12.7)

## 2022-09-19 PROCEDURE — 99214 OFFICE O/P EST MOD 30 MIN: CPT | Mod: S$GLB,,, | Performed by: FAMILY MEDICINE

## 2022-09-19 PROCEDURE — 99999 PR PBB SHADOW E&M-EST. PATIENT-LVL III: ICD-10-PCS | Mod: PBBFAC,,, | Performed by: FAMILY MEDICINE

## 2022-09-19 PROCEDURE — 3079F DIAST BP 80-89 MM HG: CPT | Mod: CPTII,S$GLB,, | Performed by: FAMILY MEDICINE

## 2022-09-19 PROCEDURE — 3008F PR BODY MASS INDEX (BMI) DOCUMENTED: ICD-10-PCS | Mod: CPTII,S$GLB,, | Performed by: FAMILY MEDICINE

## 2022-09-19 PROCEDURE — 4010F PR ACE/ARB THEARPY RXD/TAKEN: ICD-10-PCS | Mod: CPTII,S$GLB,, | Performed by: FAMILY MEDICINE

## 2022-09-19 PROCEDURE — 4010F ACE/ARB THERAPY RXD/TAKEN: CPT | Mod: CPTII,S$GLB,, | Performed by: FAMILY MEDICINE

## 2022-09-19 PROCEDURE — 84466 ASSAY OF TRANSFERRIN: CPT | Performed by: FAMILY MEDICINE

## 2022-09-19 PROCEDURE — 83880 ASSAY OF NATRIURETIC PEPTIDE: CPT | Performed by: FAMILY MEDICINE

## 2022-09-19 PROCEDURE — 3008F BODY MASS INDEX DOCD: CPT | Mod: CPTII,S$GLB,, | Performed by: FAMILY MEDICINE

## 2022-09-19 PROCEDURE — 1159F MED LIST DOCD IN RCRD: CPT | Mod: CPTII,S$GLB,, | Performed by: FAMILY MEDICINE

## 2022-09-19 PROCEDURE — 3044F PR MOST RECENT HEMOGLOBIN A1C LEVEL <7.0%: ICD-10-PCS | Mod: CPTII,S$GLB,, | Performed by: FAMILY MEDICINE

## 2022-09-19 PROCEDURE — 3079F PR MOST RECENT DIASTOLIC BLOOD PRESSURE 80-89 MM HG: ICD-10-PCS | Mod: CPTII,S$GLB,, | Performed by: FAMILY MEDICINE

## 2022-09-19 PROCEDURE — 3074F SYST BP LT 130 MM HG: CPT | Mod: CPTII,S$GLB,, | Performed by: FAMILY MEDICINE

## 2022-09-19 PROCEDURE — 80048 BASIC METABOLIC PNL TOTAL CA: CPT | Performed by: FAMILY MEDICINE

## 2022-09-19 PROCEDURE — 99999 PR PBB SHADOW E&M-EST. PATIENT-LVL III: CPT | Mod: PBBFAC,,, | Performed by: FAMILY MEDICINE

## 2022-09-19 PROCEDURE — 3074F PR MOST RECENT SYSTOLIC BLOOD PRESSURE < 130 MM HG: ICD-10-PCS | Mod: CPTII,S$GLB,, | Performed by: FAMILY MEDICINE

## 2022-09-19 PROCEDURE — 3044F HG A1C LEVEL LT 7.0%: CPT | Mod: CPTII,S$GLB,, | Performed by: FAMILY MEDICINE

## 2022-09-19 PROCEDURE — 36415 COLL VENOUS BLD VENIPUNCTURE: CPT | Mod: PO | Performed by: FAMILY MEDICINE

## 2022-09-19 PROCEDURE — 85025 COMPLETE CBC W/AUTO DIFF WBC: CPT | Performed by: FAMILY MEDICINE

## 2022-09-19 PROCEDURE — 1159F PR MEDICATION LIST DOCUMENTED IN MEDICAL RECORD: ICD-10-PCS | Mod: CPTII,S$GLB,, | Performed by: FAMILY MEDICINE

## 2022-09-19 PROCEDURE — 99214 PR OFFICE/OUTPT VISIT, EST, LEVL IV, 30-39 MIN: ICD-10-PCS | Mod: S$GLB,,, | Performed by: FAMILY MEDICINE

## 2022-09-19 RX ORDER — PROPRANOLOL HYDROCHLORIDE 80 MG/1
80 CAPSULE, EXTENDED RELEASE ORAL DAILY
Qty: 90 CAPSULE | Refills: 3 | Status: SHIPPED | OUTPATIENT
Start: 2022-09-19 | End: 2023-09-10 | Stop reason: SDUPTHER

## 2022-09-19 NOTE — TELEPHONE ENCOUNTER
No new care gaps identified.  [facility] Embedded Care Gaps. Reference number: [id number]. 9/19/2022   4:28:41 PM CDT

## 2022-09-19 NOTE — TELEPHONE ENCOUNTER
Refill Decision Note   Chasity Blake  is requesting a refill authorization.  Brief Assessment and Rationale for Refill:  Approve     Medication Therapy Plan:       Medication Reconciliation Completed: No   Comments:     No Care Gaps recommended.     Note composed:6:15 PM 09/19/2022

## 2022-09-20 ENCOUNTER — PATIENT MESSAGE (OUTPATIENT)
Dept: FAMILY MEDICINE | Facility: CLINIC | Age: 57
End: 2022-09-20
Payer: COMMERCIAL

## 2022-09-20 RX ORDER — FUROSEMIDE 40 MG/1
40 TABLET ORAL DAILY
Qty: 5 TABLET | Refills: 0 | Status: SHIPPED | OUTPATIENT
Start: 2022-09-20 | End: 2023-01-04 | Stop reason: SDUPTHER

## 2022-09-20 NOTE — TELEPHONE ENCOUNTER
No new care gaps identified.  [facility] Embedded Care Gaps. Reference number: [id number]. 9/20/2022   9:58:23 AM SUET

## 2022-09-24 LAB — NONINV COLON CA DNA+OCC BLD SCRN STL QL: POSITIVE

## 2022-09-25 DIAGNOSIS — R19.5 POSITIVE COLORECTAL CANCER SCREENING USING COLOGUARD TEST: Primary | ICD-10-CM

## 2022-09-26 ENCOUNTER — PATIENT MESSAGE (OUTPATIENT)
Dept: OBSTETRICS AND GYNECOLOGY | Facility: CLINIC | Age: 57
End: 2022-09-26
Payer: COMMERCIAL

## 2022-09-26 ENCOUNTER — PATIENT MESSAGE (OUTPATIENT)
Dept: FAMILY MEDICINE | Facility: CLINIC | Age: 57
End: 2022-09-26
Payer: COMMERCIAL

## 2022-09-27 ENCOUNTER — HOSPITAL ENCOUNTER (OUTPATIENT)
Dept: PREADMISSION TESTING | Facility: HOSPITAL | Age: 57
Discharge: HOME | End: 2022-09-27
Attending: FAMILY MEDICINE
Payer: COMMERCIAL

## 2022-09-27 DIAGNOSIS — R19.5 POSITIVE COLORECTAL CANCER SCREENING USING COLOGUARD TEST: Primary | ICD-10-CM

## 2022-09-27 NOTE — TELEPHONE ENCOUNTER
Pt's MyChart message reviewed.  Before any further testosterone injections, pt will need an appointment.  Please assist with scheduling well-woman visit, and we will discuss her testosterone injections at that time.

## 2022-09-28 ENCOUNTER — TELEPHONE (OUTPATIENT)
Dept: FAMILY MEDICINE | Facility: CLINIC | Age: 57
End: 2022-09-28
Payer: COMMERCIAL

## 2022-09-28 ENCOUNTER — PATIENT MESSAGE (OUTPATIENT)
Dept: FAMILY MEDICINE | Facility: CLINIC | Age: 57
End: 2022-09-28
Payer: COMMERCIAL

## 2022-09-28 DIAGNOSIS — M79.7 FIBROMYALGIA: Primary | ICD-10-CM

## 2022-09-28 RX ORDER — POLYETHYLENE GLYCOL 3350, SODIUM SULFATE ANHYDROUS, SODIUM BICARBONATE, SODIUM CHLORIDE, POTASSIUM CHLORIDE 236; 22.74; 6.74; 5.86; 2.97 G/4L; G/4L; G/4L; G/4L; G/4L
4 POWDER, FOR SOLUTION ORAL ONCE
Qty: 4000 ML | Refills: 0 | Status: SHIPPED | OUTPATIENT
Start: 2022-09-28 | End: 2022-09-28

## 2022-09-28 NOTE — TELEPHONE ENCOUNTER
Patient is requesting a referral to Rheumatology for fibromyalgia. Please advise and authorize.

## 2022-10-17 ENCOUNTER — PATIENT MESSAGE (OUTPATIENT)
Dept: OBSTETRICS AND GYNECOLOGY | Facility: CLINIC | Age: 57
End: 2022-10-17
Payer: COMMERCIAL

## 2022-10-27 ENCOUNTER — HOSPITAL ENCOUNTER (OUTPATIENT)
Facility: HOSPITAL | Age: 57
Discharge: HOME | End: 2022-10-27
Attending: INTERNAL MEDICINE | Admitting: INTERNAL MEDICINE
Payer: COMMERCIAL

## 2022-10-27 ENCOUNTER — ANESTHESIA (OUTPATIENT)
Dept: ENDOSCOPY | Facility: HOSPITAL | Age: 57
End: 2022-10-27
Payer: COMMERCIAL

## 2022-10-27 ENCOUNTER — ANESTHESIA EVENT (OUTPATIENT)
Dept: ENDOSCOPY | Facility: HOSPITAL | Age: 57
End: 2022-10-27
Payer: COMMERCIAL

## 2022-10-27 VITALS
BODY MASS INDEX: 32.44 KG/M2 | HEIGHT: 64 IN | DIASTOLIC BLOOD PRESSURE: 52 MMHG | TEMPERATURE: 98 F | OXYGEN SATURATION: 94 % | WEIGHT: 190 LBS | SYSTOLIC BLOOD PRESSURE: 118 MMHG | HEART RATE: 61 BPM | RESPIRATION RATE: 16 BRPM

## 2022-10-27 DIAGNOSIS — R19.5 POSITIVE COLORECTAL CANCER SCREENING USING COLOGUARD TEST: Primary | ICD-10-CM

## 2022-10-27 PROCEDURE — 37000008 HC ANESTHESIA 1ST 15 MINUTES: Performed by: INTERNAL MEDICINE

## 2022-10-27 PROCEDURE — 45378 PR COLONOSCOPY,DIAGNOSTIC: ICD-10-PCS | Mod: 53,,, | Performed by: INTERNAL MEDICINE

## 2022-10-27 PROCEDURE — 45378 DIAGNOSTIC COLONOSCOPY: CPT | Performed by: INTERNAL MEDICINE

## 2022-10-27 PROCEDURE — 63600175 PHARM REV CODE 636 W HCPCS: Performed by: NURSE ANESTHETIST, CERTIFIED REGISTERED

## 2022-10-27 PROCEDURE — 45378 DIAGNOSTIC COLONOSCOPY: CPT | Mod: 53,,, | Performed by: INTERNAL MEDICINE

## 2022-10-27 PROCEDURE — 25000003 PHARM REV CODE 250: Performed by: NURSE ANESTHETIST, CERTIFIED REGISTERED

## 2022-10-27 RX ORDER — SOD SULF/POT CHLORIDE/MAG SULF 1.479 G
12 TABLET ORAL DAILY
Qty: 24 TABLET | Refills: 0 | Status: SHIPPED | OUTPATIENT
Start: 2022-10-27 | End: 2022-10-29

## 2022-10-27 RX ORDER — PROPOFOL 10 MG/ML
VIAL (ML) INTRAVENOUS
Status: DISCONTINUED | OUTPATIENT
Start: 2022-10-27 | End: 2022-10-27

## 2022-10-27 RX ORDER — LIDOCAINE HYDROCHLORIDE 20 MG/ML
INJECTION, SOLUTION EPIDURAL; INFILTRATION; INTRACAUDAL; PERINEURAL
Status: DISCONTINUED | OUTPATIENT
Start: 2022-10-27 | End: 2022-10-27

## 2022-10-27 RX ADMIN — LIDOCAINE HYDROCHLORIDE 80 MG: 20 INJECTION, SOLUTION EPIDURAL; INFILTRATION; INTRACAUDAL; PERINEURAL at 09:10

## 2022-10-27 RX ADMIN — PROPOFOL 90 MG: 10 INJECTION, EMULSION INTRAVENOUS at 09:10

## 2022-10-27 RX ADMIN — SODIUM CHLORIDE, SODIUM LACTATE, POTASSIUM CHLORIDE, AND CALCIUM CHLORIDE: .6; .31; .03; .02 INJECTION, SOLUTION INTRAVENOUS at 08:10

## 2022-10-27 NOTE — [employer] PATIENT INSTRUCTIONS
Discharge Summary/Instructions after an Endoscopic Procedure  Patient Name: Chasity Blake  Patient MRN: [id number]  Patient YOB: 1965 Thursday, October 27, 2022 Nadja Seals MD  Dear patient,  As a result of recent federal legislation (The Federal Cures Act), you may   receive lab or pathology results from your procedure in your [facility]   account before your physician is able to contact you. Your physician or   their representative will relay the results to you with their   recommendations at their soonest availability.  Thank you,  RESTRICTIONS:  During your procedure today, you received medications for sedation.  These   medications may affect your judgment, balance and coordination.  Therefore,   for 24 hours, you have the following restrictions:   - DO NOT drive a car, operate machinery, make legal/financial decisions,   sign important papers or drink alcohol.    ACTIVITY:  Today: no heavy lifting, straining or running due to procedural   sedation/anesthesia.  The following day: return to full activity including work.  DIET:  Eat and drink normally unless instructed otherwise.     TREATMENT FOR COMMON SIDE EFFECTS:  - Mild abdominal pain, nausea, belching, bloating or excessive gas:  rest,   eat lightly and use a heating pad.  - Sore Throat: treat with throat lozenges and/or gargle with warm salt   water.  - Because air was used during the procedure, expelling large amounts of air   from your rectum or belching is normal.  - If a bowel prep was taken, you may not have a bowel movement for 1-3 days.    This is normal.  SYMPTOMS TO WATCH FOR AND REPORT TO YOUR PHYSICIAN:  1. Abdominal pain or bloating, other than gas cramps.  2. Chest pain.  3. Back pain.  4. Signs of infection such as: chills or fever occurring within 24 hours   after the procedure.  5. Rectal bleeding, which would show as bright red, maroon, or black stools.   (A tablespoon of blood from the rectum is not serious, especially  if   hemorrhoids are present.)  6. Vomiting.  7. Weakness or dizziness.  GO DIRECTLY TO THE NEAREST EMERGENCY ROOM IF YOU HAVE ANY OF THE FOLLOWING:      Difficulty breathing              Chills and/or fever over 101 F   Persistent vomiting and/or vomiting blood   Severe abdominal pain   Severe chest pain   Black, tarry stools   Bleeding- more than one tablespoon   Any other symptom or condition that you feel may need urgent attention  Your doctor recommends these additional instructions:  If any biopsies were taken, your doctors clinic will contact you in 1 to 2   weeks with any results.  - Discharge patient to home (via wheelchair).   - Resume previous diet.   - Continue present medications.   - Repeat colonoscopy at next available appointment (within 3 months) because   the bowel preparation was poor.   - Patient has a contact number available for emergencies.  The signs and   symptoms of potential delayed complications were discussed with the   patient.  Return to normal activities tomorrow.  Written discharge   instructions were provided to the patient.  For questions, problems or results please call your physician Nadja Seals MD at Work:  [phone]  If you have any questions about the above instructions, call the GI   department at [phone] or call the endoscopy unit at [phone]   from 7am until 3 pm.  [facility] [place], EMERGENCY ROOM PHONE NUMBER:   [phone]  IF A COMPLICATION OR EMERGENCY SITUATION ARISES AND YOU ARE UNABLE TO REACH   YOUR PHYSICIAN - GO DIRECTLY TO THE EMERGENCY ROOM.  I have read or have had read to me these discharge instructions for my   procedure and have received a written copy.  I understand these   instructions and will follow-up with my physician if I have any questions.     __________________________________       _____________________________________  Nurse Signature                                          Patient/Designated    Responsible Party Signature  MD Nadja Ramirez MD  10/27/2022 9:05:21 AM  [employer]

## 2022-10-27 NOTE — TRANSFER OF CARE
"Anesthesia Transfer of Care Note    Patient: Chasity Blake    Procedure(s) Performed: Procedure(s) (LRB):  COLONOSCOPY (N/A)    Patient location: PACU    Anesthesia Type: MAC    Transport from OR: Transported from OR on room air with adequate spontaneous ventilation    Post pain: adequate analgesia    Post assessment: no apparent anesthetic complications    Post vital signs: stable    Level of consciousness: awake    Nausea/Vomiting: no nausea/vomiting    Complications: none    Transfer of care protocol was followed      Last vitals:   Visit Vitals  /75 (BP Location: Left arm, Patient Position: Lying)   Pulse 68   Temp 36.2 °C (97.2 °F) (Temporal)   Resp 14   Ht 5' 4" (1.626 m)   Wt 86.2 kg (190 lb)   SpO2 97%   Breastfeeding No   BMI 32.61 kg/m²     "

## 2022-10-27 NOTE — ANESTHESIA POSTPROCEDURE EVALUATION
Anesthesia Post Evaluation    Patient: Chasity Blake    Procedure(s) Performed: Procedure(s) (LRB):  COLONOSCOPY (N/A)    Final Anesthesia Type: MAC      Patient location during evaluation: PACU  Patient participation: Yes- Able to Participate  Level of consciousness: awake and alert  Post-procedure vital signs: reviewed and stable  Pain management: adequate  Airway patency: patent    PONV status at discharge: No PONV  Anesthetic complications: no      Cardiovascular status: blood pressure returned to baseline  Respiratory status: unassisted  Hydration status: euvolemic  Follow-up not needed.          Vitals Value Taken Time   /61 10/27/22 0906   Temp 36.6 °C (97.9 °F) 10/27/22 0906   Pulse 67 10/27/22 0906   Resp 22 10/27/22 0906   SpO2 91 % 10/27/22 0906         No case tracking events are documented in the log.      Pain/Zelalem Score: Zelalem Score: 6 (10/27/2022  9:06 AM)

## 2022-10-27 NOTE — DISCHARGE SUMMARY
[facility] - Endoscopy (Hospital)  Discharge Note  Short Stay    Procedure(s) (LRB):  COLONOSCOPY (N/A)      OUTCOME: Patient tolerated treatment/procedure well without complication and is now ready for discharge.    DISPOSITION: Home or Self Care    FINAL DIAGNOSIS:  Positive colorectal cancer screening using Cologuard test    FOLLOWUP: With primary care provider    DISCHARGE INSTRUCTIONS:    Discharge Procedure Orders   Case Request Endoscopy: COLONOSCOPY     Order Specific Question Answer Comments   Medical Necessity: Medically Non-Urgent [100]    CPT Code: ID COLONOSCOPY,DIAGNOSTIC [79727]    Is an on-site pathologist required for this procedure? N/A

## 2022-10-27 NOTE — H&P
Short Stay Endoscopy History and Physical    PCP - Dima Her MD    Procedure - Colonoscopy  ASA - 2  Mallampati - per anesthesia  History of Anesthesia problems - no  Family history Anesthesia problems -  no     HPI:  This is a 56 y.o. female here for evaluation of :   Active Hospital Problems    Diagnosis  POA    *Positive colorectal cancer screening using Cologuard test [R19.5]  Yes      Resolved Hospital Problems   No resolved problems to display.         Health Maintenance         Date Due Completion Date    COVID-19 Vaccine (1) Never done ---    Pneumococcal Vaccines (Age 0-64) (2 - PCV) 10/06/2010 10/6/2009    Shingles Vaccine (1 of 2) Never done ---    Influenza Vaccine (1) 09/01/2022 1/17/2011    Cervical Cancer Screening 09/18/2022 9/18/2019    Mammogram 07/13/2023 7/13/2022    Colorectal Cancer Screening 09/18/2025 9/18/2022    Lipid Panel 09/08/2027 9/8/2022    TETANUS VACCINE 10/29/2027 10/29/2017          ROS:  CONSTITUTIONAL: Denies weight change,  fatigue, fevers, chills, night sweats.  CARDIOVASCULAR: Denies chest pain, shortness of breath, orthopnea and edema.  RESPIRATORY: Denies cough, hemoptysis, dyspnea, and wheezing.  GI: See HPI.    Medical History:   Past Medical History:   Diagnosis Date    ADD (attention deficit disorder with hyperactivity)     Anxiety     Asthma     mild intermittent    Depression     Hypertension        Surgical History:   Past Surgical History:   Procedure Laterality Date    AUGMENTATION OF BREAST Bilateral     saline. About 20 years ago    BELT ABDOMINOPLASTY      Breast Augmentation      BREAST BIOPSY Left     2015. benign    BREAST SURGERY      COSMETIC SURGERY      ENDOMETRIAL ABLATION      TONSILLECTOMY      TUBAL LIGATION         Family History:   Family History   Problem Relation Age of Onset    Lung cancer Mother     Coronary artery disease Mother 50    Coronary artery disease Father 38    Breast cancer Neg Hx     Colon cancer Neg Hx     Ovarian cancer Neg Hx         Social History:   Social History     Tobacco Use    Smoking status: Every Day     Packs/day: 0.50     Years: 10.00     Pack years: 5.00     Types: Cigarettes    Smokeless tobacco: Never   Substance Use Topics    Alcohol use: No     Alcohol/week: 0.0 standard drinks    Drug use: No       Allergies:   Review of patient's allergies indicates:   Allergen Reactions    Percocet [oxycodone-acetaminophen] Itching       Medications:   No current facility-administered medications on file prior to encounter.     Current Outpatient Medications on File Prior to Encounter   Medication Sig Dispense Refill    albuterol (ACCUNEB) 1.25 mg/3 mL Nebu USE ONE VIAL IN NEBULIZER EVERY 4 TO 6 HOURS AS NEEDED FOR WHEEZING & SHORTNESS OF BREATH **THANK YOU** 75 mL 6    albuterol (PROAIR HFA) 90 mcg/actuation inhaler INHALE TWO PUFFS INTO THE LUNGS EVERY 6 HOURS AS NEEDED FOR WHEEZING 25.5 g 2    albuterol-ipratropium (DUO-NEB) 2.5 mg-0.5 mg/3 mL nebulizer solution Inhale contents of 1 vial (3 mLs) by nebulization every 6 (six) hours as needed for Wheezing. Rescue 90 mL 2    aspirin 81 MG Chew Take 1 tablet (81 mg total) by mouth once daily. 30 tablet 0    busPIRone (BUSPAR) 10 MG tablet Take 1 tablet (10 mg total) by mouth 2 (two) times daily. 60 tablet 5    clindamycin (CLEOCIN T) 1 % external solution Apply topically 2 (two) times daily. 60 mL 1    EPINEPHrine (EPIPEN) 0.3 mg/0.3 mL AtIn INJECT 0.3mL INTRAMUSCULARLY X1 DOSE THANK YOU  12    estradioL (ESTRACE) 2 MG tablet Take 1 tablet (2 mg total) by mouth once daily. 90 tablet 3    fluticasone-salmeterol diskus inhaler 250-50 mcg Inhale 1 puff into the lungs every 12 (twelve) hours. 60 each 2    furosemide (LASIX) 40 MG tablet Take 1 tablet (40 mg total) by mouth once daily. 5 tablet 0    hydrOXYzine pamoate (VISTARIL) 50 MG Cap TAKE TWO CAPSULES BY MOUTH DAILY AS NEEDED FOR ITCHING THANK YOU  0    levocetirizine (XYZAL) 5 MG tablet TAKE ONE TABLET BY MOUTH EVERY EVENING **THANK  YOU** 30 tablet 5    [START ON 11/29/2022] lisdexamfetamine (VYVANSE) 40 MG Cap Take 1 capsule (40 mg total) by mouth once daily. 30 capsule 0    montelukast (SINGULAIR) 10 mg tablet Take 1 tablet (10 mg total) by mouth once daily. 90 tablet 3    mupirocin (BACTROBAN) 2 % ointment APPLY TO AFFECTED AREA TOPICALLY THREE TIMES DAILY FOR 7 DAYS THANK YOU  0    omeprazole (PRILOSEC) 40 MG capsule Take 1 capsule (40 mg total) by mouth every morning. 90 capsule 3    ondansetron (ZOFRAN-ODT) 4 MG TbDL Dissolve 1 tablet (4 mg total) by mouth every 8 (eight) hours as needed. 14 tablet 1    potassium chloride SA (K-DUR,KLOR-CON) 20 MEQ tablet Take 1 tablet (20 mEq total) by mouth once daily. 5 tablet 0    progesterone (PROMETRIUM) 100 MG capsule Take 1 capsule (100 mg total) by mouth nightly. 30 capsule 11    propranoloL (INDERAL LA) 80 MG 24 hr capsule Take 1 capsule (80 mg total) by mouth once daily. 90 capsule 3    valsartan (DIOVAN) 320 MG tablet Take 1 tablet (320 mg total) by mouth once daily. 90 tablet 3    venlafaxine (EFFEXOR-XR) 37.5 MG 24 hr capsule Take 1 capsule (37.5 mg total) by mouth once daily. 30 capsule 11    [START ON 10/29/2022] lisdexamfetamine (VYVANSE) 40 MG Cap Take 1 capsule (40 mg total) by mouth once daily. 30 capsule 0    lisdexamfetamine (VYVANSE) 40 MG Cap Take 1 capsule (40 mg total) by mouth once daily. 30 capsule 0    LORazepam (ATIVAN) 0.5 MG tablet Take 1 tablet (0.5 mg total) by mouth every 12 (twelve) hours as needed for Anxiety. 14 tablet 0    tiZANidine (ZANAFLEX) 4 mg Cap Take 1 capsule by mouth 2 (two) times daily as needed. (Patient not taking: Reported on 10/20/2022) 60 capsule 0       Physical Exam:  Vital Signs: There were no vitals filed for this visit.  General Appearance: Well appearing in no acute distress  ENT: OP clear  Chest: CTA B  CV: RRR, no m/r/g  Abd: s/nt/nd/nabs  Ext: no edema    Labs:Reviewed    IMP:  Active Hospital Problems    Diagnosis  POA    *Positive  colorectal cancer screening using Cologuard test [R19.5]  Yes      Resolved Hospital Problems   No resolved problems to display.         Plan:   I have explained the risks and benefits of colonoscopy to the patient including but not limited to bleeding, perforation, infection, and death. The patient wishes to proceed.

## 2022-10-27 NOTE — ANESTHESIA PREPROCEDURE EVALUATION
10/27/2022  Chasity Blake is a 56 y.o., female.      Pre-op Assessment    I have reviewed the Patient Summary Reports.     I have reviewed the Nursing Notes. I have reviewed the NPO Status.   I have reviewed the Medications.     Review of Systems  Anesthesia Hx:  No problems with previous Anesthesia    Social:  Smoker    Hematology/Oncology:  Hematology Normal   Oncology Normal     EENT/Dental:EENT/Dental Normal   Cardiovascular:   Hypertension, well controlled    Pulmonary:   Asthma moderate    Renal/:  Renal/ Normal     Hepatic/GI:   Bowel Prep.    Musculoskeletal:  Musculoskeletal Normal    Neurological:  Neurology Normal    Endocrine:  Morbid Obesity / BMI > 40  Dermatological:  Skin Normal    Psych:   Psychiatric History anxiety depression          Physical Exam  General: Well nourished    Airway:  Mallampati: II   Mouth Opening: Normal  TM Distance: Normal  Tongue: Normal  Neck ROM: Normal ROM    Dental:  Intact    Chest/Lungs:  Clear to auscultation    Heart:  Rate: Normal        Anesthesia Plan  Type of Anesthesia, risks & benefits discussed:    Anesthesia Type: MAC  Intra-op Monitoring Plan: Standard ASA Monitors  Induction:  IV  Informed Consent: Informed consent signed with the Patient and all parties understand the risks and agree with anesthesia plan.  All questions answered. Patient consented to blood products? Yes  ASA Score: 3    Ready For Surgery From Anesthesia Perspective.     .

## 2022-10-28 ENCOUNTER — PATIENT MESSAGE (OUTPATIENT)
Dept: FAMILY MEDICINE | Facility: CLINIC | Age: 57
End: 2022-10-28
Payer: COMMERCIAL

## 2022-12-08 ENCOUNTER — PATIENT MESSAGE (OUTPATIENT)
Dept: PREADMISSION TESTING | Facility: HOSPITAL | Age: 57
End: 2022-12-08
Payer: COMMERCIAL

## 2022-12-18 DIAGNOSIS — J45.21 MILD INTERMITTENT ASTHMA WITH ACUTE EXACERBATION: ICD-10-CM

## 2022-12-19 RX ORDER — IPRATROPIUM BROMIDE AND ALBUTEROL SULFATE 2.5; .5 MG/3ML; MG/3ML
3 SOLUTION RESPIRATORY (INHALATION) EVERY 6 HOURS PRN
Qty: 90 ML | Refills: 2 | Status: SHIPPED | OUTPATIENT
Start: 2022-12-19 | End: 2023-12-19

## 2022-12-19 NOTE — TELEPHONE ENCOUNTER
No new care gaps identified.  [facility] Embedded Care Gaps. Reference number: [id number]. 12/18/2022   10:40:37 PM CST

## 2022-12-19 NOTE — TELEPHONE ENCOUNTER
Refill Decision Note   Chasity Blake  is requesting a refill authorization.  Brief Assessment and Rationale for Refill:  Approve     Medication Therapy Plan:       Medication Reconciliation Completed: No   Comments:     No Care Gaps recommended.     Note composed:5:28 PM 12/19/2022

## 2022-12-23 ENCOUNTER — OFFICE VISIT (OUTPATIENT)
Dept: FAMILY MEDICINE | Facility: CLINIC | Age: 57
End: 2022-12-23
Payer: COMMERCIAL

## 2022-12-23 DIAGNOSIS — F98.8 ATTENTION DEFICIT DISORDER (ADD) WITHOUT HYPERACTIVITY: Primary | ICD-10-CM

## 2022-12-23 PROCEDURE — 3044F PR MOST RECENT HEMOGLOBIN A1C LEVEL <7.0%: ICD-10-PCS | Mod: CPTII,95,, | Performed by: FAMILY MEDICINE

## 2022-12-23 PROCEDURE — 4010F PR ACE/ARB THEARPY RXD/TAKEN: ICD-10-PCS | Mod: CPTII,95,, | Performed by: FAMILY MEDICINE

## 2022-12-23 PROCEDURE — 3044F HG A1C LEVEL LT 7.0%: CPT | Mod: CPTII,95,, | Performed by: FAMILY MEDICINE

## 2022-12-23 PROCEDURE — 4010F ACE/ARB THERAPY RXD/TAKEN: CPT | Mod: CPTII,95,, | Performed by: FAMILY MEDICINE

## 2022-12-23 PROCEDURE — 99213 PR OFFICE/OUTPT VISIT, EST, LEVL III, 20-29 MIN: ICD-10-PCS | Mod: 95,,, | Performed by: FAMILY MEDICINE

## 2022-12-23 PROCEDURE — 99213 OFFICE O/P EST LOW 20 MIN: CPT | Mod: 95,,, | Performed by: FAMILY MEDICINE

## 2022-12-23 RX ORDER — LISDEXAMFETAMINE DIMESYLATE 40 MG/1
40 CAPSULE ORAL DAILY
Qty: 30 CAPSULE | Refills: 0 | Status: SHIPPED | OUTPATIENT
Start: 2023-01-30 | End: 2023-03-24 | Stop reason: SDUPTHER

## 2022-12-23 RX ORDER — LISDEXAMFETAMINE DIMESYLATE 40 MG/1
40 CAPSULE ORAL DAILY
Qty: 30 CAPSULE | Refills: 0 | Status: SHIPPED | OUTPATIENT
Start: 2022-12-31 | End: 2023-03-24 | Stop reason: SDUPTHER

## 2022-12-23 RX ORDER — LISDEXAMFETAMINE DIMESYLATE 40 MG/1
40 CAPSULE ORAL DAILY
Qty: 30 CAPSULE | Refills: 0 | Status: SHIPPED | OUTPATIENT
Start: 2023-03-02 | End: 2023-03-24 | Stop reason: SDUPTHER

## 2022-12-23 NOTE — PROGRESS NOTES
Chief Complaint:    No chief complaint on file.      History of Present Illness:  Patient with ADD presents today for a virtual visit.     Medication refill today for Vyvanse.  Due for pap smear. Scheduled an appt with her OB.      ROS:  Review of Systems   Constitutional:  Negative for activity change and unexpected weight change.   HENT:  Negative for hearing loss, rhinorrhea and trouble swallowing.    Eyes:  Negative for discharge and visual disturbance.   Respiratory:  Negative for chest tightness and wheezing.    Cardiovascular:  Negative for chest pain and palpitations.   Gastrointestinal:  Negative for blood in stool, constipation, diarrhea and vomiting.   Endocrine: Negative for polydipsia and polyuria.   Genitourinary:  Negative for difficulty urinating, dysuria, hematuria and menstrual problem.   Musculoskeletal:  Negative for arthralgias, joint swelling and neck pain.   Neurological:  Negative for weakness and headaches.   Psychiatric/Behavioral:  Negative for confusion and dysphoric mood.    All other systems reviewed and are negative.    Past Medical History:   Diagnosis Date    ADD (attention deficit disorder with hyperactivity)     Anxiety     Asthma     mild intermittent    COPD (chronic obstructive pulmonary disease)     Depression     Hypertension        Social History:  Social History     Socioeconomic History    Marital status:    Tobacco Use    Smoking status: Every Day     Packs/day: 0.50     Years: 10.00     Pack years: 5.00     Types: Cigarettes    Smokeless tobacco: Never   Substance and Sexual Activity    Alcohol use: No     Alcohol/week: 0.0 standard drinks    Drug use: No    Sexual activity: Yes     Partners: Male     Birth control/protection: None     Social Determinants of Health     Financial Resource Strain: Low Risk     Difficulty of Paying Living Expenses: Not hard at all   Food Insecurity: No Food Insecurity    Worried About Running Out of Food in the Last Year: Never true     Ran Out of Food in the Last Year: Never true   Transportation Needs: No Transportation Needs    Lack of Transportation (Medical): No    Lack of Transportation (Non-Medical): No   Physical Activity: Insufficiently Active    Days of Exercise per Week: 2 days    Minutes of Exercise per Session: 20 min   Stress: No Stress Concern Present    Feeling of Stress : Only a little   Social Connections: Unknown    Frequency of Communication with Friends and Family: Three times a week    Frequency of Social Gatherings with Friends and Family: Once a week    Active Member of Clubs or Organizations: Yes    Attends Club or Organization Meetings: 1 to 4 times per year    Marital Status:    Housing Stability: Low Risk     Unable to Pay for Housing in the Last Year: No    Number of Places Lived in the Last Year: 1    Unstable Housing in the Last Year: No       Family History:   family history includes Coronary artery disease (age of onset: 38) in her father; Coronary artery disease (age of onset: 50) in her mother; Lung cancer in her mother.    Health Maintenance   Topic Date Due    Mammogram  07/13/2023    Lipid Panel  09/08/2027    TETANUS VACCINE  10/29/2027    Hepatitis C Screening  Completed       Physical Exam:     ]    There is no height or weight on file to calculate BMI.    Physical Exam      Assessment:      ICD-10-CM ICD-9-CM   1. Attention deficit disorder (ADD) without hyperactivity  F98.8 314.00     Plan:   check Vyvanse  Prescription sent to pharmacy  Follow up with Dr. Woods on 01/09/2023 at 2:00 PM.  The patient location is: Home   The chief complaint leading to consultation is: as below  Visit type: Virtual visit with synchronous audio and video  Total time spent with patient: 15+ mins  Each patient to whom he or she provides medical services by telemedicine is:  (1) informed of the relationship between the physician and patient and the respective role of any other health care provider with respect  to management of the patient; and (2) notified that he or she may decline to receive medical services by telemedicine and may withdraw from such care at any time.    Notes: see below  No orders of the defined types were placed in this encounter.    Current Outpatient Medications   Medication Sig Dispense Refill    albuterol (ACCUNEB) 1.25 mg/3 mL Nebu USE ONE VIAL IN NEBULIZER EVERY 4 TO 6 HOURS AS NEEDED FOR WHEEZING & SHORTNESS OF BREATH 90 mL 6    albuterol (PROAIR HFA) 90 mcg/actuation inhaler INHALE TWO PUFFS INTO THE LUNGS EVERY 6 HOURS AS NEEDED FOR WHEEZING 25.5 g 2    albuterol-ipratropium (DUO-NEB) 2.5 mg-0.5 mg/3 mL nebulizer solution Inhale contents of 1 vial (3 mLs) by nebulization every 6 (six) hours as needed for Wheezing. Rescue 90 mL 2    aspirin 81 MG Chew Take 1 tablet (81 mg total) by mouth once daily. 30 tablet 0    busPIRone (BUSPAR) 10 MG tablet Take 1 tablet (10 mg total) by mouth 2 (two) times daily. 60 tablet 5    clindamycin (CLEOCIN T) 1 % external solution Apply topically 2 (two) times daily. 60 mL 1    EPINEPHrine (EPIPEN) 0.3 mg/0.3 mL AtIn INJECT 0.3mL INTRAMUSCULARLY X1 DOSE THANK YOU  12    estradioL (ESTRACE) 2 MG tablet Take 1 tablet (2 mg total) by mouth once daily. 90 tablet 3    fluticasone-salmeterol diskus inhaler 250-50 mcg Inhale 1 puff into the lungs every 12 (twelve) hours. 60 each 2    furosemide (LASIX) 40 MG tablet Take 1 tablet (40 mg total) by mouth once daily. 5 tablet 0    hydrOXYzine pamoate (VISTARIL) 50 MG Cap TAKE TWO CAPSULES BY MOUTH DAILY AS NEEDED FOR ITCHING THANK YOU  0    levocetirizine (XYZAL) 5 MG tablet TAKE ONE TABLET BY MOUTH EVERY EVENING **THANK YOU** 30 tablet 5    [START ON 3/2/2023] lisdexamfetamine (VYVANSE) 40 MG Cap Take 1 capsule (40 mg total) by mouth once daily. 30 capsule 0    [START ON 1/30/2023] lisdexamfetamine (VYVANSE) 40 MG Cap Take 1 capsule (40 mg total) by mouth once daily. 30 capsule 0    [START ON 12/31/2022] lisdexamfetamine  (VYVANSE) 40 MG Cap Take 1 capsule (40 mg total) by mouth once daily. 30 capsule 0    LORazepam (ATIVAN) 0.5 MG tablet Take 1 tablet (0.5 mg total) by mouth every 12 (twelve) hours as needed for Anxiety. 14 tablet 0    montelukast (SINGULAIR) 10 mg tablet Take 1 tablet (10 mg total) by mouth once daily. 90 tablet 3    mupirocin (BACTROBAN) 2 % ointment APPLY TO AFFECTED AREA TOPICALLY THREE TIMES DAILY FOR 7 DAYS THANK YOU  0    omeprazole (PRILOSEC) 40 MG capsule Take 1 capsule (40 mg total) by mouth every morning. 90 capsule 3    ondansetron (ZOFRAN-ODT) 4 MG TbDL Dissolve 1 tablet (4 mg total) by mouth every 8 (eight) hours as needed. 14 tablet 1    potassium chloride SA (K-DUR,KLOR-CON) 20 MEQ tablet Take 1 tablet (20 mEq total) by mouth once daily. 5 tablet 0    progesterone (PROMETRIUM) 100 MG capsule Take 1 capsule (100 mg total) by mouth nightly. 30 capsule 11    propranoloL (INDERAL LA) 80 MG 24 hr capsule Take 1 capsule (80 mg total) by mouth once daily. 90 capsule 3    tiZANidine (ZANAFLEX) 4 mg Cap Take 1 capsule by mouth 2 (two) times daily as needed. (Patient not taking: No sig reported) 60 capsule 0    valsartan (DIOVAN) 320 MG tablet Take 1 tablet (320 mg total) by mouth once daily. 90 tablet 3    venlafaxine (EFFEXOR-XR) 37.5 MG 24 hr capsule Take 1 capsule (37.5 mg total) by mouth once daily. 30 capsule 11     No current facility-administered medications for this visit.       Medications Discontinued During This Encounter   Medication Reason    lisdexamfetamine (VYVANSE) 40 MG Cap Reorder    lisdexamfetamine (VYVANSE) 40 MG Cap Reorder    lisdexamfetamine (VYVANSE) 40 MG Cap Reorder         No follow-ups on file.      Dima Her MD  Scribe Attestation:   Nicole ANDRADE, am scribing for, and in the presence of, Dr.Arif Her I performed the above scribed service and the documentation accurately describes the services I performed. I attest to the accuracy of the note.    I, Dr. Dima Her,  reviewed documentation as scribed above. I performed the services described in this documentation.  I agree that the record reflects my personal performance and is accurate and complete. Dima Her MD.  12/23/2022

## 2023-01-05 RX ORDER — FUROSEMIDE 40 MG/1
40 TABLET ORAL DAILY
Qty: 5 TABLET | Refills: 0 | Status: SHIPPED | OUTPATIENT
Start: 2023-01-05 | End: 2023-01-23 | Stop reason: SDUPTHER

## 2023-01-05 RX ORDER — BUSPIRONE HYDROCHLORIDE 10 MG/1
10 TABLET ORAL 2 TIMES DAILY
Qty: 60 TABLET | Refills: 5 | Status: SHIPPED | OUTPATIENT
Start: 2023-01-05 | End: 2023-11-29 | Stop reason: SDUPTHER

## 2023-01-05 NOTE — TELEPHONE ENCOUNTER
Refill Routing Note   Medication(s) are not appropriate for processing by [facility] for the following reason(s):      - Outside of protocol (non delegated)    [facility] action(s):  Route          Medication reconciliation completed: No     Appointments  past 12m or future 3m with PCP    Date Provider   Last Visit   12/23/2022 Dima Her MD   Next Visit   1/4/2023 Dima Her MD   ED visits in past 90 days: 0        Note composed:10:21 PM 01/04/2023

## 2023-01-05 NOTE — TELEPHONE ENCOUNTER
No new care gaps identified.  [facility] [place] Embedded Care Gaps. Reference number: [id number]. 1/04/2023   8:10:43 PM CST

## 2023-01-05 NOTE — TELEPHONE ENCOUNTER
Refill Routing Note   Medication(s) are not appropriate for processing by [facility] for the following reason(s):      - Required laboratory values are abnormal    [facility] action(s):  Defer          Medication reconciliation completed: No     Appointments  past 12m or future 3m with PCP    Date Provider   Last Visit   12/23/2022 Dima Her MD   Next Visit   Visit date not found Dima Her MD   ED visits in past 90 days: 0        Note composed:10:22 PM 01/04/2023

## 2023-01-09 ENCOUNTER — OFFICE VISIT (OUTPATIENT)
Dept: OBSTETRICS AND GYNECOLOGY | Facility: CLINIC | Age: 58
End: 2023-01-09
Payer: COMMERCIAL

## 2023-01-09 VITALS
BODY MASS INDEX: 34.74 KG/M2 | DIASTOLIC BLOOD PRESSURE: 80 MMHG | WEIGHT: 203.5 LBS | HEIGHT: 64 IN | SYSTOLIC BLOOD PRESSURE: 122 MMHG

## 2023-01-09 DIAGNOSIS — N95.1 MENOPAUSE SYNDROME: Primary | ICD-10-CM

## 2023-01-09 PROCEDURE — 99999 PR PBB SHADOW E&M-EST. PATIENT-LVL V: ICD-10-PCS | Mod: PBBFAC,,, | Performed by: OBSTETRICS & GYNECOLOGY

## 2023-01-09 PROCEDURE — 3074F SYST BP LT 130 MM HG: CPT | Mod: CPTII,S$GLB,, | Performed by: OBSTETRICS & GYNECOLOGY

## 2023-01-09 PROCEDURE — 96372 PR INJECTION,THERAP/PROPH/DIAG2ST, IM OR SUBCUT: ICD-10-PCS | Mod: S$GLB,,, | Performed by: OBSTETRICS & GYNECOLOGY

## 2023-01-09 PROCEDURE — 3008F PR BODY MASS INDEX (BMI) DOCUMENTED: ICD-10-PCS | Mod: CPTII,S$GLB,, | Performed by: OBSTETRICS & GYNECOLOGY

## 2023-01-09 PROCEDURE — 3074F PR MOST RECENT SYSTOLIC BLOOD PRESSURE < 130 MM HG: ICD-10-PCS | Mod: CPTII,S$GLB,, | Performed by: OBSTETRICS & GYNECOLOGY

## 2023-01-09 PROCEDURE — 3008F BODY MASS INDEX DOCD: CPT | Mod: CPTII,S$GLB,, | Performed by: OBSTETRICS & GYNECOLOGY

## 2023-01-09 PROCEDURE — 96372 THER/PROPH/DIAG INJ SC/IM: CPT | Mod: S$GLB,,, | Performed by: OBSTETRICS & GYNECOLOGY

## 2023-01-09 PROCEDURE — 1159F PR MEDICATION LIST DOCUMENTED IN MEDICAL RECORD: ICD-10-PCS | Mod: CPTII,S$GLB,, | Performed by: OBSTETRICS & GYNECOLOGY

## 2023-01-09 PROCEDURE — 99999 PR PBB SHADOW E&M-EST. PATIENT-LVL V: CPT | Mod: PBBFAC,,, | Performed by: OBSTETRICS & GYNECOLOGY

## 2023-01-09 PROCEDURE — 1159F MED LIST DOCD IN RCRD: CPT | Mod: CPTII,S$GLB,, | Performed by: OBSTETRICS & GYNECOLOGY

## 2023-01-09 PROCEDURE — 3079F DIAST BP 80-89 MM HG: CPT | Mod: CPTII,S$GLB,, | Performed by: OBSTETRICS & GYNECOLOGY

## 2023-01-09 PROCEDURE — 3079F PR MOST RECENT DIASTOLIC BLOOD PRESSURE 80-89 MM HG: ICD-10-PCS | Mod: CPTII,S$GLB,, | Performed by: OBSTETRICS & GYNECOLOGY

## 2023-01-09 PROCEDURE — 99212 PR OFFICE/OUTPT VISIT, EST, LEVL II, 10-19 MIN: ICD-10-PCS | Mod: 25,S$GLB,, | Performed by: OBSTETRICS & GYNECOLOGY

## 2023-01-09 PROCEDURE — 99212 OFFICE O/P EST SF 10 MIN: CPT | Mod: 25,S$GLB,, | Performed by: OBSTETRICS & GYNECOLOGY

## 2023-01-09 RX ORDER — TESTOSTERONE CYPIONATE 200 MG/ML
100 INJECTION, SOLUTION INTRAMUSCULAR
Status: ACTIVE | OUTPATIENT
Start: 2023-01-09 | End: 2023-06-26

## 2023-01-09 RX ADMIN — TESTOSTERONE CYPIONATE 100 MG: 200 INJECTION, SOLUTION INTRAMUSCULAR at 02:01

## 2023-01-09 NOTE — LETTER
January 9, 2023      [facility]  [id number] [facility] [address]  Phone: [phone]  Fax: [phone]       Patient: Chasity Blake   YOB: 1965  Date of Visit: 01/09/2023    To Whom It May Concern:    Mita Blake  was at [facility] on 01/09/2023. Bettye Blake was with his mother, Chasity Blake at this appointment. He may return to school on 1/10/22 with no restrictions. If you have any questions or concerns, or if I can be of further assistance, please do not hesitate to contact me.    Sincerely,    Michelle Sevilla MA/ Gem Woods MD

## 2023-01-09 NOTE — PROGRESS NOTES
Subjective:       Patient ID: Chasity Blake is a 57 y.o. female.    Chief Complaint:  Follow-up (Testosterone injection )      History of Present Illness  HPI  Presents for testosterone injection.  Pt has been on estradiol 2mg/prometrium 100mg HRT.  We added testosterone 100mg q 28 day injections, and pt noticed marked improvement in energy and libido.  Felt overall better with the addition of the testosterone.  PCP advised her to stop it, but I can't find any absolute contraindication when reviewing and confirming her medical history.  Pt has always had some chin hairs.  Noticed a few more while on the testosterone, but states they were manageable.    GYN & OB History  No LMP recorded. Patient has had an ablation.   Date of Last Pap: No result found    OB History    Para Term  AB Living   2 2 2     2   SAB IAB Ectopic Multiple Live Births           2      # Outcome Date GA Lbr Yobany/2nd Weight Sex Delivery Anes PTL Lv   2 Term 11/15/86 38w0d  3.175 kg (7 lb) M Vag-Spont EPI N CARI   1 Term 83 41w0d  3.515 kg (7 lb 12 oz) F Vag-Spont EPI N CARI       Review of Systems  Review of Systems   Constitutional:  Positive for fatigue (lower energy since stopping testosterone). Negative for fever and unexpected weight change.   Gastrointestinal:  Negative for abdominal pain, bloating, blood in stool, constipation, diarrhea, nausea and vomiting.   Endocrine: Negative for hot flashes.   Genitourinary:  Positive for decreased libido. Negative for dyspareunia, dysuria, flank pain, frequency, genital sores, hematuria, pelvic pain, urgency, vaginal bleeding, vaginal discharge, vaginal pain, urinary incontinence, postcoital bleeding, postmenopausal bleeding and vaginal odor.   Integumentary:  Negative for rash, hair changes, breast mass, nipple discharge and breast skin changes.   Psychiatric/Behavioral:  Negative for depression. The patient is not nervous/anxious.    Breast: Negative for mass, mastodynia, nipple  discharge and skin changes        Objective:    Physical Exam:   Constitutional: She is oriented to person, place, and time. She appears well-developed and well-nourished. No distress.                           Neurological: She is alert and oriented to person, place, and time.     Psychiatric: She has a normal mood and affect. Her behavior is normal. Judgment and thought content normal.        Assessment:        1. Menopause syndrome                Plan:      Chasity was seen today for follow-up.    Diagnoses and all orders for this visit:    Menopause syndrome  -     testosterone cypionate injection 100 mg     After careful review of pt's history, I think it is reasonable to resume testosterone.  Pt seemed to benefit from it.    RTC 5-6 months for well-woman exam with pap.

## 2023-01-09 NOTE — PROGRESS NOTES
Two patient identifiers verified. Patient notified to wait in clinic 15 minutes after injection, patient verbalized understanding. Testosterone administered IM to patient's right ventrogluteal. Patient tolerated well. Next injection scheduled.

## 2023-01-23 RX ORDER — FUROSEMIDE 40 MG/1
40 TABLET ORAL DAILY
Qty: 5 TABLET | Refills: 0 | Status: SHIPPED | OUTPATIENT
Start: 2023-01-23 | End: 2023-02-27 | Stop reason: SDUPTHER

## 2023-01-23 NOTE — TELEPHONE ENCOUNTER
Refill Routing Note   Medication(s) are not appropriate for processing by [facility] for the following reason(s):      - Required laboratory values are abnormal    [facility] action(s):  Defer          Medication reconciliation completed: No     Appointments  past 12m or future 3m with PCP    Date Provider   Last Visit   12/23/2022 Dima Her MD   Next Visit   Visit date not found Dima Her MD   ED visits in past 90 days: 0        Note composed:2:26 PM 01/23/2023

## 2023-01-23 NOTE — TELEPHONE ENCOUNTER
No new care gaps identified.  [facility] Embedded Care Gaps. Reference number: [id number]. 1/23/2023   10:08:35 AM CST

## 2023-01-25 DIAGNOSIS — R19.5 POSITIVE COLORECTAL CANCER SCREENING USING COLOGUARD TEST: Primary | ICD-10-CM

## 2023-02-27 NOTE — TELEPHONE ENCOUNTER
No new care gaps identified.  [facility] Embedded Care Gaps. Reference number: [id number]. 2/27/2023   3:01:49 PM CST

## 2023-02-28 RX ORDER — FUROSEMIDE 40 MG/1
40 TABLET ORAL DAILY
Qty: 5 TABLET | Refills: 0 | Status: SHIPPED | OUTPATIENT
Start: 2023-02-28 | End: 2023-06-13 | Stop reason: SDUPTHER

## 2023-02-28 NOTE — TELEPHONE ENCOUNTER
Refill Routing Note   Medication(s) are not appropriate for processing by [facility] for the following reason(s):       Required labs abnormal    [facility] action(s):  Defer         Appointments  past 12m or future 3m with PCP    Date Provider   Last Visit   12/23/2022 Dima Her MD   Next Visit   Visit date not found Dima Her MD   ED visits in past 90 days: 0        Note composed:10:56 AM 02/28/2023

## 2023-03-22 ENCOUNTER — CLINICAL SUPPORT (OUTPATIENT)
Dept: OBSTETRICS AND GYNECOLOGY | Facility: CLINIC | Age: 58
End: 2023-03-22
Payer: COMMERCIAL

## 2023-03-22 DIAGNOSIS — N95.1 MENOPAUSE SYNDROME: Primary | ICD-10-CM

## 2023-03-22 PROCEDURE — 99999 PR PBB SHADOW E&M-EST. PATIENT-LVL III: ICD-10-PCS | Mod: PBBFAC,,,

## 2023-03-22 PROCEDURE — 99999 PR PBB SHADOW E&M-EST. PATIENT-LVL III: CPT | Mod: PBBFAC,,,

## 2023-03-22 PROCEDURE — 96372 PR INJECTION,THERAP/PROPH/DIAG2ST, IM OR SUBCUT: ICD-10-PCS | Mod: S$GLB,,, | Performed by: OBSTETRICS & GYNECOLOGY

## 2023-03-22 PROCEDURE — 96372 THER/PROPH/DIAG INJ SC/IM: CPT | Mod: S$GLB,,, | Performed by: OBSTETRICS & GYNECOLOGY

## 2023-03-22 RX ADMIN — TESTOSTERONE CYPIONATE 100 MG: 200 INJECTION, SOLUTION INTRAMUSCULAR at 11:03

## 2023-03-22 NOTE — PROGRESS NOTES
Per Dr Vitor iglesias to administer.  Patient in clinic today for menopause syndrome.   Two pt identifiers identified   Patient notified to wait 15 minutes after recieiving injection, patient verbalized understanding.   100 mg testosterone  administered IM to patients left  ventrogluteal.  Patient tolerated well.  Next injection scheduled.

## 2023-03-24 ENCOUNTER — OFFICE VISIT (OUTPATIENT)
Dept: FAMILY MEDICINE | Facility: CLINIC | Age: 58
End: 2023-03-24
Payer: COMMERCIAL

## 2023-03-24 DIAGNOSIS — I10 HYPERTENSION NOT AT GOAL: ICD-10-CM

## 2023-03-24 DIAGNOSIS — E61.1 IRON DEFICIENCY: ICD-10-CM

## 2023-03-24 DIAGNOSIS — F98.8 ATTENTION DEFICIT DISORDER (ADD) WITHOUT HYPERACTIVITY: Primary | ICD-10-CM

## 2023-03-24 PROCEDURE — 99214 OFFICE O/P EST MOD 30 MIN: CPT | Mod: 95,,, | Performed by: FAMILY MEDICINE

## 2023-03-24 PROCEDURE — 4010F PR ACE/ARB THEARPY RXD/TAKEN: ICD-10-PCS | Mod: CPTII,95,, | Performed by: FAMILY MEDICINE

## 2023-03-24 PROCEDURE — 99214 PR OFFICE/OUTPT VISIT, EST, LEVL IV, 30-39 MIN: ICD-10-PCS | Mod: 95,,, | Performed by: FAMILY MEDICINE

## 2023-03-24 PROCEDURE — 4010F ACE/ARB THERAPY RXD/TAKEN: CPT | Mod: CPTII,95,, | Performed by: FAMILY MEDICINE

## 2023-03-24 RX ORDER — LISDEXAMFETAMINE DIMESYLATE 40 MG/1
40 CAPSULE ORAL DAILY
Qty: 30 CAPSULE | Refills: 0 | Status: SHIPPED | OUTPATIENT
Start: 2023-06-06 | End: 2023-08-28 | Stop reason: SDUPTHER

## 2023-03-24 RX ORDER — LISDEXAMFETAMINE DIMESYLATE 40 MG/1
40 CAPSULE ORAL DAILY
Qty: 30 CAPSULE | Refills: 0 | Status: SHIPPED | OUTPATIENT
Start: 2023-04-06 | End: 2023-08-28 | Stop reason: SDUPTHER

## 2023-03-24 RX ORDER — LISDEXAMFETAMINE DIMESYLATE 40 MG/1
40 CAPSULE ORAL DAILY
Qty: 30 CAPSULE | Refills: 0 | Status: SHIPPED | OUTPATIENT
Start: 2023-05-06 | End: 2023-08-28 | Stop reason: SDUPTHER

## 2023-03-24 NOTE — PROGRESS NOTES
Chief Complaint:    No chief complaint on file.      History of Present Illness:  Patient with ADD presents today for a medication refill.     She says home blood pressure readings are okay she do not know the numbers   Requesting a refill of ADD med   She also had iron deficiency is taking iron once ever low check     Has decreased her smoking. Has smoking for 22 years.      ROS:  Review of Systems   Constitutional:  Negative for activity change, appetite change, chills, fever and unexpected weight change.   HENT:  Negative for congestion, ear pain, hearing loss, postnasal drip, rhinorrhea, sinus pressure, sinus pain and trouble swallowing.    Eyes:  Negative for pain, discharge and visual disturbance.   Respiratory:  Negative for cough, chest tightness, shortness of breath and wheezing.    Cardiovascular:  Negative for chest pain, palpitations and leg swelling.   Gastrointestinal:  Negative for abdominal pain, blood in stool, constipation, diarrhea, nausea and vomiting.   Endocrine: Negative for polydipsia and polyuria.   Genitourinary:  Negative for difficulty urinating, dysuria, flank pain, hematuria and menstrual problem.   Musculoskeletal:  Negative for arthralgias, joint swelling, myalgias and neck pain.   Skin:  Negative for pallor, rash and wound.   Neurological:  Negative for dizziness, tremors, speech difficulty, weakness, light-headedness and headaches.   Psychiatric/Behavioral:  Negative for behavioral problems, confusion, dysphoric mood and sleep disturbance. The patient is not nervous/anxious.    All other systems reviewed and are negative.    Past Medical History:   Diagnosis Date    ADD (attention deficit disorder with hyperactivity)     Anxiety     Asthma     mild intermittent    COPD (chronic obstructive pulmonary disease)     Depression     Hypertension        Social History:  Social History     Socioeconomic History    Marital status:    Tobacco Use    Smoking status: Every Day      Packs/day: 0.50     Years: 10.00     Pack years: 5.00     Types: Cigarettes    Smokeless tobacco: Never   Substance and Sexual Activity    Alcohol use: No     Alcohol/week: 0.0 standard drinks    Drug use: No    Sexual activity: Yes     Partners: Male     Birth control/protection: None     Social Determinants of Health     Financial Resource Strain: Low Risk     Difficulty of Paying Living Expenses: Not hard at all   Food Insecurity: No Food Insecurity    Worried About Running Out of Food in the Last Year: Never true    Ran Out of Food in the Last Year: Never true   Transportation Needs: No Transportation Needs    Lack of Transportation (Medical): No    Lack of Transportation (Non-Medical): No   Physical Activity: Insufficiently Active    Days of Exercise per Week: 3 days    Minutes of Exercise per Session: 20 min   Stress: No Stress Concern Present    Feeling of Stress : Only a little   Social Connections: Unknown    Frequency of Communication with Friends and Family: Twice a week    Frequency of Social Gatherings with Friends and Family: Once a week    Active Member of Clubs or Organizations: Yes    Attends Club or Organization Meetings: 1 to 4 times per year    Marital Status:    Housing Stability: Low Risk     Unable to Pay for Housing in the Last Year: No    Number of Places Lived in the Last Year: 1    Unstable Housing in the Last Year: No       Family History:   family history includes Coronary artery disease (age of onset: 38) in her father; Coronary artery disease (age of onset: 50) in her mother; Lung cancer in her mother.    Health Maintenance   Topic Date Due    Mammogram  07/13/2023    Lipid Panel  09/08/2027    TETANUS VACCINE  10/29/2027    Hepatitis C Screening  Completed       Physical Exam:     ]    There is no height or weight on file to calculate BMI.    Physical Exam      Assessment:      ICD-10-CM ICD-9-CM   1. Attention deficit disorder (ADD) without hyperactivity  F98.8 314.00   2.  Hypertension not at goal  I10 401.9   3. Iron deficiency  E61.1 280.9     Plan:     checked, Vyvanse prescription sent to the pharmacy   Check labs as below   Please monitor home blood pressure readings and send us the numbers with the next 1-2 weeks   She says she is schedule a Pap smear with gyn     The patient location is: Home   The chief complaint leading to consultation is: as below  Visit type: Virtual visit with synchronous audio and video  Total time spent with patient: 20+ mins  Each patient to whom he or she provides medical services by telemedicine is:  (1) informed of the relationship between the physician and patient and the respective role of any other health care provider with respect to management of the patient; and (2) notified that he or she may decline to receive medical services by telemedicine and may withdraw from such care at any time.    Notes: see below        Orders Placed This Encounter   Procedures    CBC Auto Differential    Comprehensive Metabolic Panel    Lipid Panel    Iron and TIBC       Current Outpatient Medications   Medication Sig Dispense Refill    albuterol (ACCUNEB) 1.25 mg/3 mL Nebu USE ONE VIAL IN NEBULIZER EVERY 4 TO 6 HOURS AS NEEDED FOR WHEEZING & SHORTNESS OF BREATH 90 mL 6    albuterol (PROAIR HFA) 90 mcg/actuation inhaler INHALE TWO PUFFS INTO THE LUNGS EVERY 6 HOURS AS NEEDED FOR WHEEZING 25.5 g 2    albuterol-ipratropium (DUO-NEB) 2.5 mg-0.5 mg/3 mL nebulizer solution Inhale contents of 1 vial (3 mLs) by nebulization every 6 (six) hours as needed for Wheezing. Rescue 90 mL 2    aspirin 81 MG Chew Take 1 tablet (81 mg total) by mouth once daily. 30 tablet 0    busPIRone (BUSPAR) 10 MG tablet Take 1 tablet (10 mg total) by mouth 2 (two) times daily. 60 tablet 5    clindamycin (CLEOCIN T) 1 % external solution Apply topically 2 (two) times daily. 60 mL 1    EPINEPHrine (EPIPEN) 0.3 mg/0.3 mL AtIn INJECT 0.3mL INTRAMUSCULARLY X1 DOSE THANK YOU  12    estradioL  (ESTRACE) 2 MG tablet Take 1 tablet (2 mg total) by mouth once daily. 90 tablet 3    fluticasone-salmeterol diskus inhaler 250-50 mcg Inhale 1 puff into the lungs every 12 (twelve) hours. 60 each 2    furosemide (LASIX) 40 MG tablet Take 1 tablet (40 mg total) by mouth once daily. 5 tablet 0    hydrOXYzine pamoate (VISTARIL) 50 MG Cap TAKE TWO CAPSULES BY MOUTH DAILY AS NEEDED FOR ITCHING THANK YOU  0    levocetirizine (XYZAL) 5 MG tablet TAKE ONE TABLET BY MOUTH EVERY EVENING **THANK YOU** 30 tablet 5    [START ON 6/6/2023] lisdexamfetamine (VYVANSE) 40 MG Cap Take 1 capsule (40 mg total) by mouth once daily. 30 capsule 0    [START ON 5/6/2023] lisdexamfetamine (VYVANSE) 40 MG Cap Take 1 capsule (40 mg total) by mouth once daily. 30 capsule 0    [START ON 4/6/2023] lisdexamfetamine (VYVANSE) 40 MG Cap Take 1 capsule (40 mg total) by mouth once daily. 30 capsule 0    LORazepam (ATIVAN) 0.5 MG tablet Take 1 tablet (0.5 mg total) by mouth every 12 (twelve) hours as needed for Anxiety. 14 tablet 0    montelukast (SINGULAIR) 10 mg tablet Take 1 tablet (10 mg total) by mouth once daily. 90 tablet 3    mupirocin (BACTROBAN) 2 % ointment APPLY TO AFFECTED AREA TOPICALLY THREE TIMES DAILY FOR 7 DAYS THANK YOU  0    omeprazole (PRILOSEC) 40 MG capsule Take 1 capsule (40 mg total) by mouth every morning. 90 capsule 3    ondansetron (ZOFRAN-ODT) 4 MG TbDL Dissolve 1 tablet (4 mg total) by mouth every 8 (eight) hours as needed. 14 tablet 1    potassium chloride SA (K-DUR,KLOR-CON) 20 MEQ tablet Take 1 tablet (20 mEq total) by mouth once daily. 5 tablet 0    progesterone (PROMETRIUM) 100 MG capsule Take 1 capsule (100 mg total) by mouth nightly. 30 capsule 11    propranoloL (INDERAL LA) 80 MG 24 hr capsule Take 1 capsule (80 mg total) by mouth once daily. 90 capsule 3    valsartan (DIOVAN) 320 MG tablet Take 1 tablet (320 mg total) by mouth once daily. 90 tablet 3    venlafaxine (EFFEXOR-XR) 37.5 MG 24 hr capsule Take 1 capsule  (37.5 mg total) by mouth once daily. 30 capsule 11     Current Facility-Administered Medications   Medication Dose Route Frequency Provider Last Rate Last Admin    testosterone cypionate injection 100 mg  100 mg Intramuscular Q28 Days Gem Woods MD   100 mg at 03/22/23 1117       Medications Discontinued During This Encounter   Medication Reason    lisdexamfetamine (VYVANSE) 40 MG Cap Reorder    lisdexamfetamine (VYVANSE) 40 MG Cap Reorder    lisdexamfetamine (VYVANSE) 40 MG Cap Reorder       No follow-ups on file.      Dima Her MD  Scribe Attestation:   I, Nicole Baker, am scribing for, and in the presence of, Dr.Arif Her I performed the above scribed service and the documentation accurately describes the services I performed. I attest to the accuracy of the note.    I, Dr. Dima Her, reviewed documentation as scribed above. I performed the services described in this documentation.  I agree that the record reflects my personal performance and is accurate and complete. Dima Her MD.  03/24/2023

## 2023-03-29 ENCOUNTER — PES CALL (OUTPATIENT)
Dept: ADMINISTRATIVE | Facility: CLINIC | Age: 58
End: 2023-03-29
Payer: COMMERCIAL

## 2023-04-01 NOTE — PROGRESS NOTES
Contacted patient as a reminder for pap smear. Patient states she will schedule an appointment with Dr. Her in a bout 30 days to have the pap smear done.  
Wounds

## 2023-04-19 ENCOUNTER — PATIENT MESSAGE (OUTPATIENT)
Dept: OBSTETRICS AND GYNECOLOGY | Facility: CLINIC | Age: 58
End: 2023-04-19
Payer: COMMERCIAL

## 2023-05-08 DIAGNOSIS — T75.3XXD MOTION SICKNESS, SUBSEQUENT ENCOUNTER: ICD-10-CM

## 2023-05-09 RX ORDER — ONDANSETRON 4 MG/1
4 TABLET, ORALLY DISINTEGRATING ORAL EVERY 8 HOURS PRN
Qty: 14 TABLET | Refills: 1 | Status: ON HOLD | OUTPATIENT
Start: 2023-05-09 | End: 2024-03-19

## 2023-05-09 NOTE — TELEPHONE ENCOUNTER
No care due was identified.  [facility] Embedded Care Due Messages. Reference number: [id number].   5/08/2023 8:34:34 PM CDT

## 2023-05-11 ENCOUNTER — PATIENT MESSAGE (OUTPATIENT)
Dept: FAMILY MEDICINE | Facility: CLINIC | Age: 58
End: 2023-05-11

## 2023-05-11 ENCOUNTER — OFFICE VISIT (OUTPATIENT)
Dept: FAMILY MEDICINE | Facility: CLINIC | Age: 58
End: 2023-05-11
Payer: COMMERCIAL

## 2023-05-11 DIAGNOSIS — B96.89 ACUTE BACTERIAL BRONCHITIS: Primary | ICD-10-CM

## 2023-05-11 DIAGNOSIS — J20.8 ACUTE BACTERIAL BRONCHITIS: Primary | ICD-10-CM

## 2023-05-11 PROCEDURE — 99213 OFFICE O/P EST LOW 20 MIN: CPT | Mod: 95,,, | Performed by: FAMILY MEDICINE

## 2023-05-11 PROCEDURE — 99213 PR OFFICE/OUTPT VISIT, EST, LEVL III, 20-29 MIN: ICD-10-PCS | Mod: 95,,, | Performed by: FAMILY MEDICINE

## 2023-05-11 PROCEDURE — 4010F ACE/ARB THERAPY RXD/TAKEN: CPT | Mod: CPTII,95,, | Performed by: FAMILY MEDICINE

## 2023-05-11 PROCEDURE — 4010F PR ACE/ARB THEARPY RXD/TAKEN: ICD-10-PCS | Mod: CPTII,95,, | Performed by: FAMILY MEDICINE

## 2023-05-11 RX ORDER — DOXYCYCLINE HYCLATE 100 MG
100 TABLET ORAL EVERY 12 HOURS
Qty: 20 TABLET | Refills: 0 | Status: SHIPPED | OUTPATIENT
Start: 2023-05-11 | End: 2023-05-21

## 2023-05-11 RX ORDER — PREDNISONE 50 MG/1
50 TABLET ORAL DAILY
Qty: 5 TABLET | Refills: 0 | Status: SHIPPED | OUTPATIENT
Start: 2023-05-11 | End: 2023-05-16

## 2023-05-11 RX ORDER — BENZONATATE 200 MG/1
200 CAPSULE ORAL 3 TIMES DAILY PRN
Qty: 30 CAPSULE | Refills: 0 | Status: SHIPPED | OUTPATIENT
Start: 2023-05-11 | End: 2023-05-21

## 2023-05-11 NOTE — TELEPHONE ENCOUNTER
C/o cough and congestions. Asking to be added onto schedule virtually today or tomorrow   We are at 104% today and 100% tomorrow

## 2023-05-11 NOTE — PROGRESS NOTES
Chief Complaint:  No chief complaint on file.      History of Present Illness:    Patient presents today  Cough congestion wheezing with the last few days yellow-green sputum    ROS:  Review of Systems   Constitutional:  Negative for appetite change, chills and fever.   HENT:  Positive for congestion. Negative for ear discharge, ear pain, facial swelling, mouth sores, postnasal drip, rhinorrhea, sinus pressure, sneezing, sore throat, trouble swallowing and voice change.    Eyes:  Negative for discharge, redness and itching.   Respiratory:  Positive for cough. Negative for chest tightness, shortness of breath and wheezing.    Cardiovascular:  Negative for chest pain.     Past Medical History:   Diagnosis Date    ADD (attention deficit disorder with hyperactivity)     Anxiety     Asthma     mild intermittent    COPD (chronic obstructive pulmonary disease)     Depression     Hypertension        Social History:  Social History     Socioeconomic History    Marital status:    Tobacco Use    Smoking status: Every Day     Packs/day: 0.50     Years: 10.00     Pack years: 5.00     Types: Cigarettes    Smokeless tobacco: Never   Substance and Sexual Activity    Alcohol use: No     Alcohol/week: 0.0 standard drinks    Drug use: No    Sexual activity: Yes     Partners: Male     Birth control/protection: None     Social Determinants of Health     Financial Resource Strain: Low Risk     Difficulty of Paying Living Expenses: Not hard at all   Food Insecurity: No Food Insecurity    Worried About Running Out of Food in the Last Year: Never true    Ran Out of Food in the Last Year: Never true   Transportation Needs: No Transportation Needs    Lack of Transportation (Medical): No    Lack of Transportation (Non-Medical): No   Physical Activity: Insufficiently Active    Days of Exercise per Week: 1 day    Minutes of Exercise per Session: 20 min   Stress: No Stress Concern Present    Feeling of Stress : Only a little   Social  Connections: Unknown    Frequency of Communication with Friends and Family: Twice a week    Frequency of Social Gatherings with Friends and Family: Once a week    Active Member of Clubs or Organizations: Yes    Attends Club or Organization Meetings: 1 to 4 times per year    Marital Status:    Housing Stability: Low Risk     Unable to Pay for Housing in the Last Year: No    Number of Places Lived in the Last Year: 1    Unstable Housing in the Last Year: No       Family History:   family history includes Coronary artery disease (age of onset: 38) in her father; Coronary artery disease (age of onset: 50) in her mother; Lung cancer in her mother.    Health Maintenance   Topic Date Due    Mammogram  07/13/2023    Lipid Panel  09/08/2027    TETANUS VACCINE  10/29/2027    Hepatitis C Screening  Completed       Exam:Physical      ]    There is no height or weight on file to calculate BMI.    Physical Exam      Assessment:      ICD-10-CM ICD-9-CM   1. Acute bacterial bronchitis  J20.8 466.0    B96.89 041.9         Plan:  Diagnoses and all orders for this visit:    Acute bacterial bronchitis    Other orders  -     doxycycline (VIBRA-TABS) 100 MG tablet; Take 1 tablet (100 mg total) by mouth every 12 (twelve) hours. for 10 days  -     predniSONE (DELTASONE) 50 MG Tab; Take 1 tablet (50 mg total) by mouth once daily. for 5 days  -     benzonatate (TESSALON) 200 MG capsule; Take 1 capsule (200 mg total) by mouth 3 (three) times daily as needed for Cough.                No follow-ups on file.      Dima Her MD              Answers submitted by the patient for this visit:  Sore Throat Questionnaire (Submitted on 5/11/2023)  Chief Complaint: Sore throat  Chronicity: new  Onset: yesterday  Progression since onset: gradually worsening  Pain worse on: neither  Fever: 100 - 100.9 F  Fever duration: less than 1 day  Pain - numeric: 2/10  hoarse voice: No  plugged ear sensation: No  swollen glands: No  strep: No  mono:  No  Treatments tried: NSAIDs  Improvement on treatment: no relief  Pain severity: mild

## 2023-05-21 ENCOUNTER — PATIENT MESSAGE (OUTPATIENT)
Dept: FAMILY MEDICINE | Facility: CLINIC | Age: 58
End: 2023-05-21
Payer: COMMERCIAL

## 2023-05-22 RX ORDER — NIFEDIPINE 30 MG/1
30 TABLET, EXTENDED RELEASE ORAL DAILY
Qty: 30 TABLET | Refills: 11 | Status: SHIPPED | OUTPATIENT
Start: 2023-05-22 | End: 2023-07-04 | Stop reason: SDUPTHER

## 2023-05-22 NOTE — TELEPHONE ENCOUNTER
No care due was identified.  [facility] Embedded Care Due Messages. Reference number: [id number].   5/22/2023 2:03:35 PM CDT

## 2023-05-26 RX ORDER — VENLAFAXINE HYDROCHLORIDE 37.5 MG/1
37.5 CAPSULE, EXTENDED RELEASE ORAL DAILY
Qty: 90 CAPSULE | Refills: 1 | Status: SHIPPED | OUTPATIENT
Start: 2023-05-26 | End: 2023-07-10

## 2023-05-26 NOTE — TELEPHONE ENCOUNTER
No care due was identified.  [facility] Embedded Care Due Messages. Reference number: [id number].   5/25/2023 8:43:26 PM CDT

## 2023-05-30 ENCOUNTER — PATIENT MESSAGE (OUTPATIENT)
Dept: FAMILY MEDICINE | Facility: CLINIC | Age: 58
End: 2023-05-30
Payer: COMMERCIAL

## 2023-05-30 VITALS — SYSTOLIC BLOOD PRESSURE: 130 MMHG | DIASTOLIC BLOOD PRESSURE: 85 MMHG

## 2023-06-11 DIAGNOSIS — N95.1 MENOPAUSAL HOT FLUSHES: ICD-10-CM

## 2023-06-12 RX ORDER — PROGESTERONE 100 MG/1
100 CAPSULE ORAL NIGHTLY
Qty: 30 CAPSULE | Refills: 11 | Status: SHIPPED | OUTPATIENT
Start: 2023-06-12 | End: 2023-12-05 | Stop reason: SDUPTHER

## 2023-06-14 RX ORDER — FUROSEMIDE 40 MG/1
40 TABLET ORAL DAILY
Qty: 5 TABLET | Refills: 0 | Status: SHIPPED | OUTPATIENT
Start: 2023-06-14 | End: 2023-08-11 | Stop reason: SDUPTHER

## 2023-06-14 NOTE — TELEPHONE ENCOUNTER
No care due was identified.  [facility] Embedded Care Due Messages. Reference number: [id number].   6/13/2023 9:39:42 PM CDT

## 2023-06-14 NOTE — TELEPHONE ENCOUNTER
Refill Routing Note   Medication(s) are not appropriate for processing by [facility] for the following reason(s):      Required labs abnormal    [facility] action(s):  Defer None identified            Appointments  past 12m or future 3m with PCP    Date Provider   Last Visit   5/11/2023 Dima Her MD   Next Visit   Visit date not found Dima Her MD   ED visits in past 90 days: 0        Note composed:10:21 AM 06/14/2023

## 2023-07-04 DIAGNOSIS — J45.21 MILD INTERMITTENT ASTHMA WITH ACUTE EXACERBATION: ICD-10-CM

## 2023-07-04 DIAGNOSIS — I10 HYPERTENSION, UNSPECIFIED TYPE: ICD-10-CM

## 2023-07-04 DIAGNOSIS — I10 HYPERTENSION NOT AT GOAL: Primary | ICD-10-CM

## 2023-07-04 RX ORDER — ALBUTEROL SULFATE 90 UG/1
AEROSOL, METERED RESPIRATORY (INHALATION)
Qty: 25.5 G | Refills: 1 | Status: SHIPPED | OUTPATIENT
Start: 2023-07-04 | End: 2024-01-31 | Stop reason: SDUPTHER

## 2023-07-04 NOTE — TELEPHONE ENCOUNTER
Care Due:                  Date            Visit Type   Department     Provider  --------------------------------------------------------------------------------                                ESTABLISHED                              PATIENT -    [facility]  Last Visit: 05-      VIRTUAL      MEDICINE       Dima Her  Next Visit: None Scheduled  None         None Found                                                            Last  Test          Frequency    Reason                     Performed    Due Date  --------------------------------------------------------------------------------    CMP.........  12 months..  furosemide, potassium,     09-   09-                             valsartan, venlafaxine...    [facility] Embedded Care Due Messages. Reference number: [id number].   7/04/2023 10:13:45 AM CDT

## 2023-07-04 NOTE — TELEPHONE ENCOUNTER
Provider Staff:  Action required for this patient     Please see care gap opportunities below in Care Due Message.    Thanks!  [facility]     Appointments      Date Provider   Last Visit   5/11/2023 Dima Her MD   Next Visit   7/4/2023 Dima Her MD     Refill Decision Note   Chasity Blake  is requesting a refill authorization.  Brief Assessment and Rationale for Refill:  Approve     Medication Therapy Plan:         Comments:     Note composed:10:26 AM 07/04/2023

## 2023-07-04 NOTE — TELEPHONE ENCOUNTER
No care due was identified.  [facility] Embedded Care Due Messages. Reference number: [id number].   7/04/2023 10:14:20 AM CDT

## 2023-07-05 RX ORDER — NIFEDIPINE 30 MG/1
30 TABLET, EXTENDED RELEASE ORAL DAILY
Qty: 30 TABLET | Refills: 11 | Status: SHIPPED | OUTPATIENT
Start: 2023-07-05 | End: 2023-11-29 | Stop reason: SDUPTHER

## 2023-07-10 ENCOUNTER — OFFICE VISIT (OUTPATIENT)
Dept: FAMILY MEDICINE | Facility: CLINIC | Age: 58
End: 2023-07-10
Payer: COMMERCIAL

## 2023-07-10 DIAGNOSIS — F41.9 ANXIETY: ICD-10-CM

## 2023-07-10 DIAGNOSIS — F98.8 ATTENTION DEFICIT DISORDER (ADD) WITHOUT HYPERACTIVITY: Primary | ICD-10-CM

## 2023-07-10 PROCEDURE — 99214 OFFICE O/P EST MOD 30 MIN: CPT | Mod: 95,,, | Performed by: FAMILY MEDICINE

## 2023-07-10 PROCEDURE — 4010F PR ACE/ARB THEARPY RXD/TAKEN: ICD-10-PCS | Mod: CPTII,95,, | Performed by: FAMILY MEDICINE

## 2023-07-10 PROCEDURE — 4010F ACE/ARB THERAPY RXD/TAKEN: CPT | Mod: CPTII,95,, | Performed by: FAMILY MEDICINE

## 2023-07-10 PROCEDURE — 99214 PR OFFICE/OUTPT VISIT, EST, LEVL IV, 30-39 MIN: ICD-10-PCS | Mod: 95,,, | Performed by: FAMILY MEDICINE

## 2023-07-10 RX ORDER — METHYLPHENIDATE HYDROCHLORIDE 54 MG/1
54 TABLET ORAL EVERY MORNING
Qty: 30 TABLET | Refills: 0 | Status: SHIPPED | OUTPATIENT
Start: 2023-07-10 | End: 2023-08-03 | Stop reason: SDUPTHER

## 2023-07-10 RX ORDER — VENLAFAXINE HYDROCHLORIDE 75 MG/1
75 CAPSULE, EXTENDED RELEASE ORAL DAILY
Qty: 90 CAPSULE | Refills: 2 | Status: SHIPPED | OUTPATIENT
Start: 2023-07-10 | End: 2023-10-09 | Stop reason: SDUPTHER

## 2023-07-10 NOTE — PROGRESS NOTES
Chief Complaint:    No chief complaint on file.      History of Present Illness:  Patient with ADD presents today for a medication refill.     Requesting a refill of ADD med   Wants to try something else because pharmacy does not have vyvshyann  She also had iron deficiency is taking iron once ever low check     Has decreased her smoking. Has smoking for 22 years.      ROS:  Review of Systems   Constitutional:  Negative for activity change, appetite change, chills, fever and unexpected weight change.   HENT:  Negative for congestion, ear pain, hearing loss, postnasal drip, rhinorrhea, sinus pressure, sinus pain and trouble swallowing.    Eyes:  Negative for pain, discharge and visual disturbance.   Respiratory:  Negative for cough, chest tightness, shortness of breath and wheezing.    Cardiovascular:  Negative for chest pain, palpitations and leg swelling.   Gastrointestinal:  Negative for abdominal pain, blood in stool, constipation, diarrhea, nausea and vomiting.   Endocrine: Negative for polydipsia and polyuria.   Genitourinary:  Negative for difficulty urinating, dysuria, flank pain, hematuria and menstrual problem.   Musculoskeletal:  Negative for arthralgias, joint swelling, myalgias and neck pain.   Skin:  Negative for pallor, rash and wound.   Neurological:  Negative for dizziness, tremors, speech difficulty, weakness, light-headedness and headaches.   Psychiatric/Behavioral:  Negative for behavioral problems, confusion, dysphoric mood and sleep disturbance. The patient is not nervous/anxious.    All other systems reviewed and are negative.    Past Medical History:   Diagnosis Date    ADD (attention deficit disorder with hyperactivity)     Anxiety     Asthma     mild intermittent    COPD (chronic obstructive pulmonary disease)     Depression     Hypertension        Social History:  Social History     Socioeconomic History    Marital status:    Tobacco Use    Smoking status: Every Day     Packs/day:  0.50     Years: 10.00     Pack years: 5.00     Types: Cigarettes    Smokeless tobacco: Never   Substance and Sexual Activity    Alcohol use: No     Alcohol/week: 0.0 standard drinks    Drug use: No    Sexual activity: Yes     Partners: Male     Birth control/protection: None     Social Determinants of Health     Financial Resource Strain: Low Risk     Difficulty of Paying Living Expenses: Not very hard   Food Insecurity: No Food Insecurity    Worried About Running Out of Food in the Last Year: Never true    Ran Out of Food in the Last Year: Never true   Transportation Needs: No Transportation Needs    Lack of Transportation (Medical): No    Lack of Transportation (Non-Medical): No   Physical Activity: Insufficiently Active    Days of Exercise per Week: 1 day    Minutes of Exercise per Session: 10 min   Stress: No Stress Concern Present    Feeling of Stress : Only a little   Social Connections: Unknown    Frequency of Communication with Friends and Family: Once a week    Frequency of Social Gatherings with Friends and Family: Once a week    Active Member of Clubs or Organizations: Yes    Attends Club or Organization Meetings: 1 to 4 times per year    Marital Status:    Housing Stability: Low Risk     Unable to Pay for Housing in the Last Year: No    Number of Places Lived in the Last Year: 1    Unstable Housing in the Last Year: No       Family History:   family history includes Coronary artery disease (age of onset: 38) in her father; Coronary artery disease (age of onset: 50) in her mother; Lung cancer in her mother.    Health Maintenance   Topic Date Due    Mammogram  07/13/2023    Lipid Panel  09/08/2027    TETANUS VACCINE  10/29/2027    Hepatitis C Screening  Completed       Physical Exam:     ]    There is no height or weight on file to calculate BMI.    Physical Exam      Assessment:      ICD-10-CM ICD-9-CM   1. Attention deficit disorder (ADD) without hyperactivity  F98.8 314.00   2. Anxiety  F41.9  300.00     Plan:     checked, also try something else because pharmacy does not have Vyvanse anymore.  Prescription sent for Concerta  Increase venlafaxine to 75 mg     Next visit needs to in-person visit and will also need a Pap smear and mammogram the time.  She says she is schedule a Pap smear with gyn     The patient location is: Home   The chief complaint leading to consultation is: as below  Visit type: Virtual visit with synchronous audio and video  Total time spent with patient: 20+ mins  Each patient to whom he or she provides medical services by telemedicine is:  (1) informed of the relationship between the physician and patient and the respective role of any other health care provider with respect to management of the patient; and (2) notified that he or she may decline to receive medical services by telemedicine and may withdraw from such care at any time.    Notes: see below        No orders of the defined types were placed in this encounter.      Current Outpatient Medications   Medication Sig Dispense Refill    albuterol (ACCUNEB) 1.25 mg/3 mL Nebu USE ONE VIAL IN NEBULIZER EVERY 4 TO 6 HOURS AS NEEDED FOR WHEEZING & SHORTNESS OF BREATH 90 mL 6    albuterol (PROAIR HFA) 90 mcg/actuation inhaler INHALE TWO PUFFS INTO THE LUNGS EVERY 6 HOURS AS NEEDED FOR WHEEZING 25.5 g 1    albuterol-ipratropium (DUO-NEB) 2.5 mg-0.5 mg/3 mL nebulizer solution Inhale contents of 1 vial (3 mLs) by nebulization every 6 (six) hours as needed for Wheezing. Rescue 90 mL 2    aspirin 81 MG Chew Take 1 tablet (81 mg total) by mouth once daily. 30 tablet 0    busPIRone (BUSPAR) 10 MG tablet Take 1 tablet (10 mg total) by mouth 2 (two) times daily. 60 tablet 5    clindamycin (CLEOCIN T) 1 % external solution Apply topically 2 (two) times daily. 60 mL 1    EPINEPHrine (EPIPEN) 0.3 mg/0.3 mL AtIn INJECT 0.3mL INTRAMUSCULARLY X1 DOSE THANK YOU  12    estradioL (ESTRACE) 2 MG tablet Take 1 tablet (2 mg total) by mouth once  daily. 90 tablet 3    fluticasone-salmeterol diskus inhaler 250-50 mcg Inhale 1 puff into the lungs every 12 (twelve) hours. 60 each 2    furosemide (LASIX) 40 MG tablet Take 1 tablet (40 mg total) by mouth once daily. 5 tablet 0    hydrOXYzine pamoate (VISTARIL) 50 MG Cap TAKE TWO CAPSULES BY MOUTH DAILY AS NEEDED FOR ITCHING THANK YOU  0    levocetirizine (XYZAL) 5 MG tablet TAKE ONE TABLET BY MOUTH EVERY EVENING **THANK YOU** 30 tablet 5    lisdexamfetamine (VYVANSE) 40 MG Cap Take 1 capsule (40 mg total) by mouth once daily. 30 capsule 0    lisdexamfetamine (VYVANSE) 40 MG Cap Take 1 capsule (40 mg total) by mouth once daily. 30 capsule 0    lisdexamfetamine (VYVANSE) 40 MG Cap Take 1 capsule (40 mg total) by mouth once daily. 30 capsule 0    LORazepam (ATIVAN) 0.5 MG tablet Take 1 tablet (0.5 mg total) by mouth every 12 (twelve) hours as needed for Anxiety. 14 tablet 0    methylphenidate HCl 54 MG CR tablet Take 1 tablet (54 mg total) by mouth every morning. 30 tablet 0    montelukast (SINGULAIR) 10 mg tablet Take 1 tablet (10 mg total) by mouth once daily. 90 tablet 3    mupirocin (BACTROBAN) 2 % ointment APPLY TO AFFECTED AREA TOPICALLY THREE TIMES DAILY FOR 7 DAYS THANK YOU  0    NIFEdipine (PROCARDIA-XL) 30 MG (OSM) 24 hr tablet Take 1 tablet (30 mg total) by mouth once daily. 30 tablet 11    omeprazole (PRILOSEC) 40 MG capsule Take 1 capsule (40 mg total) by mouth every morning. 90 capsule 3    ondansetron (ZOFRAN-ODT) 4 MG TbDL Dissolve 1 tablet (4 mg total) by mouth every 8 (eight) hours as needed. 14 tablet 1    potassium chloride SA (K-DUR,KLOR-CON) 20 MEQ tablet Take 1 tablet (20 mEq total) by mouth once daily. 5 tablet 0    progesterone (PROMETRIUM) 100 MG capsule Take 1 capsule (100 mg total) by mouth nightly. 30 capsule 11    propranoloL (INDERAL LA) 80 MG 24 hr capsule Take 1 capsule (80 mg total) by mouth once daily. 90 capsule 3    valsartan (DIOVAN) 320 MG tablet Take 1 tablet (320 mg total) by  mouth once daily. 90 tablet 3    venlafaxine (EFFEXOR-XR) 75 MG 24 hr capsule Take 1 capsule (75 mg total) by mouth once daily. 90 capsule 2     No current facility-administered medications for this visit.       Medications Discontinued During This Encounter   Medication Reason    venlafaxine (EFFEXOR-XR) 37.5 MG 24 hr capsule        No follow-ups on file.      Dima Her MD  Scribe Attestation:   I, Nicole Baker, am scribing for, and in the presence of, Dr.Arif Her I performed the above scribed service and the documentation accurately describes the services I performed. I attest to the accuracy of the note.    I, Dr. Dima Her, reviewed documentation as scribed above. I performed the services described in this documentation.  I agree that the record reflects my personal performance and is accurate and complete. Dima Her MD.  07/10/2023

## 2023-07-13 ENCOUNTER — PES CALL (OUTPATIENT)
Dept: ADMINISTRATIVE | Facility: CLINIC | Age: 58
End: 2023-07-13
Payer: COMMERCIAL

## 2023-07-20 DIAGNOSIS — K21.9 GASTROESOPHAGEAL REFLUX DISEASE WITHOUT ESOPHAGITIS: ICD-10-CM

## 2023-07-20 RX ORDER — OMEPRAZOLE 40 MG/1
40 CAPSULE, DELAYED RELEASE ORAL EVERY MORNING
Qty: 90 CAPSULE | Refills: 3 | Status: SHIPPED | OUTPATIENT
Start: 2023-07-20 | End: 2023-11-29 | Stop reason: SDUPTHER

## 2023-07-20 NOTE — TELEPHONE ENCOUNTER
Refill Decision Note   Chasity Blake  is requesting a refill authorization.  Brief Assessment and Rationale for Refill:  Approve     Medication Therapy Plan:         Comments:     Note composed:1:52 PM 07/20/2023

## 2023-07-20 NOTE — TELEPHONE ENCOUNTER
No care due was identified.  [facility] Embedded Care Due Messages. Reference number: [id number].   7/20/2023 12:42:06 PM CDT

## 2023-08-01 DIAGNOSIS — N95.1 MENOPAUSAL HOT FLUSHES: ICD-10-CM

## 2023-08-02 RX ORDER — ESTRADIOL 2 MG/1
2 TABLET ORAL DAILY
Qty: 90 TABLET | Refills: 3 | Status: SHIPPED | OUTPATIENT
Start: 2023-08-02 | End: 2023-12-05 | Stop reason: SDUPTHER

## 2023-08-03 ENCOUNTER — LAB VISIT (OUTPATIENT)
Dept: LAB | Facility: HOSPITAL | Age: 58
End: 2023-08-03
Attending: FAMILY MEDICINE
Payer: COMMERCIAL

## 2023-08-03 ENCOUNTER — OFFICE VISIT (OUTPATIENT)
Dept: FAMILY MEDICINE | Facility: CLINIC | Age: 58
End: 2023-08-03
Payer: COMMERCIAL

## 2023-08-03 VITALS
SYSTOLIC BLOOD PRESSURE: 129 MMHG | HEIGHT: 64 IN | OXYGEN SATURATION: 97 % | DIASTOLIC BLOOD PRESSURE: 72 MMHG | BODY MASS INDEX: 34.52 KG/M2 | HEART RATE: 76 BPM | WEIGHT: 202.19 LBS

## 2023-08-03 DIAGNOSIS — E61.1 LOW IRON: ICD-10-CM

## 2023-08-03 DIAGNOSIS — F43.9 STRESS: ICD-10-CM

## 2023-08-03 DIAGNOSIS — M79.604 PAIN IN BOTH LOWER EXTREMITIES: Primary | ICD-10-CM

## 2023-08-03 DIAGNOSIS — M79.605 PAIN IN BOTH LOWER EXTREMITIES: Primary | ICD-10-CM

## 2023-08-03 DIAGNOSIS — I10 PRIMARY HYPERTENSION: ICD-10-CM

## 2023-08-03 DIAGNOSIS — F98.8 ATTENTION DEFICIT DISORDER (ADD) WITHOUT HYPERACTIVITY: ICD-10-CM

## 2023-08-03 DIAGNOSIS — R21 RASH AND NONSPECIFIC SKIN ERUPTION: ICD-10-CM

## 2023-08-03 LAB
ALBUMIN SERPL BCP-MCNC: 3.5 G/DL (ref 3.5–5.2)
ALP SERPL-CCNC: 93 U/L (ref 55–135)
ALT SERPL W/O P-5'-P-CCNC: 10 U/L (ref 10–44)
ANION GAP SERPL CALC-SCNC: 10 MMOL/L (ref 8–16)
AST SERPL-CCNC: 11 U/L (ref 10–40)
BASOPHILS # BLD AUTO: 0.04 K/UL (ref 0–0.2)
BASOPHILS NFR BLD: 0.4 % (ref 0–1.9)
BILIRUB SERPL-MCNC: 0.3 MG/DL (ref 0.1–1)
BUN SERPL-MCNC: 7 MG/DL (ref 6–20)
CALCIUM SERPL-MCNC: 9.2 MG/DL (ref 8.7–10.5)
CHLORIDE SERPL-SCNC: 105 MMOL/L (ref 95–110)
CHOLEST SERPL-MCNC: 199 MG/DL (ref 120–199)
CHOLEST/HDLC SERPL: 4.7 {RATIO} (ref 2–5)
CO2 SERPL-SCNC: 24 MMOL/L (ref 23–29)
CREAT SERPL-MCNC: 0.7 MG/DL (ref 0.5–1.4)
DIFFERENTIAL METHOD: ABNORMAL
EOSINOPHIL # BLD AUTO: 0.5 K/UL (ref 0–0.5)
EOSINOPHIL NFR BLD: 4.3 % (ref 0–8)
ERYTHROCYTE [DISTWIDTH] IN BLOOD BY AUTOMATED COUNT: 13.1 % (ref 11.5–14.5)
EST. GFR  (NO RACE VARIABLE): >60 ML/MIN/1.73 M^2
GLUCOSE SERPL-MCNC: 105 MG/DL (ref 70–110)
HCT VFR BLD AUTO: 41.2 % (ref 37–48.5)
HDLC SERPL-MCNC: 42 MG/DL (ref 40–75)
HDLC SERPL: 21.1 % (ref 20–50)
HGB BLD-MCNC: 13.7 G/DL (ref 12–16)
IMM GRANULOCYTES # BLD AUTO: 0.05 K/UL (ref 0–0.04)
IMM GRANULOCYTES NFR BLD AUTO: 0.5 % (ref 0–0.5)
IRON SERPL-MCNC: 89 UG/DL (ref 30–160)
LDLC SERPL CALC-MCNC: 125 MG/DL (ref 63–159)
LYMPHOCYTES # BLD AUTO: 2.7 K/UL (ref 1–4.8)
LYMPHOCYTES NFR BLD: 25.5 % (ref 18–48)
MCH RBC QN AUTO: 31.6 PG (ref 27–31)
MCHC RBC AUTO-ENTMCNC: 33.3 G/DL (ref 32–36)
MCV RBC AUTO: 95 FL (ref 82–98)
MONOCYTES # BLD AUTO: 0.5 K/UL (ref 0.3–1)
MONOCYTES NFR BLD: 5 % (ref 4–15)
NEUTROPHILS # BLD AUTO: 6.8 K/UL (ref 1.8–7.7)
NEUTROPHILS NFR BLD: 64.3 % (ref 38–73)
NONHDLC SERPL-MCNC: 157 MG/DL
NRBC BLD-RTO: 0 /100 WBC
PLATELET # BLD AUTO: 341 K/UL (ref 150–450)
PMV BLD AUTO: 11.6 FL (ref 9.2–12.9)
POTASSIUM SERPL-SCNC: 3.9 MMOL/L (ref 3.5–5.1)
PROT SERPL-MCNC: 6.5 G/DL (ref 6–8.4)
RBC # BLD AUTO: 4.33 M/UL (ref 4–5.4)
SATURATED IRON: 22 % (ref 20–50)
SODIUM SERPL-SCNC: 139 MMOL/L (ref 136–145)
TOTAL IRON BINDING CAPACITY: 408 UG/DL (ref 250–450)
TRANSFERRIN SERPL-MCNC: 276 MG/DL (ref 200–375)
TRIGL SERPL-MCNC: 160 MG/DL (ref 30–150)
WBC # BLD AUTO: 10.61 K/UL (ref 3.9–12.7)

## 2023-08-03 PROCEDURE — 3078F DIAST BP <80 MM HG: CPT | Mod: CPTII,S$GLB,, | Performed by: FAMILY MEDICINE

## 2023-08-03 PROCEDURE — 3078F PR MOST RECENT DIASTOLIC BLOOD PRESSURE < 80 MM HG: ICD-10-PCS | Mod: CPTII,S$GLB,, | Performed by: FAMILY MEDICINE

## 2023-08-03 PROCEDURE — 3008F BODY MASS INDEX DOCD: CPT | Mod: CPTII,S$GLB,, | Performed by: FAMILY MEDICINE

## 2023-08-03 PROCEDURE — 36415 COLL VENOUS BLD VENIPUNCTURE: CPT | Mod: PO | Performed by: FAMILY MEDICINE

## 2023-08-03 PROCEDURE — 80061 LIPID PANEL: CPT | Performed by: FAMILY MEDICINE

## 2023-08-03 PROCEDURE — 99214 OFFICE O/P EST MOD 30 MIN: CPT | Mod: S$GLB,,, | Performed by: FAMILY MEDICINE

## 2023-08-03 PROCEDURE — 99214 PR OFFICE/OUTPT VISIT, EST, LEVL IV, 30-39 MIN: ICD-10-PCS | Mod: S$GLB,,, | Performed by: FAMILY MEDICINE

## 2023-08-03 PROCEDURE — 3074F SYST BP LT 130 MM HG: CPT | Mod: CPTII,S$GLB,, | Performed by: FAMILY MEDICINE

## 2023-08-03 PROCEDURE — 3074F PR MOST RECENT SYSTOLIC BLOOD PRESSURE < 130 MM HG: ICD-10-PCS | Mod: CPTII,S$GLB,, | Performed by: FAMILY MEDICINE

## 2023-08-03 PROCEDURE — 1159F MED LIST DOCD IN RCRD: CPT | Mod: CPTII,S$GLB,, | Performed by: FAMILY MEDICINE

## 2023-08-03 PROCEDURE — 99999 PR PBB SHADOW E&M-EST. PATIENT-LVL V: ICD-10-PCS | Mod: PBBFAC,,, | Performed by: FAMILY MEDICINE

## 2023-08-03 PROCEDURE — 4010F ACE/ARB THERAPY RXD/TAKEN: CPT | Mod: CPTII,S$GLB,, | Performed by: FAMILY MEDICINE

## 2023-08-03 PROCEDURE — 85025 COMPLETE CBC W/AUTO DIFF WBC: CPT | Performed by: FAMILY MEDICINE

## 2023-08-03 PROCEDURE — 84466 ASSAY OF TRANSFERRIN: CPT | Performed by: FAMILY MEDICINE

## 2023-08-03 PROCEDURE — 99999 PR PBB SHADOW E&M-EST. PATIENT-LVL V: CPT | Mod: PBBFAC,,, | Performed by: FAMILY MEDICINE

## 2023-08-03 PROCEDURE — 3008F PR BODY MASS INDEX (BMI) DOCUMENTED: ICD-10-PCS | Mod: CPTII,S$GLB,, | Performed by: FAMILY MEDICINE

## 2023-08-03 PROCEDURE — 1159F PR MEDICATION LIST DOCUMENTED IN MEDICAL RECORD: ICD-10-PCS | Mod: CPTII,S$GLB,, | Performed by: FAMILY MEDICINE

## 2023-08-03 PROCEDURE — 80053 COMPREHEN METABOLIC PANEL: CPT | Performed by: FAMILY MEDICINE

## 2023-08-03 PROCEDURE — 4010F PR ACE/ARB THEARPY RXD/TAKEN: ICD-10-PCS | Mod: CPTII,S$GLB,, | Performed by: FAMILY MEDICINE

## 2023-08-03 RX ORDER — METHYLPHENIDATE HYDROCHLORIDE 54 MG/1
54 TABLET ORAL EVERY MORNING
Qty: 30 TABLET | Refills: 0 | Status: SHIPPED | OUTPATIENT
Start: 2023-08-10 | End: 2023-08-28 | Stop reason: ALTCHOICE

## 2023-08-03 RX ORDER — METHYLPHENIDATE HYDROCHLORIDE 54 MG/1
54 TABLET ORAL EVERY MORNING
Qty: 30 TABLET | Refills: 0 | Status: SHIPPED | OUTPATIENT
Start: 2023-09-09 | End: 2023-08-28 | Stop reason: ALTCHOICE

## 2023-08-03 NOTE — PROGRESS NOTES
"  Chief Complaint:    Chief Complaint   Patient presents with    Medication Refill    blood work       History of Present Illness:  8/3/23:  Patient with ADD presents today for a medication refill.     Pt wants her iron levels checked since it is usually low.     Pt says that when she wakes up she feels like her legs weigh "100 lbs a piece".     She also has itchiness, dry skin, and swelling on mostly her neck and behind her knees, but it will go other places as well. She says that she has had it for as long as she can remember. She has seen a dermatologist, but she doesn't believe that it is eczema because she thinks it is more stress related. She also doesn't believe that it is a hive.    Pt says that she is stressed pretty often and that it comes in episodes. She also says that she will often stress out over events that haven't happened but that could happen. She has also been stressed about money recently. She doesn't think that it is anxiety.    Pt is due for a pap smear and mammogram, but has them scheduled currently.    7/10/22:  Patient with ADD presents today for a medication refill.     Requesting a refill of ADD med   Wants to try something else because pharmacy does not have vyvaanse  She also had iron deficiency is taking iron once ever low check     Has decreased her smoking. Has smoking for 22 years.      ROS:  Review of Systems   Constitutional:  Negative for activity change, appetite change, chills, fever and unexpected weight change.   HENT:  Negative for congestion, ear pain, hearing loss, postnasal drip, rhinorrhea, sinus pressure, sinus pain and trouble swallowing.    Eyes:  Negative for pain, discharge and visual disturbance.   Respiratory:  Negative for cough, chest tightness, shortness of breath and wheezing.    Cardiovascular:  Negative for chest pain, palpitations and leg swelling.   Gastrointestinal:  Negative for abdominal pain, blood in stool, constipation, diarrhea, nausea and vomiting. "   Endocrine: Negative for polydipsia and polyuria.   Genitourinary:  Negative for difficulty urinating, dysuria, flank pain, hematuria and menstrual problem.   Musculoskeletal:  Negative for arthralgias, joint swelling, myalgias and neck pain.   Skin:  Positive for rash. Negative for pallor and wound.        (+) Dryness and swelling   Neurological:  Negative for dizziness, tremors, speech difficulty, weakness, light-headedness and headaches.   Psychiatric/Behavioral:  Negative for behavioral problems, confusion, dysphoric mood and sleep disturbance. The patient is not nervous/anxious.    All other systems reviewed and are negative.      Past Medical History:   Diagnosis Date    ADD (attention deficit disorder with hyperactivity)     Anxiety     Asthma     mild intermittent    COPD (chronic obstructive pulmonary disease)     Depression     Hypertension        Social History:  Social History     Socioeconomic History    Marital status:    Tobacco Use    Smoking status: Every Day     Current packs/day: 0.50     Average packs/day: 0.5 packs/day for 10.0 years (5.0 ttl pk-yrs)     Types: Cigarettes    Smokeless tobacco: Never   Substance and Sexual Activity    Alcohol use: No     Alcohol/week: 0.0 standard drinks of alcohol    Drug use: No    Sexual activity: Yes     Partners: Male     Birth control/protection: None     Social Determinants of Health     Financial Resource Strain: Low Risk  (7/28/2023)    Overall Financial Resource Strain (CARDIA)     Difficulty of Paying Living Expenses: Not hard at all   Food Insecurity: No Food Insecurity (7/28/2023)    Hunger Vital Sign     Worried About Running Out of Food in the Last Year: Never true     Ran Out of Food in the Last Year: Never true   Transportation Needs: No Transportation Needs (7/28/2023)    PRAPARE - Transportation     Lack of Transportation (Medical): No     Lack of Transportation (Non-Medical): No   Physical Activity: Insufficiently Active (7/28/2023)     "Exercise Vital Sign     Days of Exercise per Week: 1 day     Minutes of Exercise per Session: 10 min   Stress: No Stress Concern Present (7/28/2023)    [employer] - Occupational Stress Questionnaire     Feeling of Stress : Only a little   Social Connections: Unknown (7/28/2023)    Social Connection and Isolation Panel [NHANES]     Frequency of Communication with Friends and Family: Twice a week     Frequency of Social Gatherings with Friends and Family: Once a week     Active Member of Clubs or Organizations: Yes     Attends Club or Organization Meetings: 1 to 4 times per year     Marital Status:    Housing Stability: Low Risk  (7/28/2023)    Housing Stability Vital Sign     Unable to Pay for Housing in the Last Year: No     Number of Places Lived in the Last Year: 1     Unstable Housing in the Last Year: No       Family History:   family history includes Coronary artery disease (age of onset: 38) in her father; Coronary artery disease (age of onset: 50) in her mother; Lung cancer in her mother.    Health Maintenance   Topic Date Due    Mammogram  07/13/2023    Lipid Panel  09/08/2027    TETANUS VACCINE  10/29/2027    Hepatitis C Screening  Completed       Physical Exam:    Vital Signs  Pulse: 76  SpO2: 97 %  BP: 129/72  BP Location: Right arm  Patient Position: Sitting  Pain Score:   2  Height and Weight  Height: 5' 4" (162.6 cm)  Weight: 91.7 kg (202 lb 2.6 oz)  BSA (Calculated - sq m): 2.03 sq meters  BMI (Calculated): 34.7  Weight in (lb) to have BMI = 25: 145.3]    Body mass index is 34.7 kg/m².    Physical Exam  Constitutional:       Appearance: She is well-developed.   Eyes:      Conjunctiva/sclera: Conjunctivae normal.      Pupils: Pupils are equal, round, and reactive to light.   Cardiovascular:      Rate and Rhythm: Normal rate and regular rhythm.      Heart sounds: Normal heart sounds. No murmur heard.  Pulmonary:      Effort: Pulmonary effort is normal. No respiratory " distress.      Breath sounds: Normal breath sounds. No wheezing or rales.   Chest:      Chest wall: No tenderness.   Abdominal:      General: There is no distension.      Palpations: Abdomen is soft. There is no mass.      Tenderness: There is no abdominal tenderness. There is no guarding.   Musculoskeletal:         General: No tenderness.      Cervical back: Normal range of motion and neck supple.   Lymphadenopathy:      Cervical: No cervical adenopathy.   Skin:     General: Skin is warm and dry.   Neurological:      Mental Status: She is alert and oriented to person, place, and time.      Deep Tendon Reflexes: Reflexes are normal and symmetric.   Psychiatric:         Behavior: Behavior normal.         Thought Content: Thought content normal.         Judgment: Judgment normal.           Assessment:      ICD-10-CM ICD-9-CM   1. Pain in both lower extremities  M79.604 729.5    M79.605    2. Low iron  E61.1 280.9   3. Stress  F43.9 V62.89   4. Rash and nonspecific skin eruption  R21 782.1   5. Primary hypertension  I10 401.9   6. Attention deficit disorder (ADD) without hyperactivity  F98.8 314.00     Plan:    Informed about breathing techniques to better manage stress.  Blood work and urine work; today on [facility]  Labs as below   follow up in 3 months          Orders Placed This Encounter   Procedures    Iron and TIBC    CBC Auto Differential    Comprehensive Metabolic Panel    Lipid Panel    Toxicology screen, urine       Current Outpatient Medications   Medication Sig Dispense Refill    albuterol (ACCUNEB) 1.25 mg/3 mL Nebu USE ONE VIAL IN NEBULIZER EVERY 4 TO 6 HOURS AS NEEDED FOR WHEEZING & SHORTNESS OF BREATH 90 mL 6    albuterol (PROAIR HFA) 90 mcg/actuation inhaler INHALE TWO PUFFS INTO THE LUNGS EVERY 6 HOURS AS NEEDED FOR WHEEZING 25.5 g 1    albuterol-ipratropium (DUO-NEB) 2.5 mg-0.5 mg/3 mL nebulizer solution Inhale contents of 1 vial (3 mLs) by nebulization every 6 (six) hours as needed for Wheezing.  Rescue 90 mL 2    busPIRone (BUSPAR) 10 MG tablet Take 1 tablet (10 mg total) by mouth 2 (two) times daily. 60 tablet 5    clindamycin (CLEOCIN T) 1 % external solution Apply topically 2 (two) times daily. 60 mL 1    EPINEPHrine (EPIPEN) 0.3 mg/0.3 mL AtIn INJECT 0.3mL INTRAMUSCULARLY X1 DOSE THANK YOU  12    estradioL (ESTRACE) 2 MG tablet Take 1 tablet (2 mg total) by mouth once daily. 90 tablet 3    fluticasone-salmeterol diskus inhaler 250-50 mcg Inhale 1 puff into the lungs every 12 (twelve) hours. 60 each 2    furosemide (LASIX) 40 MG tablet Take 1 tablet (40 mg total) by mouth once daily. 5 tablet 0    hydrOXYzine pamoate (VISTARIL) 50 MG Cap TAKE TWO CAPSULES BY MOUTH DAILY AS NEEDED FOR ITCHING THANK YOU  0    levocetirizine (XYZAL) 5 MG tablet TAKE ONE TABLET BY MOUTH EVERY EVENING **THANK YOU** 30 tablet 5    lisdexamfetamine (VYVANSE) 40 MG Cap Take 1 capsule (40 mg total) by mouth once daily. 30 capsule 0    lisdexamfetamine (VYVANSE) 40 MG Cap Take 1 capsule (40 mg total) by mouth once daily. 30 capsule 0    lisdexamfetamine (VYVANSE) 40 MG Cap Take 1 capsule (40 mg total) by mouth once daily. 30 capsule 0    montelukast (SINGULAIR) 10 mg tablet Take 1 tablet (10 mg total) by mouth once daily. 90 tablet 3    mupirocin (BACTROBAN) 2 % ointment APPLY TO AFFECTED AREA TOPICALLY THREE TIMES DAILY FOR 7 DAYS THANK YOU  0    NIFEdipine (PROCARDIA-XL) 30 MG (OSM) 24 hr tablet Take 1 tablet (30 mg total) by mouth once daily. 30 tablet 11    omeprazole (PRILOSEC) 40 MG capsule Take 1 capsule (40 mg total) by mouth every morning. 90 capsule 3    ondansetron (ZOFRAN-ODT) 4 MG TbDL Dissolve 1 tablet (4 mg total) by mouth every 8 (eight) hours as needed. 14 tablet 1    potassium chloride SA (K-DUR,KLOR-CON) 20 MEQ tablet Take 1 tablet (20 mEq total) by mouth once daily. 5 tablet 0    progesterone (PROMETRIUM) 100 MG capsule Take 1 capsule (100 mg total) by mouth nightly. 30 capsule 11    propranoloL (INDERAL LA) 80  MG 24 hr capsule Take 1 capsule (80 mg total) by mouth once daily. 90 capsule 3    valsartan (DIOVAN) 320 MG tablet Take 1 tablet (320 mg total) by mouth once daily. 90 tablet 3    venlafaxine (EFFEXOR-XR) 75 MG 24 hr capsule Take 1 capsule (75 mg total) by mouth once daily. 90 capsule 2    aspirin 81 MG Chew Take 1 tablet (81 mg total) by mouth once daily. 30 tablet 0    LORazepam (ATIVAN) 0.5 MG tablet Take 1 tablet (0.5 mg total) by mouth every 12 (twelve) hours as needed for Anxiety. 14 tablet 0    [START ON 8/10/2023] methylphenidate HCl 54 MG CR tablet Take 1 tablet (54 mg total) by mouth every morning. 30 tablet 0    [START ON 9/9/2023] methylphenidate HCl 54 MG CR tablet Take 1 tablet (54 mg total) by mouth every morning. 30 tablet 0     No current facility-administered medications for this visit.       Medications Discontinued During This Encounter   Medication Reason    methylphenidate HCl 54 MG CR tablet Reorder       No follow-ups on file.      Dima Her MD  Scribe Attestation:   I, Marva Kruse, am scribing for, and in the presence of, Dr.Arif Her I performed the above scribed service and the documentation accurately describes the services I performed. I attest to the accuracy of the note.    I, Dr. Dima Her, reviewed documentation as scribed above. I performed the services described in this documentation.  I agree that the record reflects my personal performance and is accurate and complete. Dima Her MD.  08/03/2023

## 2023-08-12 NOTE — TELEPHONE ENCOUNTER
Refill Routing Note   Medication(s) are not appropriate for processing by [facility] for the following reason(s):      Medication outside of protocol    [facility] action(s):  Defer Care Due:  None identified     Medication Therapy Plan: PRN Loop diurectics are out of scope for [facility]      Appointments  past 12m or future 3m with PCP    Date Provider   Last Visit   8/3/2023 Dima Her MD   Next Visit   Visit date not found Dima Her MD   ED visits in past 90 days: 0        Note composed:10:33 AM 08/12/2023

## 2023-08-12 NOTE — TELEPHONE ENCOUNTER
No care due was identified.  [facility] Embedded Care Due Messages. Reference number: [id number].   8/11/2023 8:15:17 PM CDT

## 2023-08-13 RX ORDER — FUROSEMIDE 40 MG/1
40 TABLET ORAL DAILY
Qty: 5 TABLET | Refills: 0 | Status: SHIPPED | OUTPATIENT
Start: 2023-08-13 | End: 2023-11-20 | Stop reason: SDUPTHER

## 2023-08-28 ENCOUNTER — PATIENT OUTREACH (OUTPATIENT)
Dept: ADMINISTRATIVE | Facility: HOSPITAL | Age: 58
End: 2023-08-28
Payer: COMMERCIAL

## 2023-08-28 ENCOUNTER — PATIENT MESSAGE (OUTPATIENT)
Dept: FAMILY MEDICINE | Facility: CLINIC | Age: 58
End: 2023-08-28
Payer: COMMERCIAL

## 2023-08-28 ENCOUNTER — PATIENT MESSAGE (OUTPATIENT)
Dept: ADMINISTRATIVE | Facility: HOSPITAL | Age: 58
End: 2023-08-28
Payer: COMMERCIAL

## 2023-08-28 DIAGNOSIS — Z12.31 ENCOUNTER FOR SCREENING MAMMOGRAM FOR MALIGNANT NEOPLASM OF BREAST: Primary | ICD-10-CM

## 2023-08-28 RX ORDER — LISDEXAMFETAMINE DIMESYLATE 40 MG/1
40 CAPSULE ORAL DAILY
Qty: 30 CAPSULE | Refills: 0 | Status: ON HOLD | OUTPATIENT
Start: 2023-09-28 | End: 2023-10-17 | Stop reason: HOSPADM

## 2023-08-28 RX ORDER — LISDEXAMFETAMINE DIMESYLATE 40 MG/1
40 CAPSULE ORAL DAILY
Qty: 30 CAPSULE | Refills: 0 | Status: ON HOLD | OUTPATIENT
Start: 2023-08-28 | End: 2023-10-17 | Stop reason: HOSPADM

## 2023-08-28 RX ORDER — LISDEXAMFETAMINE DIMESYLATE 40 MG/1
40 CAPSULE ORAL DAILY
Qty: 30 CAPSULE | Refills: 0 | Status: SHIPPED | OUTPATIENT
Start: 2023-10-28 | End: 2023-11-20

## 2023-08-28 NOTE — TELEPHONE ENCOUNTER
No care due was identified.  [facility] Embedded Care Due Messages. Reference number: [id number].   8/28/2023 2:22:44 PM CDT

## 2023-08-29 ENCOUNTER — PATIENT MESSAGE (OUTPATIENT)
Dept: FAMILY MEDICINE | Facility: CLINIC | Age: 58
End: 2023-08-29
Payer: COMMERCIAL

## 2023-09-10 DIAGNOSIS — I10 HTN, GOAL BELOW 130/80: ICD-10-CM

## 2023-09-11 RX ORDER — PROPRANOLOL HYDROCHLORIDE 80 MG/1
80 CAPSULE, EXTENDED RELEASE ORAL DAILY
Qty: 90 CAPSULE | Refills: 3 | Status: SHIPPED | OUTPATIENT
Start: 2023-09-11 | End: 2023-11-29 | Stop reason: SDUPTHER

## 2023-09-11 NOTE — TELEPHONE ENCOUNTER
No care due was identified.  [facility] Embedded Care Due Messages. Reference number: [id number].   9/10/2023 8:59:43 PM CDT

## 2023-09-11 NOTE — TELEPHONE ENCOUNTER
Refill Decision Note   Chasity Blake  is requesting a refill authorization.  Brief Assessment and Rationale for Refill:  Approve     Medication Therapy Plan:         Comments:     Note composed:2:40 PM 09/11/2023             Appointments     Last Visit   8/3/2023 Dima Her MD   Next Visit   Visit date not found Dima Her MD

## 2023-09-14 ENCOUNTER — PATIENT MESSAGE (OUTPATIENT)
Dept: FAMILY MEDICINE | Facility: CLINIC | Age: 58
End: 2023-09-14
Payer: COMMERCIAL

## 2023-09-14 ENCOUNTER — HOSPITAL ENCOUNTER (OUTPATIENT)
Dept: PREADMISSION TESTING | Facility: HOSPITAL | Age: 58
Discharge: HOME | End: 2023-09-14
Attending: INTERNAL MEDICINE
Payer: MEDICARE

## 2023-09-14 DIAGNOSIS — Z12.11 COLON CANCER SCREENING: Primary | ICD-10-CM

## 2023-09-19 ENCOUNTER — HOSPITAL ENCOUNTER (OUTPATIENT)
Dept: PREADMISSION TESTING | Facility: HOSPITAL | Age: 58
Discharge: HOME | End: 2023-09-19
Attending: INTERNAL MEDICINE
Payer: COMMERCIAL

## 2023-09-19 VITALS — BODY MASS INDEX: 34.52 KG/M2 | WEIGHT: 202.19 LBS | HEIGHT: 64 IN

## 2023-09-19 DIAGNOSIS — Z12.11 COLON CANCER SCREENING: ICD-10-CM

## 2023-09-20 ENCOUNTER — HOSPITAL ENCOUNTER (OUTPATIENT)
Dept: PREADMISSION TESTING | Facility: HOSPITAL | Age: 58
Discharge: HOME | End: 2023-09-20
Attending: INTERNAL MEDICINE
Payer: COMMERCIAL

## 2023-09-21 ENCOUNTER — HOSPITAL ENCOUNTER (OUTPATIENT)
Dept: PREADMISSION TESTING | Facility: HOSPITAL | Age: 58
Discharge: HOME | End: 2023-09-21
Attending: INTERNAL MEDICINE
Payer: COMMERCIAL

## 2023-09-21 DIAGNOSIS — Z12.11 COLON CANCER SCREENING: Primary | ICD-10-CM

## 2023-09-21 RX ORDER — SODIUM, POTASSIUM,MAG SULFATES 17.5-3.13G
1 SOLUTION, RECONSTITUTED, ORAL ORAL DAILY
Qty: 1 KIT | Refills: 0 | Status: SHIPPED | OUTPATIENT
Start: 2023-09-21 | End: 2023-09-23

## 2023-09-26 ENCOUNTER — PATIENT MESSAGE (OUTPATIENT)
Dept: ADMINISTRATIVE | Facility: CLINIC | Age: 58
End: 2023-09-26
Payer: COMMERCIAL

## 2023-10-09 DIAGNOSIS — J45.21 MILD INTERMITTENT ASTHMA WITH ACUTE EXACERBATION: ICD-10-CM

## 2023-10-09 RX ORDER — MONTELUKAST SODIUM 10 MG/1
10 TABLET ORAL DAILY
Qty: 90 TABLET | Refills: 3 | Status: SHIPPED | OUTPATIENT
Start: 2023-10-09 | End: 2023-11-29 | Stop reason: SDUPTHER

## 2023-10-10 RX ORDER — VENLAFAXINE HYDROCHLORIDE 75 MG/1
75 CAPSULE, EXTENDED RELEASE ORAL DAILY
Qty: 90 CAPSULE | Refills: 2 | Status: SHIPPED | OUTPATIENT
Start: 2023-10-10 | End: 2023-11-29 | Stop reason: SDUPTHER

## 2023-10-10 NOTE — TELEPHONE ENCOUNTER
No care due was identified.  [facility] Embedded Care Due Messages. Reference number: [id number].   10/09/2023 9:18:16 PM CDT

## 2023-10-10 NOTE — TELEPHONE ENCOUNTER
Refill Decision Note   Chasity Blake  is requesting a refill authorization.  Brief Assessment and Rationale for Refill:  Approve     Medication Therapy Plan:       Medication Reconciliation Completed: No   Comments:     No Care Gaps recommended.     Note composed:10:38 PM 10/09/2023

## 2023-10-10 NOTE — TELEPHONE ENCOUNTER
No care due was identified.  [facility] Embedded Care Due Messages. Reference number: [id number].   10/09/2023 9:18:50 PM CDT

## 2023-10-17 ENCOUNTER — HOSPITAL ENCOUNTER (OUTPATIENT)
Facility: HOSPITAL | Age: 58
Discharge: HOME | End: 2023-10-17
Attending: INTERNAL MEDICINE | Admitting: INTERNAL MEDICINE
Payer: COMMERCIAL

## 2023-10-17 ENCOUNTER — ANESTHESIA EVENT (OUTPATIENT)
Dept: ENDOSCOPY | Facility: HOSPITAL | Age: 58
End: 2023-10-17
Payer: COMMERCIAL

## 2023-10-17 ENCOUNTER — ANESTHESIA (OUTPATIENT)
Dept: ENDOSCOPY | Facility: HOSPITAL | Age: 58
End: 2023-10-17
Payer: COMMERCIAL

## 2023-10-17 DIAGNOSIS — R19.5 POSITIVE COLORECTAL CANCER SCREENING USING COLOGUARD TEST: ICD-10-CM

## 2023-10-17 PROCEDURE — 88305 TISSUE EXAM BY PATHOLOGIST: CPT | Mod: 59 | Performed by: STUDENT IN AN ORGANIZED HEALTH CARE EDUCATION/TRAINING PROGRAM

## 2023-10-17 PROCEDURE — 45385 PR COLONOSCOPY,REMV LESN,SNARE: ICD-10-PCS | Mod: ,,, | Performed by: INTERNAL MEDICINE

## 2023-10-17 PROCEDURE — 63600175 PHARM REV CODE 636 W HCPCS

## 2023-10-17 PROCEDURE — 88305 TISSUE EXAM BY PATHOLOGIST: CPT | Mod: 26,,, | Performed by: STUDENT IN AN ORGANIZED HEALTH CARE EDUCATION/TRAINING PROGRAM

## 2023-10-17 PROCEDURE — 37000009 HC ANESTHESIA EA ADD 15 MINS: Performed by: INTERNAL MEDICINE

## 2023-10-17 PROCEDURE — 27201089 HC SNARE, DISP (ANY): Performed by: INTERNAL MEDICINE

## 2023-10-17 PROCEDURE — 45385 COLONOSCOPY W/LESION REMOVAL: CPT | Performed by: INTERNAL MEDICINE

## 2023-10-17 PROCEDURE — 37000008 HC ANESTHESIA 1ST 15 MINUTES: Performed by: INTERNAL MEDICINE

## 2023-10-17 PROCEDURE — 45385 COLONOSCOPY W/LESION REMOVAL: CPT | Mod: ,,, | Performed by: INTERNAL MEDICINE

## 2023-10-17 PROCEDURE — 25000003 PHARM REV CODE 250

## 2023-10-17 PROCEDURE — 88305 TISSUE EXAM BY PATHOLOGIST: ICD-10-PCS | Mod: 26,,, | Performed by: STUDENT IN AN ORGANIZED HEALTH CARE EDUCATION/TRAINING PROGRAM

## 2023-10-17 RX ORDER — PROPOFOL 10 MG/ML
VIAL (ML) INTRAVENOUS
Status: DISCONTINUED | OUTPATIENT
Start: 2023-10-17 | End: 2023-10-17

## 2023-10-17 RX ORDER — LIDOCAINE HYDROCHLORIDE 10 MG/ML
INJECTION, SOLUTION EPIDURAL; INFILTRATION; INTRACAUDAL; PERINEURAL
Status: DISCONTINUED | OUTPATIENT
Start: 2023-10-17 | End: 2023-10-17

## 2023-10-17 RX ORDER — SODIUM CHLORIDE, SODIUM LACTATE, POTASSIUM CHLORIDE, CALCIUM CHLORIDE 600; 310; 30; 20 MG/100ML; MG/100ML; MG/100ML; MG/100ML
INJECTION, SOLUTION INTRAVENOUS CONTINUOUS PRN
Status: DISCONTINUED | OUTPATIENT
Start: 2023-10-17 | End: 2023-10-17

## 2023-10-17 RX ADMIN — PROPOFOL 30 MG: 10 INJECTION, EMULSION INTRAVENOUS at 10:10

## 2023-10-17 RX ADMIN — PROPOFOL 40 MG: 10 INJECTION, EMULSION INTRAVENOUS at 10:10

## 2023-10-17 RX ADMIN — LIDOCAINE HYDROCHLORIDE 50 MG: 10 SOLUTION INTRAVENOUS at 10:10

## 2023-10-17 RX ADMIN — SODIUM CHLORIDE, SODIUM LACTATE, POTASSIUM CHLORIDE, AND CALCIUM CHLORIDE: 600; 310; 30; 20 INJECTION, SOLUTION INTRAVENOUS at 10:10

## 2023-10-17 RX ADMIN — PROPOFOL 90 MG: 10 INJECTION, EMULSION INTRAVENOUS at 10:10

## 2023-10-17 NOTE — ANESTHESIA PREPROCEDURE EVALUATION
10/17/2023  Chasity Blake is a 57 y.o., female.    Patient Active Problem List   Diagnosis    Insomnia, psychophysiological    ADD (attention deficit disorder)    Backache    BECK (stress urinary incontinence, female)    Urticaria, idiopathic    PVCs (premature ventricular contractions)    Facial abscess    Current smoker    Hypertension not at goal    Menopausal hot flushes    Menopause syndrome    Positive colorectal cancer screening using Cologuard test     Past Medical History:   Diagnosis Date    ADD (attention deficit disorder with hyperactivity)     Anxiety     Asthma     mild intermittent    COPD (chronic obstructive pulmonary disease)     Depression     Hypertension      Past Surgical History:   Procedure Laterality Date    AUGMENTATION OF BREAST Bilateral     saline. About 20 years ago    BELT ABDOMINOPLASTY      Breast Augmentation      BREAST BIOPSY Left     2015. benign    BREAST SURGERY      COLONOSCOPY N/A 10/27/2022    Procedure: COLONOSCOPY;  Surgeon: Nadja Seals MD;  Location: [facility];  Service: Endoscopy;  Laterality: N/A;    COSMETIC SURGERY      ENDOMETRIAL ABLATION      TONSILLECTOMY      TUBAL LIGATION           Pre-op Assessment    I have reviewed the Patient Summary Reports.     I have reviewed the Nursing Notes. I have reviewed the NPO Status.   I have reviewed the Medications.     Review of Systems  Anesthesia Hx:  Denies Family Hx of Anesthesia complications.   Denies Personal Hx of Anesthesia complications.   Social:  Smoker    Cardiovascular:   Hypertension Denies MI.  Denies CAD.     Denies CHF.    Pulmonary:   COPD Asthma    Hepatic/GI:  Hepatic/GI Normal Bowel Prep.    Neurological:  Neurology Normal    Endocrine:  Endocrine Normal    Psych:   Psychiatric History anxiety depression          Physical Exam  General: Well  nourished    Airway:  Mallampati: II   Mouth Opening: Normal  TM Distance: Normal  Tongue: Normal  Neck ROM: Normal ROM    Dental:  Intact      Results for orders placed or performed during the hospital encounter of 05/04/20   EKG 12-lead    Collection Time: 05/04/20  9:43 PM    Narrative    Test Reason : R68.89,    Vent. Rate : 058 BPM     Atrial Rate : 058 BPM     P-R Int : 102 ms          QRS Dur : 088 ms      QT Int : 452 ms       P-R-T Axes : 000 043 013 degrees     QTc Int : 443 ms    Sinus bradycardia with short ID  Otherwise normal ECG  When compared with ECG of 12-MAR-2019 08:14,  Sinus rhythm has replaced Ectopic atrial rhythm  Confirmed by MD MARIE, KENNETH ([id number]) on 5/7/2020 5:30:04 PM    Referred By: [email or web address]   SELF           Confirmed By:KENNETH SALAZAR MD       Anesthesia Plan  Type of Anesthesia, risks & benefits discussed:    Anesthesia Type: MAC  Intra-op Monitoring Plan: Standard ASA Monitors  Induction:  IV  Informed Consent: Informed consent signed with the Patient and all parties understand the risks and agree with anesthesia plan.  All questions answered. Patient consented to blood products? Yes  ASA Score: 3    Ready For Surgery From Anesthesia Perspective.     .

## 2023-10-17 NOTE — PROVATION PATIENT INSTRUCTIONS
Discharge Summary/Instructions after an Endoscopic Procedure  Patient Name: Chasity Blake  Patient MRN: [id number]  Patient YOB: 1965 Tuesday, October 17, 2023 Hiral Fulton MD  Dear patient,  As a result of recent federal legislation (The Federal Cures Act), you may   receive lab or pathology results from your procedure in your [facility]   account before your physician is able to contact you. Your physician or   their representative will relay the results to you with their   recommendations at their soonest availability.  Thank you,  RESTRICTIONS:  During your procedure today, you received medications for sedation.  These   medications may affect your judgment, balance and coordination.  Therefore,   for 24 hours, you have the following restrictions:   - DO NOT drive a car, operate machinery, make legal/financial decisions,   sign important papers or drink alcohol.    ACTIVITY:  Today: no heavy lifting, straining or running due to procedural   sedation/anesthesia.  The following day: return to full activity including work.  DIET:  Eat and drink normally unless instructed otherwise.     TREATMENT FOR COMMON SIDE EFFECTS:  - Mild abdominal pain, nausea, belching, bloating or excessive gas:  rest,   eat lightly and use a heating pad.  - Sore Throat: treat with throat lozenges and/or gargle with warm salt   water.  - Because air was used during the procedure, expelling large amounts of air   from your rectum or belching is normal.  - If a bowel prep was taken, you may not have a bowel movement for 1-3 days.    This is normal.  SYMPTOMS TO WATCH FOR AND REPORT TO YOUR PHYSICIAN:  1. Abdominal pain or bloating, other than gas cramps.  2. Chest pain.  3. Back pain.  4. Signs of infection such as: chills or fever occurring within 24 hours   after the procedure.  5. Rectal bleeding, which would show as bright red, maroon, or black stools.   (A tablespoon of blood from the rectum is not serious, especially if    hemorrhoids are present.)  6. Vomiting.  7. Weakness or dizziness.  GO DIRECTLY TO THE NEAREST EMERGENCY ROOM IF YOU HAVE ANY OF THE FOLLOWING:      Difficulty breathing              Chills and/or fever over 101 F   Persistent vomiting and/or vomiting blood   Severe abdominal pain   Severe chest pain   Black, tarry stools   Bleeding- more than one tablespoon   Any other symptom or condition that you feel may need urgent attention  Your doctor recommends these additional instructions:  If any biopsies were taken, your doctors clinic will contact you in 1 to 2   weeks with any results.  - Discharge patient to home (with escort).   - Resume previous diet.   - Continue present medications.   - No ibuprofen, naproxen, or other non-steroidal anti-inflammatory drugs for   3 weeks after polyp removal.   - Restart Aspirin in 5 days.   - Await pathology results.   - Repeat colonoscopy in 5 years for surveillance based on pathology results.     - Return to primary care physician.  For questions, problems or results please call your physician Hiral Fulton MD at Work:  [phone]  If you have any questions about the above instructions, call the GI   department at [phone] or call the endoscopy unit at [phone]   from 7am until 3 pm.  [facility], EMERGENCY ROOM PHONE NUMBER:   [phone]  IF A COMPLICATION OR EMERGENCY SITUATION ARISES AND YOU ARE UNABLE TO REACH   YOUR PHYSICIAN - GO DIRECTLY TO THE EMERGENCY ROOM.  I have read or have had read to me these discharge instructions for my   procedure and have received a written copy.  I understand these   instructions and will follow-up with my physician if I have any questions.     __________________________________       _____________________________________  Nurse Signature                                          Patient/Designated   Responsible Party Signature  MD Hiral Zuñiga MD  10/17/2023 11:01:01 AM  This  report has been verified and signed electronically.  Dear patient,  As a result of recent federal legislation (The Federal Cures Act), you may   receive lab or pathology results from your procedure in your [facility]   account before your physician is able to contact you. Your physician or   their representative will relay the results to you with their   recommendations at their soonest availability.  Thank you,  PROVATION

## 2023-10-17 NOTE — H&P
PRE PROCEDURE H&P    Patient Name: Chasity Blake  MRN: [id number]  : 1965  Date of Procedure:  10/17/2023  Referring Physician: Dima Her MD  Primary Physician: Dima Her MD  Procedure Physician: Hiral Fulton MD       Planned Procedure: Colonoscopy  Diagnosis: positive cologuard  , poor prep on last colonsocopy    Chief Complaint: Same as above    HPI: Patient is an 57 y.o. female is here for the above. Returns today for evaluation of positive cologuard in . Underwent colonoscopy  but prep poor. Asked to repeat colonoscopy in 3 months but returns today. No Fhx of CRC, takes ASA daily.  at bedside.     Last colonoscopy:   Family history: none  Anticoagulation: ASA, last taken last night.     Past Medical History:   Past Medical History:   Diagnosis Date    ADD (attention deficit disorder with hyperactivity)     Anxiety     Asthma     mild intermittent    COPD (chronic obstructive pulmonary disease)     Depression     Hypertension         Past Surgical History:  Past Surgical History:   Procedure Laterality Date    AUGMENTATION OF BREAST Bilateral     saline. About 20 years ago    BELT ABDOMINOPLASTY      Breast Augmentation      BREAST BIOPSY Left     . benign    BREAST SURGERY      COLONOSCOPY N/A 10/27/2022    Procedure: COLONOSCOPY;  Surgeon: Nadja Seals MD;  Location: [facility];  Service: Endoscopy;  Laterality: N/A;    COSMETIC SURGERY      ENDOMETRIAL ABLATION      TONSILLECTOMY      TUBAL LIGATION          Home Medications:  Prior to Admission medications    Medication Sig Start Date End Date Taking? Authorizing Provider   albuterol (PROAIR HFA) 90 mcg/actuation inhaler INHALE TWO PUFFS INTO THE LUNGS EVERY 6 HOURS AS NEEDED FOR WHEEZING 23  Yes Dima Her MD   aspirin 81 MG Chew Take 1 tablet (81 mg total) by mouth once daily. 1/21/18 10/17/23 Yes Mukesh Rodriguez MD   busPIRone (BUSPAR) 10 MG tablet Take 1 tablet (10 mg total) by mouth 2 (two)  times daily. 1/5/23 1/5/24 Yes Dima eHr MD   estradioL (ESTRACE) 2 MG tablet Take 1 tablet (2 mg total) by mouth once daily. 8/2/23 8/1/24 Yes Gem Woods MD   levocetirizine (XYZAL) 5 MG tablet TAKE ONE TABLET BY MOUTH EVERY EVENING **THANK YOU** 10/18/17  Yes Dima Her MD   lisdexamfetamine (VYVANSE) 40 MG Cap Take 1 capsule (40 mg total) by mouth once daily. 10/28/23  Yes Dima Her MD   montelukast (SINGULAIR) 10 mg tablet Take 1 tablet (10 mg total) by mouth once daily. 10/9/23  Yes Dima Her MD   NIFEdipine (PROCARDIA-XL) 30 MG (OSM) 24 hr tablet Take 1 tablet (30 mg total) by mouth once daily. 7/5/23 7/4/24 Yes Dima Her MD   omeprazole (PRILOSEC) 40 MG capsule Take 1 capsule (40 mg total) by mouth every morning. 7/20/23  Yes Dima Her MD   progesterone (PROMETRIUM) 100 MG capsule Take 1 capsule (100 mg total) by mouth nightly. 6/12/23 6/11/24 Yes Gem Woods MD   propranoloL (INDERAL LA) 80 MG 24 hr capsule Take 1 capsule (80 mg total) by mouth once daily. 9/11/23  Yes Dima Her MD   valsartan (DIOVAN) 320 MG tablet Take 1 tablet (320 mg total) by mouth once daily. 3/15/23 3/14/24 Yes Dima Her MD   venlafaxine (EFFEXOR-XR) 75 MG 24 hr capsule Take 1 capsule (75 mg total) by mouth once daily. 10/10/23  Yes Dima Her MD   albuterol (ACCUNEB) 1.25 mg/3 mL Nebu USE ONE VIAL IN NEBULIZER EVERY 4 TO 6 HOURS AS NEEDED FOR WHEEZING & SHORTNESS OF BREATH 11/22/22   Dima Her MD   albuterol-ipratropium (DUO-NEB) 2.5 mg-0.5 mg/3 mL nebulizer solution Inhale contents of 1 vial (3 mLs) by nebulization every 6 (six) hours as needed for Wheezing. Rescue 12/19/22 12/19/23  Dima Her MD   clindamycin (CLEOCIN T) 1 % external solution Apply topically 2 (two) times daily. 3/4/21   Dima Her MD   EPINEPHrine (EPIPEN) 0.3 mg/0.3 mL AtIn INJECT 0.3mL INTRAMUSCULARLY X1 DOSE THANK YOU 7/11/19   Provider, Historical   fluticasone-salmeterol diskus inhaler 250-50  mcg Inhale 1 puff into the lungs every 12 (twelve) hours. 9/19/22 9/19/23  Dima Her MD   furosemide (LASIX) 40 MG tablet Take 1 tablet (40 mg total) by mouth once daily. 8/13/23 8/12/24  Dima Her MD   hydrOXYzine pamoate (VISTARIL) 50 MG Cap TAKE TWO CAPSULES BY MOUTH DAILY AS NEEDED FOR ITCHING THANK YOU 9/30/19   Provider, Historical   lisdexamfetamine (VYVANSE) 40 MG Cap Take 1 capsule (40 mg total) by mouth once daily. 9/28/23   Dima Her MD   lisdexamfetamine (VYVANSE) 40 MG Cap Take 1 capsule (40 mg total) by mouth once daily. 8/28/23   Dima Her MD   LORazepam (ATIVAN) 0.5 MG tablet Take 1 tablet (0.5 mg total) by mouth every 12 (twelve) hours as needed for Anxiety. 2/5/21 4/13/23  Dima Her MD   mupirocin (BACTROBAN) 2 % ointment APPLY TO AFFECTED AREA TOPICALLY THREE TIMES DAILY FOR 7 DAYS THANK YOU 9/30/19   Provider, Historical   ondansetron (ZOFRAN-ODT) 4 MG TbDL Dissolve 1 tablet (4 mg total) by mouth every 8 (eight) hours as needed. 5/9/23   Dima Her MD   potassium chloride SA (K-DUR,KLOR-CON) 20 MEQ tablet Take 1 tablet (20 mEq total) by mouth once daily. 9/19/22   Dima Her MD        Allergies:  Review of patient's allergies indicates:   Allergen Reactions    Percocet [oxycodone-acetaminophen] Itching        Social History:   Social History     Socioeconomic History    Marital status:    Tobacco Use    Smoking status: Every Day     Current packs/day: 0.50     Average packs/day: 0.5 packs/day for 10.0 years (5.0 ttl pk-yrs)     Types: Cigarettes    Smokeless tobacco: Never   Substance and Sexual Activity    Alcohol use: No     Alcohol/week: 0.0 standard drinks of alcohol    Drug use: No    Sexual activity: Yes     Partners: Male     Birth control/protection: None     Social Determinants of Health     Financial Resource Strain: Low Risk  (7/28/2023)    Overall Financial Resource Strain (CARDIA)     Difficulty of Paying Living Expenses: Not hard at all   Food  "Insecurity: No Food Insecurity (7/28/2023)    Hunger Vital Sign     Worried About Running Out of Food in the Last Year: Never true     Ran Out of Food in the Last Year: Never true   Transportation Needs: No Transportation Needs (7/28/2023)    PRAPARE - Transportation     Lack of Transportation (Medical): No     Lack of Transportation (Non-Medical): No   Physical Activity: Insufficiently Active (7/28/2023)    Exercise Vital Sign     Days of Exercise per Week: 1 day     Minutes of Exercise per Session: 10 min   Stress: No Stress Concern Present (7/28/2023)    [employer] [place] [employer] - Occupational Stress Questionnaire     Feeling of Stress : Only a little   Social Connections: Unknown (7/28/2023)    Social Connection and Isolation Panel [NHANES]     Frequency of Communication with Friends and Family: Twice a week     Frequency of Social Gatherings with Friends and Family: Once a week     Active Member of Clubs or Organizations: Yes     Attends Club or Organization Meetings: 1 to 4 times per year     Marital Status:    Housing Stability: Low Risk  (7/28/2023)    Housing Stability Vital Sign     Unable to Pay for Housing in the Last Year: No     Number of Places Lived in the Last Year: 1     Unstable Housing in the Last Year: No       Family History:  Family History   Problem Relation Age of Onset    Lung cancer Mother     Coronary artery disease Mother 50    Coronary artery disease Father 38    Breast cancer Neg Hx     Colon cancer Neg Hx     Ovarian cancer Neg Hx        ROS: No acute cardiac events, no acute respiratory complaints.     Physical Exam (all patients):    /67 (BP Location: Left arm, Patient Position: Lying)   Pulse 71   Temp 97.5 °F (36.4 °C) (Temporal)   Resp 18   Ht 5' 4" (1.626 m)   Wt 88.5 kg (195 lb)   SpO2 99%   BMI 33.47 kg/m²   Lungs: Clear to auscultation bilaterally, respirations unlabored  Heart: Regular rate and rhythm, S1 and S2 normal, no obvious " murmurs  Abdomen:         Soft, non-tender, bowel sounds normal, no masses, no organomegaly    Lab Results   Component Value Date    WBC 10.61 08/03/2023    MCV 95 08/03/2023    RDW 13.1 08/03/2023     08/03/2023     08/03/2023    HGBA1C 5.5 09/08/2022    BUN 7 08/03/2023     08/03/2023    K 3.9 08/03/2023     08/03/2023        SEDATION PLAN: per anesthesia      History reviewed, vital signs satisfactory, cardiopulmonary status satisfactory, sedation options, risks and plans have been discussed with the patient  All their questions were answered and the patient agrees to the sedation procedures as planned and the patient is deemed an appropriate candidate for the sedation as planned.    The risks, benefits and alternatives of the procedure were discussed with the patient in detail. This discussion was had in the presence of her  and endoscopy staff. The risks include, risks of adverse reaction to sedation requiring the use of reversal agents, bleeding requiring blood transfusion, perforation requiring surgical intervention and technical failure. Other risks include aspiration leading to respiratory distress and respiratory failure resulting in endotracheal intubation and mechanical ventilation including death. If anesthesia is being utilized for this procedure, it is up to the anesthesiologist to determine airway safety including elective endotracheal intubation. Questions were answered, they agree to proceed. There was no language barriers.      Procedure explained to patient, informed consent obtained and placed in chart.    Hiral Fulton  10/17/2023  10:23 AM

## 2023-10-17 NOTE — ANESTHESIA POSTPROCEDURE EVALUATION
Anesthesia Post Evaluation    Patient: Chasity Blake    Procedure(s) Performed: Procedure(s) (LRB):  COLONOSCOPY (N/A)    Final Anesthesia Type: MAC      Patient location during evaluation: GI PACU  Patient participation: Yes- Able to Participate  Level of consciousness: awake  Post-procedure vital signs: reviewed and stable  Pain management: adequate  Airway patency: patent    PONV status at discharge: No PONV  Anesthetic complications: no      Cardiovascular status: blood pressure returned to baseline and hemodynamically stable  Respiratory status: unassisted and spontaneous ventilation  Hydration status: euvolemic  Follow-up not needed.          Vitals Value Taken Time   BP 98/56 10/17/23 1058   Temp 36.7 °C (98.1 °F) 10/17/23 1058   Pulse 63 10/17/23 1058   Resp 17 10/17/23 1058   SpO2 95 % 10/17/23 1058         No case tracking events are documented in the log.      Pain/Zelalem Score: Zelalem Score: 8 (10/17/2023 10:58 AM)

## 2023-10-18 VITALS
OXYGEN SATURATION: 95 % | BODY MASS INDEX: 33.29 KG/M2 | SYSTOLIC BLOOD PRESSURE: 98 MMHG | WEIGHT: 195 LBS | TEMPERATURE: 98 F | HEIGHT: 64 IN | DIASTOLIC BLOOD PRESSURE: 55 MMHG | RESPIRATION RATE: 16 BRPM | HEART RATE: 62 BPM

## 2023-10-19 LAB
FINAL PATHOLOGIC DIAGNOSIS: NORMAL
GROSS: NORMAL
Lab: NORMAL

## 2023-10-28 NOTE — PROGRESS NOTES
The polyps removed were precancerous also known as adenomas. They were completely removed. Guidelines recommend a repeat colonoscopy in 5 years. We will send you a reminder as the time nears.      Thanks for trusting us with your healthcare needs and using [facility].     Sincerely,    Hiral Fulton M.D.

## 2023-11-06 ENCOUNTER — PATIENT MESSAGE (OUTPATIENT)
Dept: OBSTETRICS AND GYNECOLOGY | Facility: CLINIC | Age: 58
End: 2023-11-06
Payer: COMMERCIAL

## 2023-11-07 NOTE — PATIENT INSTRUCTIONS
What Are Neuromas of the Foot?  The ball of your foot is the bottom part just behind your toes. Bands of tissue (ligaments) connect the bones in the ball of your foot. Nerves run between the bones and underneath the ligaments. When a nerve becomes pinched, this causes it to swell and become painful due to the thickening of the tissue that surrounds the nerve. The painful, swollen nerve is called a neuroma (also called Phillip's neuroma).     A neuroma most often occurs at the base of either the third and fourth toes or the second and third toes.   What causes a neuroma?  Wearing tight or high-heeled shoes can cause a neuroma. Shoes that are too narrow or too pointed squeeze the bones in the ball of the foot. Shoes with high heels put extra pressure on the ends of the bones. When the bones are squeezed together, they pinch the nerve that runs between them.  Symptoms  The most common symptom of a neuroma is pain in the ball of the foot between two toes. The pain may be dull or sharp. It may feel as if you have a stone in your shoe. You may also have tingling or numbness in one or both of the toes. Symptoms may occur after you have been walking or standing for a while. Taking off your shoes and rubbing the ball of your foot may decrease or relieve the pain.  Preventing future problems  To prevent a future neuroma, buy shoes with plenty of room across the ball of the foot and in the toes. This keeps the bones from being squeezed together. Wearing low-heeled shoes (less than 2 inches) also puts less pressure on the bones and nerves in the ball of the foot.   Date Last Reviewed: 9/10/2015  © 3230-4589 [employer]. [address] [place] [address], [place] [address]. All rights reserved. This information is not intended as a substitute for professional medical care. Always follow your healthcare professional's instructions.          used

## 2023-11-20 ENCOUNTER — PATIENT MESSAGE (OUTPATIENT)
Dept: FAMILY MEDICINE | Facility: CLINIC | Age: 58
End: 2023-11-20
Payer: COMMERCIAL

## 2023-11-20 ENCOUNTER — TELEPHONE (OUTPATIENT)
Dept: FAMILY MEDICINE | Facility: CLINIC | Age: 58
End: 2023-11-20
Payer: COMMERCIAL

## 2023-11-20 DIAGNOSIS — T75.3XXD MOTION SICKNESS, SUBSEQUENT ENCOUNTER: ICD-10-CM

## 2023-11-20 DIAGNOSIS — F98.8 ATTENTION DEFICIT DISORDER (ADD) WITHOUT HYPERACTIVITY: Primary | ICD-10-CM

## 2023-11-20 RX ORDER — LISDEXAMFETAMINE DIMESYLATE 40 MG/1
CAPSULE ORAL
Qty: 30 CAPSULE | Refills: 0 | Status: SHIPPED | OUTPATIENT
Start: 2023-11-20 | End: 2023-11-29 | Stop reason: SDUPTHER

## 2023-11-20 RX ORDER — FUROSEMIDE 40 MG/1
40 TABLET ORAL DAILY
Qty: 5 TABLET | Refills: 0 | Status: SHIPPED | OUTPATIENT
Start: 2023-11-20

## 2023-11-20 NOTE — TELEPHONE ENCOUNTER
----- Message from Brittanie Vásquez sent at 11/20/2023  4:04 PM CST -----  Regarding: Missed call  Type:  Patient Returning Call         Who Called:  PT         Who Left Message for Patient: Deb Garcia LPN         Does the patient know what this is regarding?:          Would the patient rather a call back or a response via [facility]?  Call back          Best Call Back Number:[phone]         Additional Information:

## 2023-11-20 NOTE — TELEPHONE ENCOUNTER
No care due was identified.  [facility] Embedded Care Due Messages. Reference number: [id number].   11/20/2023 1:19:17 PM CST

## 2023-11-20 NOTE — TELEPHONE ENCOUNTER
Refill Routing Note   Medication(s) are not appropriate for processing by [facility] for the following reason(s):        Outside of protocol    [facility] action(s):  Route        Medication Therapy Plan: PRN Loop diurectics are out of scope for [facility]      Appointments  past 12m or future 3m with PCP    Date Provider   Last Visit   8/3/2023 Dima Her MD   Next Visit   11/20/2023 Dima Her MD   ED visits in past 90 days: 0        Note composed:1:35 PM 11/20/2023

## 2023-11-20 NOTE — TELEPHONE ENCOUNTER
No care due was identified.  [facility] Embedded Care Due Messages. Reference number: [id number].   11/20/2023 1:23:38 PM CST

## 2023-11-21 ENCOUNTER — PATIENT MESSAGE (OUTPATIENT)
Dept: FAMILY MEDICINE | Facility: CLINIC | Age: 58
End: 2023-11-21
Payer: COMMERCIAL

## 2023-11-21 RX ORDER — ONDANSETRON 4 MG/1
4 TABLET, ORALLY DISINTEGRATING ORAL EVERY 8 HOURS PRN
Qty: 14 TABLET | Refills: 1 | OUTPATIENT
Start: 2023-11-21

## 2023-11-21 NOTE — TELEPHONE ENCOUNTER
No care due was identified.  [facility] Embedded Care Due Messages. Reference number: [id number].   11/20/2023 9:52:53 PM CST

## 2023-11-29 ENCOUNTER — PATIENT MESSAGE (OUTPATIENT)
Dept: OBSTETRICS AND GYNECOLOGY | Facility: CLINIC | Age: 58
End: 2023-11-29
Payer: COMMERCIAL

## 2023-11-29 ENCOUNTER — OFFICE VISIT (OUTPATIENT)
Dept: FAMILY MEDICINE | Facility: CLINIC | Age: 58
End: 2023-11-29
Payer: COMMERCIAL

## 2023-11-29 DIAGNOSIS — K21.9 GASTROESOPHAGEAL REFLUX DISEASE WITHOUT ESOPHAGITIS: ICD-10-CM

## 2023-11-29 DIAGNOSIS — F32.A DEPRESSION, UNSPECIFIED DEPRESSION TYPE: ICD-10-CM

## 2023-11-29 DIAGNOSIS — J45.21 MILD INTERMITTENT ASTHMA WITH ACUTE EXACERBATION: ICD-10-CM

## 2023-11-29 DIAGNOSIS — I10 HYPERTENSION NOT AT GOAL: ICD-10-CM

## 2023-11-29 DIAGNOSIS — F98.8 ATTENTION DEFICIT DISORDER (ADD) WITHOUT HYPERACTIVITY: Primary | ICD-10-CM

## 2023-11-29 DIAGNOSIS — I10 HYPERTENSION, UNSPECIFIED TYPE: ICD-10-CM

## 2023-11-29 DIAGNOSIS — I10 HTN, GOAL BELOW 130/80: ICD-10-CM

## 2023-11-29 PROCEDURE — 4010F PR ACE/ARB THEARPY RXD/TAKEN: ICD-10-PCS | Mod: CPTII,95,, | Performed by: FAMILY MEDICINE

## 2023-11-29 PROCEDURE — 99214 PR OFFICE/OUTPT VISIT, EST, LEVL IV, 30-39 MIN: ICD-10-PCS | Mod: 95,,, | Performed by: FAMILY MEDICINE

## 2023-11-29 PROCEDURE — 4010F ACE/ARB THERAPY RXD/TAKEN: CPT | Mod: CPTII,95,, | Performed by: FAMILY MEDICINE

## 2023-11-29 PROCEDURE — 99214 OFFICE O/P EST MOD 30 MIN: CPT | Mod: 95,,, | Performed by: FAMILY MEDICINE

## 2023-11-29 RX ORDER — LISDEXAMFETAMINE DIMESYLATE 40 MG/1
40 CAPSULE ORAL DAILY
Qty: 30 CAPSULE | Refills: 0 | Status: SHIPPED | OUTPATIENT
Start: 2023-11-29

## 2023-11-29 RX ORDER — LISDEXAMFETAMINE DIMESYLATE 40 MG/1
40 CAPSULE ORAL DAILY
Qty: 30 CAPSULE | Refills: 0 | Status: SHIPPED | OUTPATIENT
Start: 2024-01-29 | End: 2024-02-12 | Stop reason: SDUPTHER

## 2023-11-29 RX ORDER — NIFEDIPINE 30 MG/1
30 TABLET, EXTENDED RELEASE ORAL DAILY
Qty: 90 TABLET | Refills: 3 | Status: SHIPPED | OUTPATIENT
Start: 2023-11-29 | End: 2024-01-31 | Stop reason: SDUPTHER

## 2023-11-29 RX ORDER — MONTELUKAST SODIUM 10 MG/1
10 TABLET ORAL DAILY
Qty: 90 TABLET | Refills: 3 | Status: SHIPPED | OUTPATIENT
Start: 2023-11-29 | End: 2024-01-31 | Stop reason: SDUPTHER

## 2023-11-29 RX ORDER — VALSARTAN 320 MG/1
320 TABLET ORAL DAILY
Qty: 90 TABLET | Refills: 3 | Status: SHIPPED | OUTPATIENT
Start: 2023-11-29 | End: 2024-01-31 | Stop reason: SDUPTHER

## 2023-11-29 RX ORDER — VENLAFAXINE HYDROCHLORIDE 75 MG/1
75 CAPSULE, EXTENDED RELEASE ORAL DAILY
Qty: 90 CAPSULE | Refills: 2 | Status: SHIPPED | OUTPATIENT
Start: 2023-11-29 | End: 2024-01-31 | Stop reason: SDUPTHER

## 2023-11-29 RX ORDER — PROPRANOLOL HYDROCHLORIDE 80 MG/1
80 CAPSULE, EXTENDED RELEASE ORAL DAILY
Qty: 90 CAPSULE | Refills: 3 | Status: SHIPPED | OUTPATIENT
Start: 2023-11-29 | End: 2024-01-31 | Stop reason: SDUPTHER

## 2023-11-29 RX ORDER — BUSPIRONE HYDROCHLORIDE 10 MG/1
10 TABLET ORAL 2 TIMES DAILY
Qty: 60 TABLET | Refills: 5 | Status: SHIPPED | OUTPATIENT
Start: 2023-11-29 | End: 2023-11-30 | Stop reason: SDUPTHER

## 2023-11-29 RX ORDER — LISDEXAMFETAMINE DIMESYLATE 40 MG/1
40 CAPSULE ORAL DAILY
Qty: 30 CAPSULE | Refills: 0 | Status: SHIPPED | OUTPATIENT
Start: 2023-12-29

## 2023-11-29 RX ORDER — OMEPRAZOLE 40 MG/1
40 CAPSULE, DELAYED RELEASE ORAL EVERY MORNING
Qty: 90 CAPSULE | Refills: 3 | Status: SHIPPED | OUTPATIENT
Start: 2023-11-29 | End: 2024-01-31 | Stop reason: SDUPTHER

## 2023-11-29 NOTE — TELEPHONE ENCOUNTER
Spoke to patient and assisted in scheduling an appt for 11/30/23 to see Dr. CARO Woods at the [place] location. Patient verbalized understanding.

## 2023-11-29 NOTE — PROGRESS NOTES
Chief Complaint:    No chief complaint on file.      History of Present Illness:  Patient with ADD presents today virtually for a medication refill.     Requesting a refill of ADD med. Is on vyvanse which works well.     She also had iron deficiency is taking iron oral pills.      ROS:  Review of Systems   Constitutional:  Negative for activity change and unexpected weight change.   HENT:  Negative for hearing loss, rhinorrhea and trouble swallowing.    Eyes:  Negative for discharge and visual disturbance.   Respiratory:  Negative for chest tightness and wheezing.    Cardiovascular:  Negative for chest pain and palpitations.   Gastrointestinal:  Negative for blood in stool, constipation, diarrhea and vomiting.   Endocrine: Negative for polydipsia and polyuria.   Genitourinary:  Negative for difficulty urinating, dysuria, hematuria and menstrual problem.   Musculoskeletal:  Negative for arthralgias, joint swelling and neck pain.   Neurological:  Negative for weakness and headaches.   Psychiatric/Behavioral:  Negative for confusion and dysphoric mood.        Past Medical History:   Diagnosis Date    ADD (attention deficit disorder with hyperactivity)     Anxiety     Asthma     mild intermittent    COPD (chronic obstructive pulmonary disease)     Depression     Hypertension        Social History:  Social History     Socioeconomic History    Marital status:    Tobacco Use    Smoking status: Every Day     Current packs/day: 0.50     Average packs/day: 0.5 packs/day for 10.0 years (5.0 ttl pk-yrs)     Types: Cigarettes    Smokeless tobacco: Never   Substance and Sexual Activity    Alcohol use: No     Alcohol/week: 0.0 standard drinks of alcohol    Drug use: No    Sexual activity: Yes     Partners: Male     Birth control/protection: None     Social Determinants of Health     Financial Resource Strain: Unknown (11/29/2023)    Overall Financial Resource Strain (CARDI)     Difficulty of Paying Living Expenses:  Patient refused   Food Insecurity: Unknown (11/29/2023)    Hunger Vital Sign     Worried About Running Out of Food in the Last Year: Patient refused     Ran Out of Food in the Last Year: Patient refused   Transportation Needs: Unknown (11/29/2023)    PRAPARE - Transportation     Lack of Transportation (Medical): Patient refused     Lack of Transportation (Non-Medical): Patient refused   Physical Activity: Unknown (11/29/2023)    Exercise Vital Sign     Days of Exercise per Week: Patient refused     Minutes of Exercise per Session: 10 min   Stress: Unknown (11/29/2023)    [employer] - Occupational Stress Questionnaire     Feeling of Stress : Patient refused   Social Connections: Unknown (11/29/2023)    Social Connection and Isolation Panel [NHANES]     Frequency of Communication with Friends and Family: Patient refused     Frequency of Social Gatherings with Friends and Family: Patient refused     Active Member of Clubs or Organizations: Patient refused     Attends Club or Organization Meetings: Patient refused     Marital Status: Patient refused   Housing Stability: Unknown (11/29/2023)    Housing Stability Vital Sign     Unable to Pay for Housing in the Last Year: Patient refused     Number of Places Lived in the Last Year: 1     Unstable Housing in the Last Year: Patient refused       Family History:   family history includes Coronary artery disease (age of onset: 38) in her father; Coronary artery disease (age of onset: 50) in her mother; Lung cancer in her mother.    Health Maintenance   Topic Date Due    Shingles Vaccine (1 of 2) Never done    Mammogram  07/13/2023    TETANUS VACCINE  10/29/2027    Lipid Panel  08/03/2028    Colorectal Cancer Screening  10/17/2028    Hepatitis C Screening  Completed       Physical Exam:     ]    There is no height or weight on file to calculate BMI.    Physical Exam      Assessment:      ICD-10-CM ICD-9-CM   1. Attention deficit disorder (ADD)  without hyperactivity  F98.8 314.00   2. Hypertension not at goal  I10 401.9   3. Depression, unspecified depression type  F32.A 311   4. Mild intermittent asthma with acute exacerbation  J45.21 493.92   5. Hypertension, unspecified type  I10 401.9   6. Gastroesophageal reflux disease without esophagitis  K21.9 530.81   7. HTN, goal below 130/80  I10 401.9       Plan:     check, Vyvanse refill.   Medical problems above stable.  follow up in 3 months    The patient location is: Home   The chief complaint leading to consultation is: as below  Visit type: Virtual visit with synchronous audio and video  Total time spent with patient: 15+ mins  Each patient to whom he or she provides medical services by telemedicine is:  (1) informed of the relationship between the physician and patient and the respective role of any other health care provider with respect to management of the patient; and (2) notified that he or she may decline to receive medical services by telemedicine and may withdraw from such care at any time.    Notes: see below        No orders of the defined types were placed in this encounter.      Current Outpatient Medications   Medication Sig Dispense Refill    albuterol (ACCUNEB) 1.25 mg/3 mL Nebu USE ONE VIAL IN NEBULIZER EVERY 4 TO 6 HOURS AS NEEDED FOR WHEEZING & SHORTNESS OF BREATH 90 mL 6    albuterol (PROAIR HFA) 90 mcg/actuation inhaler INHALE TWO PUFFS INTO THE LUNGS EVERY 6 HOURS AS NEEDED FOR WHEEZING 25.5 g 1    albuterol-ipratropium (DUO-NEB) 2.5 mg-0.5 mg/3 mL nebulizer solution Inhale contents of 1 vial (3 mLs) by nebulization every 6 (six) hours as needed for Wheezing. Rescue 90 mL 2    aspirin 81 MG Chew Take 1 tablet (81 mg total) by mouth once daily. 30 tablet 0    busPIRone (BUSPAR) 10 MG tablet Take 1 tablet (10 mg total) by mouth 2 (two) times daily. 60 tablet 5    EPINEPHrine (EPIPEN) 0.3 mg/0.3 mL AtIn INJECT 0.3mL INTRAMUSCULARLY X1 DOSE THANK YOU  12    estradioL (ESTRACE) 2 MG  tablet Take 1 tablet (2 mg total) by mouth once daily. 90 tablet 3    fluticasone-salmeterol diskus inhaler 250-50 mcg Inhale 1 puff into the lungs every 12 (twelve) hours. 60 each 2    furosemide (LASIX) 40 MG tablet Take 1 tablet (40 mg total) by mouth once daily. 5 tablet 0    hydrOXYzine pamoate (VISTARIL) 50 MG Cap TAKE TWO CAPSULES BY MOUTH DAILY AS NEEDED FOR ITCHING THANK YOU  0    levocetirizine (XYZAL) 5 MG tablet TAKE ONE TABLET BY MOUTH EVERY EVENING **THANK YOU** 30 tablet 5    lisdexamfetamine (VYVANSE) 40 MG Cap Take 1 capsule (40 mg total) by mouth once daily. 30 capsule 0    [START ON 12/29/2023] lisdexamfetamine (VYVANSE) 40 MG Cap Take 1 capsule (40 mg total) by mouth once daily. 30 capsule 0    [START ON 1/29/2024] lisdexamfetamine (VYVANSE) 40 MG Cap Take 1 capsule (40 mg total) by mouth once daily. 30 capsule 0    LORazepam (ATIVAN) 0.5 MG tablet Take 1 tablet (0.5 mg total) by mouth every 12 (twelve) hours as needed for Anxiety. 14 tablet 0    montelukast (SINGULAIR) 10 mg tablet Take 1 tablet (10 mg total) by mouth once daily. 90 tablet 3    mupirocin (BACTROBAN) 2 % ointment APPLY TO AFFECTED AREA TOPICALLY THREE TIMES DAILY FOR 7 DAYS THANK YOU  0    NIFEdipine (PROCARDIA-XL) 30 MG (OSM) 24 hr tablet Take 1 tablet (30 mg total) by mouth once daily. 90 tablet 3    omeprazole (PRILOSEC) 40 MG capsule Take 1 capsule (40 mg total) by mouth every morning. 90 capsule 3    ondansetron (ZOFRAN-ODT) 4 MG TbDL Dissolve 1 tablet (4 mg total) by mouth every 8 (eight) hours as needed. 14 tablet 1    progesterone (PROMETRIUM) 100 MG capsule Take 1 capsule (100 mg total) by mouth nightly. 30 capsule 11    propranoloL (INDERAL LA) 80 MG 24 hr capsule Take 1 capsule (80 mg total) by mouth once daily. 90 capsule 3    valsartan (DIOVAN) 320 MG tablet Take 1 tablet (320 mg total) by mouth once daily. 90 tablet 3    venlafaxine (EFFEXOR-XR) 75 MG 24 hr capsule Take 1 capsule (75 mg total) by mouth once daily.  90 capsule 2     No current facility-administered medications for this visit.       Medications Discontinued During This Encounter   Medication Reason    lisdexamfetamine (VYVANSE) 40 MG Cap Reorder    busPIRone (BUSPAR) 10 MG tablet Reorder    valsartan (DIOVAN) 320 MG tablet Reorder    NIFEdipine (PROCARDIA-XL) 30 MG (OSM) 24 hr tablet Reorder    omeprazole (PRILOSEC) 40 MG capsule Reorder    propranoloL (INDERAL LA) 80 MG 24 hr capsule Reorder    montelukast (SINGULAIR) 10 mg tablet Reorder    venlafaxine (EFFEXOR-XR) 75 MG 24 hr capsule Reorder         No follow-ups on file.      Dima Her MD  Scribe Attestation:   I, Artis Maynard, am scribing for, and in the presence of, Dr.Arif Her I performed the above scribed service and the documentation accurately describes the services I performed. I attest to the accuracy of the note.    I, Dr. Dima Her, reviewed documentation as scribed above. I performed the services described in this documentation.  I agree that the record reflects my personal performance and is accurate and complete. Dima Her MD.  11/29/2023

## 2023-11-30 ENCOUNTER — OFFICE VISIT (OUTPATIENT)
Dept: OBSTETRICS AND GYNECOLOGY | Facility: CLINIC | Age: 58
End: 2023-11-30
Payer: COMMERCIAL

## 2023-11-30 VITALS
SYSTOLIC BLOOD PRESSURE: 120 MMHG | BODY MASS INDEX: 34.48 KG/M2 | DIASTOLIC BLOOD PRESSURE: 70 MMHG | WEIGHT: 201.94 LBS | HEIGHT: 64 IN

## 2023-11-30 DIAGNOSIS — Z12.4 CERVICAL CANCER SCREENING: ICD-10-CM

## 2023-11-30 DIAGNOSIS — N95.0 POSTMENOPAUSAL BLEEDING: Primary | ICD-10-CM

## 2023-11-30 PROCEDURE — 88305 TISSUE EXAM BY PATHOLOGIST: ICD-10-PCS | Mod: 26,,, | Performed by: PATHOLOGY

## 2023-11-30 PROCEDURE — 3074F PR MOST RECENT SYSTOLIC BLOOD PRESSURE < 130 MM HG: ICD-10-PCS | Mod: CPTII,S$GLB,, | Performed by: OBSTETRICS & GYNECOLOGY

## 2023-11-30 PROCEDURE — 58100 BIOPSY OF UTERUS LINING: CPT | Mod: S$GLB,,, | Performed by: OBSTETRICS & GYNECOLOGY

## 2023-11-30 PROCEDURE — 1159F MED LIST DOCD IN RCRD: CPT | Mod: CPTII,S$GLB,, | Performed by: OBSTETRICS & GYNECOLOGY

## 2023-11-30 PROCEDURE — 3074F SYST BP LT 130 MM HG: CPT | Mod: CPTII,S$GLB,, | Performed by: OBSTETRICS & GYNECOLOGY

## 2023-11-30 PROCEDURE — 3008F BODY MASS INDEX DOCD: CPT | Mod: CPTII,S$GLB,, | Performed by: OBSTETRICS & GYNECOLOGY

## 2023-11-30 PROCEDURE — 88305 TISSUE EXAM BY PATHOLOGIST: CPT | Mod: 26,,, | Performed by: PATHOLOGY

## 2023-11-30 PROCEDURE — 4010F ACE/ARB THERAPY RXD/TAKEN: CPT | Mod: CPTII,S$GLB,, | Performed by: OBSTETRICS & GYNECOLOGY

## 2023-11-30 PROCEDURE — 3008F PR BODY MASS INDEX (BMI) DOCUMENTED: ICD-10-PCS | Mod: CPTII,S$GLB,, | Performed by: OBSTETRICS & GYNECOLOGY

## 2023-11-30 PROCEDURE — 99213 PR OFFICE/OUTPT VISIT, EST, LEVL III, 20-29 MIN: ICD-10-PCS | Mod: 25,S$GLB,, | Performed by: OBSTETRICS & GYNECOLOGY

## 2023-11-30 PROCEDURE — 1159F PR MEDICATION LIST DOCUMENTED IN MEDICAL RECORD: ICD-10-PCS | Mod: CPTII,S$GLB,, | Performed by: OBSTETRICS & GYNECOLOGY

## 2023-11-30 PROCEDURE — 99213 OFFICE O/P EST LOW 20 MIN: CPT | Mod: 25,S$GLB,, | Performed by: OBSTETRICS & GYNECOLOGY

## 2023-11-30 PROCEDURE — 3078F DIAST BP <80 MM HG: CPT | Mod: CPTII,S$GLB,, | Performed by: OBSTETRICS & GYNECOLOGY

## 2023-11-30 PROCEDURE — 88175 CYTOPATH C/V AUTO FLUID REDO: CPT | Performed by: OBSTETRICS & GYNECOLOGY

## 2023-11-30 PROCEDURE — 58100 ENDOMETRIAL BIOPSY: ICD-10-PCS | Mod: S$GLB,,, | Performed by: OBSTETRICS & GYNECOLOGY

## 2023-11-30 PROCEDURE — 99999 PR PBB SHADOW E&M-EST. PATIENT-LVL IV: CPT | Mod: PBBFAC,,, | Performed by: OBSTETRICS & GYNECOLOGY

## 2023-11-30 PROCEDURE — 3078F PR MOST RECENT DIASTOLIC BLOOD PRESSURE < 80 MM HG: ICD-10-PCS | Mod: CPTII,S$GLB,, | Performed by: OBSTETRICS & GYNECOLOGY

## 2023-11-30 PROCEDURE — 87624 HPV HI-RISK TYP POOLED RSLT: CPT | Performed by: OBSTETRICS & GYNECOLOGY

## 2023-11-30 PROCEDURE — 99999 PR PBB SHADOW E&M-EST. PATIENT-LVL IV: ICD-10-PCS | Mod: PBBFAC,,, | Performed by: OBSTETRICS & GYNECOLOGY

## 2023-11-30 PROCEDURE — 4010F PR ACE/ARB THEARPY RXD/TAKEN: ICD-10-PCS | Mod: CPTII,S$GLB,, | Performed by: OBSTETRICS & GYNECOLOGY

## 2023-11-30 PROCEDURE — 88305 TISSUE EXAM BY PATHOLOGIST: CPT | Performed by: PATHOLOGY

## 2023-11-30 RX ORDER — BUSPIRONE HYDROCHLORIDE 15 MG/1
15 TABLET ORAL 2 TIMES DAILY
COMMUNITY
End: 2024-01-31 | Stop reason: SDUPTHER

## 2023-11-30 NOTE — PROCEDURES
Endometrial biopsy    Date/Time: 11/30/2023 10:15 AM    Performed by: Gem Woods MD  Authorized by: Gem Woods MD    Consent:     Consent given by:  Patient    Patient questions answered: yes      Patient agrees, verbalizes understanding, and wants to proceed: yes    Indication:     Indications: Post-menopausal bleeding    Procedure:     Procedure: endometrial biopsy with Pipelle      Uterus sounded: yes      Uterus sound depth (cm):  8    Specimen collected: specimen collected and sent to pathology      Patient tolerated procedure well with no complications: yes

## 2023-11-30 NOTE — PROGRESS NOTES
Subjective:      Patient ID: Chasity Blake is a 58 y.o. female.    Chief Complaint:  Metrorrhagia      History of Present Illness  HPI  Presents with postmenopausal bleeding.  Pt has been on estradiol 2mg/prometrium 100mg daily for awhile for symptomatic menopausal symptoms.  Has done well on this regimen.  Pt underwent endometrial ablation with me several years ago, and achieved amenorrhea afterwards.  Had no vaginal bleeding until 3 weeks ago.  Since then, has had daily bleeding ranging from brown to bright red.  No heavy flow.  + mild cramping.  Last pap: 2019: normal  MM22: normal; scheduled for next MMG 24    GYN & OB History  No LMP recorded. Patient has had an ablation.   Date of Last Pap: 2023    OB History    Para Term  AB Living   2 2 2     2   SAB IAB Ectopic Multiple Live Births           2      # Outcome Date GA Lbr Yobany/2nd Weight Sex Delivery Anes PTL Lv   2 Term 11/15/86 38w0d  3.175 kg (7 lb) M Vag-Spont EPI N CARI   1 Term 84 41w0d  3.515 kg (7 lb 12 oz) F Vag-Spont EPI N CARI       Review of Systems  Review of Systems       Objective:     Physical Exam:   Constitutional: She is oriented to person, place, and time. She appears well-developed and well-nourished. No distress.             Abdominal: Soft. She exhibits no distension and no mass. There is no abdominal tenderness. There is no rebound and no guarding. Hernia confirmed negative in the right inguinal area and confirmed negative in the left inguinal area.   Abdominoplasty scar present     Genitourinary:    Pelvic exam was performed with patient supine.   There is no rash, tenderness, lesion or injury on the right labia. There is no rash, tenderness, lesion or injury on the left labia. Right adnexum displays no mass, no tenderness and no fullness. Left adnexum displays no mass, no tenderness and no fullness. There is bleeding (small amount of brown menstrual type blood coming from the internal cervical os)  in the vagina. No erythema,  no vaginal discharge, tenderness, rectocele, cystocele or unspecified prolapse of vaginal walls in the vagina.    No foreign body in the vagina.      No signs of injury in the vagina.   Cervix exhibits no motion tenderness, no discharge and no friability. Uterus is not deviated, not enlarged, not fixed and not tender.               Neurological: She is alert and oriented to person, place, and time.     Psychiatric: She has a normal mood and affect.         Assessment:     1. Postmenopausal bleeding    2. Cervical cancer screening               Plan:     Chasity was seen today for metrorrhagia.    Diagnoses and all orders for this visit:    Postmenopausal bleeding  -     US Pelvis Complete Non OB; Future  -     Specimen to Pathology, Ob/Gyn  -     Endometrial biopsy    Cervical cancer screening  -     Liquid-Based Pap Smear, Screening  -     HPV High Risk Genotypes, PCR     Pap and EMB performed.  Explained EMB may not adequately sample the entire endometrial cavity given hx of endometrial ablation, but I was able to obtain what seemed to be an adequate amount of tissue during the biopsy.  Check ultrasound.  RTC for virtual visit.  May need to adjust HRT pending the findings if benign (may decrease estradiol to 1mg and increase prometrium to 200mg).  If endometrial cancer present, will need to stop HRT.

## 2023-12-04 LAB
COMMENT: NORMAL
FINAL PATHOLOGIC DIAGNOSIS: NORMAL
GROSS: NORMAL
Lab: NORMAL

## 2023-12-05 ENCOUNTER — PATIENT MESSAGE (OUTPATIENT)
Dept: OBSTETRICS AND GYNECOLOGY | Facility: HOSPITAL | Age: 58
End: 2023-12-05
Payer: COMMERCIAL

## 2023-12-05 DIAGNOSIS — N95.1 MENOPAUSAL HOT FLUSHES: ICD-10-CM

## 2023-12-05 DIAGNOSIS — N95.1 MENOPAUSE SYNDROME: Primary | ICD-10-CM

## 2023-12-05 RX ORDER — PROGESTERONE 200 MG/1
200 CAPSULE ORAL NIGHTLY
Qty: 30 CAPSULE | Refills: 11 | Status: SHIPPED | OUTPATIENT
Start: 2023-12-05 | End: 2023-12-25 | Stop reason: SDUPTHER

## 2023-12-05 RX ORDER — ESTRADIOL 1 MG/1
1 TABLET ORAL DAILY
Qty: 90 TABLET | Refills: 3 | Status: SHIPPED | OUTPATIENT
Start: 2023-12-05 | End: 2024-02-01 | Stop reason: SDUPTHER

## 2023-12-06 LAB
HPV HR 12 DNA SPEC QL NAA+PROBE: NEGATIVE
HPV16 AG SPEC QL: NEGATIVE
HPV18 DNA SPEC QL NAA+PROBE: NEGATIVE

## 2023-12-06 NOTE — TELEPHONE ENCOUNTER
No new care gaps identified.  [facility] Embedded Care Gaps. Reference number: [id number]. 9/12/2022   3:33:23 PM CDT  
(3) no apparent problem

## 2023-12-08 LAB
FINAL PATHOLOGIC DIAGNOSIS: NORMAL
Lab: NORMAL

## 2023-12-21 ENCOUNTER — PATIENT MESSAGE (OUTPATIENT)
Dept: FAMILY MEDICINE | Facility: CLINIC | Age: 58
End: 2023-12-21
Payer: COMMERCIAL

## 2023-12-21 NOTE — TELEPHONE ENCOUNTER
No care due was identified.  [facility] Embedded Care Due Messages. Reference number: [id number].   12/21/2023 5:44:18 PM CST

## 2023-12-22 RX ORDER — TIZANIDINE 4 MG/1
4 TABLET ORAL EVERY 8 HOURS
Qty: 30 TABLET | Refills: 1 | Status: SHIPPED | OUTPATIENT
Start: 2023-12-22 | End: 2024-01-11

## 2023-12-25 DIAGNOSIS — N95.1 MENOPAUSAL HOT FLUSHES: ICD-10-CM

## 2023-12-26 RX ORDER — PROGESTERONE 200 MG/1
200 CAPSULE ORAL NIGHTLY
Qty: 90 CAPSULE | Refills: 3 | Status: SHIPPED | OUTPATIENT
Start: 2023-12-26 | End: 2024-02-01 | Stop reason: SDUPTHER

## 2024-01-03 ENCOUNTER — APPOINTMENT (OUTPATIENT)
Dept: RADIOLOGY | Facility: HOSPITAL | Age: 59
End: 2024-01-03
Attending: OBSTETRICS & GYNECOLOGY
Payer: MEDICARE

## 2024-01-03 DIAGNOSIS — N95.0 POSTMENOPAUSAL BLEEDING: ICD-10-CM

## 2024-01-03 PROCEDURE — 76830 TRANSVAGINAL US NON-OB: CPT | Mod: TC,PO

## 2024-01-03 PROCEDURE — 76856 US EXAM PELVIC COMPLETE: CPT | Mod: 26,,, | Performed by: RADIOLOGY

## 2024-01-03 PROCEDURE — 76830 TRANSVAGINAL US NON-OB: CPT | Mod: 26,,, | Performed by: RADIOLOGY

## 2024-01-04 ENCOUNTER — PATIENT MESSAGE (OUTPATIENT)
Dept: OBSTETRICS AND GYNECOLOGY | Facility: CLINIC | Age: 59
End: 2024-01-04
Payer: MEDICARE

## 2024-01-08 ENCOUNTER — TELEPHONE (OUTPATIENT)
Dept: OBSTETRICS AND GYNECOLOGY | Facility: CLINIC | Age: 59
End: 2024-01-08
Payer: MEDICARE

## 2024-01-08 NOTE — TELEPHONE ENCOUNTER
----- Message from Gem Woods MD sent at 1/4/2024  4:15 PM CST -----  Please schedule repeat ultrasound in 3 months, then follow-up visit with me.

## 2024-01-08 NOTE — TELEPHONE ENCOUNTER
----- Message from Destinee Garcia sent at 1/8/2024  3:56 PM CST -----  Contact: YASMIN OH [[id number]]  .Type:  Patient Returning Call    Who Called: YASMIN OH [[id number]]  Who Left Message for Patient: Michelle  Does the patient know what this is regarding?:  Would the patient rather a call back or a response via [facility]? call  Best Call Back Number: [phone] (home)   Additional Information:

## 2024-01-12 NOTE — TELEPHONE ENCOUNTER
No new care gaps identified.  [facility] Embedded Care Gaps. Reference number: [id number]. 9/15/2022   3:43:55 PM CDT   [Negative] : Heme/Lymph [Weight Loss (___ Lbs)] : no recent weight loss [Chest Discomfort] : no chest discomfort [Cough] : cough

## 2024-01-17 ENCOUNTER — TELEPHONE (OUTPATIENT)
Dept: OBSTETRICS AND GYNECOLOGY | Facility: CLINIC | Age: 59
End: 2024-01-17
Payer: MEDICARE

## 2024-01-25 ENCOUNTER — OFFICE VISIT (OUTPATIENT)
Dept: OBSTETRICS AND GYNECOLOGY | Facility: CLINIC | Age: 59
End: 2024-01-25
Payer: MEDICARE

## 2024-01-25 DIAGNOSIS — R10.2 PELVIC CRAMPING: ICD-10-CM

## 2024-01-25 DIAGNOSIS — N95.1 MENOPAUSE SYNDROME: ICD-10-CM

## 2024-01-25 DIAGNOSIS — N95.0 POSTMENOPAUSAL BLEEDING: Primary | ICD-10-CM

## 2024-01-25 PROCEDURE — 99213 OFFICE O/P EST LOW 20 MIN: CPT | Mod: 95,,, | Performed by: OBSTETRICS & GYNECOLOGY

## 2024-01-25 NOTE — PROGRESS NOTES
The patient location is: [place]  The chief complaint leading to consultation is: postmenopausal bleeding    Visit type: audiovisual    Face to Face time with patient: 10 minutes  20 minutes of total time spent on the encounter, which includes face to face time and non-face to face time preparing to see the patient (eg, review of tests), Obtaining and/or reviewing separately obtained history, Documenting clinical information in the electronic or other health record, Independently interpreting results (not separately reported) and communicating results to the patient/family/caregiver, or Care coordination (not separately reported).         Each patient to whom he or she provides medical services by telemedicine is:  (1) informed of the relationship between the physician and patient and the respective role of any other health care provider with respect to management of the patient; and (2) notified that he or she may decline to receive medical services by telemedicine and may withdraw from such care at any time.    Notes:     Subjective:      Patient ID: Chasity Blake is a 58 y.o. female.    Chief Complaint:  postmenopausal bleeding    History of Present Illness  HPI  Presents via telemedicine for postmenopausal bleeding.  Pt saw me at the end of November with bleeding.  Pt had been on estradiol 2mg and prometrium 100mg for symptomatic menopause.  Has hx of endometrial ablation and BTL.  This past year started having intermittent vaginal bleeding and cramping.  EMB with disordered proliferative endometrium.  Since then, we decreased estradiol to 1mg and increased prometrium to 200mg.  She continues to have intermittent bleeding and cramping, and is ready for definitive management with hysterectomy and BSO.  Pap 11/30/23: neg, HPV neg  Ultrasound 1/3/24:   The uterus is 3.7 x 3.8 x 6.4 cm.  Endometrial stripe thickness is borderline enlarged at 7 mm.  There is trace fluid within the lower uterine segment of the  endometrial canal.  No myometrial mass.   There is a normal-appearing right ovary a 1.7 x 2.0 x 2.7 cm.  Normal blood flow.   The left ovary is 1.7 x 1.6 x 2.5 cm.  Normal blood flow.  There is a small simple cyst with a septation 8 x 7 x 11 mm.     here is no free fluid.    GYN & OB History  No LMP recorded. Patient has had an ablation.   Date of Last Pap: 2023    OB History    Para Term  AB Living   2 2 2     2   SAB IAB Ectopic Multiple Live Births           2      # Outcome Date GA Lbr Yobany/2nd Weight Sex Delivery Anes PTL Lv   2 Term 11/15/86 38w0d  3.175 kg (7 lb) M Vag-Spont EPI N CARI   1 Term 83 41w0d  3.515 kg (7 lb 12 oz) F Vag-Spont EPI N CRAI       Review of Systems  Review of Systems       Objective:     Physical Exam:   Constitutional: She is oriented to person, place, and time. She appears well-developed and well-nourished. No distress.                           Neurological: She is alert and oriented to person, place, and time.     Psychiatric: She has a normal mood and affect. Her behavior is normal. Judgment and thought content normal.         Assessment:     1. Postmenopausal bleeding    2. Menopause syndrome    3. Pelvic cramping               Plan:     Diagnoses and all orders for this visit:    Postmenopausal bleeding    Menopause syndrome    Pelvic cramping     Reviewed option to stop HRT and see if bleeding and cramping resolve, but menopause symptoms may flare.  Reviewed option to proceed with RATLH/BSO and continue estradiol HRT afterwards.  Pt ready to proceed with RATLH/BSO and continue ERT afterwards.  RTC for pre-op visit.

## 2024-01-29 ENCOUNTER — TELEPHONE (OUTPATIENT)
Dept: OBSTETRICS AND GYNECOLOGY | Facility: CLINIC | Age: 59
End: 2024-01-29
Payer: MEDICARE

## 2024-01-29 ENCOUNTER — PATIENT MESSAGE (OUTPATIENT)
Dept: OBSTETRICS AND GYNECOLOGY | Facility: CLINIC | Age: 59
End: 2024-01-29
Payer: MEDICARE

## 2024-01-29 DIAGNOSIS — N95.0 POSTMENOPAUSAL BLEEDING: Primary | ICD-10-CM

## 2024-01-29 DIAGNOSIS — R10.2 PELVIC CRAMPING: ICD-10-CM

## 2024-01-31 ENCOUNTER — OFFICE VISIT (OUTPATIENT)
Dept: FAMILY MEDICINE | Facility: CLINIC | Age: 59
End: 2024-01-31
Payer: MEDICARE

## 2024-01-31 ENCOUNTER — PATIENT MESSAGE (OUTPATIENT)
Dept: FAMILY MEDICINE | Facility: CLINIC | Age: 59
End: 2024-01-31

## 2024-01-31 ENCOUNTER — PATIENT MESSAGE (OUTPATIENT)
Dept: OBSTETRICS AND GYNECOLOGY | Facility: CLINIC | Age: 59
End: 2024-01-31
Payer: MEDICARE

## 2024-01-31 DIAGNOSIS — N95.1 MENOPAUSAL HOT FLUSHES: ICD-10-CM

## 2024-01-31 DIAGNOSIS — K21.9 GASTROESOPHAGEAL REFLUX DISEASE WITHOUT ESOPHAGITIS: ICD-10-CM

## 2024-01-31 DIAGNOSIS — L50.9 URTICARIA: Primary | ICD-10-CM

## 2024-01-31 DIAGNOSIS — I10 HTN, GOAL BELOW 130/80: ICD-10-CM

## 2024-01-31 DIAGNOSIS — N95.1 MENOPAUSE SYNDROME: Primary | ICD-10-CM

## 2024-01-31 DIAGNOSIS — I10 HYPERTENSION, UNSPECIFIED TYPE: ICD-10-CM

## 2024-01-31 DIAGNOSIS — J45.21 MILD INTERMITTENT ASTHMA WITH ACUTE EXACERBATION: ICD-10-CM

## 2024-01-31 DIAGNOSIS — R60.0 PEDAL EDEMA: ICD-10-CM

## 2024-01-31 DIAGNOSIS — F43.9 STRESS: ICD-10-CM

## 2024-01-31 DIAGNOSIS — F98.8 ATTENTION DEFICIT DISORDER (ADD) WITHOUT HYPERACTIVITY: ICD-10-CM

## 2024-01-31 DIAGNOSIS — J45.901 ASTHMA WITH COPD WITH EXACERBATION: ICD-10-CM

## 2024-01-31 DIAGNOSIS — J44.1 ASTHMA WITH COPD WITH EXACERBATION: ICD-10-CM

## 2024-01-31 PROCEDURE — 99214 OFFICE O/P EST MOD 30 MIN: CPT | Mod: 95,,, | Performed by: FAMILY MEDICINE

## 2024-01-31 RX ORDER — NIFEDIPINE 30 MG/1
30 TABLET, EXTENDED RELEASE ORAL DAILY
Qty: 90 TABLET | Refills: 3 | Status: SHIPPED | OUTPATIENT
Start: 2024-01-31 | End: 2024-01-31 | Stop reason: SDUPTHER

## 2024-01-31 RX ORDER — ESTRADIOL 1 MG/1
1 TABLET ORAL DAILY
Qty: 90 TABLET | Refills: 2 | Status: CANCELLED | OUTPATIENT
Start: 2024-01-31 | End: 2025-01-30

## 2024-01-31 RX ORDER — HYDROXYZINE HYDROCHLORIDE 10 MG/1
10 TABLET, FILM COATED ORAL 3 TIMES DAILY PRN
Qty: 30 TABLET | Refills: 1 | Status: SHIPPED | OUTPATIENT
Start: 2024-01-31 | End: 2024-01-31 | Stop reason: SDUPTHER

## 2024-01-31 RX ORDER — PROGESTERONE 200 MG/1
200 CAPSULE ORAL NIGHTLY
Qty: 90 CAPSULE | Refills: 2 | Status: CANCELLED | OUTPATIENT
Start: 2024-01-31 | End: 2025-01-30

## 2024-01-31 RX ORDER — CETIRIZINE HYDROCHLORIDE 10 MG/1
10 TABLET ORAL DAILY
Qty: 30 TABLET | Refills: 2 | Status: SHIPPED | OUTPATIENT
Start: 2024-01-31 | End: 2025-01-30

## 2024-01-31 RX ORDER — OMEPRAZOLE 40 MG/1
40 CAPSULE, DELAYED RELEASE ORAL EVERY MORNING
Qty: 90 CAPSULE | Refills: 3 | Status: SHIPPED | OUTPATIENT
Start: 2024-01-31 | End: 2024-01-31 | Stop reason: SDUPTHER

## 2024-01-31 RX ORDER — PREDNISONE 20 MG/1
20 TABLET ORAL SEE ADMIN INSTRUCTIONS
Qty: 45 TABLET | Refills: 0 | Status: ON HOLD | OUTPATIENT
Start: 2024-01-31 | End: 2024-03-19 | Stop reason: HOSPADM

## 2024-01-31 NOTE — PROGRESS NOTES
Chief Complaint:    No chief complaint on file.      History of Present Illness:  Patient with ADD presents today virtually for a medication refill.     Requesting a refill of ADD med. Is on vyvanse which works well.     Rash that causes itching. Started 1 week  ago and will change to different body parts every 2 days, most recent are on legs and stomach. Says the rash is raised. Denies fever or recent medication changes.     Checks blood pressure regularly and is stable.     Hysterectomy scheduled    She also had iron deficiency is taking iron oral pills.      ROS:  Review of Systems   Constitutional:  Negative for fatigue and fever.   HENT:  Negative for congestion, rhinorrhea and sore throat.    Eyes:  Negative for pain.   Respiratory:  Negative for cough and shortness of breath.    Gastrointestinal:  Negative for diarrhea and vomiting.   Skin:  Positive for rash.       Past Medical History:   Diagnosis Date    ADD (attention deficit disorder with hyperactivity)     Anxiety     Asthma     mild intermittent    COPD (chronic obstructive pulmonary disease)     Depression     Hypertension        Social History:  Social History     Socioeconomic History    Marital status:    Tobacco Use    Smoking status: Every Day     Current packs/day: 0.50     Average packs/day: 0.5 packs/day for 10.0 years (5.0 ttl pk-yrs)     Types: Cigarettes    Smokeless tobacco: Never   Substance and Sexual Activity    Alcohol use: No     Alcohol/week: 0.0 standard drinks of alcohol    Drug use: No    Sexual activity: Yes     Partners: Male     Birth control/protection: None, See Surgical Hx     Social Determinants of Health     Financial Resource Strain: Patient Declined (11/29/2023)    Overall Financial Resource Strain (CARDIA)     Difficulty of Paying Living Expenses: Patient declined   Food Insecurity: Patient Declined (11/29/2023)    Hunger Vital Sign     Worried About Running Out of Food in the Last Year: Patient declined      Ran Out of Food in the Last Year: Patient declined   Transportation Needs: Patient Declined (11/29/2023)    PRAPARE - Transportation     Lack of Transportation (Medical): Patient declined     Lack of Transportation (Non-Medical): Patient declined   Physical Activity: Unknown (11/29/2023)    Exercise Vital Sign     Days of Exercise per Week: Patient declined     Minutes of Exercise per Session: 10 min   Stress: Patient Declined (11/29/2023)    [employer] - Occupational Stress Questionnaire     Feeling of Stress : Patient declined   Social Connections: Unknown (11/29/2023)    Social Connection and Isolation Panel [NHANES]     Frequency of Communication with Friends and Family: Patient declined     Frequency of Social Gatherings with Friends and Family: Patient declined     Active Member of Clubs or Organizations: Patient declined     Attends Club or Organization Meetings: Patient declined     Marital Status: Patient declined   Housing Stability: Unknown (11/29/2023)    Housing Stability Vital Sign     Unable to Pay for Housing in the Last Year: Patient refused     Number of Places Lived in the Last Year: 1     Unstable Housing in the Last Year: Patient refused       Family History:   family history includes Coronary artery disease (age of onset: 38) in her father; Coronary artery disease (age of onset: 50) in her mother; Lung cancer in her mother.    Health Maintenance   Topic Date Due    Shingles Vaccine (1 of 2) Never done    Mammogram  07/13/2023    TETANUS VACCINE  10/29/2027    Lipid Panel  08/03/2028    Colorectal Cancer Screening  10/17/2028    Hepatitis C Screening  Completed       Physical Exam:     ]    There is no height or weight on file to calculate BMI.    Physical Exam              Assessment:      ICD-10-CM ICD-9-CM   1. Urticaria  L50.9 708.9   2. Attention deficit disorder (ADD) without hyperactivity  F98.8 314.00   3. Hypertension, unspecified type  I10 401.9   4.  Gastroesophageal reflux disease without esophagitis  K21.9 530.81         Plan:  Start prednisone, Atarax, Zyrtec for urticaria.  Avoid scratching and cut fingernails short.    check, Vyvanse refill.   Medical problems above stable.  follow up in 3 months      The patient location is: Home   The chief complaint leading to consultation is: as below  Visit type: Virtual visit with synchronous audio and video  Total time spent with patient: 15+ mins  Each patient to whom he or she provides medical services by telemedicine is:  (1) informed of the relationship between the physician and patient and the respective role of any other health care provider with respect to management of the patient; and (2) notified that he or she may decline to receive medical services by telemedicine and may withdraw from such care at any time.    Notes: see below        No orders of the defined types were placed in this encounter.      Current Outpatient Medications   Medication Sig Dispense Refill    albuterol (ACCUNEB) 1.25 mg/3 mL Nebu USE ONE VIAL IN NEBULIZER EVERY 4 TO 6 HOURS AS NEEDED FOR WHEEZING & SHORTNESS OF BREATH 90 mL 6    albuterol (PROAIR HFA) 90 mcg/actuation inhaler INHALE TWO PUFFS INTO THE LUNGS EVERY 6 HOURS AS NEEDED FOR WHEEZING 25.5 g 1    albuterol-ipratropium (DUO-NEB) 2.5 mg-0.5 mg/3 mL nebulizer solution Inhale contents of 1 vial (3 mLs) by nebulization every 6 (six) hours as needed for Wheezing. Rescue 90 mL 2    aspirin 81 MG Chew Take 1 tablet (81 mg total) by mouth once daily. 30 tablet 0    busPIRone (BUSPAR) 15 MG tablet Take 15 mg by mouth 2 (two) times daily.      cetirizine (ZYRTEC) 10 MG tablet Take 1 tablet (10 mg total) by mouth once daily. 30 tablet 2    EPINEPHrine (EPIPEN) 0.3 mg/0.3 mL AtIn INJECT 0.3mL INTRAMUSCULARLY X1 DOSE THANK YOU  12    estradioL (ESTRACE) 1 MG tablet Take 1 tablet (1 mg total) by mouth once daily. 90 tablet 3    fluticasone-salmeterol diskus inhaler 250-50 mcg Inhale 1  puff into the lungs every 12 (twelve) hours. 60 each 2    furosemide (LASIX) 40 MG tablet Take 1 tablet (40 mg total) by mouth once daily. 5 tablet 0    hydrOXYzine HCL (ATARAX) 10 MG Tab Take 1 tablet (10 mg total) by mouth 3 (three) times daily as needed. 30 tablet 1    levocetirizine (XYZAL) 5 MG tablet TAKE ONE TABLET BY MOUTH EVERY EVENING **THANK YOU** 30 tablet 5    lisdexamfetamine (VYVANSE) 40 MG Cap Take 1 capsule (40 mg total) by mouth once daily. 30 capsule 0    lisdexamfetamine (VYVANSE) 40 MG Cap Take 1 capsule (40 mg total) by mouth once daily. 30 capsule 0    lisdexamfetamine (VYVANSE) 40 MG Cap Take 1 capsule (40 mg total) by mouth once daily. 30 capsule 0    LORazepam (ATIVAN) 0.5 MG tablet Take 1 tablet (0.5 mg total) by mouth every 12 (twelve) hours as needed for Anxiety. 14 tablet 0    montelukast (SINGULAIR) 10 mg tablet Take 1 tablet (10 mg total) by mouth once daily. 90 tablet 3    mupirocin (BACTROBAN) 2 % ointment APPLY TO AFFECTED AREA TOPICALLY THREE TIMES DAILY FOR 7 DAYS THANK YOU  0    NIFEdipine (PROCARDIA-XL) 30 MG (OSM) 24 hr tablet Take 1 tablet (30 mg total) by mouth once daily. 90 tablet 3    omeprazole (PRILOSEC) 40 MG capsule Take 1 capsule (40 mg total) by mouth every morning. 90 capsule 3    ondansetron (ZOFRAN-ODT) 4 MG TbDL Dissolve 1 tablet (4 mg total) by mouth every 8 (eight) hours as needed. 14 tablet 1    predniSONE (DELTASONE) 20 MG tablet Take 1 tablet (20 mg total) by mouth As instructed. Take 4 tabs x 3 day, then 3 tabs x 4 day, then 2 tabs x 4 days, then 1 tab x 5 days. 45 tablet 0    progesterone (PROMETRIUM) 200 MG capsule Take 1 capsule (200 mg total) by mouth nightly. 90 capsule 3    propranoloL (INDERAL LA) 80 MG 24 hr capsule Take 1 capsule (80 mg total) by mouth once daily. 90 capsule 3    valsartan (DIOVAN) 320 MG tablet Take 1 tablet (320 mg total) by mouth once daily. 90 tablet 3    venlafaxine (EFFEXOR-XR) 75 MG 24 hr capsule Take 1 capsule (75 mg  total) by mouth once daily. 90 capsule 2     No current facility-administered medications for this visit.       Medications Discontinued During This Encounter   Medication Reason    NIFEdipine (PROCARDIA-XL) 30 MG (OSM) 24 hr tablet Reorder    omeprazole (PRILOSEC) 40 MG capsule Reorder           No follow-ups on file.      Dima Her MD  Scribe Attestation:   I, Artis Maynard, am scribing for, and in the presence of, Dr.Arif Her I performed the above scribed service and the documentation accurately describes the services I performed. I attest to the accuracy of the note.    I, Dr. Dima Her, reviewed documentation as scribed above. I performed the services described in this documentation.  I agree that the record reflects my personal performance and is accurate and complete. Dima Her MD.  01/31/2024     Answers submitted by the patient for this visit:  Rash Questionnaire (Submitted on 1/31/2024)  Chief Complaint: Rash  Chronicity: new  Onset: in the past 7 days  Progression since onset: waxing and waning  Characteristics: pain, redness, swelling, itchiness  Exposed to: nothing  anorexia: No  facial edema: No  joint pain: No  nail changes: No  Treatments tried: analgesics, anti-itch cream  Improvement on treatment: no relief  asthma: Yes  allergies: Yes  eczema: Yes  varicella: No

## 2024-02-01 RX ORDER — ESTRADIOL 1 MG/1
1 TABLET ORAL DAILY
Qty: 90 TABLET | Refills: 3 | Status: SHIPPED | OUTPATIENT
Start: 2024-02-01 | End: 2025-01-31

## 2024-02-01 RX ORDER — PROGESTERONE 200 MG/1
200 CAPSULE ORAL NIGHTLY
Qty: 90 CAPSULE | Refills: 3 | Status: ON HOLD | OUTPATIENT
Start: 2024-02-01 | End: 2024-03-19 | Stop reason: HOSPADM

## 2024-02-04 RX ORDER — BUSPIRONE HYDROCHLORIDE 15 MG/1
15 TABLET ORAL 2 TIMES DAILY
Qty: 90 TABLET | Refills: 2 | Status: SHIPPED | OUTPATIENT
Start: 2024-02-04

## 2024-02-04 RX ORDER — NIFEDIPINE 30 MG/1
30 TABLET, EXTENDED RELEASE ORAL DAILY
Qty: 90 TABLET | Refills: 2 | Status: SHIPPED | OUTPATIENT
Start: 2024-02-04 | End: 2025-02-03

## 2024-02-04 RX ORDER — HYDROXYZINE HYDROCHLORIDE 10 MG/1
10 TABLET, FILM COATED ORAL 3 TIMES DAILY PRN
Qty: 30 TABLET | Refills: 1 | Status: SHIPPED | OUTPATIENT
Start: 2024-02-04

## 2024-02-04 RX ORDER — MONTELUKAST SODIUM 10 MG/1
10 TABLET ORAL DAILY
Qty: 90 TABLET | Refills: 2 | Status: SHIPPED | OUTPATIENT
Start: 2024-02-04

## 2024-02-04 RX ORDER — PROPRANOLOL HYDROCHLORIDE 80 MG/1
80 CAPSULE, EXTENDED RELEASE ORAL DAILY
Qty: 90 CAPSULE | Refills: 2 | Status: SHIPPED | OUTPATIENT
Start: 2024-02-04

## 2024-02-04 RX ORDER — OMEPRAZOLE 40 MG/1
40 CAPSULE, DELAYED RELEASE ORAL EVERY MORNING
Qty: 90 CAPSULE | Refills: 2 | Status: SHIPPED | OUTPATIENT
Start: 2024-02-04

## 2024-02-04 RX ORDER — VENLAFAXINE HYDROCHLORIDE 75 MG/1
75 CAPSULE, EXTENDED RELEASE ORAL DAILY
Qty: 90 CAPSULE | Refills: 2 | Status: SHIPPED | OUTPATIENT
Start: 2024-02-04

## 2024-02-04 RX ORDER — VALSARTAN 320 MG/1
320 TABLET ORAL DAILY
Qty: 90 TABLET | Refills: 2 | Status: SHIPPED | OUTPATIENT
Start: 2024-02-04 | End: 2025-02-03

## 2024-02-04 RX ORDER — ALBUTEROL SULFATE 90 UG/1
AEROSOL, METERED RESPIRATORY (INHALATION)
Qty: 25.5 G | Refills: 1 | Status: SHIPPED | OUTPATIENT
Start: 2024-02-04

## 2024-02-12 ENCOUNTER — PATIENT MESSAGE (OUTPATIENT)
Dept: FAMILY MEDICINE | Facility: CLINIC | Age: 59
End: 2024-02-12
Payer: MEDICARE

## 2024-02-12 DIAGNOSIS — F98.8 ATTENTION DEFICIT DISORDER (ADD) WITHOUT HYPERACTIVITY: ICD-10-CM

## 2024-02-12 RX ORDER — LISDEXAMFETAMINE DIMESYLATE 40 MG/1
40 CAPSULE ORAL DAILY
Qty: 30 CAPSULE | Refills: 0 | Status: SHIPPED | OUTPATIENT
Start: 2024-02-12 | End: 2024-02-16 | Stop reason: SDUPTHER

## 2024-02-12 NOTE — TELEPHONE ENCOUNTER
No care due was identified.  [facility] Embedded Care Due Messages. Reference number: [id number].   2/12/2024 10:56:31 AM CST

## 2024-02-16 ENCOUNTER — PATIENT MESSAGE (OUTPATIENT)
Dept: FAMILY MEDICINE | Facility: CLINIC | Age: 59
End: 2024-02-16
Payer: MEDICARE

## 2024-02-16 DIAGNOSIS — F98.8 ATTENTION DEFICIT DISORDER (ADD) WITHOUT HYPERACTIVITY: ICD-10-CM

## 2024-02-16 RX ORDER — LISDEXAMFETAMINE DIMESYLATE 40 MG/1
40 CAPSULE ORAL DAILY
Qty: 30 CAPSULE | Refills: 0 | Status: SHIPPED | OUTPATIENT
Start: 2024-02-16 | End: 2024-02-19 | Stop reason: RX

## 2024-02-16 NOTE — TELEPHONE ENCOUNTER
No care due was identified.  [facility] Embedded Care Due Messages. Reference number: [id number].   2/16/2024 11:06:25 AM CST

## 2024-02-16 NOTE — TELEPHONE ENCOUNTER
Pts vyvanse was sent to [facility] and she needs this to go to [facility] on [address].  Please resend prescription.

## 2024-02-19 RX ORDER — LISDEXAMFETAMINE DIMESYLATE 60 MG/1
60 CAPSULE ORAL EVERY MORNING
Qty: 30 CAPSULE | Refills: 0 | Status: SHIPPED | OUTPATIENT
Start: 2024-02-19

## 2024-03-11 ENCOUNTER — HOSPITAL ENCOUNTER (OUTPATIENT)
Dept: CARDIOLOGY | Facility: HOSPITAL | Age: 59
Discharge: HOME | End: 2024-03-11
Attending: OBSTETRICS & GYNECOLOGY
Payer: MEDICARE

## 2024-03-11 ENCOUNTER — OFFICE VISIT (OUTPATIENT)
Dept: OBSTETRICS AND GYNECOLOGY | Facility: CLINIC | Age: 59
End: 2024-03-11
Payer: MEDICARE

## 2024-03-11 ENCOUNTER — HOSPITAL ENCOUNTER (OUTPATIENT)
Dept: RADIOLOGY | Facility: HOSPITAL | Age: 59
Discharge: HOME | End: 2024-03-11
Attending: FAMILY MEDICINE
Payer: MEDICARE

## 2024-03-11 VITALS
SYSTOLIC BLOOD PRESSURE: 124 MMHG | WEIGHT: 205.94 LBS | BODY MASS INDEX: 35.16 KG/M2 | DIASTOLIC BLOOD PRESSURE: 80 MMHG | HEIGHT: 64 IN

## 2024-03-11 DIAGNOSIS — R10.2 PELVIC CRAMPING: ICD-10-CM

## 2024-03-11 DIAGNOSIS — Z12.31 ENCOUNTER FOR SCREENING MAMMOGRAM FOR MALIGNANT NEOPLASM OF BREAST: ICD-10-CM

## 2024-03-11 DIAGNOSIS — N95.0 POSTMENOPAUSAL BLEEDING: Primary | ICD-10-CM

## 2024-03-11 DIAGNOSIS — N95.0 POSTMENOPAUSAL BLEEDING: ICD-10-CM

## 2024-03-11 LAB
OHS QRS DURATION: 90 MS
OHS QTC CALCULATION: 439 MS

## 2024-03-11 PROCEDURE — 93010 ELECTROCARDIOGRAM REPORT: CPT | Mod: ,,, | Performed by: INTERNAL MEDICINE

## 2024-03-11 PROCEDURE — 77067 SCR MAMMO BI INCL CAD: CPT | Mod: TC

## 2024-03-11 PROCEDURE — 77063 BREAST TOMOSYNTHESIS BI: CPT | Mod: 26,,, | Performed by: RADIOLOGY

## 2024-03-11 PROCEDURE — 93005 ELECTROCARDIOGRAM TRACING: CPT

## 2024-03-11 PROCEDURE — 77067 SCR MAMMO BI INCL CAD: CPT | Mod: 26,,, | Performed by: RADIOLOGY

## 2024-03-11 PROCEDURE — 99499 UNLISTED E&M SERVICE: CPT | Mod: S$GLB,,, | Performed by: OBSTETRICS & GYNECOLOGY

## 2024-03-11 PROCEDURE — 99999 PR PBB SHADOW E&M-EST. PATIENT-LVL IV: CPT | Mod: PBBFAC,,, | Performed by: OBSTETRICS & GYNECOLOGY

## 2024-03-11 RX ORDER — SODIUM CHLORIDE, SODIUM LACTATE, POTASSIUM CHLORIDE, CALCIUM CHLORIDE 600; 310; 30; 20 MG/100ML; MG/100ML; MG/100ML; MG/100ML
INJECTION, SOLUTION INTRAVENOUS CONTINUOUS
Status: CANCELLED | OUTPATIENT
Start: 2024-03-11

## 2024-03-11 RX ORDER — LIDOCAINE HYDROCHLORIDE 10 MG/ML
0.5 INJECTION, SOLUTION EPIDURAL; INFILTRATION; INTRACAUDAL; PERINEURAL
Status: CANCELLED | OUTPATIENT
Start: 2024-03-11

## 2024-03-11 RX ORDER — HYDROMORPHONE HYDROCHLORIDE 1 MG/ML
1 INJECTION, SOLUTION INTRAMUSCULAR; INTRAVENOUS; SUBCUTANEOUS EVERY 4 HOURS PRN
Status: CANCELLED | OUTPATIENT
Start: 2024-03-11

## 2024-03-11 RX ORDER — HYDROCODONE BITARTRATE AND ACETAMINOPHEN 5; 325 MG/1; MG/1
1 TABLET ORAL EVERY 4 HOURS PRN
Status: CANCELLED | OUTPATIENT
Start: 2024-03-11

## 2024-03-11 RX ORDER — FAMOTIDINE 20 MG/1
20 TABLET, FILM COATED ORAL
Status: CANCELLED | OUTPATIENT
Start: 2024-03-11

## 2024-03-11 RX ORDER — DIPHENHYDRAMINE HCL 25 MG
25 CAPSULE ORAL EVERY 4 HOURS PRN
Status: CANCELLED | OUTPATIENT
Start: 2024-03-11

## 2024-03-11 RX ORDER — MEPERIDINE HYDROCHLORIDE 25 MG/ML
12.5 INJECTION INTRAMUSCULAR; INTRAVENOUS; SUBCUTANEOUS ONCE AS NEEDED
Status: CANCELLED | OUTPATIENT
Start: 2024-03-11 | End: 2024-03-12

## 2024-03-11 RX ORDER — CEFAZOLIN SODIUM 2 G/50ML
2 SOLUTION INTRAVENOUS
Status: CANCELLED | OUTPATIENT
Start: 2024-03-11

## 2024-03-11 RX ORDER — ONDANSETRON HYDROCHLORIDE 2 MG/ML
4 INJECTION, SOLUTION INTRAVENOUS DAILY PRN
Status: CANCELLED | OUTPATIENT
Start: 2024-03-11

## 2024-03-11 RX ORDER — AMOXICILLIN 250 MG
1 CAPSULE ORAL 2 TIMES DAILY
Status: CANCELLED | OUTPATIENT
Start: 2024-03-11

## 2024-03-11 RX ORDER — DIPHENHYDRAMINE HYDROCHLORIDE 50 MG/ML
25 INJECTION INTRAMUSCULAR; INTRAVENOUS EVERY 4 HOURS PRN
Status: CANCELLED | OUTPATIENT
Start: 2024-03-11

## 2024-03-11 RX ORDER — SODIUM CHLORIDE 0.9 % (FLUSH) 0.9 %
3 SYRINGE (ML) INJECTION
Status: CANCELLED | OUTPATIENT
Start: 2024-03-11

## 2024-03-11 RX ORDER — ACETAMINOPHEN 500 MG
1000 TABLET ORAL
Status: CANCELLED | OUTPATIENT
Start: 2024-03-11

## 2024-03-11 RX ORDER — MUPIROCIN 20 MG/G
OINTMENT TOPICAL
Status: CANCELLED | OUTPATIENT
Start: 2024-03-11

## 2024-03-11 RX ORDER — HYDROCODONE BITARTRATE AND ACETAMINOPHEN 10; 325 MG/1; MG/1
1 TABLET ORAL EVERY 4 HOURS PRN
Status: CANCELLED | OUTPATIENT
Start: 2024-03-11

## 2024-03-11 RX ORDER — KETOROLAC TROMETHAMINE 30 MG/ML
30 INJECTION, SOLUTION INTRAMUSCULAR; INTRAVENOUS
Status: CANCELLED | OUTPATIENT
Start: 2024-03-11 | End: 2024-03-12

## 2024-03-11 RX ORDER — ACETAMINOPHEN 325 MG/1
650 TABLET ORAL EVERY 4 HOURS PRN
Status: CANCELLED | OUTPATIENT
Start: 2024-03-11

## 2024-03-11 RX ORDER — PROCHLORPERAZINE EDISYLATE 5 MG/ML
5 INJECTION INTRAMUSCULAR; INTRAVENOUS EVERY 6 HOURS PRN
Status: CANCELLED | OUTPATIENT
Start: 2024-03-11

## 2024-03-11 RX ORDER — IBUPROFEN 800 MG/1
800 TABLET ORAL
Status: CANCELLED | OUTPATIENT
Start: 2024-03-12

## 2024-03-11 RX ORDER — PROCHLORPERAZINE EDISYLATE 5 MG/ML
5 INJECTION INTRAMUSCULAR; INTRAVENOUS EVERY 10 MIN PRN
Status: CANCELLED | OUTPATIENT
Start: 2024-03-11

## 2024-03-11 RX ORDER — POLYETHYLENE GLYCOL 3350 17 G/17G
17 POWDER, FOR SOLUTION ORAL DAILY
Status: CANCELLED | OUTPATIENT
Start: 2024-03-11

## 2024-03-11 RX ORDER — ONDANSETRON 8 MG/1
8 TABLET, ORALLY DISINTEGRATING ORAL EVERY 8 HOURS PRN
Status: CANCELLED | OUTPATIENT
Start: 2024-03-11

## 2024-03-11 RX ORDER — HYDROMORPHONE HYDROCHLORIDE 1 MG/ML
0.2 INJECTION, SOLUTION INTRAMUSCULAR; INTRAVENOUS; SUBCUTANEOUS EVERY 5 MIN PRN
Status: CANCELLED | OUTPATIENT
Start: 2024-03-11

## 2024-03-11 NOTE — H&P (VIEW-ONLY)
History & Physical    SUBJECTIVE:     History of Present Illness:  Patient is a 58 y.o. female presents for pre-op visit for RATLH/BSO.   Pt saw me at the end of November with bleeding.  Pt had been on estradiol 2mg and prometrium 100mg for symptomatic menopause.  Has hx of endometrial ablation and BTL.  This past year started having intermittent vaginal bleeding and cramping.  EMB with disordered proliferative endometrium.  Since then, we decreased estradiol to 1mg and increased prometrium to 200mg.  She continues to have intermittent bleeding and cramping, and is ready for definitive management with hysterectomy and BSO.  Pap 11/30/23: neg, HPV neg  Ultrasound 1/3/24:   The uterus is 3.7 x 3.8 x 6.4 cm.  Endometrial stripe thickness is borderline enlarged at 7 mm.  There is trace fluid within the lower uterine segment of the endometrial canal.  No myometrial mass.   There is a normal-appearing right ovary a 1.7 x 2.0 x 2.7 cm.  Normal blood flow.   The left ovary is 1.7 x 1.6 x 2.5 cm.  Normal blood flow.  There is a small simple cyst with a septation 8 x 7 x 11 mm.     here is no free fluid.    EMB with disordered proliferative endometrium.  We decreased estradiol to 1mg and increased prometrium to 200mg.  Bleeding resolved, but still having cramping, and hot flushes got worse after estradiol was decreased.  Ready to proceed with hysterectomy.     Past Medical History:   Diagnosis Date    ADD (attention deficit disorder with hyperactivity)     Anxiety     Asthma     mild intermittent    COPD (chronic obstructive pulmonary disease)     Depression     Hypertension     PONV (postoperative nausea and vomiting)      Past Surgical History:   Procedure Laterality Date    AUGMENTATION OF BREAST Bilateral     saline. About 20 years ago    BELT ABDOMINOPLASTY      Breast Augmentation      BREAST BIOPSY Left     2015. benign    BREAST SURGERY      COLONOSCOPY N/A 10/27/2022    Procedure: COLONOSCOPY;  Surgeon: Nadja RIOS  MD Arnel;  Location: [facility] ENDO;  Service: Endoscopy;  Laterality: N/A;    COLONOSCOPY N/A 10/17/2023    Procedure: COLONOSCOPY;  Surgeon: Hiral Fulton MD;  Location: [facility] ENDO;  Service: Endoscopy;  Laterality: N/A;    COSMETIC SURGERY      ENDOMETRIAL ABLATION      TONSILLECTOMY      TUBAL LIGATION       Social History     Socioeconomic History    Marital status:    Tobacco Use    Smoking status: Every Day     Current packs/day: 0.50     Average packs/day: 0.5 packs/day for 10.0 years (5.0 ttl pk-yrs)     Types: Cigarettes    Smokeless tobacco: Never   Substance and Sexual Activity    Alcohol use: No     Alcohol/week: 0.0 standard drinks of alcohol    Drug use: No    Sexual activity: Yes     Partners: Male     Birth control/protection: None, See Surgical Hx     Social Determinants of Health     Financial Resource Strain: Patient Declined (11/29/2023)    Overall Financial Resource Strain (CARDIA)     Difficulty of Paying Living Expenses: Patient declined   Food Insecurity: Patient Declined (11/29/2023)    Hunger Vital Sign     Worried About Running Out of Food in the Last Year: Patient declined     Ran Out of Food in the Last Year: Patient declined   Transportation Needs: Patient Declined (11/29/2023)    PRAPARE - Transportation     Lack of Transportation (Medical): Patient declined     Lack of Transportation (Non-Medical): Patient declined   Physical Activity: Unknown (11/29/2023)    Exercise Vital Sign     Days of Exercise per Week: Patient declined     Minutes of Exercise per Session: 10 min   Stress: Patient Declined (11/29/2023)    [employer] - Occupational Stress Questionnaire     Feeling of Stress : Patient declined   Social Connections: Unknown (11/29/2023)    Social Connection and Isolation Panel [NHANES]     Frequency of Communication with Friends and Family: Patient declined     Frequency of Social Gatherings with Friends and Family: Patient declined      Active Member of Clubs or Organizations: Patient declined     Attends Club or Organization Meetings: Patient declined     Marital Status: Patient declined   Housing Stability: Unknown (11/29/2023)    Housing Stability Vital Sign     Unable to Pay for Housing in the Last Year: Patient refused     Number of Places Lived in the Last Year: 1     Unstable Housing in the Last Year: Patient refused     Family History   Problem Relation Age of Onset    Lung cancer Mother     Coronary artery disease Mother 50    Coronary artery disease Father 38    Breast cancer Neg Hx     Colon cancer Neg Hx     Ovarian cancer Neg Hx      Review of patient's allergies indicates:   Allergen Reactions    Percocet [oxycodone-acetaminophen] Itching     Current Outpatient Medications   Medication Sig Dispense Refill    albuterol (ACCUNEB) 1.25 mg/3 mL Nebu USE ONE VIAL IN NEBULIZER EVERY 4 TO 6 HOURS AS NEEDED FOR WHEEZING & SHORTNESS OF BREATH 90 mL 6    albuterol (PROAIR HFA) 90 mcg/actuation inhaler INHALE TWO PUFFS INTO THE LUNGS EVERY 6 HOURS AS NEEDED FOR WHEEZING 25.5 g 1    busPIRone (BUSPAR) 15 MG tablet Take 1 tablet (15 mg total) by mouth 2 (two) times daily. 90 tablet 2    cetirizine (ZYRTEC) 10 MG tablet Take 1 tablet (10 mg total) by mouth once daily. 30 tablet 2    EPINEPHrine (EPIPEN) 0.3 mg/0.3 mL AtIn INJECT 0.3mL INTRAMUSCULARLY X1 DOSE THANK YOU  12    estradioL (ESTRACE) 1 MG tablet Take 1 tablet (1 mg total) by mouth once daily. 90 tablet 3    furosemide (LASIX) 40 MG tablet Take 1 tablet (40 mg total) by mouth once daily. 5 tablet 0    hydrOXYzine HCL (ATARAX) 10 MG Tab Take 1 tablet (10 mg total) by mouth 3 (three) times daily as needed. 30 tablet 1    levocetirizine (XYZAL) 5 MG tablet TAKE ONE TABLET BY MOUTH EVERY EVENING **THANK YOU** 30 tablet 5    lisdexamfetamine (VYVANSE) 40 MG Cap Take 1 capsule (40 mg total) by mouth once daily. 30 capsule 0    lisdexamfetamine (VYVANSE) 40 MG Cap Take 1 capsule (40 mg total) by  mouth once daily. 30 capsule 0    lisdexamfetamine (VYVANSE) 60 MG capsule Take 1 capsule (60 mg total) by mouth every morning. 30 capsule 0    montelukast (SINGULAIR) 10 mg tablet Take 1 tablet (10 mg total) by mouth once daily. 90 tablet 2    mupirocin (BACTROBAN) 2 % ointment APPLY TO AFFECTED AREA TOPICALLY THREE TIMES DAILY FOR 7 DAYS THANK YOU  0    NIFEdipine (PROCARDIA-XL) 30 MG (OSM) 24 hr tablet Take 1 tablet (30 mg total) by mouth once daily. 90 tablet 2    omeprazole (PRILOSEC) 40 MG capsule Take 1 capsule (40 mg total) by mouth every morning. 90 capsule 2    ondansetron (ZOFRAN-ODT) 4 MG TbDL Dissolve 1 tablet (4 mg total) by mouth every 8 (eight) hours as needed. 14 tablet 1    predniSONE (DELTASONE) 20 MG tablet Take 1 tablet (20 mg total) by mouth As instructed. Take 4 tabs x 3 day, then 3 tabs x 4 day, then 2 tabs x 4 days, then 1 tab x 5 days. 45 tablet 0    progesterone (PROMETRIUM) 200 MG capsule Take 1 capsule (200 mg total) by mouth nightly. 90 capsule 3    propranoloL (INDERAL LA) 80 MG 24 hr capsule Take 1 capsule (80 mg total) by mouth once daily. 90 capsule 2    valsartan (DIOVAN) 320 MG tablet Take 1 tablet (320 mg total) by mouth once daily. 90 tablet 2    venlafaxine (EFFEXOR-XR) 75 MG 24 hr capsule Take 1 capsule (75 mg total) by mouth once daily. 90 capsule 2    albuterol-ipratropium (DUO-NEB) 2.5 mg-0.5 mg/3 mL nebulizer solution Inhale contents of 1 vial (3 mLs) by nebulization every 6 (six) hours as needed for Wheezing. Rescue 90 mL 2    aspirin 81 MG Chew Take 1 tablet (81 mg total) by mouth once daily. 30 tablet 0    fluticasone-salmeterol diskus inhaler 250-50 mcg Inhale 1 puff into the lungs every 12 (twelve) hours. 60 each 2    LORazepam (ATIVAN) 0.5 MG tablet Take 1 tablet (0.5 mg total) by mouth every 12 (twelve) hours as needed for Anxiety. 14 tablet 0     No current facility-administered medications for this visit.            Review of Systems:  Constitutional: no fever or  chills  Respiratory: no cough or shortness of breath  Cardiovascular: no chest pain or palpitations  Gastrointestinal: no nausea or vomiting, tolerating diet  Genitourinary: see HPI  Hematologic/Lymphatic: no easy bruising or lymphadenopathy  Neurological: no seizures or tremors      OBJECTIVE:     Vitals:    03/11/24 1106   BP: 124/80         Physical Exam:  General: well developed, well nourished, no distress  Head: normocephalic  Neck: supple, symmetrical, trachea midline  Lungs:  clear to auscultation bilaterally and normal respiratory effort  Chest Wall: no tenderness  Heart: regular rate and rhythm, S1, S2 normal, no murmur, rub or gallop  Abdomen: soft, non-tender non-distented; bowel sounds normal; no masses,  no organomegaly  Extremities: no cyanosis or edema, or clubbing  Pulses: 2+ and symmetric      Laboratory  Pre-op labs pending    Diagnostic Results:  EKG pending    ASSESSMENT/PLAN:     Chasity was seen today for pre-op exam.    Diagnoses and all orders for this visit:    Postmenopausal bleeding     I reviewed the risks of hysterectomy with the patient including, but not limited to, disfigurement from scars, pain, bleeding, infection, and potential damage to internal organs including muscles, nerves, blood vessels, small bowel, large bowel, bladder, ureters, and kidneys.  I discussed the potential need for conversion to an open abdominal procedure or the need to extend the original incision/incisions for completion of the case or to address any complications that may arise.  I also reviewed potential risks of venous-thrombotic events and potential air embolism. I reviewed the risk of blood loss with the patient, and discussed potential need for blood transfusion if a significant hemorrhage occurs.  Reviewed potential significant risks of blood transfusion including, but not limited to, a transfusion reaction and possible transmission of blood-borne pathogens including, but not limited to, HIV,  hepatitis, and CMV.  Surgical consents were reviewed in detail with the patient and were signed.  All questions were answered.  Proceed with RATLH/BSO as scheduled.

## 2024-03-11 NOTE — H&P
History & Physical    SUBJECTIVE:     History of Present Illness:  Patient is a 58 y.o. female presents for pre-op visit for RATLH/BSO.   Pt saw me at the end of November with bleeding.  Pt had been on estradiol 2mg and prometrium 100mg for symptomatic menopause.  Has hx of endometrial ablation and BTL.  This past year started having intermittent vaginal bleeding and cramping.  EMB with disordered proliferative endometrium.  Since then, we decreased estradiol to 1mg and increased prometrium to 200mg.  She continues to have intermittent bleeding and cramping, and is ready for definitive management with hysterectomy and BSO.  Pap 11/30/23: neg, HPV neg  Ultrasound 1/3/24:   The uterus is 3.7 x 3.8 x 6.4 cm.  Endometrial stripe thickness is borderline enlarged at 7 mm.  There is trace fluid within the lower uterine segment of the endometrial canal.  No myometrial mass.   There is a normal-appearing right ovary a 1.7 x 2.0 x 2.7 cm.  Normal blood flow.   The left ovary is 1.7 x 1.6 x 2.5 cm.  Normal blood flow.  There is a small simple cyst with a septation 8 x 7 x 11 mm.     here is no free fluid.    EMB with disordered proliferative endometrium.  We decreased estradiol to 1mg and increased prometrium to 200mg.  Bleeding resolved, but still having cramping, and hot flushes got worse after estradiol was decreased.  Ready to proceed with hysterectomy.     Past Medical History:   Diagnosis Date    ADD (attention deficit disorder with hyperactivity)     Anxiety     Asthma     mild intermittent    COPD (chronic obstructive pulmonary disease)     Depression     Hypertension     PONV (postoperative nausea and vomiting)      Past Surgical History:   Procedure Laterality Date    AUGMENTATION OF BREAST Bilateral     saline. About 20 years ago    BELT ABDOMINOPLASTY      Breast Augmentation      BREAST BIOPSY Left     2015. benign    BREAST SURGERY      COLONOSCOPY N/A 10/27/2022    Procedure: COLONOSCOPY;  Surgeon: Nadja RIOS  MD Arnel;  Location: [facility] ENDO;  Service: Endoscopy;  Laterality: N/A;    COLONOSCOPY N/A 10/17/2023    Procedure: COLONOSCOPY;  Surgeon: Hiral Fulton MD;  Location: [facility] ENDO;  Service: Endoscopy;  Laterality: N/A;    COSMETIC SURGERY      ENDOMETRIAL ABLATION      TONSILLECTOMY      TUBAL LIGATION       Social History     Socioeconomic History    Marital status:    Tobacco Use    Smoking status: Every Day     Current packs/day: 0.50     Average packs/day: 0.5 packs/day for 10.0 years (5.0 ttl pk-yrs)     Types: Cigarettes    Smokeless tobacco: Never   Substance and Sexual Activity    Alcohol use: No     Alcohol/week: 0.0 standard drinks of alcohol    Drug use: No    Sexual activity: Yes     Partners: Male     Birth control/protection: None, See Surgical Hx     Social Determinants of Health     Financial Resource Strain: Patient Declined (11/29/2023)    Overall Financial Resource Strain (CARDIA)     Difficulty of Paying Living Expenses: Patient declined   Food Insecurity: Patient Declined (11/29/2023)    Hunger Vital Sign     Worried About Running Out of Food in the Last Year: Patient declined     Ran Out of Food in the Last Year: Patient declined   Transportation Needs: Patient Declined (11/29/2023)    PRAPARE - Transportation     Lack of Transportation (Medical): Patient declined     Lack of Transportation (Non-Medical): Patient declined   Physical Activity: Unknown (11/29/2023)    Exercise Vital Sign     Days of Exercise per Week: Patient declined     Minutes of Exercise per Session: 10 min   Stress: Patient Declined (11/29/2023)    [employer] [place] [employer] - Occupational Stress Questionnaire     Feeling of Stress : Patient declined   Social Connections: Unknown (11/29/2023)    Social Connection and Isolation Panel [NHANES]     Frequency of Communication with Friends and Family: Patient declined     Frequency of Social Gatherings with Friends and Family: Patient declined      Active Member of Clubs or Organizations: Patient declined     Attends Club or Organization Meetings: Patient declined     Marital Status: Patient declined   Housing Stability: Unknown (11/29/2023)    Housing Stability Vital Sign     Unable to Pay for Housing in the Last Year: Patient refused     Number of Places Lived in the Last Year: 1     Unstable Housing in the Last Year: Patient refused     Family History   Problem Relation Age of Onset    Lung cancer Mother     Coronary artery disease Mother 50    Coronary artery disease Father 38    Breast cancer Neg Hx     Colon cancer Neg Hx     Ovarian cancer Neg Hx      Review of patient's allergies indicates:   Allergen Reactions    Percocet [oxycodone-acetaminophen] Itching     Current Outpatient Medications   Medication Sig Dispense Refill    albuterol (ACCUNEB) 1.25 mg/3 mL Nebu USE ONE VIAL IN NEBULIZER EVERY 4 TO 6 HOURS AS NEEDED FOR WHEEZING & SHORTNESS OF BREATH 90 mL 6    albuterol (PROAIR HFA) 90 mcg/actuation inhaler INHALE TWO PUFFS INTO THE LUNGS EVERY 6 HOURS AS NEEDED FOR WHEEZING 25.5 g 1    busPIRone (BUSPAR) 15 MG tablet Take 1 tablet (15 mg total) by mouth 2 (two) times daily. 90 tablet 2    cetirizine (ZYRTEC) 10 MG tablet Take 1 tablet (10 mg total) by mouth once daily. 30 tablet 2    EPINEPHrine (EPIPEN) 0.3 mg/0.3 mL AtIn INJECT 0.3mL INTRAMUSCULARLY X1 DOSE THANK YOU  12    estradioL (ESTRACE) 1 MG tablet Take 1 tablet (1 mg total) by mouth once daily. 90 tablet 3    furosemide (LASIX) 40 MG tablet Take 1 tablet (40 mg total) by mouth once daily. 5 tablet 0    hydrOXYzine HCL (ATARAX) 10 MG Tab Take 1 tablet (10 mg total) by mouth 3 (three) times daily as needed. 30 tablet 1    levocetirizine (XYZAL) 5 MG tablet TAKE ONE TABLET BY MOUTH EVERY EVENING **THANK YOU** 30 tablet 5    lisdexamfetamine (VYVANSE) 40 MG Cap Take 1 capsule (40 mg total) by mouth once daily. 30 capsule 0    lisdexamfetamine (VYVANSE) 40 MG Cap Take 1 capsule (40 mg total) by  mouth once daily. 30 capsule 0    lisdexamfetamine (VYVANSE) 60 MG capsule Take 1 capsule (60 mg total) by mouth every morning. 30 capsule 0    montelukast (SINGULAIR) 10 mg tablet Take 1 tablet (10 mg total) by mouth once daily. 90 tablet 2    mupirocin (BACTROBAN) 2 % ointment APPLY TO AFFECTED AREA TOPICALLY THREE TIMES DAILY FOR 7 DAYS THANK YOU  0    NIFEdipine (PROCARDIA-XL) 30 MG (OSM) 24 hr tablet Take 1 tablet (30 mg total) by mouth once daily. 90 tablet 2    omeprazole (PRILOSEC) 40 MG capsule Take 1 capsule (40 mg total) by mouth every morning. 90 capsule 2    ondansetron (ZOFRAN-ODT) 4 MG TbDL Dissolve 1 tablet (4 mg total) by mouth every 8 (eight) hours as needed. 14 tablet 1    predniSONE (DELTASONE) 20 MG tablet Take 1 tablet (20 mg total) by mouth As instructed. Take 4 tabs x 3 day, then 3 tabs x 4 day, then 2 tabs x 4 days, then 1 tab x 5 days. 45 tablet 0    progesterone (PROMETRIUM) 200 MG capsule Take 1 capsule (200 mg total) by mouth nightly. 90 capsule 3    propranoloL (INDERAL LA) 80 MG 24 hr capsule Take 1 capsule (80 mg total) by mouth once daily. 90 capsule 2    valsartan (DIOVAN) 320 MG tablet Take 1 tablet (320 mg total) by mouth once daily. 90 tablet 2    venlafaxine (EFFEXOR-XR) 75 MG 24 hr capsule Take 1 capsule (75 mg total) by mouth once daily. 90 capsule 2    albuterol-ipratropium (DUO-NEB) 2.5 mg-0.5 mg/3 mL nebulizer solution Inhale contents of 1 vial (3 mLs) by nebulization every 6 (six) hours as needed for Wheezing. Rescue 90 mL 2    aspirin 81 MG Chew Take 1 tablet (81 mg total) by mouth once daily. 30 tablet 0    fluticasone-salmeterol diskus inhaler 250-50 mcg Inhale 1 puff into the lungs every 12 (twelve) hours. 60 each 2    LORazepam (ATIVAN) 0.5 MG tablet Take 1 tablet (0.5 mg total) by mouth every 12 (twelve) hours as needed for Anxiety. 14 tablet 0     No current facility-administered medications for this visit.            Review of Systems:  Constitutional: no fever or  chills  Respiratory: no cough or shortness of breath  Cardiovascular: no chest pain or palpitations  Gastrointestinal: no nausea or vomiting, tolerating diet  Genitourinary: see HPI  Hematologic/Lymphatic: no easy bruising or lymphadenopathy  Neurological: no seizures or tremors      OBJECTIVE:     Vitals:    03/11/24 1106   BP: 124/80         Physical Exam:  General: well developed, well nourished, no distress  Head: normocephalic  Neck: supple, symmetrical, trachea midline  Lungs:  clear to auscultation bilaterally and normal respiratory effort  Chest Wall: no tenderness  Heart: regular rate and rhythm, S1, S2 normal, no murmur, rub or gallop  Abdomen: soft, non-tender non-distented; bowel sounds normal; no masses,  no organomegaly  Extremities: no cyanosis or edema, or clubbing  Pulses: 2+ and symmetric      Laboratory  Pre-op labs pending    Diagnostic Results:  EKG pending    ASSESSMENT/PLAN:     Chasity was seen today for pre-op exam.    Diagnoses and all orders for this visit:    Postmenopausal bleeding     I reviewed the risks of hysterectomy with the patient including, but not limited to, disfigurement from scars, pain, bleeding, infection, and potential damage to internal organs including muscles, nerves, blood vessels, small bowel, large bowel, bladder, ureters, and kidneys.  I discussed the potential need for conversion to an open abdominal procedure or the need to extend the original incision/incisions for completion of the case or to address any complications that may arise.  I also reviewed potential risks of venous-thrombotic events and potential air embolism. I reviewed the risk of blood loss with the patient, and discussed potential need for blood transfusion if a significant hemorrhage occurs.  Reviewed potential significant risks of blood transfusion including, but not limited to, a transfusion reaction and possible transmission of blood-borne pathogens including, but not limited to, HIV,  hepatitis, and CMV.  Surgical consents were reviewed in detail with the patient and were signed.  All questions were answered.  Proceed with RATLH/BSO as scheduled.

## 2024-03-11 NOTE — PROGRESS NOTES
History & Physical    SUBJECTIVE:     History of Present Illness:  Patient is a 58 y.o. female presents for pre-op visit for RATLH/BSO.   Pt saw me at the end of November with bleeding.  Pt had been on estradiol 2mg and prometrium 100mg for symptomatic menopause.  Has hx of endometrial ablation and BTL.  This past year started having intermittent vaginal bleeding and cramping.  EMB with disordered proliferative endometrium.  Since then, we decreased estradiol to 1mg and increased prometrium to 200mg.  She continues to have intermittent bleeding and cramping, and is ready for definitive management with hysterectomy and BSO.  Pap 11/30/23: neg, HPV neg  Ultrasound 1/3/24:   The uterus is 3.7 x 3.8 x 6.4 cm.  Endometrial stripe thickness is borderline enlarged at 7 mm.  There is trace fluid within the lower uterine segment of the endometrial canal.  No myometrial mass.   There is a normal-appearing right ovary a 1.7 x 2.0 x 2.7 cm.  Normal blood flow.   The left ovary is 1.7 x 1.6 x 2.5 cm.  Normal blood flow.  There is a small simple cyst with a septation 8 x 7 x 11 mm.     here is no free fluid.    EMB with disordered proliferative endometrium.  We decreased estradiol to 1mg and increased prometrium to 200mg.  Bleeding resolved, but still having cramping, and hot flushes got worse after estradiol was decreased.  Ready to proceed with hysterectomy.     Past Medical History:   Diagnosis Date    ADD (attention deficit disorder with hyperactivity)     Anxiety     Asthma     mild intermittent    COPD (chronic obstructive pulmonary disease)     Depression     Hypertension     PONV (postoperative nausea and vomiting)      Past Surgical History:   Procedure Laterality Date    AUGMENTATION OF BREAST Bilateral     saline. About 20 years ago    BELT ABDOMINOPLASTY      Breast Augmentation      BREAST BIOPSY Left     2015. benign    BREAST SURGERY      COLONOSCOPY N/A 10/27/2022    Procedure: COLONOSCOPY;  Surgeon: Nadja RISO  MD Arnel;  Location: [facility] ENDO;  Service: Endoscopy;  Laterality: N/A;    COLONOSCOPY N/A 10/17/2023    Procedure: COLONOSCOPY;  Surgeon: Hiral Fulton MD;  Location: [facility] ENDO;  Service: Endoscopy;  Laterality: N/A;    COSMETIC SURGERY      ENDOMETRIAL ABLATION      TONSILLECTOMY      TUBAL LIGATION       Social History     Socioeconomic History    Marital status:    Tobacco Use    Smoking status: Every Day     Current packs/day: 0.50     Average packs/day: 0.5 packs/day for 10.0 years (5.0 ttl pk-yrs)     Types: Cigarettes    Smokeless tobacco: Never   Substance and Sexual Activity    Alcohol use: No     Alcohol/week: 0.0 standard drinks of alcohol    Drug use: No    Sexual activity: Yes     Partners: Male     Birth control/protection: None, See Surgical Hx     Social Determinants of Health     Financial Resource Strain: Patient Declined (11/29/2023)    Overall Financial Resource Strain (CARDIA)     Difficulty of Paying Living Expenses: Patient declined   Food Insecurity: Patient Declined (11/29/2023)    Hunger Vital Sign     Worried About Running Out of Food in the Last Year: Patient declined     Ran Out of Food in the Last Year: Patient declined   Transportation Needs: Patient Declined (11/29/2023)    PRAPARE - Transportation     Lack of Transportation (Medical): Patient declined     Lack of Transportation (Non-Medical): Patient declined   Physical Activity: Unknown (11/29/2023)    Exercise Vital Sign     Days of Exercise per Week: Patient declined     Minutes of Exercise per Session: 10 min   Stress: Patient Declined (11/29/2023)    [employer] [place] [employer] - Occupational Stress Questionnaire     Feeling of Stress : Patient declined   Social Connections: Unknown (11/29/2023)    Social Connection and Isolation Panel [NHANES]     Frequency of Communication with Friends and Family: Patient declined     Frequency of Social Gatherings with Friends and Family: Patient declined      Active Member of Clubs or Organizations: Patient declined     Attends Club or Organization Meetings: Patient declined     Marital Status: Patient declined   Housing Stability: Unknown (11/29/2023)    Housing Stability Vital Sign     Unable to Pay for Housing in the Last Year: Patient refused     Number of Places Lived in the Last Year: 1     Unstable Housing in the Last Year: Patient refused     Family History   Problem Relation Age of Onset    Lung cancer Mother     Coronary artery disease Mother 50    Coronary artery disease Father 38    Breast cancer Neg Hx     Colon cancer Neg Hx     Ovarian cancer Neg Hx      Review of patient's allergies indicates:   Allergen Reactions    Percocet [oxycodone-acetaminophen] Itching     Current Outpatient Medications   Medication Sig Dispense Refill    albuterol (ACCUNEB) 1.25 mg/3 mL Nebu USE ONE VIAL IN NEBULIZER EVERY 4 TO 6 HOURS AS NEEDED FOR WHEEZING & SHORTNESS OF BREATH 90 mL 6    albuterol (PROAIR HFA) 90 mcg/actuation inhaler INHALE TWO PUFFS INTO THE LUNGS EVERY 6 HOURS AS NEEDED FOR WHEEZING 25.5 g 1    busPIRone (BUSPAR) 15 MG tablet Take 1 tablet (15 mg total) by mouth 2 (two) times daily. 90 tablet 2    cetirizine (ZYRTEC) 10 MG tablet Take 1 tablet (10 mg total) by mouth once daily. 30 tablet 2    EPINEPHrine (EPIPEN) 0.3 mg/0.3 mL AtIn INJECT 0.3mL INTRAMUSCULARLY X1 DOSE THANK YOU  12    estradioL (ESTRACE) 1 MG tablet Take 1 tablet (1 mg total) by mouth once daily. 90 tablet 3    furosemide (LASIX) 40 MG tablet Take 1 tablet (40 mg total) by mouth once daily. 5 tablet 0    hydrOXYzine HCL (ATARAX) 10 MG Tab Take 1 tablet (10 mg total) by mouth 3 (three) times daily as needed. 30 tablet 1    levocetirizine (XYZAL) 5 MG tablet TAKE ONE TABLET BY MOUTH EVERY EVENING **THANK YOU** 30 tablet 5    lisdexamfetamine (VYVANSE) 40 MG Cap Take 1 capsule (40 mg total) by mouth once daily. 30 capsule 0    lisdexamfetamine (VYVANSE) 40 MG Cap Take 1 capsule (40 mg total) by  mouth once daily. 30 capsule 0    lisdexamfetamine (VYVANSE) 60 MG capsule Take 1 capsule (60 mg total) by mouth every morning. 30 capsule 0    montelukast (SINGULAIR) 10 mg tablet Take 1 tablet (10 mg total) by mouth once daily. 90 tablet 2    mupirocin (BACTROBAN) 2 % ointment APPLY TO AFFECTED AREA TOPICALLY THREE TIMES DAILY FOR 7 DAYS THANK YOU  0    NIFEdipine (PROCARDIA-XL) 30 MG (OSM) 24 hr tablet Take 1 tablet (30 mg total) by mouth once daily. 90 tablet 2    omeprazole (PRILOSEC) 40 MG capsule Take 1 capsule (40 mg total) by mouth every morning. 90 capsule 2    ondansetron (ZOFRAN-ODT) 4 MG TbDL Dissolve 1 tablet (4 mg total) by mouth every 8 (eight) hours as needed. 14 tablet 1    predniSONE (DELTASONE) 20 MG tablet Take 1 tablet (20 mg total) by mouth As instructed. Take 4 tabs x 3 day, then 3 tabs x 4 day, then 2 tabs x 4 days, then 1 tab x 5 days. 45 tablet 0    progesterone (PROMETRIUM) 200 MG capsule Take 1 capsule (200 mg total) by mouth nightly. 90 capsule 3    propranoloL (INDERAL LA) 80 MG 24 hr capsule Take 1 capsule (80 mg total) by mouth once daily. 90 capsule 2    valsartan (DIOVAN) 320 MG tablet Take 1 tablet (320 mg total) by mouth once daily. 90 tablet 2    venlafaxine (EFFEXOR-XR) 75 MG 24 hr capsule Take 1 capsule (75 mg total) by mouth once daily. 90 capsule 2    albuterol-ipratropium (DUO-NEB) 2.5 mg-0.5 mg/3 mL nebulizer solution Inhale contents of 1 vial (3 mLs) by nebulization every 6 (six) hours as needed for Wheezing. Rescue 90 mL 2    aspirin 81 MG Chew Take 1 tablet (81 mg total) by mouth once daily. 30 tablet 0    fluticasone-salmeterol diskus inhaler 250-50 mcg Inhale 1 puff into the lungs every 12 (twelve) hours. 60 each 2    LORazepam (ATIVAN) 0.5 MG tablet Take 1 tablet (0.5 mg total) by mouth every 12 (twelve) hours as needed for Anxiety. 14 tablet 0     No current facility-administered medications for this visit.            Review of Systems:  Constitutional: no fever or  chills  Respiratory: no cough or shortness of breath  Cardiovascular: no chest pain or palpitations  Gastrointestinal: no nausea or vomiting, tolerating diet  Genitourinary: see HPI  Hematologic/Lymphatic: no easy bruising or lymphadenopathy  Neurological: no seizures or tremors      OBJECTIVE:     Vitals:    03/11/24 1106   BP: 124/80         Physical Exam:  General: well developed, well nourished, no distress  Head: normocephalic  Neck: supple, symmetrical, trachea midline  Lungs:  clear to auscultation bilaterally and normal respiratory effort  Chest Wall: no tenderness  Heart: regular rate and rhythm, S1, S2 normal, no murmur, rub or gallop  Abdomen: soft, non-tender non-distented; bowel sounds normal; no masses,  no organomegaly  Extremities: no cyanosis or edema, or clubbing  Pulses: 2+ and symmetric      Laboratory  Pre-op labs pending    Diagnostic Results:  EKG pending    ASSESSMENT/PLAN:     Chasity was seen today for pre-op exam.    Diagnoses and all orders for this visit:    Postmenopausal bleeding     I reviewed the risks of hysterectomy with the patient including, but not limited to, disfigurement from scars, pain, bleeding, infection, and potential damage to internal organs including muscles, nerves, blood vessels, small bowel, large bowel, bladder, ureters, and kidneys.  I discussed the potential need for conversion to an open abdominal procedure or the need to extend the original incision/incisions for completion of the case or to address any complications that may arise.  I also reviewed potential risks of venous-thrombotic events and potential air embolism. I reviewed the risk of blood loss with the patient, and discussed potential need for blood transfusion if a significant hemorrhage occurs.  Reviewed potential significant risks of blood transfusion including, but not limited to, a transfusion reaction and possible transmission of blood-borne pathogens including, but not limited to, HIV,  hepatitis, and CMV.  Surgical consents were reviewed in detail with the patient and were signed.  All questions were answered.  Proceed with RATLH/BSO as scheduled.

## 2024-03-12 ENCOUNTER — OFFICE VISIT (OUTPATIENT)
Dept: CARDIOLOGY | Facility: CLINIC | Age: 59
End: 2024-03-12
Payer: MEDICARE

## 2024-03-12 ENCOUNTER — TELEPHONE (OUTPATIENT)
Dept: OBSTETRICS AND GYNECOLOGY | Facility: CLINIC | Age: 59
End: 2024-03-12
Payer: MEDICARE

## 2024-03-12 ENCOUNTER — PATIENT MESSAGE (OUTPATIENT)
Dept: PREADMISSION TESTING | Facility: HOSPITAL | Age: 59
End: 2024-03-12
Payer: MEDICARE

## 2024-03-12 ENCOUNTER — TELEPHONE (OUTPATIENT)
Dept: CARDIOLOGY | Facility: HOSPITAL | Age: 59
End: 2024-03-12
Payer: MEDICARE

## 2024-03-12 VITALS
SYSTOLIC BLOOD PRESSURE: 129 MMHG | HEIGHT: 64 IN | WEIGHT: 204.13 LBS | HEART RATE: 81 BPM | RESPIRATION RATE: 16 BRPM | OXYGEN SATURATION: 95 % | DIASTOLIC BLOOD PRESSURE: 78 MMHG | BODY MASS INDEX: 34.85 KG/M2

## 2024-03-12 DIAGNOSIS — Z01.810 PRE-OPERATIVE CARDIOVASCULAR EXAMINATION: ICD-10-CM

## 2024-03-12 DIAGNOSIS — F17.200 SMOKER: ICD-10-CM

## 2024-03-12 DIAGNOSIS — E66.01 SEVERE OBESITY (BMI 35.0-39.9) WITH COMORBIDITY: Primary | ICD-10-CM

## 2024-03-12 DIAGNOSIS — E78.5 HYPERLIPIDEMIA, UNSPECIFIED HYPERLIPIDEMIA TYPE: ICD-10-CM

## 2024-03-12 DIAGNOSIS — Z01.810 PRE-OPERATIVE CARDIOVASCULAR EXAMINATION: Primary | ICD-10-CM

## 2024-03-12 DIAGNOSIS — R07.9 CHEST PAIN, UNSPECIFIED TYPE: ICD-10-CM

## 2024-03-12 DIAGNOSIS — Z01.810 PREOP CARDIOVASCULAR EXAM: ICD-10-CM

## 2024-03-12 PROCEDURE — 99204 OFFICE O/P NEW MOD 45 MIN: CPT | Mod: S$GLB,,, | Performed by: INTERNAL MEDICINE

## 2024-03-12 PROCEDURE — 99999 PR PBB SHADOW E&M-EST. PATIENT-LVL V: CPT | Mod: PBBFAC,,, | Performed by: INTERNAL MEDICINE

## 2024-03-12 NOTE — TELEPHONE ENCOUNTER
Pre-op EKG is abnormal.  Needs cardiology clearance prior to surgery.  Referral placed.  Please schedule appt with them asap.

## 2024-03-12 NOTE — TELEPHONE ENCOUNTER
----- Message from Tammy Melvin sent at 3/12/2024  9:39 AM CDT -----  Contact: Patient, [phone]  Patient is returning a phone call.  Who left a message for the patient: Stefany  Does patient know what this is regarding:  Abnormal EKG  Would you like a call back, or a response through your [facility] portal?:   Call back  Comments: Missed your call, please call her back. Thanks.

## 2024-03-12 NOTE — TELEPHONE ENCOUNTER
Called patient and she is aware of cardiology appointment today for cardiac clearance.  Denied any further needs.

## 2024-03-12 NOTE — PROGRESS NOTES
Subjective:   Patient ID:  Chasity Blake is a 58 y.o. female who presents for evaluation of No chief complaint on file.      HPI  58-year-old female, history of COPD, anxiety, heavy smoker, hypertension.    She is going for a hysterectomy and here for preop evaluation.    She has had multiple EKGs in the past which showed septal infarct pattern however recently had a EKG yesterday with T-wave inversion which is new.    She had a echocardiogram with normal EF with Dr. Vásquez at the [facility] back in August she states that she had 7 minutes walking test at that time which came back normal however I do not have results of the stress test.    She has been having for years chest pain, that she calls heaviness and pressure with and without exertion.  She does have a lot of stress.  She adopted her grandson since he was born.    She also takes Adderall daily.  Past Medical History:   Diagnosis Date    ADD (attention deficit disorder with hyperactivity)     Anxiety     Asthma     mild intermittent    COPD (chronic obstructive pulmonary disease)     Depression     Hypertension     PONV (postoperative nausea and vomiting)        Past Surgical History:   Procedure Laterality Date    AUGMENTATION OF BREAST Bilateral     saline. About 20 years ago    BELT ABDOMINOPLASTY      Breast Augmentation      BREAST BIOPSY Left     2015. benign    BREAST SURGERY      COLONOSCOPY N/A 10/27/2022    Procedure: COLONOSCOPY;  Surgeon: Nadja Seals MD;  Location: [facility] ENDO;  Service: Endoscopy;  Laterality: N/A;    COLONOSCOPY N/A 10/17/2023    Procedure: COLONOSCOPY;  Surgeon: Hiral Fulton MD;  Location: [facility] ENDO;  Service: Endoscopy;  Laterality: N/A;    COSMETIC SURGERY      ENDOMETRIAL ABLATION      TONSILLECTOMY      TUBAL LIGATION         Social History     Tobacco Use    Smoking status: Every Day     Current packs/day: 0.50     Average packs/day: 0.5 packs/day for 10.0 years (5.0 ttl pk-yrs)     Types: Cigarettes    Smokeless  tobacco: Never   Substance Use Topics    Alcohol use: No     Alcohol/week: 0.0 standard drinks of alcohol    Drug use: No       Family History   Problem Relation Age of Onset    Lung cancer Mother     Coronary artery disease Mother 50    Coronary artery disease Father 38    Breast cancer Neg Hx     Colon cancer Neg Hx     Ovarian cancer Neg Hx        Review of Systems   Cardiovascular:  Positive for chest pain and dyspnea on exertion. Negative for palpitations and syncope.   Genitourinary: Negative.    Neurological: Negative.        Current Outpatient Medications on File Prior to Visit   Medication Sig    albuterol (ACCUNEB) 1.25 mg/3 mL Nebu USE ONE VIAL IN NEBULIZER EVERY 4 TO 6 HOURS AS NEEDED FOR WHEEZING & SHORTNESS OF BREATH    albuterol (PROAIR HFA) 90 mcg/actuation inhaler INHALE TWO PUFFS INTO THE LUNGS EVERY 6 HOURS AS NEEDED FOR WHEEZING    busPIRone (BUSPAR) 15 MG tablet Take 1 tablet (15 mg total) by mouth 2 (two) times daily.    cetirizine (ZYRTEC) 10 MG tablet Take 1 tablet (10 mg total) by mouth once daily.    EPINEPHrine (EPIPEN) 0.3 mg/0.3 mL AtIn INJECT 0.3mL INTRAMUSCULARLY X1 DOSE THANK YOU    estradioL (ESTRACE) 1 MG tablet Take 1 tablet (1 mg total) by mouth once daily.    furosemide (LASIX) 40 MG tablet Take 1 tablet (40 mg total) by mouth once daily.    hydrOXYzine HCL (ATARAX) 10 MG Tab Take 1 tablet (10 mg total) by mouth 3 (three) times daily as needed.    levocetirizine (XYZAL) 5 MG tablet TAKE ONE TABLET BY MOUTH EVERY EVENING **THANK YOU**    lisdexamfetamine (VYVANSE) 40 MG Cap Take 1 capsule (40 mg total) by mouth once daily.    lisdexamfetamine (VYVANSE) 40 MG Cap Take 1 capsule (40 mg total) by mouth once daily.    lisdexamfetamine (VYVANSE) 60 MG capsule Take 1 capsule (60 mg total) by mouth every morning.    montelukast (SINGULAIR) 10 mg tablet Take 1 tablet (10 mg total) by mouth once daily.    mupirocin (BACTROBAN) 2 % ointment APPLY TO AFFECTED AREA TOPICALLY THREE TIMES DAILY  FOR 7 DAYS THANK YOU    NIFEdipine (PROCARDIA-XL) 30 MG (OSM) 24 hr tablet Take 1 tablet (30 mg total) by mouth once daily.    omeprazole (PRILOSEC) 40 MG capsule Take 1 capsule (40 mg total) by mouth every morning.    ondansetron (ZOFRAN-ODT) 4 MG TbDL Dissolve 1 tablet (4 mg total) by mouth every 8 (eight) hours as needed.    predniSONE (DELTASONE) 20 MG tablet Take 1 tablet (20 mg total) by mouth As instructed. Take 4 tabs x 3 day, then 3 tabs x 4 day, then 2 tabs x 4 days, then 1 tab x 5 days.    progesterone (PROMETRIUM) 200 MG capsule Take 1 capsule (200 mg total) by mouth nightly.    propranoloL (INDERAL LA) 80 MG 24 hr capsule Take 1 capsule (80 mg total) by mouth once daily.    valsartan (DIOVAN) 320 MG tablet Take 1 tablet (320 mg total) by mouth once daily.    venlafaxine (EFFEXOR-XR) 75 MG 24 hr capsule Take 1 capsule (75 mg total) by mouth once daily.    albuterol-ipratropium (DUO-NEB) 2.5 mg-0.5 mg/3 mL nebulizer solution Inhale contents of 1 vial (3 mLs) by nebulization every 6 (six) hours as needed for Wheezing. Rescue    aspirin 81 MG Chew Take 1 tablet (81 mg total) by mouth once daily.    fluticasone-salmeterol diskus inhaler 250-50 mcg Inhale 1 puff into the lungs every 12 (twelve) hours.    LORazepam (ATIVAN) 0.5 MG tablet Take 1 tablet (0.5 mg total) by mouth every 12 (twelve) hours as needed for Anxiety.     No current facility-administered medications on file prior to visit.       Objective:   Objective:  Wt Readings from Last 3 Encounters:   03/12/24 92.6 kg (204 lb 2.3 oz)   03/11/24 93.4 kg (205 lb 14.6 oz)   11/30/23 91.6 kg (201 lb 15.1 oz)     Temp Readings from Last 3 Encounters:   10/17/23 98.1 °F (36.7 °C) (Temporal)   10/27/22 97.9 °F (36.6 °C) (Temporal)   09/19/22 97.9 °F (36.6 °C) (Temporal)     BP Readings from Last 3 Encounters:   03/12/24 129/78   03/11/24 124/80   11/30/23 120/70     Pulse Readings from Last 3 Encounters:   03/12/24 81   10/17/23 62   08/03/23 76  "      Physical Exam  Vitals reviewed.   Constitutional:       Appearance: She is well-developed.   Neck:      Vascular: No carotid bruit.   Cardiovascular:      Rate and Rhythm: Normal rate and regular rhythm.      Pulses: Intact distal pulses.      Heart sounds: Normal heart sounds. No murmur heard.  Pulmonary:      Breath sounds: Normal breath sounds.   Neurological:      Mental Status: She is oriented to person, place, and time.         Lab Results   Component Value Date    CHOL 199 08/03/2023    CHOL 196 09/08/2022    CHOL 176 08/01/2017     Lab Results   Component Value Date    HDL 42 08/03/2023    HDL 47 09/08/2022    HDL 48 08/01/2017     Lab Results   Component Value Date    LDLCALC 125.0 08/03/2023    LDLCALC 120.4 09/08/2022    LDLCALC 96.2 08/01/2017     Lab Results   Component Value Date    TRIG 160 (H) 08/03/2023    TRIG 143 09/08/2022    TRIG 159 (H) 08/01/2017     Lab Results   Component Value Date    CHOLHDL 21.1 08/03/2023    CHOLHDL 24.0 09/08/2022    CHOLHDL 27.3 08/01/2017       Chemistry        Component Value Date/Time     03/11/2024 1204    K 4.5 03/11/2024 1204     03/11/2024 1204    CO2 24 03/11/2024 1204    BUN 6 03/11/2024 1204    CREATININE 0.8 03/11/2024 1204    GLU 92 03/11/2024 1204        Component Value Date/Time    CALCIUM 9.2 03/11/2024 1204    ALKPHOS 93 08/03/2023 1341    AST 11 08/03/2023 1341    ALT 10 08/03/2023 1341    BILITOT 0.3 08/03/2023 1341    ESTGFRAFRICA >60 05/04/2020 2257    EGFRNONAA >60 05/04/2020 2257          Lab Results   Component Value Date    TSH 1.020 08/18/2023     No results found for: "INR", "PROTIME"  Lab Results   Component Value Date    WBC 11.13 03/11/2024    HGB 14.2 03/11/2024    HCT 44.6 03/11/2024    MCV 99 (H) 03/11/2024     03/11/2024     BNP  @LABRCNTIP(BNP,BNPTRIAGEBLO)@  Estimated Creatinine Clearance: 83.6 mL/min (based on SCr of 0.8 mg/dL).     Imaging:  ======    No results found for this or any previous visit.    No " results found for this or any previous visit.    Results for orders placed during the hospital encounter of 04/08/19    X-Ray Chest PA And Lateral    Narrative  EXAMINATION:  XR CHEST PA AND LATERAL    CLINICAL HISTORY:  Mild persistent asthma with (acute) exacerbation    TECHNIQUE:  PA and lateral views of the chest were performed.    COMPARISON:  01/21/2018    FINDINGS:  The cardiac and mediastinal silhouettes appear within normal limits.   The lungs are clear bilaterally.  No acute osseous findings demonstrated.    IMPRESSION:    No acute findings.      Electronically signed by: Armin Javed MD  Date:    04/08/2019  Time:    14:13    No results found for this or any previous visit.    No valid procedures specified.    No results found for this or any previous visit.      No results found for this or any previous visit.      No results found for this or any previous visit.      Diagnostic Results:  ECG: Reviewed    The 10-year ASCVD risk score (Tea NUNEZ, et al., 2019) is: 10.4%    Values used to calculate the score:      Age: 58 years      Sex: Female      Is Non- : No      Diabetic: No      Tobacco smoker: Yes      Systolic Blood Pressure: 129 mmHg      Is BP treated: Yes      HDL Cholesterol: 42 mg/dL      Total Cholesterol: 199 mg/dL        Assessment and Plan:   Severe obesity (BMI 35.0-39.9) with comorbidity    Pre-operative cardiovascular examination  -     Ambulatory referral/consult to Cardiology  -     Nuclear Stress - Cardiology Interpreted; Future    Smoker    Chest pain, unspecified type    Hyperlipidemia, unspecified hyperlipidemia type    Preop cardiovascular exam      Abnormal EKG with septal infarct pattern and anterior T-wave inversion that is new compared to before.    Intermittent chest pains.    Continue with the propranolol and nicardipine  Advised to lower her Adderall or stop it in the future as these can cause chest pains as well.    We will obtain an urgent  nuclear stress test prior to hysterectomy for abnormal endometrial lining  Reviewed all tests and above medical conditions with patient in detail and formulated treatment plan.  Risk factor modification discussed.   Cardiac low salt diet discussed.  Maintaining healthy weight and weight loss goals were discussed in clinic.    Follow up in  6 months

## 2024-03-14 ENCOUNTER — HOSPITAL ENCOUNTER (OUTPATIENT)
Dept: CARDIOLOGY | Facility: HOSPITAL | Age: 59
Discharge: HOME | End: 2024-03-14
Attending: INTERNAL MEDICINE
Payer: MEDICARE

## 2024-03-14 ENCOUNTER — HOSPITAL ENCOUNTER (OUTPATIENT)
Dept: RADIOLOGY | Facility: HOSPITAL | Age: 59
Discharge: HOME | End: 2024-03-14
Attending: INTERNAL MEDICINE
Payer: MEDICARE

## 2024-03-14 DIAGNOSIS — Z01.810 PRE-OPERATIVE CARDIOVASCULAR EXAMINATION: ICD-10-CM

## 2024-03-14 LAB
CV STRESS BASE HR: 75 BPM
DIASTOLIC BLOOD PRESSURE: 70 MMHG
NUC REST EJECTION FRACTION: 73
NUC STRESS EJECTION FRACTION: 76 %
OHS CV CPX 85 PERCENT MAX PREDICTED HEART RATE MALE: 137
OHS CV CPX MAX PREDICTED HEART RATE: 161
OHS CV CPX PATIENT IS FEMALE: 1
OHS CV CPX PATIENT IS MALE: 0
OHS CV CPX PEAK DIASTOLIC BLOOD PRESSURE: 66 MMHG
OHS CV CPX PEAK HEAR RATE: 92 BPM
OHS CV CPX PEAK RATE PRESSURE PRODUCT: NORMAL
OHS CV CPX PEAK SYSTOLIC BLOOD PRESSURE: 127 MMHG
OHS CV CPX PERCENT MAX PREDICTED HEART RATE ACHIEVED: 60
OHS CV CPX RATE PRESSURE PRODUCT PRESENTING: 9150
SYSTOLIC BLOOD PRESSURE: 122 MMHG

## 2024-03-14 PROCEDURE — 93016 CV STRESS TEST SUPVJ ONLY: CPT | Mod: ,,, | Performed by: INTERNAL MEDICINE

## 2024-03-14 PROCEDURE — 93018 CV STRESS TEST I&R ONLY: CPT | Mod: ,,, | Performed by: INTERNAL MEDICINE

## 2024-03-14 PROCEDURE — A9502 TC99M TETROFOSMIN: HCPCS | Performed by: INTERNAL MEDICINE

## 2024-03-14 PROCEDURE — 63600175 PHARM REV CODE 636 W HCPCS: Performed by: INTERNAL MEDICINE

## 2024-03-14 PROCEDURE — 78452 HT MUSCLE IMAGE SPECT MULT: CPT

## 2024-03-14 PROCEDURE — 78452 HT MUSCLE IMAGE SPECT MULT: CPT | Mod: 26,,, | Performed by: INTERNAL MEDICINE

## 2024-03-14 PROCEDURE — 93017 CV STRESS TEST TRACING ONLY: CPT

## 2024-03-14 RX ORDER — REGADENOSON 0.08 MG/ML
0.4 INJECTION, SOLUTION INTRAVENOUS ONCE
Status: COMPLETED | OUTPATIENT
Start: 2024-03-14 | End: 2024-03-14

## 2024-03-14 RX ADMIN — REGADENOSON 0.4 MG: 0.08 INJECTION, SOLUTION INTRAVENOUS at 01:03

## 2024-03-14 RX ADMIN — TETROFOSMIN 10.1 MILLICURIE: 1.38 INJECTION, POWDER, LYOPHILIZED, FOR SOLUTION INTRAVENOUS at 11:03

## 2024-03-14 RX ADMIN — TETROFOSMIN 30.5 MILLICURIE: 1.38 INJECTION, POWDER, LYOPHILIZED, FOR SOLUTION INTRAVENOUS at 01:03

## 2024-03-15 ENCOUNTER — TELEPHONE (OUTPATIENT)
Dept: PREADMISSION TESTING | Facility: HOSPITAL | Age: 59
End: 2024-03-15
Payer: MEDICARE

## 2024-03-15 ENCOUNTER — TELEPHONE (OUTPATIENT)
Dept: OBSTETRICS AND GYNECOLOGY | Facility: CLINIC | Age: 59
End: 2024-03-15
Payer: MEDICARE

## 2024-03-15 NOTE — TELEPHONE ENCOUNTER
----- Message from Maritza Young RN sent at 3/15/2024  1:33 PM CDT -----  Regarding: surgery 3/19  Good Afternoon,    Pt canceled [facility] appointment scheduled for today due to GI symptoms.  Pt unable to be rescheduled for POSH prior to surgery.  Pt's surgery scheduled for 3/19/24.    Pt cleared from a Cardiology standpoint by Dr. Tabor.    Thank you,    Maritza CHANEY    
[phone]

## 2024-03-15 NOTE — PROGRESS NOTES
NORMAL STRESS TEST. NO ISCHEMIA.    OKAY TO UNDERGO HER HYSTERECTOMY FROM CARDIAC STANDPOINT.    CONTINUE BETA-BLOCKER PERIOPERATIVELY.

## 2024-03-15 NOTE — TELEPHONE ENCOUNTER
Pt called to cancel POSH appointment scheduled for 3/15/24 due to GI symptoms.  Dr. Woods's staff notified  Pt's surgery scheduled for 3/19/24.

## 2024-03-15 NOTE — TELEPHONE ENCOUNTER
Pre op instructions reviewed with pt over telephone, verbalized understanding.    To confirm, Surgery is scheduled on 3/19/24. We will call you late afternoon the business day prior to surgery with your arrival time.    *Please report to the [facility] Lobby (1st Floor) located off of [facility] (2nd Entrance/Building on the left, in front of the flag pole).  Address: [address] [place] [address] [place] [address]      INSTRUCTIONS IMPORTANT!!!  DO NOT Eat, Drink, or Smoke after 12 midnight unless instructed otherwise by your Surgeon. OK to brush teeth, no gum, candy or mints!    >>>MEDICATION INSTRUCTIONS<<<: Morning of Surgery, take small sip of water with ONLY these medications:  Inhaler  Procardia     *ADHD Medication: Stop taking Vyvanse 48 hrs prior to surgery, as this can affect the anesthesia used. Bariatric surgeries must HOLD 7 Days prior to surgery!    Vitamins/supplements/ OTC Aspirin products: Stop 7 days prior to surgery!    Weight Loss Injections/medications: Stop 7 days prior to surgery!    ____  Avoid Alcoholic beverages 3 days prior to surgery, as it can thin the blood.  ____  NO Acrylic/fake nails or nail polish worn day of surgery (specifically hand/arm & foot surgeries).  ____  NO powder, lotions, deodorants, oils or cream on body.  ____  Remove all jewelry & piercings & foreign objects before arrival & leave at home.  ____  Remove Dentures, Hearing Aids & Contact Lens prior to surgery.  ____  Bring photo ID and insurance information to hospital (Leave Valuables at Home).  ____  If going home the same day, arrange for a ride home. You will not be able to drive for 24 hrs if Anesthesia was used.   ____  Females (ages 11-60): may need to give a urine sample the morning of surgery; please see Pre op Nurse prior to using the restroom.  ____  Males: Stop ED medications (Viagra, Cialis) 24 hrs prior to surgery.  ____  Wear clean, loose fitting clothing to allow for dressings/  bandages.      Bathing Instructions:    -Shower with anti-bacterial Soap (Hibiclens or Dial) the night before surgery and the morning of.   -Do not use Hibiclens on your face or genitals.   -Apply clean clothes after shower.  -Do not shave your face or body 2 days prior to surgery unless instructed otherwise by your Surgeon.  -Do not shave pubic hair 7 days prior to surgery (gyn pt's).    [facility] Visitor/Ride Policy:  Only 2 adults allowed in pre op/recovery area during your procedure. You MUST HAVE A RIDE HOME from a responsible adult that you know and trust. Medical Transport, Uber or Lyft can ONLY be used if patient has a responsible adult to accompany them during ride home.       *Signs and symptoms of Infection Before or After Surgery:               !!!If you experience any fever, chills, nausea/ vomiting, foul odor/ excessive drainage from surgical site, flu-like symptoms, new wounds or cuts, PLEASE CALL THE SURGEON OFFICE at [phone] or SEND MESSAGE THROUGH [employer] PORTAL!!!       *If you are running late the morning of surgery, please call the Hospital Surgery Dept @ [phone].     *Billing questions:  [phone]       Thank you,  -[facility] Pre Admit Dept.  [phone] or [phone]  M-F 7:30 am-4:00 pm (Closed Major Holidays)

## 2024-03-18 ENCOUNTER — TELEPHONE (OUTPATIENT)
Dept: PREADMISSION TESTING | Facility: HOSPITAL | Age: 59
End: 2024-03-18
Payer: MEDICARE

## 2024-03-18 NOTE — TELEPHONE ENCOUNTER
Called and spoke with Pt about the following:     Please arrive to [facility] ([facility]) at 7:15 am on 3/19/24 for your scheduled procedure.  Address: [address] [place] [address] [place] [address] (2nd Building on left, 1st Floor Lobby)  >>>NO eating or drinking after midnight unless instructed otherwise by your Surgeon<<<

## 2024-03-18 NOTE — TELEPHONE ENCOUNTER
----- Message from Sara Navarro MA sent at 3/18/2024  7:40 AM CDT -----  Regarding: FW: surgery 3/19  See message below    ----- Message -----  From: Gem Woods MD  Sent: 3/15/2024   2:48 PM CDT  To: Stefany Adams LPN; Sara Navarro MA; #  Subject: RE: surgery 3/19                                 Not a problem!  Cardiology cleared her, so from my standpoint, we're good to keep her surgery scheduled as is.  ----- Message -----  From: Stefany Adams LPN  Sent: 3/15/2024   1:45 PM CDT  To: Gem Woods MD; Sara Navarro MA; #  Subject: FW: surgery 3/19                                   ----- Message -----  From: Maritza Young RN  Sent: 3/15/2024   1:36 PM CDT  To: [facility] Rabago Staff  Subject: surgery 3/19                                     Good Afternoon,    Pt canceled [facility] appointment scheduled for today due to GI symptoms.  Pt unable to be rescheduled for POSH prior to surgery.  Pt's surgery scheduled for 3/19/24.    Pt cleared from a Cardiology standpoint by Dr. Tabor.    Thank you,    Maritza CHANEY

## 2024-03-19 ENCOUNTER — ANESTHESIA EVENT (OUTPATIENT)
Dept: SURGERY | Facility: HOSPITAL | Age: 59
End: 2024-03-19
Payer: MEDICARE

## 2024-03-19 ENCOUNTER — HOSPITAL ENCOUNTER (OUTPATIENT)
Facility: HOSPITAL | Age: 59
Discharge: HOME | End: 2024-03-19
Attending: OBSTETRICS & GYNECOLOGY | Admitting: OBSTETRICS & GYNECOLOGY
Payer: MEDICARE

## 2024-03-19 ENCOUNTER — ANESTHESIA (OUTPATIENT)
Dept: SURGERY | Facility: HOSPITAL | Age: 59
End: 2024-03-19
Payer: MEDICARE

## 2024-03-19 VITALS
TEMPERATURE: 99 F | OXYGEN SATURATION: 95 % | DIASTOLIC BLOOD PRESSURE: 82 MMHG | WEIGHT: 203.81 LBS | BODY MASS INDEX: 34.8 KG/M2 | HEIGHT: 64 IN | SYSTOLIC BLOOD PRESSURE: 116 MMHG | RESPIRATION RATE: 23 BRPM | HEART RATE: 74 BPM

## 2024-03-19 DIAGNOSIS — T75.3XXD MOTION SICKNESS, SUBSEQUENT ENCOUNTER: ICD-10-CM

## 2024-03-19 DIAGNOSIS — Z90.710 STATUS POST LAPAROSCOPIC HYSTERECTOMY: Primary | ICD-10-CM

## 2024-03-19 DIAGNOSIS — N95.0 POSTMENOPAUSAL BLEEDING: ICD-10-CM

## 2024-03-19 LAB
B-HCG UR QL: NEGATIVE
CTP QC/QA: YES

## 2024-03-19 PROCEDURE — 71000039 HC RECOVERY, EACH ADD'L HOUR: Performed by: OBSTETRICS & GYNECOLOGY

## 2024-03-19 PROCEDURE — 25000003 PHARM REV CODE 250: Performed by: NURSE ANESTHETIST, CERTIFIED REGISTERED

## 2024-03-19 PROCEDURE — 37000008 HC ANESTHESIA 1ST 15 MINUTES: Performed by: OBSTETRICS & GYNECOLOGY

## 2024-03-19 PROCEDURE — 63600175 PHARM REV CODE 636 W HCPCS: Performed by: NURSE ANESTHETIST, CERTIFIED REGISTERED

## 2024-03-19 PROCEDURE — 27201423 OPTIME MED/SURG SUP & DEVICES STERILE SUPPLY: Performed by: OBSTETRICS & GYNECOLOGY

## 2024-03-19 PROCEDURE — 25000003 PHARM REV CODE 250: Performed by: STUDENT IN AN ORGANIZED HEALTH CARE EDUCATION/TRAINING PROGRAM

## 2024-03-19 PROCEDURE — 71000015 HC POSTOP RECOV 1ST HR: Performed by: OBSTETRICS & GYNECOLOGY

## 2024-03-19 PROCEDURE — 81025 URINE PREGNANCY TEST: CPT | Performed by: OBSTETRICS & GYNECOLOGY

## 2024-03-19 PROCEDURE — 37000009 HC ANESTHESIA EA ADD 15 MINS: Performed by: OBSTETRICS & GYNECOLOGY

## 2024-03-19 PROCEDURE — 71000033 HC RECOVERY, INTIAL HOUR: Performed by: OBSTETRICS & GYNECOLOGY

## 2024-03-19 PROCEDURE — 36000713 HC OR TIME LEV V EA ADD 15 MIN: Performed by: OBSTETRICS & GYNECOLOGY

## 2024-03-19 PROCEDURE — C2628 CATHETER, OCCLUSION: HCPCS | Performed by: OBSTETRICS & GYNECOLOGY

## 2024-03-19 PROCEDURE — 58571 TLH W/T/O 250 G OR LESS: CPT | Mod: ,,, | Performed by: OBSTETRICS & GYNECOLOGY

## 2024-03-19 PROCEDURE — 88307 TISSUE EXAM BY PATHOLOGIST: CPT | Mod: 26,,, | Performed by: PATHOLOGY

## 2024-03-19 PROCEDURE — 25000003 PHARM REV CODE 250: Performed by: OBSTETRICS & GYNECOLOGY

## 2024-03-19 PROCEDURE — 63600175 PHARM REV CODE 636 W HCPCS: Performed by: OBSTETRICS & GYNECOLOGY

## 2024-03-19 PROCEDURE — 88307 TISSUE EXAM BY PATHOLOGIST: CPT | Performed by: PATHOLOGY

## 2024-03-19 PROCEDURE — 36000712 HC OR TIME LEV V 1ST 15 MIN: Performed by: OBSTETRICS & GYNECOLOGY

## 2024-03-19 RX ORDER — SODIUM CHLORIDE 0.9 % (FLUSH) 0.9 %
3 SYRINGE (ML) INJECTION
Status: DISCONTINUED | OUTPATIENT
Start: 2024-03-19 | End: 2024-03-19 | Stop reason: HOSPADM

## 2024-03-19 RX ORDER — ONDANSETRON 4 MG/1
4 TABLET, ORALLY DISINTEGRATING ORAL EVERY 12 HOURS PRN
Qty: 20 TABLET | Refills: 0 | Status: SHIPPED | OUTPATIENT
Start: 2024-03-19

## 2024-03-19 RX ORDER — IBUPROFEN 600 MG/1
600 TABLET ORAL EVERY 8 HOURS PRN
Qty: 30 TABLET | Refills: 0 | Status: SHIPPED | OUTPATIENT
Start: 2024-03-19

## 2024-03-19 RX ORDER — SCOLOPAMINE TRANSDERMAL SYSTEM 1 MG/1
1 PATCH, EXTENDED RELEASE TRANSDERMAL ONCE
Status: DISCONTINUED | OUTPATIENT
Start: 2024-03-19 | End: 2024-03-19 | Stop reason: HOSPADM

## 2024-03-19 RX ORDER — ACETAMINOPHEN 500 MG
1000 TABLET ORAL
Status: COMPLETED | OUTPATIENT
Start: 2024-03-19 | End: 2024-03-19

## 2024-03-19 RX ORDER — MIDAZOLAM HYDROCHLORIDE 1 MG/ML
INJECTION, SOLUTION INTRAMUSCULAR; INTRAVENOUS
Status: DISCONTINUED | OUTPATIENT
Start: 2024-03-19 | End: 2024-03-19

## 2024-03-19 RX ORDER — FENTANYL CITRATE 50 UG/ML
INJECTION, SOLUTION INTRAMUSCULAR; INTRAVENOUS
Status: DISCONTINUED | OUTPATIENT
Start: 2024-03-19 | End: 2024-03-19

## 2024-03-19 RX ORDER — CEFAZOLIN SODIUM 2 G/50ML
2 SOLUTION INTRAVENOUS
Status: COMPLETED | OUTPATIENT
Start: 2024-03-19 | End: 2024-03-19

## 2024-03-19 RX ORDER — SUCCINYLCHOLINE CHLORIDE 20 MG/ML
INJECTION INTRAMUSCULAR; INTRAVENOUS
Status: DISCONTINUED | OUTPATIENT
Start: 2024-03-19 | End: 2024-03-19

## 2024-03-19 RX ORDER — SODIUM CHLORIDE, SODIUM LACTATE, POTASSIUM CHLORIDE, CALCIUM CHLORIDE 600; 310; 30; 20 MG/100ML; MG/100ML; MG/100ML; MG/100ML
INJECTION, SOLUTION INTRAVENOUS CONTINUOUS
Status: DISCONTINUED | OUTPATIENT
Start: 2024-03-19 | End: 2024-03-19 | Stop reason: HOSPADM

## 2024-03-19 RX ORDER — MEPERIDINE HYDROCHLORIDE 25 MG/ML
12.5 INJECTION INTRAMUSCULAR; INTRAVENOUS; SUBCUTANEOUS ONCE AS NEEDED
Status: DISCONTINUED | OUTPATIENT
Start: 2024-03-19 | End: 2024-03-19 | Stop reason: HOSPADM

## 2024-03-19 RX ORDER — HYDROMORPHONE HYDROCHLORIDE 2 MG/ML
0.2 INJECTION, SOLUTION INTRAMUSCULAR; INTRAVENOUS; SUBCUTANEOUS EVERY 5 MIN PRN
Status: DISCONTINUED | OUTPATIENT
Start: 2024-03-19 | End: 2024-03-19 | Stop reason: HOSPADM

## 2024-03-19 RX ORDER — PROPOFOL 10 MG/ML
VIAL (ML) INTRAVENOUS
Status: DISCONTINUED | OUTPATIENT
Start: 2024-03-19 | End: 2024-03-19

## 2024-03-19 RX ORDER — PROCHLORPERAZINE EDISYLATE 5 MG/ML
5 INJECTION INTRAMUSCULAR; INTRAVENOUS EVERY 10 MIN PRN
Status: DISCONTINUED | OUTPATIENT
Start: 2024-03-19 | End: 2024-03-19 | Stop reason: HOSPADM

## 2024-03-19 RX ORDER — MUPIROCIN 20 MG/G
OINTMENT TOPICAL
Status: DISCONTINUED | OUTPATIENT
Start: 2024-03-19 | End: 2024-03-19 | Stop reason: HOSPADM

## 2024-03-19 RX ORDER — HYDROCODONE BITARTRATE AND ACETAMINOPHEN 5; 325 MG/1; MG/1
1 TABLET ORAL EVERY 6 HOURS PRN
Qty: 15 TABLET | Refills: 0 | Status: SHIPPED | OUTPATIENT
Start: 2024-03-19

## 2024-03-19 RX ORDER — DOCUSATE SODIUM 100 MG/1
100 CAPSULE, LIQUID FILLED ORAL 2 TIMES DAILY
Qty: 60 CAPSULE | Refills: 0 | Status: SHIPPED | OUTPATIENT
Start: 2024-03-19

## 2024-03-19 RX ORDER — DEXAMETHASONE SODIUM PHOSPHATE 4 MG/ML
INJECTION, SOLUTION INTRA-ARTICULAR; INTRALESIONAL; INTRAMUSCULAR; INTRAVENOUS; SOFT TISSUE
Status: DISCONTINUED | OUTPATIENT
Start: 2024-03-19 | End: 2024-03-19

## 2024-03-19 RX ORDER — LIDOCAINE HYDROCHLORIDE 20 MG/ML
INJECTION INTRAVENOUS
Status: DISCONTINUED | OUTPATIENT
Start: 2024-03-19 | End: 2024-03-19

## 2024-03-19 RX ORDER — ROCURONIUM BROMIDE 10 MG/ML
INJECTION, SOLUTION INTRAVENOUS
Status: DISCONTINUED | OUTPATIENT
Start: 2024-03-19 | End: 2024-03-19

## 2024-03-19 RX ORDER — HYDROCODONE BITARTRATE AND ACETAMINOPHEN 5; 325 MG/1; MG/1
1 TABLET ORAL EVERY 4 HOURS PRN
Status: DISCONTINUED | OUTPATIENT
Start: 2024-03-19 | End: 2024-03-19 | Stop reason: HOSPADM

## 2024-03-19 RX ORDER — LIDOCAINE HYDROCHLORIDE 10 MG/ML
0.5 INJECTION, SOLUTION EPIDURAL; INFILTRATION; INTRACAUDAL; PERINEURAL
Status: DISCONTINUED | OUTPATIENT
Start: 2024-03-19 | End: 2024-03-19 | Stop reason: HOSPADM

## 2024-03-19 RX ORDER — FAMOTIDINE 20 MG/1
20 TABLET, FILM COATED ORAL
Status: COMPLETED | OUTPATIENT
Start: 2024-03-19 | End: 2024-03-19

## 2024-03-19 RX ORDER — ONDANSETRON HYDROCHLORIDE 2 MG/ML
4 INJECTION, SOLUTION INTRAVENOUS DAILY PRN
Status: DISCONTINUED | OUTPATIENT
Start: 2024-03-19 | End: 2024-03-19 | Stop reason: HOSPADM

## 2024-03-19 RX ORDER — ONDANSETRON HYDROCHLORIDE 2 MG/ML
INJECTION, SOLUTION INTRAVENOUS
Status: DISCONTINUED | OUTPATIENT
Start: 2024-03-19 | End: 2024-03-19

## 2024-03-19 RX ADMIN — ONDANSETRON 8 MG: 2 INJECTION INTRAMUSCULAR; INTRAVENOUS at 10:03

## 2024-03-19 RX ADMIN — SODIUM CHLORIDE, SODIUM LACTATE, POTASSIUM CHLORIDE, AND CALCIUM CHLORIDE: .6; .31; .03; .02 INJECTION, SOLUTION INTRAVENOUS at 09:03

## 2024-03-19 RX ADMIN — ROCURONIUM BROMIDE 35 MG: 10 INJECTION, SOLUTION INTRAVENOUS at 09:03

## 2024-03-19 RX ADMIN — PROPOFOL 150 MG: 10 INJECTION, EMULSION INTRAVENOUS at 09:03

## 2024-03-19 RX ADMIN — DEXAMETHASONE SODIUM PHOSPHATE 8 MG: 4 INJECTION, SOLUTION INTRA-ARTICULAR; INTRALESIONAL; INTRAMUSCULAR; INTRAVENOUS; SOFT TISSUE at 10:03

## 2024-03-19 RX ADMIN — ROCURONIUM BROMIDE 10 MG: 10 INJECTION, SOLUTION INTRAVENOUS at 10:03

## 2024-03-19 RX ADMIN — SUGAMMADEX 200 MG: 100 INJECTION, SOLUTION INTRAVENOUS at 11:03

## 2024-03-19 RX ADMIN — MIDAZOLAM 2 MG: 1 INJECTION INTRAMUSCULAR; INTRAVENOUS at 09:03

## 2024-03-19 RX ADMIN — LIDOCAINE HYDROCHLORIDE 50 MG: 20 INJECTION INTRAVENOUS at 09:03

## 2024-03-19 RX ADMIN — ROCURONIUM BROMIDE 5 MG: 10 INJECTION, SOLUTION INTRAVENOUS at 09:03

## 2024-03-19 RX ADMIN — FENTANYL CITRATE 100 MCG: 50 INJECTION, SOLUTION INTRAMUSCULAR; INTRAVENOUS at 09:03

## 2024-03-19 RX ADMIN — ACETAMINOPHEN 1000 MG: 500 TABLET ORAL at 08:03

## 2024-03-19 RX ADMIN — HYDROCODONE BITARTRATE AND ACETAMINOPHEN 1 TABLET: 5; 325 TABLET ORAL at 01:03

## 2024-03-19 RX ADMIN — CEFAZOLIN SODIUM 2 G: 2 SOLUTION INTRAVENOUS at 09:03

## 2024-03-19 RX ADMIN — FENTANYL CITRATE 50 MCG: 50 INJECTION, SOLUTION INTRAMUSCULAR; INTRAVENOUS at 10:03

## 2024-03-19 RX ADMIN — FAMOTIDINE 20 MG: 20 TABLET ORAL at 08:03

## 2024-03-19 RX ADMIN — SODIUM CHLORIDE, SODIUM LACTATE, POTASSIUM CHLORIDE, AND CALCIUM CHLORIDE: .6; .31; .03; .02 INJECTION, SOLUTION INTRAVENOUS at 11:03

## 2024-03-19 RX ADMIN — SCOPOLAMINE 1 PATCH: 1.5 PATCH, EXTENDED RELEASE TRANSDERMAL at 08:03

## 2024-03-19 RX ADMIN — SUCCINYLCHOLINE CHLORIDE 100 MG: 20 INJECTION, SOLUTION INTRAMUSCULAR; INTRAVENOUS; PARENTERAL at 09:03

## 2024-03-19 NOTE — ANESTHESIA PREPROCEDURE EVALUATION
03/19/2024  Chasity Blake is a 58 y.o., female    Patient Active Problem List   Diagnosis    Insomnia, psychophysiological    ADD (attention deficit disorder)    Backache    BECK (stress urinary incontinence, female)    Urticaria, idiopathic    PVCs (premature ventricular contractions)    Facial abscess    Current smoker    Hypertension not at goal    Menopausal hot flushes    Menopause syndrome    Positive colorectal cancer screening using Cologuard test    Postmenopausal bleeding    Severe obesity (BMI 35.0-39.9) with comorbidity     Past Medical History:   Diagnosis Date    ADD (attention deficit disorder with hyperactivity)     Anxiety     Asthma     mild intermittent    COPD (chronic obstructive pulmonary disease)     Depression     Hypertension     PONV (postoperative nausea and vomiting)      Past Surgical History:   Procedure Laterality Date    AUGMENTATION OF BREAST Bilateral     saline. About 20 years ago    BELT ABDOMINOPLASTY      Breast Augmentation      BREAST BIOPSY Left     2015. benign    BREAST SURGERY      COLONOSCOPY N/A 10/27/2022    Procedure: COLONOSCOPY;  Surgeon: Nadja Seals MD;  Location: [facility] ENDO;  Service: Endoscopy;  Laterality: N/A;    COLONOSCOPY N/A 10/17/2023    Procedure: COLONOSCOPY;  Surgeon: Hiral Fulton MD;  Location: [facility] ENDO;  Service: Endoscopy;  Laterality: N/A;    COSMETIC SURGERY      ENDOMETRIAL ABLATION      TONSILLECTOMY      TUBAL LIGATION         Chemistry        Component Value Date/Time     03/11/2024 1204    K 4.5 03/11/2024 1204     03/11/2024 1204    CO2 24 03/11/2024 1204    BUN 6 03/11/2024 1204    CREATININE 0.8 03/11/2024 1204    GLU 92 03/11/2024 1204        Component Value Date/Time    CALCIUM 9.2 03/11/2024 1204    ALKPHOS 93 08/03/2023 1341    AST 11 08/03/2023 1341    ALT 10 08/03/2023 1341    BILITOT 0.3 08/03/2023 1341     ESTGFRAFRICA >60 05/04/2020 2257    EGFRNONAA >60 05/04/2020 2257        Lab Results   Component Value Date    WBC 11.13 03/11/2024    HGB 14.2 03/11/2024    HCT 44.6 03/11/2024    MCV 99 (H) 03/11/2024     03/11/2024     TTE: (2023)  CONCLUSIONS:   1. Normal left ventricular cavity size. Normal left ventricular systolic function. LVEF   55 - 65%. Normal diastolic function. Normal wall motion.   2. Normal right ventricular size. Normal right ventricular systolic function.       Pre-op Assessment    I have reviewed the Patient Summary Reports.    I have reviewed the NPO Status.   I have reviewed the Medications.     Review of Systems  Anesthesia Hx:  No problems with previous Anesthesia  PONV after previous GETA - none reported after ablation procedure in the past (2015) -- received 12 decadron, 8 zofran     Scope patch applied today in pre-op  History of prior surgery of interest to airway management or planning:  Previous anesthesia: General, MAC         Personal Hx of Anesthesia complications, Post-Operative Nausea/Vomiting, with every anesthetic, treatment not known                    Social:  Smoker       Hematology/Oncology:  Hematology Normal   Oncology Normal                                   Cardiovascular:  Exercise tolerance: poor   Hypertension              ECG has been reviewed. Conclusion       ·  Normal myocardial perfusion scan. There is no evidence of myocardial ischemia or infarction.  ·  There is a mild intensity perfusion abnormality in the anterior wall of the left ventricle, secondary to breast attenuation.  ·  The gated perfusion images showed an ejection fraction of 73% at rest. The gated perfusion images showed an ejection fraction of 76% post stress.  ·  The ECG portion of the study is negative for ischemia.  ·  The patient reported no chest pain during the stress test.  ·  During stress, rare PVCs are noted.    Sinus rhythm with short KS   T wave abnormality, consider anterior  ischemia   Abnormal ECG   When compared with ECG of 04-MAY-2020 21:43,   T wave inversion now evident in Anterior leads   Confirmed by Stone Hancock MD ([id number]) on 3/11/2024 8:49:44 PM                            Pulmonary:   COPD Asthma mild Shortness of breath  Denies Recent URI.                 Renal/:  Renal/ Normal                 Musculoskeletal:  Musculoskeletal Normal                Neurological:  Neurology Normal                                      Endocrine:     BMI 35    Hx of atopia - has experienced angioedema, throat swelling in the past -- carries EpiPen as emergency precaution       Obesity / BMI > 30  Psych:    depression ADD, Depression, insomnia               Physical Exam  General: Cooperative, Alert and Oriented    Airway:  Mallampati: III   Mouth Opening: Small, but > 3cm  TM Distance: Normal  Tongue: Normal  Neck ROM: Normal ROM    Dental:  Edentulous        Anesthesia Plan  Type of Anesthesia, risks & benefits discussed:    Anesthesia Type: Gen ETT  Intra-op Monitoring Plan: Standard ASA Monitors  Post Op Pain Control Plan: multimodal analgesia and IV/PO Opioids PRN  Induction:  IV  Airway Plan: Direct, Post-Induction  Informed Consent: Informed consent signed with the Patient and all parties understand the risks and agree with anesthesia plan.  All questions answered.   ASA Score: 3  Day of Surgery Review of History & Physical: H&P Update referred to the surgeon/provider.  Anesthesia Plan Notes: Placement Date: 05/11/15; Placement Time: 1324; Method of Intubation: Direct laryngoscopy; Inserted by: CRNA; Airway Device: Endotracheal Tube; Mask Ventilation: Easy; Blade: Conklin #2; Airway Device Size: 7.0; Style: Cuffed; Cuff Inflation: Minimal occlusive pressure; Inflation Amount: 8; Placement Verified By: Auscultation, Capnometry; Grade: Grade I; Complicating Factors: None; Intubation Findings: Positive EtCO2, Bilateral breath sounds, Atraumatic/Condition of teeth unchanged;  Depth of  Insertion: 20; Securment: Lips; Complications: None; Breath Sounds: Equal Bilateral; Insertion Attempts: 1; Removal Date: 05/11/15;  Removal Time: 1430    Ready For Surgery From Anesthesia Perspective.     .

## 2024-03-19 NOTE — BRIEF OP NOTE
[facility] - Surgery (Hospital)  Brief Operative Note    Surgery Date: 3/19/2024     Surgeon(s) and Role:     * Gem Woods MD - Primary    Assistant:  first jeovanny Tejada    Pre-op Diagnosis:  Postmenopausal bleeding [N95.0]  Pelvic cramping [R10.2]    Post-op Diagnosis:  Post-Op Diagnosis Codes:     * Postmenopausal bleeding [N95.0]     * Pelvic cramping [R10.2]    Procedure(s) (LRB):  XI ROBOTIC HYSTERECTOMY,WITH SALPINGO-OOPHORECTOMY (Bilateral)  XI ROBOTIC LYSIS, ADHESIONS (N/A)    Anesthesia: General    Operative Findings: Liver with fatty appearance.  Adhesions of epiploic tags of descending colon to the left abdominal sidewall.  No bowel involvement.  Uterus 8 week size.  Bilateral adnexa wnl.  Bilateral ureters in their normal anatomic location vermiculating at the beginning and end of the case.  No leakage of sterile milk from the bladder at the end of the case.    Estimated Blood Loss: * 15 mL *         Specimens:   Uterus, cervix, bilateral fallopian tubes, bilateral ovaries        Discharge Note    OUTCOME: Patient tolerated treatment/procedure well without complication and is now ready for discharge.    DISPOSITION: Home or Self Care    FINAL DIAGNOSIS:  Postmenopausal bleeding    FOLLOWUP: In clinic    DISCHARGE INSTRUCTIONS:  No discharge procedures on file.     Clinical Reference Documents Added to Patient Instructions         Document    ROBOT-ASSISTED HYSTERECTOMY (ENGLISH)

## 2024-03-19 NOTE — OP NOTE
Operative Note       SURGERY DATE:  3/19/2024     PRE-OP DIAGNOSIS:  Postmenopausal bleeding [N95.0]  Pelvic cramping [R10.2]    POST-OP DIAGNOSIS:  Postmenopausal bleeding [N95.0]  Pelvic cramping [R10.2]    Procedure(s) (LRB):  XI ROBOTIC HYSTERECTOMY,WITH SALPINGO-OOPHORECTOMY (Bilateral)  XI ROBOTIC LYSIS, ADHESIONS (N/A)    Surgeon(s) and Role:     * Gem Woods MD - Primary    ASSISTANT:   first jeovanny Tejada    TASKS PERFORMED BY ASSISTANT:  Retraction, Exposure, Hemostasis, Closure    ANESTHESIA: General    FINDINGS: Liver with fatty appearance.  Adhesions of epiploic tags of descending colon to the left abdominal sidewall.  No bowel involvement.  Uterus 8 week size.  Bilateral adnexa wnl.  Bilateral ureters in their normal anatomic location vermiculating at the beginning and end of the case.  No leakage of sterile milk from the bladder at the end of the case.    GRAFTS/IMPLANTS:  None    ESTIMATED BLOOD LOSS:  15  mL              COMPLICATIONS:  None    SPECIMEN:   Uterus, cervix, bilateral fallopian tubes, and bilateral ovaries    DESCRIPTION OF PROCEDURE:    The patient was taken to the Operating Room where general        endotracheal anesthesia was induced and found to be adequate.  She was       then placed in the dorsal lithotomy position in the [facility] and her        arms tucked at her sides.  Her perineum was then prepped and draped in a          normal sterile fashion.  A Gordon catheter was placed in her bladder and hung to     gravity.  A CLIFF intrauterine manipulator with a KOH colpotomizer cup and    a vaginal occluder balloon were then placed with a good fit.  All other        instruments were removed from the vagina, and her legs were placed in a      low lithotomy position.  The patient's abdomen was then prepped and draped in  the normal sterile fashion.  Pre-op antibiotics were given.  Time out was performed.    The periumbilical skin was then tented up with perforating  towel clamps,     and the Veress needle  was inserted through the umbilicus    into the intraperitoneal cavity.  Intraperitoneal placement was  confirmed with a water drop test, and pneumoperitoneum was achieved with carbon dioxide     gas up to a pressure of 15 mmHg.  The Veress needle was then removed, and trocars were placed as follows:  8 mm bladeless trocars:      4 trocars, transverse line 2 cm above the umbilicus, 8 cm apart with direct visualization after the initial placement                   All  instruments were then removed from the trocars and the patient was placed  in Trendelenburg position.  The patient cart for the da Eyal surgical   system was then docked at the bedside.  We then proceeded with  the hysterectomy.       Adhesions of the epiploic tags of descending colon to the left abdominal sidewall were taken down with the scissors to mobilize the colon away from the left IP ligament.  No bowel wall was involved.                                                                                                                                          The left round ligament was then cauterized and transected.  This opened     up  the leaf of the broad ligament on the left side, and this incision was       carried anteromedially to create a bladder flap.  The left infundibulopelvic ligament was doubly cauterized and transected.  The left fallopian tube was then excised working from the fimbriated end to the cornual end along the mesosalpinx.  The left uterine artery was then skeletonized.  We then proceeded to take down the right round ligament.  The right round ligament was cauterized and transected.  This opened up the broad ligament on the right side and this incision was carried anteromedially.  The bladder flap was then completed using both cautery and blunt dissection.  The right infundibulopelvic ligament was doubly cauterized and transected.  The right fallopian tube was then excised working  from the fimbriated end to the cornual end along the mesosalpinx.  The right uterine artery was then skeletonized.  The uterus was then sharply retroflexed, and an anterior colpotomy was made with the monopolar EndoShears along the level of the KOH cup.  The uterus was then sharply anteflexed, and a posterior colpotomy was made with the EndoShears along the level of the KOH cup.  The  colpotomy incision was carried around towards the left side and the left  uterine artery was doubly cauterized and transected.      We then moved [id number] along the right aspect of the KOH    cup.  The right uterine artery was then doubly cauterized and transected.    The colpotomy was then completed working [id number].  The     uterus and cervix were then removed and remained in the vagina to help       maintain pneumoperitoneum for the rest of the case.     The cuff was  irrigated  and cleared of all clots and debris.  Hemostasis at the cuff was achieved with cautery.  The vaginal cuff was then reapproximated with 0-Vicryl figure of 8 sutures.  Again, the pelvis was irrigated and cleared of all clots and debris.  Excellent hemostasis was noted.    The bladder was retrogradely filled with 50 cc sterile milk and distended well.  No leakage of sterile milk noted.  The Gordon was then allowed to drain.  The bilateral ureters were seen in their normal anatomic location and were vermiculating.    At this point, all instruments were removed from the trocars and the patient cart was undocked.  Pneumoperitoneum was then allowed to escape, and all trocars were removed.     Hemostasis at all skin sites was achieved with Bovie cautery.  All skin      sites were closed with 4-0 Monocryl in a running subcuticular fashion.  The    patient tolerated the procedure well.  Sponge, lap and needle counts         correct x2.  She will go to Recovery in stable condition.             CONDITION: Good    DISPOSITION: PACU -  hemodynamically stable.

## 2024-03-19 NOTE — INTERVAL H&P NOTE
The patient has been examined and the H&P has been reviewed:    I concur with the findings and no changes have occurred since H&P was written.    Anesthesia/Surgery risks, benefits and alternative options discussed and understood by patient/family.  Pre-op EKG was abnormal.  Pt saw cardiologist, and had negative nuclear stress test.  Pt cleared to proceed with surgery as scheduled.          There are no hospital problems to display for this patient.

## 2024-03-19 NOTE — TRANSFER OF CARE
"Anesthesia Transfer of Care Note    Patient: Chasity Blake    Procedure(s) Performed: Procedure(s) (LRB):  XI ROBOTIC HYSTERECTOMY,WITH SALPINGO-OOPHORECTOMY (Bilateral)  XI ROBOTIC LYSIS, ADHESIONS (N/A)    Patient location: PACU    Anesthesia Type: general    Transport from OR: Transported from OR on room air with adequate spontaneous ventilation    Post pain: adequate analgesia    Post assessment: no apparent anesthetic complications    Post vital signs: stable    Level of consciousness: responds to stimulation    Nausea/Vomiting: no nausea/vomiting    Complications: none    Transfer of care protocol was followed      Last vitals: Visit Vitals  /67 (BP Location: Right arm, Patient Position: Sitting)   Pulse 74   Temp 36.6 °C (97.9 °F) (Temporal)   Resp 16   Ht 5' 4" (1.626 m)   Wt 92.4 kg (203 lb 13 oz)   SpO2 97%   Breastfeeding No   BMI 34.98 kg/m²     "

## 2024-03-19 NOTE — ANESTHESIA PROCEDURE NOTES
Intubation    Date/Time: 3/19/2024 9:43 AM    Performed by: Wilman Dela Cruz CRNA  Authorized by: Vasile Montague MD    Intubation:     Induction:  Intravenous    Intubated:  Postinduction    Mask Ventilation:  Easy mask    Attempts:  1    Attempted By:  CRNA    Method of Intubation:  Direct    Blade:  Conklin 2    Laryngeal View Grade: Grade I - full view of cords      Difficult Airway Encountered?: No      Complications:  None    Airway Device:  Oral endotracheal tube    Airway Device Size:  7.5    Style/Cuff Inflation:  Cuffed (inflated to minimal occlusive pressure)    Tube secured:  21    Secured at:  The lips    Placement Verified By:  Capnometry    Complicating Factors:  None    Findings Post-Intubation:  BS equal bilateral

## 2024-03-20 NOTE — ANESTHESIA POSTPROCEDURE EVALUATION
Anesthesia Post Evaluation    Patient: Chasity Blake    Procedure(s) Performed: Procedure(s) (LRB):  XI ROBOTIC HYSTERECTOMY,WITH SALPINGO-OOPHORECTOMY (Bilateral)  XI ROBOTIC LYSIS, ADHESIONS (N/A)    Final Anesthesia Type: general      Patient location during evaluation: PACU  Patient participation: Yes- Able to Participate  Level of consciousness: awake and alert and oriented  Post-procedure vital signs: reviewed and stable  Pain management: adequate  Airway patency: patent  JELANI mitigation strategies: Verification of full reversal of neuromuscular block  PONV status at discharge: No PONV  Anesthetic complications: no      Cardiovascular status: blood pressure returned to baseline and hemodynamically stable  Respiratory status: unassisted  Hydration status: euvolemic  Follow-up not needed.              Vitals Value Taken Time   /67 03/19/24 1310   Temp 37.3 °C (99.1 °F) 03/19/24 1150   Pulse 74 03/19/24 1311   Resp 14 03/19/24 1311   SpO2 95 % 03/19/24 1311   Vitals shown include unvalidated device data.      Event Time   Out of Recovery 13:15:45         Pain/Zelalem Score: Pain Rating Prior to Med Admin: 4 (3/19/2024  1:09 PM)  Zelalem Score: 10 (3/19/2024  1:18 PM)

## 2024-03-21 LAB
FINAL PATHOLOGIC DIAGNOSIS: NORMAL
GROSS: NORMAL
Lab: NORMAL

## 2024-06-26 ENCOUNTER — HOSPITAL ENCOUNTER (EMERGENCY)
Facility: HOSPITAL | Age: 59
Discharge: HOME | End: 2024-06-26
Attending: EMERGENCY MEDICINE
Payer: MEDICARE

## 2024-06-26 VITALS
HEART RATE: 87 BPM | TEMPERATURE: 98 F | DIASTOLIC BLOOD PRESSURE: 84 MMHG | RESPIRATION RATE: 11 BRPM | BODY MASS INDEX: 36.4 KG/M2 | SYSTOLIC BLOOD PRESSURE: 157 MMHG | OXYGEN SATURATION: 95 % | WEIGHT: 212.06 LBS

## 2024-06-26 DIAGNOSIS — Z86.59 HISTORY OF ANXIETY: ICD-10-CM

## 2024-06-26 DIAGNOSIS — I10 HYPERTENSION, UNSPECIFIED TYPE: Primary | ICD-10-CM

## 2024-06-26 DIAGNOSIS — R42 DIZZINESS: ICD-10-CM

## 2024-06-26 LAB
ALBUMIN SERPL BCP-MCNC: 3.7 G/DL (ref 3.5–5.2)
ALP SERPL-CCNC: 88 U/L (ref 55–135)
ALT SERPL W/O P-5'-P-CCNC: 26 U/L (ref 10–44)
AMPHET+METHAMPHET UR QL: NEGATIVE
ANION GAP SERPL CALC-SCNC: 11 MMOL/L (ref 8–16)
AST SERPL-CCNC: 14 U/L (ref 10–40)
BARBITURATES UR QL SCN>200 NG/ML: NEGATIVE
BASOPHILS # BLD AUTO: 0.03 K/UL (ref 0–0.2)
BASOPHILS NFR BLD: 0.3 % (ref 0–1.9)
BENZODIAZ UR QL SCN>200 NG/ML: NEGATIVE
BILIRUB SERPL-MCNC: 0.3 MG/DL (ref 0.1–1)
BILIRUB UR QL STRIP: NEGATIVE
BNP SERPL-MCNC: 11 PG/ML (ref 0–99)
BUN SERPL-MCNC: 8 MG/DL (ref 6–20)
BZE UR QL SCN: NEGATIVE
CALCIUM SERPL-MCNC: 9.3 MG/DL (ref 8.7–10.5)
CANNABINOIDS UR QL SCN: NEGATIVE
CHLORIDE SERPL-SCNC: 110 MMOL/L (ref 95–110)
CLARITY UR: CLEAR
CO2 SERPL-SCNC: 18 MMOL/L (ref 23–29)
COLOR UR: YELLOW
CREAT SERPL-MCNC: 0.7 MG/DL (ref 0.5–1.4)
CREAT UR-MCNC: 52.6 MG/DL (ref 15–325)
DIFFERENTIAL METHOD BLD: ABNORMAL
EOSINOPHIL # BLD AUTO: 0.1 K/UL (ref 0–0.5)
EOSINOPHIL NFR BLD: 0.6 % (ref 0–8)
ERYTHROCYTE [DISTWIDTH] IN BLOOD BY AUTOMATED COUNT: 12.8 % (ref 11.5–14.5)
EST. GFR  (NO RACE VARIABLE): >60 ML/MIN/1.73 M^2
GLUCOSE SERPL-MCNC: 124 MG/DL (ref 70–110)
GLUCOSE UR QL STRIP: NEGATIVE
HCT VFR BLD AUTO: 41.9 % (ref 37–48.5)
HGB BLD-MCNC: 14.3 G/DL (ref 12–16)
HGB UR QL STRIP: NEGATIVE
IMM GRANULOCYTES # BLD AUTO: 0.06 K/UL (ref 0–0.04)
IMM GRANULOCYTES NFR BLD AUTO: 0.5 % (ref 0–0.5)
KETONES UR QL STRIP: NEGATIVE
LEUKOCYTE ESTERASE UR QL STRIP: NEGATIVE
LYMPHOCYTES # BLD AUTO: 1.6 K/UL (ref 1–4.8)
LYMPHOCYTES NFR BLD: 14.9 % (ref 18–48)
MCH RBC QN AUTO: 31.8 PG (ref 27–31)
MCHC RBC AUTO-ENTMCNC: 34.1 G/DL (ref 32–36)
MCV RBC AUTO: 93 FL (ref 82–98)
METHADONE UR QL SCN>300 NG/ML: NEGATIVE
MONOCYTES # BLD AUTO: 0.5 K/UL (ref 0.3–1)
MONOCYTES NFR BLD: 4.1 % (ref 4–15)
NEUTROPHILS # BLD AUTO: 8.7 K/UL (ref 1.8–7.7)
NEUTROPHILS NFR BLD: 79.6 % (ref 38–73)
NITRITE UR QL STRIP: NEGATIVE
NRBC BLD-RTO: 0 /100 WBC
OPIATES UR QL SCN: NEGATIVE
PCP UR QL SCN>25 NG/ML: NEGATIVE
PH UR STRIP: 7 [PH] (ref 5–8)
PLATELET # BLD AUTO: 279 K/UL (ref 150–450)
PMV BLD AUTO: 10.9 FL (ref 9.2–12.9)
POTASSIUM SERPL-SCNC: 4.2 MMOL/L (ref 3.5–5.1)
PROT SERPL-MCNC: 6.6 G/DL (ref 6–8.4)
PROT UR QL STRIP: NEGATIVE
RBC # BLD AUTO: 4.49 M/UL (ref 4–5.4)
SODIUM SERPL-SCNC: 139 MMOL/L (ref 136–145)
SP GR UR STRIP: 1.01 (ref 1–1.03)
TOXICOLOGY INFORMATION: NORMAL
TROPONIN I SERPL DL<=0.01 NG/ML-MCNC: 0.01 NG/ML (ref 0–0.03)
URN SPEC COLLECT METH UR: NORMAL
UROBILINOGEN UR STRIP-ACNC: NEGATIVE EU/DL
WBC # BLD AUTO: 10.99 K/UL (ref 3.9–12.7)

## 2024-06-26 PROCEDURE — 25000003 PHARM REV CODE 250: Performed by: EMERGENCY MEDICINE

## 2024-06-26 PROCEDURE — 84484 ASSAY OF TROPONIN QUANT: CPT | Performed by: EMERGENCY MEDICINE

## 2024-06-26 PROCEDURE — 93010 ELECTROCARDIOGRAM REPORT: CPT | Mod: ,,, | Performed by: STUDENT IN AN ORGANIZED HEALTH CARE EDUCATION/TRAINING PROGRAM

## 2024-06-26 PROCEDURE — 63600175 PHARM REV CODE 636 W HCPCS: Performed by: EMERGENCY MEDICINE

## 2024-06-26 PROCEDURE — 80053 COMPREHEN METABOLIC PANEL: CPT | Performed by: EMERGENCY MEDICINE

## 2024-06-26 PROCEDURE — 80307 DRUG TEST PRSMV CHEM ANLYZR: CPT | Performed by: EMERGENCY MEDICINE

## 2024-06-26 PROCEDURE — 81003 URINALYSIS AUTO W/O SCOPE: CPT | Mod: 59 | Performed by: EMERGENCY MEDICINE

## 2024-06-26 PROCEDURE — 85025 COMPLETE CBC W/AUTO DIFF WBC: CPT | Performed by: EMERGENCY MEDICINE

## 2024-06-26 PROCEDURE — 96361 HYDRATE IV INFUSION ADD-ON: CPT

## 2024-06-26 PROCEDURE — 93005 ELECTROCARDIOGRAM TRACING: CPT

## 2024-06-26 PROCEDURE — 99285 EMERGENCY DEPT VISIT HI MDM: CPT | Mod: 25

## 2024-06-26 PROCEDURE — 25500020 PHARM REV CODE 255: Performed by: EMERGENCY MEDICINE

## 2024-06-26 PROCEDURE — 83880 ASSAY OF NATRIURETIC PEPTIDE: CPT | Performed by: EMERGENCY MEDICINE

## 2024-06-26 PROCEDURE — 96365 THER/PROPH/DIAG IV INF INIT: CPT | Mod: 59

## 2024-06-26 RX ORDER — SCOLOPAMINE TRANSDERMAL SYSTEM 1 MG/1
1 PATCH, EXTENDED RELEASE TRANSDERMAL
Status: DISCONTINUED | OUTPATIENT
Start: 2024-06-26 | End: 2024-06-27 | Stop reason: HOSPADM

## 2024-06-26 RX ORDER — MECLIZINE HYDROCHLORIDE 25 MG/1
25 TABLET ORAL
Status: COMPLETED | OUTPATIENT
Start: 2024-06-26 | End: 2024-06-26

## 2024-06-26 RX ORDER — NIFEDIPINE 30 MG/1
30 TABLET, EXTENDED RELEASE ORAL ONCE
Status: COMPLETED | OUTPATIENT
Start: 2024-06-26 | End: 2024-06-26

## 2024-06-26 RX ADMIN — IOHEXOL 100 ML: 350 INJECTION, SOLUTION INTRAVENOUS at 08:06

## 2024-06-26 RX ADMIN — PROMETHAZINE HYDROCHLORIDE 12.5 MG: 25 INJECTION INTRAMUSCULAR; INTRAVENOUS at 07:06

## 2024-06-26 RX ADMIN — MECLIZINE HYDROCHLORIDE 25 MG: 25 TABLET ORAL at 05:06

## 2024-06-26 RX ADMIN — SODIUM CHLORIDE 1000 ML: 9 INJECTION, SOLUTION INTRAVENOUS at 06:06

## 2024-06-26 RX ADMIN — NIFEDIPINE 30 MG: 30 TABLET, FILM COATED, EXTENDED RELEASE ORAL at 06:06

## 2024-06-26 RX ADMIN — SCOPOLAMINE 1 PATCH: 1.5 PATCH, EXTENDED RELEASE TRANSDERMAL at 06:06

## 2024-06-26 NOTE — ED PROVIDER NOTES
SCRIBE #1 NOTE: I, Lesli Veliz, am scribing for, and in the presence of, Marbella Tamayo MD. I have scribed the entire note.       History     Chief Complaint   Patient presents with    Dizziness     Pt brought in by [facility] for dizziness, n/v, and SOB that started this morning. Per [facility], pt took 2 breathing tx this morning without relief. 4mg Zofran given en route. Recent hysterectomy in March. Hx COPD, HTN, and fibromyalgia.     Shortness of Breath     Review of patient's allergies indicates:   Allergen Reactions    Percocet [oxycodone-acetaminophen] Itching         History of Present Illness     HPI    6/26/2024, 5:04 PM  History obtained from the  and patient      History of Present Illness: Chasity Blake is a 58 y.o. female patient with a PMHx of HTN, asthma, ADD, anxiety, COPD, depression who presents to the Emergency Department for evaluation of dizziness which onset this morning. Pt also complains of n/v, light-headedness, CP, SOB, and vision changes. Pt states that she experienced CP and SOB this morning when her sxs first onset, but is not complaining of these sxs while in the ED. Pt states that she woke up around 5 AM to do a breathing treatment and then went back to sleep. She states that once she woke up around 11 AM, she began experiencing dizziness. Pt states that she had a hysterectomy done 3/19/24 and felt fine right after the surgery, but has been having issues since then. Pt states that her BP was also elevated today. Pt's  states that the pt sent him a photo of her BP around 1 PM and it was 176/107. Symptoms are constant and moderate in severity. No mitigating or exacerbating factors reported. Patient denies any fever, chills, diarrhea, abdominal pain, syncope, weakness, and all other sxs at this time. Pt states that she is compliant with her medications and takes her BP medications everyday. No further complaints or concerns at this time.       Arrival mode: AASI    PCP:  Dima Her MD        Past Medical History:  Past Medical History:   Diagnosis Date    ADD (attention deficit disorder with hyperactivity)     Anxiety     Asthma     mild intermittent    COPD (chronic obstructive pulmonary disease)     Depression     Hypertension     PONV (postoperative nausea and vomiting)        Past Surgical History:  Past Surgical History:   Procedure Laterality Date    AUGMENTATION OF BREAST Bilateral     saline. About 20 years ago    BELT ABDOMINOPLASTY      Breast Augmentation      BREAST BIOPSY Left     2015. benign    BREAST SURGERY      COLONOSCOPY N/A 10/27/2022    Procedure: COLONOSCOPY;  Surgeon: Nadja Seals MD;  Location: [facility] ENDO;  Service: Endoscopy;  Laterality: N/A;    COLONOSCOPY N/A 10/17/2023    Procedure: COLONOSCOPY;  Surgeon: Hiral Fulton MD;  Location: [facility] ENDO;  Service: Endoscopy;  Laterality: N/A;    COSMETIC SURGERY      ENDOMETRIAL ABLATION      ROBOT-ASSISTED LAPAROSCOPIC LYSIS OF ADHESIONS USING DA SUSANNAH XI N/A 3/19/2024    Procedure: XI ROBOTIC LYSIS, ADHESIONS;  Surgeon: Gem Woods MD;  Location: [facility] OR;  Service: OB/GYN;  Laterality: N/A;    TONSILLECTOMY      TUBAL LIGATION      XI ROBOTIC HYSTERECTOMY, WITH SALPINGO-OOPHORECTOMY Bilateral 3/19/2024    Procedure: XI ROBOTIC HYSTERECTOMY,WITH SALPINGO-OOPHORECTOMY;  Surgeon: Gem Woods MD;  Location: [facility] OR;  Service: OB/GYN;  Laterality: Bilateral;         Family History:  Family History   Problem Relation Name Age of Onset    Lung cancer Mother      Coronary artery disease Mother  50    Coronary artery disease Father  38    Breast cancer Neg Hx      Colon cancer Neg Hx      Ovarian cancer Neg Hx         Social History:  Social History     Tobacco Use    Smoking status: Every Day     Current packs/day: 0.50     Average packs/day: 0.5 packs/day for 10.0 years (5.0 ttl pk-yrs)     Types: Cigarettes    Smokeless tobacco: Never   Substance and Sexual Activity    Alcohol use: No      Alcohol/week: 0.0 standard drinks of alcohol    Drug use: No    Sexual activity: Yes     Partners: Male     Birth control/protection: None, See Surgical Hx        Review of Systems     Review of Systems   Constitutional:  Negative for chills and fever.   HENT:  Negative for sore throat.    Eyes:         (+) vision changes   Respiratory:  Positive for shortness of breath.    Cardiovascular:  Positive for chest pain.   Gastrointestinal:  Positive for nausea and vomiting. Negative for abdominal pain and diarrhea.   Genitourinary:  Negative for dysuria.   Musculoskeletal:  Negative for back pain.   Skin:  Negative for rash.   Neurological:  Positive for dizziness and light-headedness. Negative for syncope and weakness.   Hematological:  Does not bruise/bleed easily.   All other systems reviewed and are negative.     Physical Exam     Initial Vitals [06/26/24 1622]   BP Pulse Resp Temp SpO2   (!) 155/77 (!) 15 19 98.5 °F (36.9 °C) 95 %      MAP       --          Physical Exam  Nursing Notes and Vital Signs Reviewed.  Constitutional: Patient is in no acute distress. Patient appears older than stated age.  Head: Atraumatic. Normocephalic.  Eyes: PERRL. EOM intact. Conjunctivae are not pale. No scleral icterus.  ENT: Mucous membranes are moist. Patient is adentulous. Ear exam normal.   Cardiovascular: Regular rate. Regular rhythm. No murmurs, rubs, or gallops. Distal pulses are 2+ and symmetric.  Pulmonary/Chest: No respiratory distress. Clear to auscultation bilaterally. No wheezing or rales.  Abdominal: Soft and non-distended.  There is no tenderness.  No rebound, guarding, or rigidity. Good bowel sounds.  Musculoskeletal: Moves all extremities. No obvious deformities. No edema. No calf tenderness.  Skin: Warm and dry.  Neurological:  Alert, awake, and appropriate.  Normal speech.  No acute focal neurological deficits are appreciated.  Normal gait observed. Negative rhomberg.   Psychiatric: Normal affect. Good eye  contact. Appropriate in content.     ED Course   Procedures  ED Vital Signs:  Vitals:    06/26/24 1622 06/26/24 1639 06/26/24 1652 06/26/24 1700   BP: (!) 155/77 121/82  (!) 161/85   Pulse: (!) 15 95  92   Resp: 19 18  12   Temp: 98.5 °F (36.9 °C)      TempSrc: Oral      SpO2: 95% 96%  95%   Weight:   96.2 kg (212 lb 1.3 oz)     06/26/24 1726 06/26/24 1730 06/26/24 1733 06/26/24 1802   BP: 136/78 132/81 137/82 (!) 186/86   Pulse: 89 93 104 93   Resp:    14   Temp:       TempSrc:       SpO2:    95%   Weight:        06/26/24 1804 06/26/24 1830 06/26/24 1900   BP: (!) 184/100 (!) 141/78 (!) 157/84   Pulse: 90 83 87   Resp: 11 16 11   Temp:   98.3 °F (36.8 °C)   TempSrc:   Oral   SpO2: 95% 95% 95%   Weight:          Abnormal Lab Results:  Labs Reviewed   CBC W/ AUTO DIFFERENTIAL - Abnormal; Notable for the following components:       Result Value    MCH 31.8 (*)     Gran # (ANC) 8.7 (*)     Immature Grans (Abs) 0.06 (*)     Gran % 79.6 (*)     Lymph % 14.9 (*)     All other components within normal limits   COMPREHENSIVE METABOLIC PANEL - Abnormal; Notable for the following components:    CO2 18 (*)     Glucose 124 (*)     All other components within normal limits   TROPONIN I   B-TYPE NATRIURETIC PEPTIDE   URINALYSIS, REFLEX TO URINE CULTURE    Narrative:     Specimen Source->Urine   DRUG SCREEN PANEL, URINE EMERGENCY    Narrative:     Specimen Source->Urine        All Lab Results:  Results for orders placed or performed during the hospital encounter of 06/26/24   CBC auto differential   Result Value Ref Range    WBC 10.99 3.90 - 12.70 K/uL    RBC 4.49 4.00 - 5.40 M/uL    Hemoglobin 14.3 12.0 - 16.0 g/dL    Hematocrit 41.9 37.0 - 48.5 %    MCV 93 82 - 98 fL    MCH 31.8 (H) 27.0 - 31.0 pg    MCHC 34.1 32.0 - 36.0 g/dL    RDW 12.8 11.5 - 14.5 %    Platelets 279 150 - 450 K/uL    MPV 10.9 9.2 - 12.9 fL    Immature Granulocytes 0.5 0.0 - 0.5 %    Gran # (ANC) 8.7 (H) 1.8 - 7.7 K/uL    Immature Grans (Abs) 0.06 (H) 0.00 -  0.04 K/uL    Lymph # 1.6 1.0 - 4.8 K/uL    Mono # 0.5 0.3 - 1.0 K/uL    Eos # 0.1 0.0 - 0.5 K/uL    Baso # 0.03 0.00 - 0.20 K/uL    nRBC 0 0 /100 WBC    Gran % 79.6 (H) 38.0 - 73.0 %    Lymph % 14.9 (L) 18.0 - 48.0 %    Mono % 4.1 4.0 - 15.0 %    Eosinophil % 0.6 0.0 - 8.0 %    Basophil % 0.3 0.0 - 1.9 %    Differential Method Automated    Comprehensive metabolic panel   Result Value Ref Range    Sodium 139 136 - 145 mmol/L    Potassium 4.2 3.5 - 5.1 mmol/L    Chloride 110 95 - 110 mmol/L    CO2 18 (L) 23 - 29 mmol/L    Glucose 124 (H) 70 - 110 mg/dL    BUN 8 6 - 20 mg/dL    Creatinine 0.7 0.5 - 1.4 mg/dL    Calcium 9.3 8.7 - 10.5 mg/dL    Total Protein 6.6 6.0 - 8.4 g/dL    Albumin 3.7 3.5 - 5.2 g/dL    Total Bilirubin 0.3 0.1 - 1.0 mg/dL    Alkaline Phosphatase 88 55 - 135 U/L    AST 14 10 - 40 U/L    ALT 26 10 - 44 U/L    eGFR >60 >60 mL/min/1.73 m^2    Anion Gap 11 8 - 16 mmol/L   Troponin I #1   Result Value Ref Range    Troponin I 0.007 0.000 - 0.026 ng/mL   BNP   Result Value Ref Range    BNP 11 0 - 99 pg/mL   Urinalysis, Reflex to Urine Culture Urine, Clean Catch    Specimen: Urine   Result Value Ref Range    Specimen UA Urine, Clean Catch     Color, UA Yellow Yellow, Straw, Tori    Appearance, UA Clear Clear    pH, UA 7.0 5.0 - 8.0    Specific Gravity, UA 1.015 1.005 - 1.030    Protein, UA Negative Negative    Glucose, UA Negative Negative    Ketones, UA Negative Negative    Bilirubin (UA) Negative Negative    Occult Blood UA Negative Negative    Nitrite, UA Negative Negative    Urobilinogen, UA Negative <2.0 EU/dL    Leukocytes, UA Negative Negative   Drug screen panel, emergency   Result Value Ref Range    Benzodiazepines Negative Negative    Methadone metabolites Negative Negative    Cocaine (Metab.) Negative Negative    Opiate Scrn, Ur Negative Negative    Barbiturate Screen, Ur Negative Negative    Amphetamine Screen, Ur Negative Negative    THC Negative Negative    Phencyclidine Negative Negative     Creatinine, Urine 52.6 15.0 - 325.0 mg/dL    Toxicology Information SEE COMMENT    EKG 12-lead   Result Value Ref Range    QRS Duration 78 ms    [facility] QTC Calculation 439 ms        Imaging Results:  Imaging Results              CTA Chest Non-Coronary (PE Studies) (Final result)  Result time 06/26/24 20:26:18      Final result by Aide Packer MD (06/26/24 20:26:18)                   Impression:      No pulmonary embolus seen on degraded imaging      Electronically signed by: Aide Packer  Date:    06/26/2024  Time:    20:26               Narrative:    EXAMINATION:  CTA CHEST NON CORONARY (PE STUDIES)    CLINICAL HISTORY:  Pulmonary embolism (PE) suspected, high prob;    TECHNIQUE:  Angiographic technique PE protocol with MIPS and post processing volumetric    Iterative technique with low-dose parameters for diminishing radiation dose as reasonably achievable    COMPARISON:  Radiographic    FINDINGS:  No pulmonary embolus identified as visualized with image degradation related to suboptimal pulmonary arterial enhancement as well as motion.    No sizable pulmonary consolidation or pleural effusion    Liver appears fatty                                       CT Head Without Contrast (Final result)  Result time 06/26/24 18:46:32      Final result by Aide Packer MD (06/26/24 18:46:32)                   Impression:      No acute abnormality.      Electronically signed by: Aide Packer  Date:    06/26/2024  Time:    18:46               Narrative:    EXAMINATION:  CT HEAD WITHOUT CONTRAST    CLINICAL HISTORY:  Dizziness, persistent/recurrent, cardiac or vascular cause suspected;    TECHNIQUE:  Low dose axial CT images obtained throughout the head without intravenous contrast. Sagittal and coronal reconstructions were performed.    COMPARISON:  None.    FINDINGS:  Intracranial compartment:    Ventricles and sulci are normal in size for age without evidence of hydrocephalus. No extra-axial blood or  fluid collections.    The brain parenchyma appears normal. No parenchymal mass, hemorrhage, edema or major vascular distribution infarct.    Skull/extracranial contents (limited evaluation): No fracture. Mastoid air cells and paranasal sinuses are essentially clear.                                       X-Ray Chest AP Portable (Final result)  Result time 06/26/24 17:59:18      Final result by Aide Packer MD (06/26/24 17:59:18)                   Impression:      no active finding      Electronically signed by: Aide Packer  Date:    06/26/2024  Time:    17:59               Narrative:    EXAMINATION:  XR CHEST AP PORTABLE    CLINICAL HISTORY:  hypertension;    TECHNIQUE:  Single frontal portable view of the chest was performed.    COMPARISON:  04/08/2019    FINDINGS:  No pulmonary consolidation pleural effusion or convincing Dumont                                       The EKG was ordered, reviewed, and independently interpreted by the ED provider.  Interpretation time: 17:13  Rate: 95 BPM  Rhythm: normal sinus rhythm  Interpretation: ST and T wave abnormality, consider anterolateral ischemia. No STEMI.         The Emergency Provider reviewed the vital signs and test results, which are outlined above.     ED Discussion      Reassessed pt at this time. Discussed with pt all pertinent ED information and results. Discussed pt dx and plan of tx. Gave pt all f/u and return to the ED instructions. All questions and concerns were addressed at this time. Pt expresses understanding of information and instructions, and is comfortable with plan to discharge. Pt is stable for discharge.    I discussed with patient and/or family/caretaker that evaluation in the ED does not suggest any emergent or life threatening medical conditions requiring immediate intervention beyond what was provided in the ED, and I believe patient is safe for discharge.  Regardless, an unremarkable evaluation in the ED does not preclude the  development or presence of a serious of life threatening condition. As such, patient was instructed to return immediately for any worsening or change in current symptoms.        Medical Decision Making  DDX: 1. Vertigo 2. HTN crisis 3. ACS 4. PE 5. Anxiety 6. Stroke    ECG reviewed and no acute ischemic changes, troponin normal, wbc normal, h/h stable, cmp otherwise normal except bicarb 18, CT head negative, CTA negative for PE, CXR normal, iv fluids given, nausea meds, patient ultimately felt better after phenergan given, neuro exam normal, no concern for stroke, admission considered but she is feeling much better and feels safe being discharged.     Amount and/or Complexity of Data Reviewed  Independent Historian: spouse  Labs: ordered. Decision-making details documented in ED Course.  Radiology: ordered. Decision-making details documented in ED Course.  ECG/medicine tests: ordered and independent interpretation performed. Decision-making details documented in ED Course.    Risk  Prescription drug management.  Decision regarding hospitalization.       Additional MDM:   Smoking Cessation: The patient is a smoker. The patient was counseled on smoking cessation for: 4 minutes. The patient was counseled on tobacco related  health complications.           ED Medication(s):  Medications   meclizine tablet 25 mg (25 mg Oral Given 6/26/24 1719)   NIFEdipine 24 hr tablet 30 mg (30 mg Oral Given 6/26/24 1816)   sodium chloride 0.9% bolus 1,000 mL 1,000 mL (0 mLs Intravenous Stopped 6/26/24 1918)   promethazine (PHENERGAN) 12.5 mg in 0.9% NaCl 50 mL IVPB (0 mg Intravenous Stopped 6/26/24 2000)   iohexoL (OMNIPAQUE 350) injection 100 mL (100 mLs Intravenous Given 6/26/24 2003)       Discharge Medication List as of 6/26/2024  8:34 PM           Follow-up Information       Dima Her MD. Schedule an appointment as soon as possible for a visit in 1 day.    Specialty: Family Medicine  Contact information:  [address] [facility]  [address]  [facility] [address]  [phone]                                 Scribe Attestation:   Scribe #1: I performed the above scribed service and the documentation accurately describes the services I performed. I attest to the accuracy of the note.     Attending:   Physician Attestation Statement for Scribe #1: I, Marbella Tamayo MD, personally performed the services described in this documentation, as scribed by Lesli Veliz, in my presence, and it is both accurate and complete.           Clinical Impression       ICD-10-CM ICD-9-CM   1. Hypertension, unspecified type  I10 401.9   2. Dizziness  R42 780.4   3. History of anxiety  Z86.59 V11.8       Disposition:   Disposition: Discharged  Condition: Stable        Marbella Tamayo MD  06/27/24 [id number]

## 2024-06-27 ENCOUNTER — OFFICE VISIT (OUTPATIENT)
Dept: FAMILY MEDICINE | Facility: CLINIC | Age: 59
End: 2024-06-27
Payer: MEDICARE

## 2024-06-27 ENCOUNTER — PATIENT OUTREACH (OUTPATIENT)
Dept: EMERGENCY MEDICINE | Facility: HOSPITAL | Age: 59
End: 2024-06-27
Payer: MEDICARE

## 2024-06-27 VITALS
HEART RATE: 72 BPM | WEIGHT: 209 LBS | BODY MASS INDEX: 35.87 KG/M2 | SYSTOLIC BLOOD PRESSURE: 124 MMHG | DIASTOLIC BLOOD PRESSURE: 78 MMHG | OXYGEN SATURATION: 97 %

## 2024-06-27 DIAGNOSIS — Z90.710 STATUS POST LAPAROSCOPIC HYSTERECTOMY: ICD-10-CM

## 2024-06-27 DIAGNOSIS — I10 HYPERTENSION NOT AT GOAL: ICD-10-CM

## 2024-06-27 DIAGNOSIS — Z72.820 POOR SLEEP: ICD-10-CM

## 2024-06-27 DIAGNOSIS — R68.89 FEELING BAD: ICD-10-CM

## 2024-06-27 DIAGNOSIS — Z09 HOSPITAL DISCHARGE FOLLOW-UP: Primary | ICD-10-CM

## 2024-06-27 DIAGNOSIS — G25.81 RLS (RESTLESS LEGS SYNDROME): ICD-10-CM

## 2024-06-27 DIAGNOSIS — K21.9 GASTROESOPHAGEAL REFLUX DISEASE, UNSPECIFIED WHETHER ESOPHAGITIS PRESENT: ICD-10-CM

## 2024-06-27 LAB
OHS QRS DURATION: 78 MS
OHS QTC CALCULATION: 439 MS

## 2024-06-27 PROCEDURE — 3078F DIAST BP <80 MM HG: CPT | Mod: CPTII,S$GLB,, | Performed by: FAMILY MEDICINE

## 2024-06-27 PROCEDURE — 99999 PR PBB SHADOW E&M-EST. PATIENT-LVL IV: CPT | Mod: PBBFAC,,, | Performed by: FAMILY MEDICINE

## 2024-06-27 PROCEDURE — 1159F MED LIST DOCD IN RCRD: CPT | Mod: CPTII,S$GLB,, | Performed by: FAMILY MEDICINE

## 2024-06-27 PROCEDURE — 99214 OFFICE O/P EST MOD 30 MIN: CPT | Mod: S$GLB,,, | Performed by: FAMILY MEDICINE

## 2024-06-27 PROCEDURE — G2211 COMPLEX E/M VISIT ADD ON: HCPCS | Mod: S$GLB,,, | Performed by: FAMILY MEDICINE

## 2024-06-27 PROCEDURE — 4010F ACE/ARB THERAPY RXD/TAKEN: CPT | Mod: CPTII,S$GLB,, | Performed by: FAMILY MEDICINE

## 2024-06-27 PROCEDURE — 3074F SYST BP LT 130 MM HG: CPT | Mod: CPTII,S$GLB,, | Performed by: FAMILY MEDICINE

## 2024-06-27 PROCEDURE — 3008F BODY MASS INDEX DOCD: CPT | Mod: CPTII,S$GLB,, | Performed by: FAMILY MEDICINE

## 2024-06-27 RX ORDER — SCOLOPAMINE TRANSDERMAL SYSTEM 1 MG/1
1 PATCH, EXTENDED RELEASE TRANSDERMAL
Qty: 4 PATCH | Refills: 0 | Status: SHIPPED | OUTPATIENT
Start: 2024-06-27 | End: 2024-06-27 | Stop reason: SDUPTHER

## 2024-06-27 RX ORDER — ROPINIROLE 0.5 MG/1
0.5 TABLET, FILM COATED ORAL NIGHTLY
Qty: 30 TABLET | Refills: 11 | Status: SHIPPED | OUTPATIENT
Start: 2024-06-27 | End: 2025-06-27

## 2024-06-27 RX ORDER — SCOLOPAMINE TRANSDERMAL SYSTEM 1 MG/1
1 PATCH, EXTENDED RELEASE TRANSDERMAL
Qty: 4 PATCH | Refills: 0 | Status: SHIPPED | OUTPATIENT
Start: 2024-06-27

## 2024-06-27 NOTE — DISCHARGE INSTRUCTIONS
Continue to monitor blood pressure at home, follow up closely with your primary care doctor for re-evaluation.

## 2024-06-27 NOTE — PROGRESS NOTES
"Chief Complaint:  No chief complaint on file.      History of Present Illness:    History of Present Illness    Patient presents today for dizziness, nausea, and high blood pressure. She reports significantly elevated blood pressure readings. Left arm readings were 176/107 and 159/100. Right arm readings were 152/103 and 142/98. A later reading at home was 198/114. Her blood pressure would fluctuate up and down during a recent hospital visit for dizziness and nausea. She describes dizziness as a feeling of "confusion and disconnect", like a disconnect between her eyes focusing and her brain processing the visual input, rather than a room spinning sensation. She also felt spots in front of her eyes. Workup at the hospital including chest XR, brain scan, and 12 lead heart tests were unremarkable. She was given meclizine, IV nausea medication, and a patch behind the ear which provided symptom relief. She had a complete hysterectomy on March 19th and felt great initially. However, about 2 weeks post-op she developed fatigue, sleep disturbances, hot flashes, and rashes. She takes 1mg estrogen for hot flashes. She occasionally has soreness near the hysterectomy site. She has also gained weight since the surgery. She reports feeling extremely tired all the time with no energy. She has difficulty getting up and doing things due to the fatigue, stating "my body does not want to get up and do anything." She only gets a good night's sleep 2 days per week, with sleep often broken up into segments. She has "terrible" and "crazy" sleep habits. She snores heavily and holds her breath frequently while sleeping, having to catch up on breathing. She stopped Vyvanse in March due to concern it was contributing to high blood pressure. A previous sleep study showed she was borderline for CPAP. She has a very hard time falling asleep even when laying down to rest. She has dealt with restless legs for years which has worsened with age. She " takes an iron supplement and her levels were last normal. She has not tried any medications for it previously. She reports dry mouth, itchy rashes on her leg, neck and knee, and 3 episodes of waking with burning in her throat and coughing/vomiting at night, suggestive of possible heartburn. She denies shortness of breath, nausea, dizziness or high blood pressure prior to the recent episode.           Past Medical History:   Diagnosis Date    ADD (attention deficit disorder with hyperactivity)     Anxiety     Asthma     mild intermittent    COPD (chronic obstructive pulmonary disease)     Depression     Hypertension     PONV (postoperative nausea and vomiting)        Social History:  Social History     Socioeconomic History    Marital status:    Tobacco Use    Smoking status: Every Day     Current packs/day: 0.50     Average packs/day: 0.5 packs/day for 10.0 years (5.0 ttl pk-yrs)     Types: Cigarettes    Smokeless tobacco: Never   Substance and Sexual Activity    Alcohol use: No     Alcohol/week: 0.0 standard drinks of alcohol    Drug use: No    Sexual activity: Yes     Partners: Male     Birth control/protection: None, See Surgical Hx     Social Determinants of Health     Financial Resource Strain: Patient Declined (11/29/2023)    Overall Financial Resource Strain (CARDIA)     Difficulty of Paying Living Expenses: Patient declined   Food Insecurity: Patient Declined (11/29/2023)    Hunger Vital Sign     Worried About Running Out of Food in the Last Year: Patient declined     Ran Out of Food in the Last Year: Patient declined   Transportation Needs: Patient Declined (11/29/2023)    PRAPARE - Transportation     Lack of Transportation (Medical): Patient declined     Lack of Transportation (Non-Medical): Patient declined   Physical Activity: Unknown (11/29/2023)    Exercise Vital Sign     Days of Exercise per Week: Patient declined     Minutes of Exercise per Session: 10 min   Stress: Patient Declined  (11/29/2023)    [employer] [place] [employer] - Occupational Stress Questionnaire     Feeling of Stress : Patient declined   Housing Stability: Unknown (11/29/2023)    Housing Stability Vital Sign     Unable to Pay for Housing in the Last Year: Patient refused     Number of Places Lived in the Last Year: 1     Unstable Housing in the Last Year: Patient refused       Family History:   family history includes Coronary artery disease (age of onset: 38) in her father; Coronary artery disease (age of onset: 50) in her mother; Lung cancer in her mother.    Health Maintenance   Topic Date Due    Shingles Vaccine (1 of 2) Never done    Mammogram  03/11/2025    Pap Smear  11/30/2026    TETANUS VACCINE  10/29/2027    Lipid Panel  08/03/2028    Colorectal Cancer Screening  10/17/2028    Hepatitis C Screening  Completed       Exam:Physical     Vital Signs  Pulse: 72  SpO2: 97 %  BP: 124/78  BP Location: Left arm  Patient Position: Sitting  Height and Weight  Weight: 94.8 kg (208 lb 15.9 oz)]    Body mass index is 35.87 kg/m².    Physical Exam    Vitals: BP: 198/114.  General: No acute distress. Well-developed. Well-nourished.  Eyes: EOMI. Sclerae anicteric.  HENT: Normocephalic. Atraumatic. Nares patent. Moist oral mucosa.  Cardiovascular: Regular rate. Regular rhythm. No murmurs. No rubs. No gallops. Normal S1, S2.  Respiratory: Normal respiratory effort. Clear to auscultation bilaterally. No rales. No rhonchi. No wheezing.  Musculoskeletal: No  obvious deformity.  Extremities: No lower extremity edema.  Neurological: Alert & oriented x3. No slurred speech. Normal gait.  Psychiatric: Normal mood. Normal affect. Good insight. Good judgment.  Skin: Warm. Dry. No rash.           Assessment:        ICD-10-CM ICD-9-CM   1. Hospital discharge follow-up  Z09 V67.59   2. Feeling bad  R68.89 780.99   3. Hypertension not at goal  I10 401.9   4. Status post laparoscopic hysterectomy  Z90.710 V88.01   5. Poor sleep  Z72.820  V69.4   6. RLS (restless legs syndrome)  G25.81 333.94   7. Gastroesophageal reflux disease, unspecified whether esophagitis present  K21.9 530.81         Plan:    Assessment & Plan    IMPRESSION:  1. Suspect sleep apnea is a major contributing factor to patient's dizziness, nausea, fatigue, hot flashes, and restless legs given weight gain, snoring, and periods of apnea during sleep. Symptoms have been present for a few months and are worsening, significantly impacting quality of life. Sleep study is warranted to evaluate further and guide treatment.  2. Dizziness and nausea are likely multifactorial. Prescribed medication to alleviate symptoms in the short-term while underlying causes are investigated.  3. Restless legs may be idiopathic or related to other medical conditions. Initiated treatment and will monitor response.  SLEEP DISORDERS:  1. Discussed the importance of good sleep hygiene and obtaining restful sleep for overall health and well-being.  2. Explained that inadequate sleep can lead to numerous symptoms and exacerbate underlying medical conditions.  3. Sleep study ordered to evaluate for obstructive sleep apnea.  4. Office will contact patient to schedule sleep study once insurance approval obtained.  ACID REFLUX:  1. Educated on lifestyle modifications to improve symptoms of acid reflux, including avoiding meals close to bedtime and elevating the head of the bed while sleeping.  MEDICATION MANAGEMENT:  1. Discussed avoiding use of multiple medications with similar mechanisms of action to prevent over-medication and adverse effects.  2. Do not use concurrently with oral meclizine or promethazine to avoid over-medication and adverse effects.  3. May use oral promethazine as needed for breakthrough nausea if patch alone is ineffective.  DIZZINESS:  1. Prescribed transdermal scopolamine patch for dizziness.  RESTLESS LEG SYNDROME:  1. Started pramipexole for restless leg symptoms.  BLOOD PRESSURE  MONITORIN. Follow up to continue monitoring blood pressure at home 2 times daily.         Diagnoses and all orders for this visit:    Hospital discharge follow-up    Feeling bad  -     Polysomnogram (CPAP will be added if patient meets diagnostic criteria.); Future    Hypertension not at goal    Status post laparoscopic hysterectomy    Poor sleep  -     Polysomnogram (CPAP will be added if patient meets diagnostic criteria.); Future    RLS (restless legs syndrome)    Gastroesophageal reflux disease, unspecified whether esophagitis present    Other orders  -     rOPINIRole (REQUIP) 0.5 MG tablet; Take 1 tablet (0.5 mg total) by mouth every evening.  -     Discontinue: scopolamine (TRANSDERM-SCOP) 1.3-1.5 mg (1 mg over 3 days); Place 1 patch onto the skin every 72 hours.  -     scopolamine (TRANSDERM-SCOP) 1.3-1.5 mg (1 mg over 3 days); Place 1 patch onto the skin every 72 hours.        No follow-ups on file.      Dima Her MD

## 2024-06-28 ENCOUNTER — PATIENT MESSAGE (OUTPATIENT)
Dept: CARDIOLOGY | Facility: CLINIC | Age: 59
End: 2024-06-28
Payer: MEDICARE

## 2024-06-28 NOTE — PROGRESS NOTES
Pt visited the ED on 6/26/24. I made an attempt to reach patient to assist with scheduling a post ED 7-day follow up with PCP. Unable to reach. Pt was contacted by pcp and scheduled a follow up appt on 6/27/24. Closing encounter.    Erna Casillas

## 2024-07-07 DIAGNOSIS — F43.9 STRESS: ICD-10-CM

## 2024-07-07 NOTE — TELEPHONE ENCOUNTER
No care due was identified.  [facility] Embedded Care Due Messages. Reference number: [id number].   7/07/2024 7:03:48 AM CDT

## 2024-07-08 RX ORDER — BUSPIRONE HYDROCHLORIDE 15 MG/1
15 TABLET ORAL 2 TIMES DAILY
Qty: 180 TABLET | Refills: 1 | Status: SHIPPED | OUTPATIENT
Start: 2024-07-08

## 2024-07-22 ENCOUNTER — PATIENT MESSAGE (OUTPATIENT)
Dept: FAMILY MEDICINE | Facility: CLINIC | Age: 59
End: 2024-07-22
Payer: MEDICARE

## 2024-07-22 DIAGNOSIS — T75.3XXD MOTION SICKNESS, SUBSEQUENT ENCOUNTER: ICD-10-CM

## 2024-07-22 DIAGNOSIS — J45.21 MILD INTERMITTENT ASTHMA WITH ACUTE EXACERBATION: ICD-10-CM

## 2024-07-22 RX ORDER — MECLIZINE HCL 12.5 MG 12.5 MG/1
12.5 TABLET ORAL 3 TIMES DAILY PRN
Qty: 30 TABLET | Refills: 0 | Status: SHIPPED | OUTPATIENT
Start: 2024-07-22

## 2024-07-22 NOTE — TELEPHONE ENCOUNTER
No care due was identified.  [facility] Embedded Care Due Messages. Reference number: [id number].   7/22/2024 9:26:24 AM CDT

## 2024-07-26 RX ORDER — ONDANSETRON 4 MG/1
4 TABLET, ORALLY DISINTEGRATING ORAL EVERY 12 HOURS PRN
Qty: 20 TABLET | Refills: 2 | Status: SHIPPED | OUTPATIENT
Start: 2024-07-26

## 2024-07-26 RX ORDER — ALBUTEROL SULFATE 1.25 MG/3ML
SOLUTION RESPIRATORY (INHALATION)
Qty: 90 ML | Refills: 6 | Status: SHIPPED | OUTPATIENT
Start: 2024-07-26

## 2024-07-26 RX ORDER — ALBUTEROL SULFATE 90 UG/1
AEROSOL, METERED RESPIRATORY (INHALATION)
Qty: 25.5 G | Refills: 1 | Status: SHIPPED | OUTPATIENT
Start: 2024-07-26

## 2024-07-26 RX ORDER — MONTELUKAST SODIUM 10 MG/1
10 TABLET ORAL DAILY
Qty: 90 TABLET | Refills: 2 | Status: SHIPPED | OUTPATIENT
Start: 2024-07-26

## 2024-07-29 DIAGNOSIS — J45.21 MILD INTERMITTENT ASTHMA WITH ACUTE EXACERBATION: ICD-10-CM

## 2024-07-29 RX ORDER — MONTELUKAST SODIUM 10 MG/1
10 TABLET ORAL DAILY
Qty: 90 TABLET | Refills: 2 | Status: SHIPPED | OUTPATIENT
Start: 2024-07-29

## 2024-07-29 NOTE — TELEPHONE ENCOUNTER
No care due was identified.  [facility] Embedded Care Due Messages. Reference number: [id number].   7/29/2024 1:25:01 PM CDT

## 2024-07-30 ENCOUNTER — PATIENT MESSAGE (OUTPATIENT)
Dept: FAMILY MEDICINE | Facility: CLINIC | Age: 59
End: 2024-07-30
Payer: MEDICARE

## 2024-07-30 ENCOUNTER — TELEPHONE (OUTPATIENT)
Dept: FAMILY MEDICINE | Facility: CLINIC | Age: 59
End: 2024-07-30
Payer: MEDICARE

## 2024-07-30 NOTE — TELEPHONE ENCOUNTER
----- Message from Hilario Yanes sent at 7/30/2024  2:50 PM CDT -----  Regarding: Sleep study order  Hello, This patient has been waiting to schedule a sleep study ordered June 28th. The note from pre-service shows invalid diagnosis. Not sure if you received that notification. Can you resubmit and take a look at what's needed. Patient has called several times. I can only schedule once it's authorized.    Thanks

## 2024-08-15 ENCOUNTER — OFFICE VISIT (OUTPATIENT)
Dept: FAMILY MEDICINE | Facility: CLINIC | Age: 59
End: 2024-08-15
Payer: MEDICARE

## 2024-08-15 DIAGNOSIS — I10 PRIMARY HYPERTENSION: ICD-10-CM

## 2024-08-15 DIAGNOSIS — Z87.891 QUIT SMOKING: ICD-10-CM

## 2024-08-15 DIAGNOSIS — J44.1 COPD WITH ACUTE EXACERBATION: Primary | ICD-10-CM

## 2024-08-15 PROCEDURE — 99214 OFFICE O/P EST MOD 30 MIN: CPT | Mod: 95,,, | Performed by: FAMILY MEDICINE

## 2024-08-15 PROCEDURE — 4010F ACE/ARB THERAPY RXD/TAKEN: CPT | Mod: CPTII,95,, | Performed by: FAMILY MEDICINE

## 2024-08-15 RX ORDER — PREDNISONE 50 MG/1
50 TABLET ORAL DAILY
Qty: 5 TABLET | Refills: 0 | Status: SHIPPED | OUTPATIENT
Start: 2024-08-15 | End: 2024-08-20

## 2024-08-15 RX ORDER — DOXYCYCLINE HYCLATE 100 MG
100 TABLET ORAL EVERY 12 HOURS
Qty: 20 TABLET | Refills: 0 | Status: SHIPPED | OUTPATIENT
Start: 2024-08-15 | End: 2024-08-25

## 2024-08-15 RX ORDER — IPRATROPIUM BROMIDE AND ALBUTEROL SULFATE 2.5; .5 MG/3ML; MG/3ML
3 SOLUTION RESPIRATORY (INHALATION) EVERY 6 HOURS PRN
Qty: 75 ML | Refills: 0 | Status: SHIPPED | OUTPATIENT
Start: 2024-08-15 | End: 2025-08-15

## 2024-08-15 NOTE — PROGRESS NOTES
Chief Complaint:  No chief complaint on file.      History of Present Illness:    Patient presents today  Complains of cough congestion wheezing yellow sputum but no fever going on for the past several days.  Been using albuterol nebulizer but not using Advair   Quit smoking many months back   Says blood pressure running okay.  Her oxygen saturation at home is running okay over 90 3-94    ROS:  Review of Systems   Constitutional:  Negative for fever.   HENT:  Positive for rhinorrhea. Negative for ear pain and sore throat.    Respiratory:  Positive for shortness of breath and wheezing.    Cardiovascular:  Negative for chest pain and leg swelling.   Gastrointestinal:  Negative for abdominal pain and vomiting.   Musculoskeletal:  Negative for neck pain.   Skin:  Negative for rash.   Neurological:  Positive for headaches.       Past Medical History:   Diagnosis Date    ADD (attention deficit disorder with hyperactivity)     Anxiety     Asthma     mild intermittent    COPD (chronic obstructive pulmonary disease)     Depression     Hypertension     PONV (postoperative nausea and vomiting)        Social History:  Social History     Socioeconomic History    Marital status:    Tobacco Use    Smoking status: Every Day     Current packs/day: 0.50     Average packs/day: 0.5 packs/day for 10.0 years (5.0 ttl pk-yrs)     Types: Cigarettes    Smokeless tobacco: Never   Substance and Sexual Activity    Alcohol use: No     Alcohol/week: 0.0 standard drinks of alcohol    Drug use: No    Sexual activity: Yes     Partners: Male     Birth control/protection: None, See Surgical Hx     Social Determinants of Health     Financial Resource Strain: Patient Declined (11/29/2023)    Overall Financial Resource Strain (CARDIA)     Difficulty of Paying Living Expenses: Patient declined   Food Insecurity: Patient Declined (11/29/2023)    Hunger Vital Sign     Worried About Running Out of Food in the Last Year: Patient declined     Ran Out  of Food in the Last Year: Patient declined   Transportation Needs: Patient Declined (11/29/2023)    PRAPARE - Transportation     Lack of Transportation (Medical): Patient declined     Lack of Transportation (Non-Medical): Patient declined   Physical Activity: Unknown (11/29/2023)    Exercise Vital Sign     Days of Exercise per Week: Patient declined   Stress: Patient Declined (11/29/2023)    [facility] [place] [facility] [employer] - Occupational Stress Questionnaire     Feeling of Stress : Patient declined   Housing Stability: Unknown (11/29/2023)    Housing Stability Vital Sign     Unable to Pay for Housing in the Last Year: Patient refused     Number of Places Lived in the Last Year: 1     Unstable Housing in the Last Year: Patient refused       Family History:   family history includes Coronary artery disease (age of onset: 38) in her father; Coronary artery disease (age of onset: 50) in her mother; Lung cancer in her mother.    Health Maintenance   Topic Date Due    Shingles Vaccine (1 of 2) Never done    Mammogram  03/11/2025    Pap Smear  11/30/2026    TETANUS VACCINE  10/29/2027    Lipid Panel  08/03/2028    Colorectal Cancer Screening  10/17/2028    Hepatitis C Screening  Completed       Exam:Physical      ]    There is no height or weight on file to calculate BMI.    Physical Exam      Assessment:      ICD-10-CM ICD-9-CM   1. COPD with acute exacerbation  J44.1 491.21   2. Quit smoking  Z87.891 V15.82   3. Primary hypertension  I10 401.9         Plan:    Diagnoses and all orders for this visit:    COPD with acute exacerbation    Quit smoking    Primary hypertension    Other orders  -     doxycycline (VIBRA-TABS) 100 MG tablet; Take 1 tablet (100 mg total) by mouth every 12 (twelve) hours. for 10 days  -     albuterol-ipratropium (DUO-NEB) 2.5 mg-0.5 mg/3 mL nebulizer solution; Take 3 mLs by nebulization every 6 (six) hours as needed for Wheezing. Rescue  -     predniSONE (DELTASONE) 50 MG Tab; Take 1  tablet (50 mg total) by mouth once daily. for 5 days    Hypertension good control   To go to the ED if pulse ox drops below 92 or heart rate goes to 130 or respiratory go rate about 30          No follow-ups on file.      Dima Her MD                Answers submitted by the patient for this visit:  Shortness of Breath Questionnaire (Submitted on 8/15/2024)  Chief Complaint: Shortness of breath  Chronicity: recurrent  Onset: 1 to 4 weeks ago  Frequency: daily  Progression since onset: waxing and waning  Episode duration: 6 Hours  hemoptysis: No  sputum production: Yes  PND: Yes  syncope: No  claudication: No  leg pain: No  orthopnea: Yes  coryza: Yes  swollen glands: No  Improvement on treatment: moderate  Risk factors for DVT/PE: no known risk factors  asthma: Yes  allergies: Yes  COPD: Yes  pneumonia: No  aspirin allergies: No  CAD: No  DVT: No  heart failure: No  PE: No  recent surgery: No  bronchiolitis: Yes  chronic lung disease: No

## 2024-09-20 NOTE — TELEPHONE ENCOUNTER
----- Message from Gem Woods MD sent at 1/25/2024 11:49 AM CST -----  Please schedule RATLH/BSO.  Needs pre-op visits and [facility] appt.  
[FreeTextEntry1] : This 56 year old male patient returns to office today for follow up care of his fungal toenail to right big toe. He states he has been applying Tolcyclen as instructed, he has run out. He states he sees improvement of the toe nail.

## 2024-12-09 DIAGNOSIS — T75.3XXD MOTION SICKNESS, SUBSEQUENT ENCOUNTER: ICD-10-CM

## 2024-12-09 RX ORDER — ONDANSETRON 4 MG/1
4 TABLET, ORALLY DISINTEGRATING ORAL EVERY 12 HOURS PRN
Qty: 20 TABLET | Refills: 2 | Status: SHIPPED | OUTPATIENT
Start: 2024-12-09

## 2024-12-09 RX ORDER — MECLIZINE HCL 12.5 MG 12.5 MG/1
12.5 TABLET ORAL 3 TIMES DAILY PRN
Qty: 30 TABLET | Refills: 0 | Status: SHIPPED | OUTPATIENT
Start: 2024-12-09

## 2024-12-09 NOTE — TELEPHONE ENCOUNTER
No care due was identified.  [facility] Embedded Care Due Messages. Reference number: [id number].   12/09/2024 3:23:05 PM CST

## 2024-12-09 NOTE — TELEPHONE ENCOUNTER
No care due was identified.  [facility] Embedded Care Due Messages. Reference number: [id number].   12/09/2024 3:20:58 PM CST

## 2024-12-13 DIAGNOSIS — F43.9 STRESS: ICD-10-CM

## 2024-12-13 NOTE — TELEPHONE ENCOUNTER
No care due was identified.  [facility] Embedded Care Due Messages. Reference number: [id number].   12/13/2024 3:33:43 PM CST

## 2024-12-14 RX ORDER — BUSPIRONE HYDROCHLORIDE 15 MG/1
15 TABLET ORAL 2 TIMES DAILY
Qty: 180 TABLET | Refills: 1 | Status: SHIPPED | OUTPATIENT
Start: 2024-12-14

## 2025-01-19 DIAGNOSIS — F43.9 STRESS: ICD-10-CM

## 2025-01-19 DIAGNOSIS — N95.1 MENOPAUSAL HOT FLUSHES: ICD-10-CM

## 2025-01-19 DIAGNOSIS — I10 HTN, GOAL BELOW 130/80: ICD-10-CM

## 2025-01-19 NOTE — TELEPHONE ENCOUNTER
No care due was identified.  [facility] Embedded Care Due Messages. Reference number: [id number].   1/19/2025 6:58:58 AM CST

## 2025-01-21 NOTE — TELEPHONE ENCOUNTER
Refill Routing Note   Medication(s) are not appropriate for processing by [facility] for the following reason(s):        Drug-drug interaction  Drug-disease interaction    [facility] action(s):  Defer        Medication Therapy Plan: Drug-Disease: venlafaxine and HTN, goal below 130/80; Hypertension not at goal; DEFER. Drug-Drug: fluticasone-salmeterol 250-50 mcg/dose and propranoloL; DEFER      Appointments  past 12m or future 3m with PCP    Date Provider   Last Visit   8/15/2024 Dima Her MD   Next Visit   Visit date not found Dima Her MD   ED visits in past 90 days: 0        Note composed:6:30 AM 01/21/2025

## 2025-01-21 NOTE — TELEPHONE ENCOUNTER
Refill Routing Note   Medication(s) are not appropriate for processing by [facility] for the following reason(s):        Outside of protocol    [facility] action(s):  Route        Medication Therapy Plan: Active tobacco use      Appointments  past 12m or future 3m with PCP    Date Provider   Last Visit   3/11/2024 Gem Woods MD   Next Visit   Visit date not found Gem Woods MD   ED visits in past 90 days: 0        Note composed:5:43 AM 01/21/2025

## 2025-01-22 RX ORDER — VENLAFAXINE HYDROCHLORIDE 75 MG/1
75 CAPSULE, EXTENDED RELEASE ORAL
Qty: 90 CAPSULE | Refills: 1 | Status: SHIPPED | OUTPATIENT
Start: 2025-01-22

## 2025-01-22 RX ORDER — ESTRADIOL 1 MG/1
1 TABLET ORAL
Qty: 90 TABLET | Refills: 3 | Status: SHIPPED | OUTPATIENT
Start: 2025-01-22

## 2025-01-22 RX ORDER — PROPRANOLOL HYDROCHLORIDE 80 MG/1
80 CAPSULE, EXTENDED RELEASE ORAL
Qty: 90 CAPSULE | Refills: 1 | Status: SHIPPED | OUTPATIENT
Start: 2025-01-22

## 2025-03-24 DIAGNOSIS — Z00.00 ENCOUNTER FOR MEDICARE ANNUAL WELLNESS EXAM: ICD-10-CM

## 2025-04-04 DIAGNOSIS — T75.3XXD MOTION SICKNESS, SUBSEQUENT ENCOUNTER: ICD-10-CM

## 2025-04-04 DIAGNOSIS — J45.21 MILD INTERMITTENT ASTHMA WITH ACUTE EXACERBATION: ICD-10-CM

## 2025-04-04 RX ORDER — IPRATROPIUM BROMIDE AND ALBUTEROL SULFATE 2.5; .5 MG/3ML; MG/3ML
3 SOLUTION RESPIRATORY (INHALATION) EVERY 6 HOURS PRN
Qty: 75 ML | Refills: 0 | Status: SHIPPED | OUTPATIENT
Start: 2025-04-04 | End: 2026-04-04

## 2025-04-04 RX ORDER — ONDANSETRON 4 MG/1
4 TABLET, ORALLY DISINTEGRATING ORAL EVERY 12 HOURS PRN
Qty: 20 TABLET | Refills: 2 | Status: SHIPPED | OUTPATIENT
Start: 2025-04-04

## 2025-04-04 RX ORDER — ALBUTEROL SULFATE 90 UG/1
INHALANT RESPIRATORY (INHALATION)
Qty: 25.5 G | Refills: 1 | Status: SHIPPED | OUTPATIENT
Start: 2025-04-04

## 2025-04-04 NOTE — TELEPHONE ENCOUNTER
No care due was identified.  [facility] Embedded Care Due Messages. Reference number: [id number].   4/04/2025 3:19:16 PM CDT

## 2025-04-04 NOTE — TELEPHONE ENCOUNTER
No care due was identified.  [facility] Embedded Care Due Messages. Reference number: [id number].   4/04/2025 9:54:11 AM CDT

## 2025-04-04 NOTE — TELEPHONE ENCOUNTER
Care Due:                  Date            Visit Type   Department     Provider  --------------------------------------------------------------------------------                                ESTABLISHED                              PATIENT -    [facility]  Last Visit: 08-      [facility]      MEDICINE       Dima Her  Next Visit: None Scheduled  None         None Found                                                            Last  Test          Frequency    Reason                     Performed    Due Date  --------------------------------------------------------------------------------    CMP.........  12 months..  furosemide, valsartan,     06- 06-                             venlafaxine..............    [facility] [employer] Embedded Care Due Messages. Reference number: [id number].   4/04/2025 9:48:15 AM CDT

## 2025-04-04 NOTE — TELEPHONE ENCOUNTER
Refill Routing Note   Medication(s) are not appropriate for processing by [facility] for the following reason(s):        Other  Med affordability    [facility] action(s):  Route             Appointments  past 12m or future 3m with PCP    Date Provider   Last Visit   8/15/2024 Dima Her MD   Next Visit   Visit date not found Dima Her MD   ED visits in past 90 days: 0        Note composed:5:16 PM 04/04/2025

## 2025-04-05 NOTE — TELEPHONE ENCOUNTER
Refill Routing Note   Medication(s) are not appropriate for processing by [facility] for the following reason(s):        Clarification of medication (Rx) details    [facility] action(s):  Defer      Medication Therapy Plan: Pharmacy comment: Alternative Requested:CAN WE CHANGE TO PROVENTIL?      Appointments  past 12m or future 3m with PCP    Date Provider   Last Visit   8/15/2024 Dima Her MD   Next Visit   Visit date not found Dima Her MD   ED visits in past 90 days: 0        Note composed:10:41 PM 04/04/2025

## 2025-04-06 RX ORDER — ALBUTEROL SULFATE 90 UG/1
2 INHALANT RESPIRATORY (INHALATION) EVERY 6 HOURS PRN
Qty: 25.5 G | Refills: 1 | Status: SHIPPED | OUTPATIENT
Start: 2025-04-06

## 2025-04-06 RX ORDER — IPRATROPIUM BROMIDE 0.5 MG/2.5ML
SOLUTION RESPIRATORY (INHALATION)
Refills: 0 | OUTPATIENT
Start: 2025-04-06

## 2025-04-07 RX ORDER — IPRATROPIUM BROMIDE 0.5 MG/2.5ML
SOLUTION RESPIRATORY (INHALATION)
Refills: 0 | OUTPATIENT
Start: 2025-04-07

## 2025-04-07 RX ORDER — IPRATROPIUM BROMIDE AND ALBUTEROL SULFATE 2.5; .5 MG/3ML; MG/3ML
3 SOLUTION RESPIRATORY (INHALATION) EVERY 6 HOURS PRN
Qty: 75 ML | Refills: 2 | Status: SHIPPED | OUTPATIENT
Start: 2025-04-07 | End: 2026-04-07

## 2025-04-07 NOTE — TELEPHONE ENCOUNTER
No care due was identified.  [facility] Embedded Care Due Messages. Reference number: [id number].   4/07/2025 6:28:34 PM CDT

## 2025-04-07 NOTE — TELEPHONE ENCOUNTER
No care due was identified.  [facility] Embedded Care Due Messages. Reference number: [id number].   4/07/2025 8:22:43 AM CDT

## 2025-04-08 RX ORDER — IPRATROPIUM BROMIDE 0.5 MG/2.5ML
SOLUTION RESPIRATORY (INHALATION)
Refills: 0 | OUTPATIENT
Start: 2025-04-08

## 2025-04-08 NOTE — TELEPHONE ENCOUNTER
The refill request the pharmacy for incomplete, pharmacy needs to resend request with correct sig request.

## 2025-04-08 NOTE — TELEPHONE ENCOUNTER
Refill Routing Note   Medication(s) are not appropriate for processing by [facility] for the following reason(s):        New or recently adjusted medication  Other: Duo-Neb not covered, requesting new rx for Atrovent    [facility] action(s):  Defer        Medication Therapy Plan: Pharmacy is requesting an alternative. Pharmacy states Duo-Neb is NOT COVERED, requesting for a new prescription for ATROVENT 0.02% neb solution as replacement      Appointments  past 12m or future 3m with PCP    Date Provider   Last Visit   8/15/2024 Dima Her MD   Next Visit   Visit date not found Dima Her MD   ED visits in past 90 days: 0        Note composed:11:51 AM 04/08/2025

## 2025-04-16 DIAGNOSIS — I10 HYPERTENSION, UNSPECIFIED TYPE: ICD-10-CM

## 2025-04-16 RX ORDER — VALSARTAN 320 MG/1
320 TABLET ORAL DAILY
Qty: 90 TABLET | Refills: 0 | Status: SHIPPED | OUTPATIENT
Start: 2025-04-16

## 2025-04-16 NOTE — TELEPHONE ENCOUNTER
No care due was identified.  [facility] Embedded Care Due Messages. Reference number: [id number].   4/16/2025 12:04:21 AM CDT

## 2025-04-16 NOTE — TELEPHONE ENCOUNTER
Refill Decision Note   Chasity Blake  is requesting a refill authorization.  Brief Assessment and Rationale for Refill:  Approve     Medication Therapy Plan:         Comments:     Note composed:7:54 AM 04/16/2025             unk

## 2025-04-17 DIAGNOSIS — I10 HYPERTENSION, UNSPECIFIED TYPE: ICD-10-CM

## 2025-04-17 DIAGNOSIS — K21.9 GASTROESOPHAGEAL REFLUX DISEASE WITHOUT ESOPHAGITIS: ICD-10-CM

## 2025-04-17 RX ORDER — NIFEDIPINE 30 MG/1
30 TABLET, EXTENDED RELEASE ORAL
Qty: 90 TABLET | Refills: 0 | Status: SHIPPED | OUTPATIENT
Start: 2025-04-17

## 2025-04-17 RX ORDER — OMEPRAZOLE 40 MG/1
40 CAPSULE, DELAYED RELEASE ORAL EVERY MORNING
Qty: 90 CAPSULE | Refills: 0 | Status: SHIPPED | OUTPATIENT
Start: 2025-04-17

## 2025-04-17 NOTE — TELEPHONE ENCOUNTER
No care due was identified.  [facility] Embedded Care Due Messages. Reference number: [id number].   4/17/2025 12:05:49 AM CDT

## 2025-04-17 NOTE — TELEPHONE ENCOUNTER
No care due was identified.  [facility] Embedded Care Due Messages. Reference number: [id number].   4/17/2025 12:05:46 AM CDT

## 2025-04-17 NOTE — TELEPHONE ENCOUNTER
Refill Decision Note   Chasity Blake  is requesting a refill authorization.  Brief Assessment and Rationale for Refill:  Approve     Medication Therapy Plan:         Comments:     Note composed:6:27 AM 04/17/2025

## 2025-06-30 NOTE — PROGRESS NOTES
"Subjective:    Patient ID:  Chasity Blake is a 53 y.o. female who presents for evaluation of Palpitations      53 yoF here for evaluation of palpitations. She had palpitations for the past few months, worse over the past few weeks. NM Stress showed normal EF and no ischemia. Holter showed 0.4% PVCs, 0.1% PACs. "NSVT 4 beats" was actually PACs with aberrancy. No sustained arrhythmias. She has started propranolol with good results. She has worse symptoms when she works and/or has increased stress levels. Both parents with CABG in their 50s, both smoked since teenage years. She has a ~10 pack year history. No syncope, near syncope.     NM Stress 1/19:  Nuclear Quantitative Functional Analysis:   LVEF: 65 %  Impression: NORMAL MYOCARDIAL PERFUSION  1. The perfusion scan is free of evidence for myocardial ischemia or injury.   2. There is a mild intensity fixed defect in the anterior wall of the left ventricle, secondary to breast attenuation.   3. Resting wall motion is physiologic.   4. Resting LV function is normal.   5. The ventricular volumes are normal at rest and stress.   6. The extracardiac distribution of radioactivity is normal.     Past Medical History:  No date: ADD (attention deficit disorder with hyperactivity)  No date: Anxiety  No date: Asthma      Comment:  mild intermittent  No date: Depression    Past Surgical History:  5/11/2015: ABLATION-ENDOMETRIAL; N/A      Comment:  Performed by Gem Woods MD at [facility] OR  No date: BELT ABDOMINOPLASTY  No date: Breast Augmentation  No date: BREAST BIOPSY; Left      Comment:  2015. benign  No date: BREAST SURGERY  No date: COSMETIC SURGERY  5/11/2015: [id number]      Comment:  Performed by Gem Woods MD at [facility] OR  No date: TONSILLECTOMY  No date: TUBAL LIGATION    Social History    Socioeconomic History      Marital status:       Spouse name: Not on file      Number of children: Not on file      Years of education: Not on file   "    Highest education level: Not on file    Social Needs      Financial resource strain: Not on file      Food insecurity - worry: Not on file      Food insecurity - inability: Not on file      Transportation needs - medical: Not on file      Transportation needs - non-medical: Not on file    Occupational History      Not on file    Tobacco Use      Smoking status: Current Every Day Smoker        Packs/day: 0.50        Years: 10.00        Pack years: 5        Types: Cigarettes      Smokeless tobacco: Never Used    Substance and Sexual Activity      Alcohol use: No        Alcohol/week: 0.0 oz      Drug use: No      Sexual activity: Yes        Partners: Male        Birth control/protection: None    Other Topics      Concerns:        Not on file    Social History Narrative      Not on file    Review of patient's family history indicates:  Problem: Lung cancer      Relation: Mother          Age of Onset: (Not Specified)  Problem: Coronary artery disease      Relation: Mother          Age of Onset: 50  Problem: Coronary artery disease      Relation: Father          Age of Onset: 38  Problem: Breast cancer      Relation: Neg Hx          Age of Onset: (Not Specified)  Problem: Colon cancer      Relation: Neg Hx          Age of Onset: (Not Specified)  Problem: Ovarian cancer      Relation: Neg Hx          Age of Onset: (Not Specified)          Review of Systems   Constitution: Negative.   HENT: Negative.    Eyes: Negative.    Cardiovascular: Positive for dyspnea on exertion and palpitations. Negative for chest pain, leg swelling, near-syncope and syncope.   Respiratory: Negative.  Negative for shortness of breath.    Endocrine: Negative.    Hematologic/Lymphatic: Negative.    Skin: Negative.    Musculoskeletal: Negative.    Gastrointestinal: Negative.    Genitourinary: Negative.    Neurological: Negative.  Negative for dizziness and light-headedness.   Psychiatric/Behavioral: Negative.    Allergic/Immunologic: Negative.          Objective:    Physical Exam   Constitutional: She is oriented to person, place, and time. She appears well-developed and well-nourished. No distress.   HENT:   Head: Normocephalic and atraumatic.   Eyes: EOM are normal. Pupils are equal, round, and reactive to light.   Neck: Normal range of motion. No JVD present. No thyromegaly present.   Cardiovascular: Normal rate, regular rhythm, S1 normal, S2 normal and normal heart sounds. PMI is not displaced. Exam reveals no gallop and no friction rub.   No murmur heard.  Pulmonary/Chest: Effort normal and breath sounds normal. No respiratory distress. She has no wheezes. She has no rales.   Abdominal: Soft. Bowel sounds are normal. She exhibits no distension. There is no tenderness. There is no rebound and no guarding.   Musculoskeletal: Normal range of motion. She exhibits no edema or tenderness.   Neurological: She is alert and oriented to person, place, and time. No cranial nerve deficit.   Skin: Skin is warm and dry. No rash noted. No erythema.   Psychiatric: She has a normal mood and affect. Her behavior is normal. Judgment and thought content normal.   Vitals reviewed.    ECG: NSR nl MN, QRS, QTc        Assessment:       1. PVCs (premature ventricular contractions)         Plan:       53 yoF palpitations here for evaluation. She has normal EF and no ischemia. PVC burden is very low. Rare, brief NSVT. Symptoms improved on propranolol. I would continue this therapy. No need for further studies. RTC as needed.                none

## 2025-07-02 DIAGNOSIS — I10 HYPERTENSION NOT AT GOAL: ICD-10-CM

## 2025-07-16 DIAGNOSIS — I10 HTN, GOAL BELOW 130/80: ICD-10-CM

## 2025-07-16 DIAGNOSIS — F43.9 STRESS: ICD-10-CM

## 2025-07-16 RX ORDER — VENLAFAXINE HYDROCHLORIDE 75 MG/1
75 CAPSULE, EXTENDED RELEASE ORAL
Qty: 90 CAPSULE | Refills: 0 | Status: SHIPPED | OUTPATIENT
Start: 2025-07-16

## 2025-07-16 RX ORDER — PROPRANOLOL HYDROCHLORIDE 80 MG/1
80 CAPSULE, EXTENDED RELEASE ORAL
Qty: 90 CAPSULE | Refills: 0 | Status: SHIPPED | OUTPATIENT
Start: 2025-07-16

## 2025-07-16 NOTE — TELEPHONE ENCOUNTER
Care Due:                  Date            Visit Type   Department     Provider  --------------------------------------------------------------------------------                                ESTABLISHED                              PATIENT -    [facility]  Last Visit: 08-      [employer]      MEDICINE       Dima Her  Next Visit: None Scheduled  None         None Found                                                            Last  Test          Frequency    Reason                     Performed    Due Date  --------------------------------------------------------------------------------    Office Visit  12 months..  busPIR[facility]................  08-   08-    CMP.........  12 months..  furosemide, valsartan,     06- 06-                             venlafaxine..............    [employer] Embedded Care Due Messages. Reference number: [id number].   7/16/2025 12:03:12 AM CDT

## 2025-07-16 NOTE — TELEPHONE ENCOUNTER
Refill Routing Note   Medication(s) are not appropriate for processing by [facility] for the following reason(s):        Required labs outdated  Required vitals outdated    [facility] action(s):  Defer   Requires appointment : Yes     Requires labs : Yes             Appointments  past 12m or future 3m with PCP    Date Provider   Last Visit   8/15/2024 Dima Her MD   Next Visit   Visit date not found Dima Her MD   ED visits in past 90 days: 0        Note composed:6:17 AM 07/16/2025

## 2025-07-17 DIAGNOSIS — I10 HYPERTENSION, UNSPECIFIED TYPE: ICD-10-CM

## 2025-07-17 DIAGNOSIS — K21.9 GASTROESOPHAGEAL REFLUX DISEASE WITHOUT ESOPHAGITIS: ICD-10-CM

## 2025-07-17 RX ORDER — NIFEDIPINE 30 MG/1
30 TABLET, EXTENDED RELEASE ORAL
Qty: 90 TABLET | Refills: 0 | OUTPATIENT
Start: 2025-07-17

## 2025-07-17 RX ORDER — VALSARTAN 320 MG/1
320 TABLET ORAL
Qty: 90 TABLET | Refills: 0 | OUTPATIENT
Start: 2025-07-17

## 2025-07-17 RX ORDER — OMEPRAZOLE 40 MG/1
40 CAPSULE, DELAYED RELEASE ORAL EVERY MORNING
Qty: 90 CAPSULE | Refills: 0 | Status: SHIPPED | OUTPATIENT
Start: 2025-07-17

## 2025-07-17 RX ORDER — NIFEDIPINE 30 MG/1
30 TABLET, EXTENDED RELEASE ORAL DAILY
Qty: 20 TABLET | Refills: 0 | Status: SHIPPED | OUTPATIENT
Start: 2025-07-17

## 2025-07-17 RX ORDER — VALSARTAN 320 MG/1
320 TABLET ORAL DAILY
Qty: 20 TABLET | Refills: 0 | Status: SHIPPED | OUTPATIENT
Start: 2025-07-17

## 2025-07-17 NOTE — TELEPHONE ENCOUNTER
No care due was identified.  [facility] Embedded Care Due Messages. Reference number: [id number].   7/17/2025 12:06:09 AM CDT

## 2025-07-17 NOTE — TELEPHONE ENCOUNTER
Refill Routing Note   Medication(s) are not appropriate for processing by [facility] for the following reason(s):        Required labs outdated  Required vitals outdated    [facility] action(s):  Approve  Defer             Appointments  past 12m or future 3m with PCP    Date Provider   Last Visit   8/15/2024 Dima Her MD   Next Visit   Visit date not found Dima Her MD   ED visits in past 90 days: 0        Note composed:12:45 AM 07/17/2025

## 2025-08-09 DIAGNOSIS — J45.21 MILD INTERMITTENT ASTHMA WITH ACUTE EXACERBATION: ICD-10-CM

## 2025-08-11 RX ORDER — MONTELUKAST SODIUM 10 MG/1
10 TABLET ORAL
Qty: 90 TABLET | Refills: 2 | Status: SHIPPED | OUTPATIENT
Start: 2025-08-11

## 2025-08-29 ENCOUNTER — PATIENT MESSAGE (OUTPATIENT)
Dept: ADMINISTRATIVE | Facility: HOSPITAL | Age: 60
End: 2025-08-29
Payer: MEDICARE

## (undated) DEVICE — ADHESIVE DERMABOND ADVANCED

## (undated) DEVICE — ELECTRODE REM PLYHSV RETURN 9

## (undated) DEVICE — OCCLUDER COLPO-PNEUMO STERILE

## (undated) DEVICE — COVER TIP CURVED SCISSORS XI

## (undated) DEVICE — SOL NACL IRR 1000ML BTL

## (undated) DEVICE — SUT MONOCRYL 4-0 PS-1 UND

## (undated) DEVICE — TROCAR ENDOPATH XCEL 5X100MM

## (undated) DEVICE — NDL PNEUMO INSUFFLATI 120MM

## (undated) DEVICE — SYR 50CC LL

## (undated) DEVICE — DRAPE COLUMN DAVINCI XI

## (undated) DEVICE — SOL NORMAL USPCA 0.9%

## (undated) DEVICE — BLADE SURG STAINLESS STEEL #11

## (undated) DEVICE — SYR LUER-LOCK STERILE 3ML

## (undated) DEVICE — TIP RUMI MANIPULATOR LAVENDER

## (undated) DEVICE — TRAY CATH FOL SIL URIMTR 16FR

## (undated) DEVICE — SEAL UNIVERSAL 5MM-8MM XI

## (undated) DEVICE — SYR LUER LOCK STERILE 10ML

## (undated) DEVICE — TOWEL OR DISP STRL BLUE 4/PK

## (undated) DEVICE — SUT VICRYL PLUS 0 CT1 36IN

## (undated) DEVICE — PACK BASIC SETUP SC BR

## (undated) DEVICE — DRAPE LAVH SURG 109X109X100IN

## (undated) DEVICE — GOWN POLY REINF BRTH SLV XL

## (undated) DEVICE — DRAPE STERI LONG

## (undated) DEVICE — DRAPE UINDERBUT GRAD PCH

## (undated) DEVICE — KIT ANTIFOG W/SPONG & FLUID

## (undated) DEVICE — OBTURATOR BLADELESS 8MM XI CLR

## (undated) DEVICE — BLADE SURG #15 CARBON STEEL

## (undated) DEVICE — TRAY SKIN SCRUB WET 4 COMPART

## (undated) DEVICE — BLADE SURG CARBON STEEL #10

## (undated) DEVICE — KIT AIRSEAL CANN CAP STD 8MM

## (undated) DEVICE — GLOVE SENSICARE PI ALOE 6

## (undated) DEVICE — COVER LIGHT HANDLE 80/CA

## (undated) DEVICE — IRRIGATOR ENDOSCOPY DISP.

## (undated) DEVICE — COVER PROXIMA MAYO STAND

## (undated) DEVICE — TUBING MEDI-VAC 20FT .25IN

## (undated) DEVICE — DRAPE ARM DAVINCI XI

## (undated) DEVICE — APPLICATOR CHLORAPREP ORN 26ML